# Patient Record
Sex: MALE | Race: WHITE | NOT HISPANIC OR LATINO | Employment: FULL TIME | URBAN - METROPOLITAN AREA
[De-identification: names, ages, dates, MRNs, and addresses within clinical notes are randomized per-mention and may not be internally consistent; named-entity substitution may affect disease eponyms.]

---

## 2017-01-05 ENCOUNTER — ANESTHESIA EVENT (OUTPATIENT)
Dept: GASTROENTEROLOGY | Facility: HOSPITAL | Age: 72
End: 2017-01-05
Payer: COMMERCIAL

## 2017-01-06 ENCOUNTER — HOSPITAL ENCOUNTER (OUTPATIENT)
Facility: HOSPITAL | Age: 72
Setting detail: OUTPATIENT SURGERY
Discharge: HOME | End: 2017-01-06
Attending: COLON & RECTAL SURGERY | Admitting: COLON & RECTAL SURGERY
Payer: COMMERCIAL

## 2017-01-06 ENCOUNTER — ANESTHESIA (OUTPATIENT)
Dept: GASTROENTEROLOGY | Facility: HOSPITAL | Age: 72
End: 2017-01-06
Payer: COMMERCIAL

## 2017-01-06 ENCOUNTER — GENERIC CONVERSION - ENCOUNTER (OUTPATIENT)
Dept: OTHER | Facility: OTHER | Age: 72
End: 2017-01-06

## 2017-01-06 VITALS
WEIGHT: 255 LBS | HEART RATE: 57 BPM | HEIGHT: 67 IN | TEMPERATURE: 98.1 F | DIASTOLIC BLOOD PRESSURE: 58 MMHG | OXYGEN SATURATION: 95 % | SYSTOLIC BLOOD PRESSURE: 127 MMHG | RESPIRATION RATE: 20 BRPM | BODY MASS INDEX: 40.02 KG/M2

## 2017-01-06 RX ORDER — SODIUM CHLORIDE 9 MG/ML
125 INJECTION, SOLUTION INTRAVENOUS CONTINUOUS
Status: DISCONTINUED | OUTPATIENT
Start: 2017-01-06 | End: 2017-01-06 | Stop reason: HOSPADM

## 2017-01-06 RX ORDER — NIFEDIPINE 60 MG/1
60 TABLET, FILM COATED, EXTENDED RELEASE ORAL DAILY
COMMUNITY
End: 2018-07-11 | Stop reason: SDUPTHER

## 2017-01-06 RX ORDER — PROPOFOL 10 MG/ML
INJECTION, EMULSION INTRAVENOUS AS NEEDED
Status: DISCONTINUED | OUTPATIENT
Start: 2017-01-06 | End: 2017-01-06 | Stop reason: SURG

## 2017-01-06 RX ORDER — ONDANSETRON 2 MG/ML
4 INJECTION INTRAMUSCULAR; INTRAVENOUS EVERY 6 HOURS PRN
Status: DISCONTINUED | OUTPATIENT
Start: 2017-01-06 | End: 2017-01-06 | Stop reason: HOSPADM

## 2017-01-06 RX ORDER — ALLOPURINOL 100 MG/1
300 TABLET ORAL DAILY
COMMUNITY
End: 2018-07-11 | Stop reason: SDUPTHER

## 2017-01-06 RX ORDER — LISINOPRIL AND HYDROCHLOROTHIAZIDE 12.5; 1 MG/1; MG/1
1 TABLET ORAL 2 TIMES DAILY
COMMUNITY
End: 2017-03-10

## 2017-01-06 RX ADMIN — PROPOFOL 50 MG: 10 INJECTION, EMULSION INTRAVENOUS at 08:15

## 2017-01-06 RX ADMIN — PROPOFOL 70 MG: 10 INJECTION, EMULSION INTRAVENOUS at 08:08

## 2017-01-06 RX ADMIN — PROPOFOL 30 MG: 10 INJECTION, EMULSION INTRAVENOUS at 08:09

## 2017-01-06 RX ADMIN — PROPOFOL 50 MG: 10 INJECTION, EMULSION INTRAVENOUS at 08:10

## 2017-01-06 RX ADMIN — SODIUM CHLORIDE 125 ML/HR: 0.9 INJECTION, SOLUTION INTRAVENOUS at 07:10

## 2017-01-06 RX ADMIN — PROPOFOL 50 MG: 10 INJECTION, EMULSION INTRAVENOUS at 08:27

## 2017-01-06 RX ADMIN — PROPOFOL 50 MG: 10 INJECTION, EMULSION INTRAVENOUS at 08:17

## 2017-01-06 RX ADMIN — PROPOFOL 50 MG: 10 INJECTION, EMULSION INTRAVENOUS at 08:23

## 2017-01-06 RX ADMIN — PROPOFOL 50 MG: 10 INJECTION, EMULSION INTRAVENOUS at 08:33

## 2017-01-27 ENCOUNTER — ALLSCRIPTS OFFICE VISIT (OUTPATIENT)
Dept: OTHER | Facility: OTHER | Age: 72
End: 2017-01-27

## 2017-02-21 ENCOUNTER — ALLSCRIPTS OFFICE VISIT (OUTPATIENT)
Dept: OTHER | Facility: OTHER | Age: 72
End: 2017-02-21

## 2017-02-21 ENCOUNTER — TRANSCRIBE ORDERS (OUTPATIENT)
Dept: ADMINISTRATIVE | Facility: HOSPITAL | Age: 72
End: 2017-02-21

## 2017-02-21 DIAGNOSIS — R10.31 PAIN IN THE GROIN, RIGHT: Primary | ICD-10-CM

## 2017-02-21 DIAGNOSIS — N28.9 UNSPECIFIED DISORDER OF KIDNEY AND URETER: ICD-10-CM

## 2017-02-21 DIAGNOSIS — R10.30 LOWER ABDOMINAL PAIN: ICD-10-CM

## 2017-02-24 ENCOUNTER — HOSPITAL ENCOUNTER (OUTPATIENT)
Dept: CT IMAGING | Facility: HOSPITAL | Age: 72
Discharge: HOME | End: 2017-02-24
Attending: SURGERY
Payer: COMMERCIAL

## 2017-02-24 DIAGNOSIS — R10.31 PAIN IN THE GROIN, RIGHT: ICD-10-CM

## 2017-02-24 PROCEDURE — 74176 CT ABD & PELVIS W/O CONTRAST: CPT

## 2017-03-07 ENCOUNTER — ALLSCRIPTS OFFICE VISIT (OUTPATIENT)
Dept: OTHER | Facility: OTHER | Age: 72
End: 2017-03-07

## 2017-03-10 ENCOUNTER — APPOINTMENT (OUTPATIENT)
Dept: PREADMISSION TESTING | Facility: HOSPITAL | Age: 72
End: 2017-03-10
Payer: COMMERCIAL

## 2017-03-10 ENCOUNTER — HOSPITAL ENCOUNTER (OUTPATIENT)
Dept: NON INVASIVE DIAGNOSTICS | Facility: HOSPITAL | Age: 72
Discharge: HOME | End: 2017-03-10
Attending: SURGERY
Payer: COMMERCIAL

## 2017-03-10 ENCOUNTER — ANESTHESIA EVENT (OUTPATIENT)
Dept: PERIOP | Facility: HOSPITAL | Age: 72
End: 2017-03-10
Payer: COMMERCIAL

## 2017-03-10 ENCOUNTER — APPOINTMENT (OUTPATIENT)
Dept: LAB | Facility: HOSPITAL | Age: 72
End: 2017-03-10
Attending: SURGERY
Payer: COMMERCIAL

## 2017-03-10 ENCOUNTER — TRANSCRIBE ORDERS (OUTPATIENT)
Dept: ADMINISTRATIVE | Facility: HOSPITAL | Age: 72
End: 2017-03-10

## 2017-03-10 VITALS
HEART RATE: 75 BPM | HEIGHT: 68 IN | WEIGHT: 266.4 LBS | BODY MASS INDEX: 40.38 KG/M2 | RESPIRATION RATE: 18 BRPM | DIASTOLIC BLOOD PRESSURE: 60 MMHG | TEMPERATURE: 97.1 F | SYSTOLIC BLOOD PRESSURE: 130 MMHG

## 2017-03-10 DIAGNOSIS — K40.90 INGUINAL HERNIA WITHOUT OBSTRUCTION OR GANGRENE, RECURRENCE NOT SPECIFIED, UNSPECIFIED LATERALITY: ICD-10-CM

## 2017-03-10 DIAGNOSIS — Z01.818 OTHER SPECIFIED PRE-OPERATIVE EXAMINATION: ICD-10-CM

## 2017-03-10 DIAGNOSIS — K42.9 UMBILICAL HERNIA WITHOUT OBSTRUCTION OR GANGRENE: ICD-10-CM

## 2017-03-10 DIAGNOSIS — Z01.818 OTHER SPECIFIED PRE-OPERATIVE EXAMINATION: Primary | ICD-10-CM

## 2017-03-10 LAB
ANION GAP SERPL CALCULATED.3IONS-SCNC: 8 MMOL/L (ref 4–13)
ATRIAL RATE: 66 BPM
ATRIAL RATE: 69 BPM
BASOPHILS # BLD AUTO: 0.03 THOUSANDS/ΜL (ref 0–0.1)
BASOPHILS NFR BLD AUTO: 0 % (ref 0–1)
BILIRUB UR QL STRIP: NEGATIVE
BUN SERPL-MCNC: 41 MG/DL (ref 5–25)
CALCIUM SERPL-MCNC: 9.4 MG/DL (ref 8.3–10.1)
CHLORIDE SERPL-SCNC: 103 MMOL/L (ref 100–108)
CLARITY UR: CLEAR
CO2 SERPL-SCNC: 25 MMOL/L (ref 21–32)
COLOR UR: YELLOW
CREAT SERPL-MCNC: 1.6 MG/DL (ref 0.6–1.3)
EOSINOPHIL # BLD AUTO: 0.31 THOUSAND/ΜL (ref 0–0.61)
EOSINOPHIL NFR BLD AUTO: 5 % (ref 0–6)
ERYTHROCYTE [DISTWIDTH] IN BLOOD BY AUTOMATED COUNT: 12.9 % (ref 11.6–15.1)
GFR SERPL CREATININE-BSD FRML MDRD: 42.7 ML/MIN/1.73SQ M
GLUCOSE SERPL-MCNC: 113 MG/DL (ref 65–140)
GLUCOSE UR STRIP-MCNC: NEGATIVE MG/DL
HCT VFR BLD AUTO: 40.2 % (ref 36.5–49.3)
HGB BLD-MCNC: 14.1 G/DL (ref 12–17)
HGB UR QL STRIP.AUTO: NEGATIVE
KETONES UR STRIP-MCNC: NEGATIVE MG/DL
LEUKOCYTE ESTERASE UR QL STRIP: NEGATIVE
LYMPHOCYTES # BLD AUTO: 2.56 THOUSANDS/ΜL (ref 0.6–4.47)
LYMPHOCYTES NFR BLD AUTO: 37 % (ref 14–44)
MCH RBC QN AUTO: 35 PG (ref 26.8–34.3)
MCHC RBC AUTO-ENTMCNC: 35.1 G/DL (ref 31.4–37.4)
MCV RBC AUTO: 100 FL (ref 82–98)
MONOCYTES # BLD AUTO: 0.97 THOUSAND/ΜL (ref 0.17–1.22)
MONOCYTES NFR BLD AUTO: 14 % (ref 4–12)
NEUTROPHILS # BLD AUTO: 3.04 THOUSANDS/ΜL (ref 1.85–7.62)
NEUTS SEG NFR BLD AUTO: 44 % (ref 43–75)
NITRITE UR QL STRIP: NEGATIVE
NRBC BLD AUTO-RTO: 0 /100 WBCS
P AXIS: 68 DEGREES
PH UR STRIP.AUTO: 5.5 [PH] (ref 4.5–8)
PLATELET # BLD AUTO: 145 THOUSANDS/UL (ref 149–390)
PMV BLD AUTO: 11.8 FL (ref 8.9–12.7)
POTASSIUM SERPL-SCNC: 4.6 MMOL/L (ref 3.5–5.3)
PROT UR STRIP-MCNC: NEGATIVE MG/DL
QRS AXIS: 4 DEGREES
QRS AXIS: 5 DEGREES
QRSD INTERVAL: 78 MS
QRSD INTERVAL: 86 MS
QT INTERVAL: 378 MS
QT INTERVAL: 388 MS
QTC INTERVAL: 396 MS
QTC INTERVAL: 415 MS
RBC # BLD AUTO: 4.03 MILLION/UL (ref 3.88–5.62)
SODIUM SERPL-SCNC: 136 MMOL/L (ref 136–145)
SP GR UR STRIP.AUTO: 1.01 (ref 1–1.03)
T WAVE AXIS: -6 DEGREES
T WAVE AXIS: 2 DEGREES
UROBILINOGEN UR QL STRIP.AUTO: 0.2 E.U./DL
VENTRICULAR RATE: 66 BPM
VENTRICULAR RATE: 69 BPM
WBC # BLD AUTO: 6.91 THOUSAND/UL (ref 4.31–10.16)

## 2017-03-10 PROCEDURE — 80048 BASIC METABOLIC PNL TOTAL CA: CPT

## 2017-03-10 PROCEDURE — 81003 URINALYSIS AUTO W/O SCOPE: CPT | Performed by: SURGERY

## 2017-03-10 PROCEDURE — 93005 ELECTROCARDIOGRAM TRACING: CPT

## 2017-03-10 PROCEDURE — 36415 COLL VENOUS BLD VENIPUNCTURE: CPT

## 2017-03-10 PROCEDURE — 85025 COMPLETE CBC W/AUTO DIFF WBC: CPT

## 2017-03-10 RX ORDER — LISINOPRIL AND HYDROCHLOROTHIAZIDE 20; 12.5 MG/1; MG/1
2 TABLET ORAL DAILY
COMMUNITY
End: 2018-07-11 | Stop reason: SDUPTHER

## 2017-03-10 RX ORDER — SODIUM CHLORIDE 9 MG/ML
125 INJECTION, SOLUTION INTRAVENOUS CONTINUOUS
Status: CANCELLED | OUTPATIENT
Start: 2017-03-10

## 2017-03-10 RX ORDER — AMOXICILLIN 500 MG
1 CAPSULE ORAL 3 TIMES DAILY
COMMUNITY

## 2017-03-16 ENCOUNTER — HOSPITAL ENCOUNTER (OUTPATIENT)
Facility: HOSPITAL | Age: 72
Setting detail: OUTPATIENT SURGERY
Discharge: HOME | End: 2017-03-16
Attending: SURGERY | Admitting: SURGERY
Payer: COMMERCIAL

## 2017-03-16 ENCOUNTER — ANESTHESIA (OUTPATIENT)
Dept: PERIOP | Facility: HOSPITAL | Age: 72
End: 2017-03-16
Payer: COMMERCIAL

## 2017-03-16 VITALS
DIASTOLIC BLOOD PRESSURE: 62 MMHG | OXYGEN SATURATION: 95 % | RESPIRATION RATE: 16 BRPM | TEMPERATURE: 98.7 F | SYSTOLIC BLOOD PRESSURE: 127 MMHG | HEART RATE: 85 BPM | WEIGHT: 271.6 LBS | HEIGHT: 68 IN | BODY MASS INDEX: 41.16 KG/M2

## 2017-03-16 PROCEDURE — C1713 ANCHOR/SCREW BN/BN,TIS/BN: HCPCS | Performed by: SURGERY

## 2017-03-16 PROCEDURE — C1781 MESH (IMPLANTABLE): HCPCS | Performed by: SURGERY

## 2017-03-16 DEVICE — BARD 3DMAX MESH RIGHT LARGE
Type: IMPLANTABLE DEVICE | Site: INGUINAL | Status: FUNCTIONAL
Brand: BARD 3DMAX MESH

## 2017-03-16 RX ORDER — FENTANYL CITRATE 50 UG/ML
INJECTION, SOLUTION INTRAMUSCULAR; INTRAVENOUS AS NEEDED
Status: DISCONTINUED | OUTPATIENT
Start: 2017-03-16 | End: 2017-03-16 | Stop reason: SURG

## 2017-03-16 RX ORDER — HYDROCODONE BITARTRATE AND ACETAMINOPHEN 5; 325 MG/1; MG/1
1 TABLET ORAL EVERY 4 HOURS PRN
Status: DISCONTINUED | OUTPATIENT
Start: 2017-03-16 | End: 2017-03-16 | Stop reason: HOSPADM

## 2017-03-16 RX ORDER — MORPHINE SULFATE 2 MG/ML
2 INJECTION, SOLUTION INTRAMUSCULAR; INTRAVENOUS
Status: DISCONTINUED | OUTPATIENT
Start: 2017-03-16 | End: 2017-03-16 | Stop reason: HOSPADM

## 2017-03-16 RX ORDER — BUPIVACAINE HYDROCHLORIDE AND EPINEPHRINE 2.5; 5 MG/ML; UG/ML
INJECTION, SOLUTION EPIDURAL; INFILTRATION; INTRACAUDAL; PERINEURAL AS NEEDED
Status: DISCONTINUED | OUTPATIENT
Start: 2017-03-16 | End: 2017-03-16 | Stop reason: HOSPADM

## 2017-03-16 RX ORDER — FENTANYL CITRATE/PF 50 MCG/ML
50 SYRINGE (ML) INJECTION
Status: DISCONTINUED | OUTPATIENT
Start: 2017-03-16 | End: 2017-03-16 | Stop reason: HOSPADM

## 2017-03-16 RX ORDER — ROCURONIUM BROMIDE 10 MG/ML
INJECTION, SOLUTION INTRAVENOUS AS NEEDED
Status: DISCONTINUED | OUTPATIENT
Start: 2017-03-16 | End: 2017-03-16 | Stop reason: SURG

## 2017-03-16 RX ORDER — SODIUM CHLORIDE 9 MG/ML
125 INJECTION, SOLUTION INTRAVENOUS CONTINUOUS
Status: DISCONTINUED | OUTPATIENT
Start: 2017-03-16 | End: 2017-03-16 | Stop reason: HOSPADM

## 2017-03-16 RX ORDER — HYDROCODONE BITARTRATE AND ACETAMINOPHEN 5; 325 MG/1; MG/1
2 TABLET ORAL EVERY 6 HOURS PRN
Status: DISCONTINUED | OUTPATIENT
Start: 2017-03-16 | End: 2017-03-16 | Stop reason: HOSPADM

## 2017-03-16 RX ORDER — ALBUTEROL SULFATE 2.5 MG/3ML
2.5 SOLUTION RESPIRATORY (INHALATION) ONCE AS NEEDED
Status: DISCONTINUED | OUTPATIENT
Start: 2017-03-16 | End: 2017-03-16 | Stop reason: HOSPADM

## 2017-03-16 RX ORDER — PROPOFOL 10 MG/ML
INJECTION, EMULSION INTRAVENOUS AS NEEDED
Status: DISCONTINUED | OUTPATIENT
Start: 2017-03-16 | End: 2017-03-16 | Stop reason: SURG

## 2017-03-16 RX ORDER — MAGNESIUM HYDROXIDE 1200 MG/15ML
LIQUID ORAL AS NEEDED
Status: DISCONTINUED | OUTPATIENT
Start: 2017-03-16 | End: 2017-03-16 | Stop reason: HOSPADM

## 2017-03-16 RX ORDER — EPHEDRINE SULFATE 50 MG/ML
INJECTION, SOLUTION INTRAVENOUS AS NEEDED
Status: DISCONTINUED | OUTPATIENT
Start: 2017-03-16 | End: 2017-03-16 | Stop reason: SURG

## 2017-03-16 RX ORDER — ONDANSETRON 2 MG/ML
INJECTION INTRAMUSCULAR; INTRAVENOUS AS NEEDED
Status: DISCONTINUED | OUTPATIENT
Start: 2017-03-16 | End: 2017-03-16 | Stop reason: SURG

## 2017-03-16 RX ORDER — ONDANSETRON 2 MG/ML
4 INJECTION INTRAMUSCULAR; INTRAVENOUS ONCE
Status: DISCONTINUED | OUTPATIENT
Start: 2017-03-16 | End: 2017-03-16 | Stop reason: HOSPADM

## 2017-03-16 RX ORDER — SODIUM CHLORIDE, SODIUM LACTATE, POTASSIUM CHLORIDE, CALCIUM CHLORIDE 600; 310; 30; 20 MG/100ML; MG/100ML; MG/100ML; MG/100ML
125 INJECTION, SOLUTION INTRAVENOUS CONTINUOUS
Status: DISCONTINUED | OUTPATIENT
Start: 2017-03-16 | End: 2017-03-16 | Stop reason: HOSPADM

## 2017-03-16 RX ORDER — HYDROCODONE BITARTRATE AND ACETAMINOPHEN 5; 325 MG/1; MG/1
1-2 TABLET ORAL EVERY 6 HOURS PRN
Qty: 40 TABLET | Refills: 0 | Status: SHIPPED | OUTPATIENT
Start: 2017-03-16 | End: 2017-03-26

## 2017-03-16 RX ADMIN — EPHEDRINE SULFATE 10 MG: 50 INJECTION, SOLUTION INTRAMUSCULAR; INTRAVENOUS; SUBCUTANEOUS at 14:21

## 2017-03-16 RX ADMIN — ONDANSETRON HYDROCHLORIDE 4 MG: 2 INJECTION, SOLUTION INTRAVENOUS at 13:21

## 2017-03-16 RX ADMIN — ROCURONIUM BROMIDE 10 MG: 10 INJECTION, SOLUTION INTRAVENOUS at 13:50

## 2017-03-16 RX ADMIN — FENTANYL CITRATE 50 MCG: 50 INJECTION, SOLUTION INTRAMUSCULAR; INTRAVENOUS at 13:56

## 2017-03-16 RX ADMIN — ROCURONIUM BROMIDE 30 MG: 10 INJECTION, SOLUTION INTRAVENOUS at 13:36

## 2017-03-16 RX ADMIN — DEXAMETHASONE SODIUM PHOSPHATE 4 MG: 10 INJECTION INTRAMUSCULAR; INTRAVENOUS at 13:22

## 2017-03-16 RX ADMIN — CEFAZOLIN SODIUM 3000 MG: 2 SOLUTION INTRAVENOUS at 13:43

## 2017-03-16 RX ADMIN — FENTANYL CITRATE 50 MCG: 50 INJECTION INTRAMUSCULAR; INTRAVENOUS at 16:00

## 2017-03-16 RX ADMIN — PROPOFOL 150 MG: 10 INJECTION, EMULSION INTRAVENOUS at 13:36

## 2017-03-16 RX ADMIN — SODIUM CHLORIDE: 0.9 INJECTION, SOLUTION INTRAVENOUS at 14:35

## 2017-03-16 RX ADMIN — SODIUM CHLORIDE 125 ML/HR: 0.9 INJECTION, SOLUTION INTRAVENOUS at 10:46

## 2017-03-16 RX ADMIN — FENTANYL CITRATE 50 MCG: 50 INJECTION INTRAMUSCULAR; INTRAVENOUS at 15:53

## 2017-03-16 RX ADMIN — FENTANYL CITRATE 50 MCG: 50 INJECTION, SOLUTION INTRAMUSCULAR; INTRAVENOUS at 13:21

## 2017-03-29 ENCOUNTER — ALLSCRIPTS OFFICE VISIT (OUTPATIENT)
Dept: OTHER | Facility: OTHER | Age: 72
End: 2017-03-29

## 2017-07-01 DIAGNOSIS — M10.9 GOUT: ICD-10-CM

## 2017-07-01 DIAGNOSIS — R73.09 OTHER ABNORMAL GLUCOSE: ICD-10-CM

## 2017-07-01 DIAGNOSIS — I10 ESSENTIAL (PRIMARY) HYPERTENSION: ICD-10-CM

## 2017-07-01 DIAGNOSIS — N18.30 CHRONIC KIDNEY DISEASE, STAGE III (MODERATE) (HCC): ICD-10-CM

## 2017-07-01 DIAGNOSIS — E78.1 PURE HYPERGLYCERIDEMIA: ICD-10-CM

## 2017-08-14 ENCOUNTER — ALLSCRIPTS OFFICE VISIT (OUTPATIENT)
Dept: OTHER | Facility: OTHER | Age: 72
End: 2017-08-14

## 2018-01-12 VITALS
BODY MASS INDEX: 39.4 KG/M2 | HEART RATE: 85 BPM | OXYGEN SATURATION: 96 % | DIASTOLIC BLOOD PRESSURE: 70 MMHG | TEMPERATURE: 98.5 F | HEIGHT: 68 IN | SYSTOLIC BLOOD PRESSURE: 140 MMHG | WEIGHT: 260 LBS

## 2018-01-12 VITALS
BODY MASS INDEX: 41.08 KG/M2 | DIASTOLIC BLOOD PRESSURE: 62 MMHG | TEMPERATURE: 98.3 F | HEART RATE: 76 BPM | RESPIRATION RATE: 16 BRPM | SYSTOLIC BLOOD PRESSURE: 130 MMHG | WEIGHT: 271.05 LBS | HEIGHT: 68 IN

## 2018-01-13 VITALS
HEIGHT: 69 IN | OXYGEN SATURATION: 97 % | HEART RATE: 82 BPM | SYSTOLIC BLOOD PRESSURE: 138 MMHG | TEMPERATURE: 96.8 F | BODY MASS INDEX: 38.83 KG/M2 | DIASTOLIC BLOOD PRESSURE: 62 MMHG | WEIGHT: 262.19 LBS | RESPIRATION RATE: 20 BRPM

## 2018-01-14 VITALS
WEIGHT: 264.02 LBS | DIASTOLIC BLOOD PRESSURE: 62 MMHG | BODY MASS INDEX: 40.01 KG/M2 | SYSTOLIC BLOOD PRESSURE: 124 MMHG | HEART RATE: 80 BPM | RESPIRATION RATE: 16 BRPM | HEIGHT: 68 IN | TEMPERATURE: 98.1 F

## 2018-01-14 VITALS
SYSTOLIC BLOOD PRESSURE: 120 MMHG | HEART RATE: 96 BPM | TEMPERATURE: 98.4 F | RESPIRATION RATE: 26 BRPM | WEIGHT: 259.01 LBS | DIASTOLIC BLOOD PRESSURE: 62 MMHG | HEIGHT: 68 IN | BODY MASS INDEX: 39.25 KG/M2

## 2018-02-01 DIAGNOSIS — I10 ESSENTIAL (PRIMARY) HYPERTENSION: ICD-10-CM

## 2018-02-01 DIAGNOSIS — E78.1 PURE HYPERGLYCERIDEMIA: ICD-10-CM

## 2018-02-01 DIAGNOSIS — N18.30 CHRONIC KIDNEY DISEASE, STAGE III (MODERATE) (HCC): ICD-10-CM

## 2018-02-01 DIAGNOSIS — Z12.5 ENCOUNTER FOR SCREENING FOR MALIGNANT NEOPLASM OF PROSTATE: ICD-10-CM

## 2018-02-01 DIAGNOSIS — M10.9 GOUT: ICD-10-CM

## 2018-02-01 DIAGNOSIS — R73.09 OTHER ABNORMAL GLUCOSE: ICD-10-CM

## 2018-02-19 ENCOUNTER — OFFICE VISIT (OUTPATIENT)
Dept: FAMILY MEDICINE CLINIC | Facility: CLINIC | Age: 73
End: 2018-02-19
Payer: COMMERCIAL

## 2018-02-19 VITALS
BODY MASS INDEX: 41.18 KG/M2 | SYSTOLIC BLOOD PRESSURE: 142 MMHG | WEIGHT: 262.4 LBS | HEART RATE: 78 BPM | RESPIRATION RATE: 16 BRPM | HEIGHT: 67 IN | OXYGEN SATURATION: 94 % | TEMPERATURE: 99.1 F | DIASTOLIC BLOOD PRESSURE: 80 MMHG

## 2018-02-19 DIAGNOSIS — M10.9 GOUT, UNSPECIFIED CAUSE, UNSPECIFIED CHRONICITY, UNSPECIFIED SITE: ICD-10-CM

## 2018-02-19 DIAGNOSIS — I10 ESSENTIAL HYPERTENSION: ICD-10-CM

## 2018-02-19 DIAGNOSIS — N18.30 CHRONIC KIDNEY DISEASE, STAGE III (MODERATE) (HCC): Primary | ICD-10-CM

## 2018-02-19 DIAGNOSIS — R73.09 ABNORMAL SERUM GLUCOSE LEVEL: ICD-10-CM

## 2018-02-19 DIAGNOSIS — E78.1 ESSENTIAL HYPERTRIGLYCERIDEMIA: ICD-10-CM

## 2018-02-19 PROCEDURE — 99214 OFFICE O/P EST MOD 30 MIN: CPT | Performed by: FAMILY MEDICINE

## 2018-02-19 NOTE — PROGRESS NOTES
Assessment/Plan:    Essential hypertension  Blood pressure well controlled on combination of lisinopril hydrochlorothiazide and nifedipine  Continue current dosage  Chronic kidney disease, stage III (moderate)  Chronic kidney disease is stable and in fact his creatinine is actually improved slightly to 1 52  Yudy Negro Continue to monitor    Essential hypertriglyceridemia  Lipids relatively stable  Total cholesterol and an LDL at goal with an LDL below 70 however triglycerides remain moderately elevated at greater than 300  Continue with dietary management exercise and Omega 3 fatty acid supplement  Gout  No recent gout flares  Continue with allopurinol  Uric acid level below 6  Diagnoses and all orders for this visit:    Chronic kidney disease, stage III (moderate)    Essential hypertriglyceridemia  -     Lipid Panel with Direct LDL reflex; Future  -     TSH, 3rd generation with T4 reflex; Future    Abnormal serum glucose level  -     Comprehensive metabolic panel; Future  -     Hemoglobin A1c; Future    Gout, unspecified cause, unspecified chronicity, unspecified site    Essential hypertension          Subjective:      Patient ID: Gabriella Mccoy is a 67 y o  male  Patient presents for routine follow-up chronic medical problems and review his recent blood work  He is being followed for hypertension, chronic kidney disease, hypertriglyceridemia, gout and elevated blood glucose  He also takes vitamin-D supplement for a vitamin-D deficiency  His regular medications include allopurinol for his gout, lisinopril hydrochlorothiazide and nifedipine for his blood pressure  He takes fish oil for his hypertriglyceridemia  He has no complaints at this time and feels generally well          The following portions of the patient's history were reviewed and updated as appropriate: allergies, current medications, past family history, past medical history, past social history, past surgical history and problem list     Review of Systems   Constitutional: Negative  Respiratory: Negative  Cardiovascular: Negative  Gastrointestinal: Negative  Genitourinary: Negative  Musculoskeletal: Negative  Psychiatric/Behavioral: Negative  Objective:      /80 (BP Location: Right arm, Patient Position: Sitting, Cuff Size: Large)   Pulse 78   Temp 99 1 °F (37 3 °C) (Tympanic)   Resp 16   Ht 5' 6 93" (1 7 m)   Wt 119 kg (262 lb 6 4 oz)   SpO2 94%   BMI 41 18 kg/m²          Physical Exam   Constitutional: He is oriented to person, place, and time  He appears well-developed and well-nourished  No distress  HENT:   Head: Normocephalic and atraumatic  Eyes: Conjunctivae are normal  Pupils are equal, round, and reactive to light  Right eye exhibits no discharge  Neck: Normal range of motion  No thyromegaly present  Cardiovascular: Normal rate and regular rhythm  Pulmonary/Chest: Effort normal and breath sounds normal  No respiratory distress  Lymphadenopathy:     He has no cervical adenopathy  Neurological: He is alert and oriented to person, place, and time  Skin: Skin is warm and dry  He is not diaphoretic  Psychiatric: He has a normal mood and affect  His behavior is normal  Judgment and thought content normal    Nursing note and vitals reviewed

## 2018-02-19 NOTE — ASSESSMENT & PLAN NOTE
Chronic kidney disease is stable and in fact his creatinine is actually improved slightly to 1 52  Monia Le   Continue to monitor

## 2018-02-19 NOTE — ASSESSMENT & PLAN NOTE
Blood pressure well controlled on combination of lisinopril hydrochlorothiazide and nifedipine  Continue current dosage

## 2018-02-19 NOTE — ASSESSMENT & PLAN NOTE
Lipids relatively stable  Total cholesterol and an LDL at goal with an LDL below 70 however triglycerides remain moderately elevated at greater than 300  Continue with dietary management exercise and Omega 3 fatty acid supplement

## 2018-04-02 LAB — HBA1C MFR BLD HPLC: 5.9 %

## 2018-07-06 LAB
CHOLEST SERPL-MCNC: 125 MG/DL (ref 50–200)
HBA1C MFR BLD HPLC: 5.8 %
HBA1C MFR BLD: 5.8 % (ref 4.2–6.3)
HDLC SERPL-MCNC: 28 MG/DL (ref 40–60)
LDLC SERPL DIRECT ASSAY-MCNC: 45 MG/DL
LDLC/HDLC SERPL: 4.46 {RATIO}
TRIGL SERPL-MCNC: 261 MG/DL (ref ?–150)
TSH SERPL DL<=0.05 MIU/L-ACNC: 2.39 UIU/ML (ref 0.34–4.82)

## 2018-07-09 LAB
EXT GLUCOSE BLD: 111
EXTERNAL ALBUMIN: 3.7
EXTERNAL ALK PHOS: 87
EXTERNAL ALT: 34
EXTERNAL ANION GAP: 6
EXTERNAL AST: 23
EXTERNAL BICARBONATE: 26
EXTERNAL BUN: 43
EXTERNAL CALCIUM: 9.5
EXTERNAL CHLORIDE: 108
EXTERNAL CREATININE: 1.58
EXTERNAL EGFR: 42
EXTERNAL POTASSIUM: 4.7
EXTERNAL SODIUM: 140
EXTERNAL T.BILIRUBIN: 0.4
EXTERNAL TOTAL PROTEIN: 7.3

## 2018-07-11 ENCOUNTER — OFFICE VISIT (OUTPATIENT)
Dept: FAMILY MEDICINE CLINIC | Facility: CLINIC | Age: 73
End: 2018-07-11
Payer: COMMERCIAL

## 2018-07-11 VITALS
SYSTOLIC BLOOD PRESSURE: 120 MMHG | TEMPERATURE: 98.5 F | DIASTOLIC BLOOD PRESSURE: 60 MMHG | BODY MASS INDEX: 41.36 KG/M2 | WEIGHT: 263.5 LBS | RESPIRATION RATE: 16 BRPM | OXYGEN SATURATION: 97 % | HEART RATE: 77 BPM | HEIGHT: 67 IN

## 2018-07-11 DIAGNOSIS — G89.29 CHRONIC PAIN OF RIGHT KNEE: ICD-10-CM

## 2018-07-11 DIAGNOSIS — E79.0 HYPERURICEMIA: ICD-10-CM

## 2018-07-11 DIAGNOSIS — Z01.818 PRE-OP EXAMINATION: Primary | ICD-10-CM

## 2018-07-11 DIAGNOSIS — M25.561 CHRONIC PAIN OF RIGHT KNEE: ICD-10-CM

## 2018-07-11 DIAGNOSIS — I10 ESSENTIAL HYPERTENSION: ICD-10-CM

## 2018-07-11 PROBLEM — M17.10 OSTEOARTHRITIS OF KNEE: Status: ACTIVE | Noted: 2018-07-11

## 2018-07-11 PROBLEM — M19.91 LOCALIZED, PRIMARY OSTEOARTHRITIS: Status: ACTIVE | Noted: 2018-07-11

## 2018-07-11 PROBLEM — M25.569 KNEE PAIN: Status: ACTIVE | Noted: 2018-07-11

## 2018-07-11 PROBLEM — M17.9 OSTEOARTHRITIS OF KNEE: Status: ACTIVE | Noted: 2018-07-11

## 2018-07-11 PROCEDURE — 4040F PNEUMOC VAC/ADMIN/RCVD: CPT | Performed by: FAMILY MEDICINE

## 2018-07-11 PROCEDURE — 3078F DIAST BP <80 MM HG: CPT | Performed by: FAMILY MEDICINE

## 2018-07-11 PROCEDURE — 3074F SYST BP LT 130 MM HG: CPT | Performed by: FAMILY MEDICINE

## 2018-07-11 PROCEDURE — 99214 OFFICE O/P EST MOD 30 MIN: CPT | Performed by: FAMILY MEDICINE

## 2018-07-11 RX ORDER — LISINOPRIL AND HYDROCHLOROTHIAZIDE 20; 12.5 MG/1; MG/1
2 TABLET ORAL DAILY
Qty: 60 TABLET | Refills: 0 | Status: SHIPPED | OUTPATIENT
Start: 2018-07-11 | End: 2018-07-11 | Stop reason: SDUPTHER

## 2018-07-11 RX ORDER — LISINOPRIL AND HYDROCHLOROTHIAZIDE 20; 12.5 MG/1; MG/1
2 TABLET ORAL DAILY
Qty: 180 TABLET | Refills: 3 | Status: SHIPPED | OUTPATIENT
Start: 2018-07-11 | End: 2019-01-11 | Stop reason: SDUPTHER

## 2018-07-11 RX ORDER — NIFEDIPINE 60 MG/1
60 TABLET, FILM COATED, EXTENDED RELEASE ORAL DAILY
Qty: 90 TABLET | Refills: 3 | Status: SHIPPED | OUTPATIENT
Start: 2018-07-11 | End: 2019-01-11 | Stop reason: SDUPTHER

## 2018-07-11 RX ORDER — ALLOPURINOL 100 MG/1
300 TABLET ORAL DAILY
Qty: 90 TABLET | Refills: 3 | Status: SHIPPED | OUTPATIENT
Start: 2018-07-11 | End: 2018-10-21 | Stop reason: SDUPTHER

## 2018-07-11 NOTE — PROGRESS NOTES
Assessment/Plan:    Preop clearance    Patient presented for preop clearance for right knee arthroscopy due to chronic pain and meniscus injury  I reviewed all of his preop testing including blood work urinalysis EKG and chest x-ray all of which are acceptable  The patient's chronic medical problems which include hypertension and hyperuricemia are stable and well controlled on his current medical regimen  He is low risk for the proposed surgery and he is medically cleared to proceed  Diagnoses and all orders for this visit:    Pre-op examination    Chronic pain of right knee    Essential hypertension  -     Discontinue: lisinopril-hydrochlorothiazide (PRINZIDE,ZESTORETIC) 20-12 5 MG per tablet; Take 2 tablets by mouth daily  -     lisinopril-hydrochlorothiazide (PRINZIDE,ZESTORETIC) 20-12 5 MG per tablet; Take 2 tablets by mouth daily  -     NIFEdipine ER (ADALAT CC) 60 MG 24 hr tablet; Take 1 tablet (60 mg total) by mouth daily    Hyperuricemia  -     allopurinol (ZYLOPRIM) 100 mg tablet; Take 3 tablets (300 mg total) by mouth daily          Subjective:      Patient ID: Joseph Damian is a 75 y o  male  Joseph Damian  75 y o   male    SURGEON: Praveena    SURGERY/PROCEDURE: Right knee arthroscopy    DATE OF SURGERY: 7/18    PRIOR ANESTHESIA:yes    COMPLICATION: no    BLEEDING PROBLEM: no    PERTINENT PMH:  Hypertension, hyperlipidemia, CKd st 3    EXERCISE CAPACITY:   CAN WALK 4 BLOCKS AND OR CLIMB 2 FLIGHTS: Yes    HOME LIVING SITUATION SAFE AND SECURE: Yes      TOBACCO: no     ETOH: no     ILLEGAL DRUGS: no        The following portions of the patient's history were reviewed and updated as appropriate: allergies, current medications, past family history, past medical history, past social history, past surgical history and problem list     Review of Systems   Constitutional: Negative  Respiratory: Negative  Cardiovascular: Negative  Gastrointestinal: Negative      Genitourinary: Negative  Musculoskeletal: Positive for arthralgias  Psychiatric/Behavioral: Negative  Objective:      /60 (BP Location: Left arm, Patient Position: Sitting, Cuff Size: Adult)   Pulse 77   Temp 98 5 °F (36 9 °C) (Tympanic)   Resp 16   Ht 5' 7" (1 702 m)   Wt 120 kg (263 lb 8 oz)   SpO2 97%   BMI 41 27 kg/m²          Physical Exam   Constitutional: He is oriented to person, place, and time  He appears well-developed and well-nourished  No distress  HENT:   Head: Normocephalic and atraumatic  Eyes: Conjunctivae are normal  Pupils are equal, round, and reactive to light  Right eye exhibits no discharge  Neck: Normal range of motion  No thyromegaly present  Cardiovascular: Normal rate and regular rhythm  Pulmonary/Chest: Effort normal and breath sounds normal  No respiratory distress  Lymphadenopathy:     He has no cervical adenopathy  Neurological: He is alert and oriented to person, place, and time  Skin: Skin is warm and dry  He is not diaphoretic  Psychiatric: He has a normal mood and affect  His behavior is normal  Judgment and thought content normal    Nursing note and vitals reviewed

## 2018-07-19 ENCOUNTER — TRANSCRIBE ORDERS (OUTPATIENT)
Dept: PHYSICAL THERAPY | Facility: CLINIC | Age: 73
End: 2018-07-19

## 2018-07-19 ENCOUNTER — EVALUATION (OUTPATIENT)
Dept: PHYSICAL THERAPY | Facility: CLINIC | Age: 73
End: 2018-07-19
Payer: COMMERCIAL

## 2018-07-19 DIAGNOSIS — M25.561 ACUTE PAIN OF RIGHT KNEE: ICD-10-CM

## 2018-07-19 DIAGNOSIS — S83.221D PERIPHERAL TEAR OF MEDIAL MENISCUS OF RIGHT KNEE AS CURRENT INJURY, SUBSEQUENT ENCOUNTER: Primary | ICD-10-CM

## 2018-07-19 DIAGNOSIS — S83.221A PERIPHERAL TEAR OF MEDIAL MENISCUS OF RIGHT KNEE AS CURRENT INJURY, INITIAL ENCOUNTER: Primary | ICD-10-CM

## 2018-07-19 PROCEDURE — 97161 PT EVAL LOW COMPLEX 20 MIN: CPT | Performed by: PHYSICAL THERAPIST

## 2018-07-19 PROCEDURE — 97110 THERAPEUTIC EXERCISES: CPT | Performed by: PHYSICAL THERAPIST

## 2018-07-19 PROCEDURE — G8980 MOBILITY D/C STATUS: HCPCS | Performed by: PHYSICAL THERAPIST

## 2018-07-19 PROCEDURE — G8979 MOBILITY GOAL STATUS: HCPCS | Performed by: PHYSICAL THERAPIST

## 2018-07-19 PROCEDURE — G8978 MOBILITY CURRENT STATUS: HCPCS | Performed by: PHYSICAL THERAPIST

## 2018-07-19 NOTE — LETTER
1356    Kalyi John MD  [facility] [address] [place] [address]    Patient: Jeffrey Palacios   YOB: 1945   Date of Visit: 2018     Encounter Diagnosis     ICD-10-CM    1  Peripheral tear of medial meniscus of right knee as current injury, initial encounter S83 221A    2  Acute pain of right knee M25 561        Dear Dr Joseph Oglesby:    Please review the attached Plan of Care from Kenney Rawls's recent visit  Please verify that you agree therapy should continue by signing the attached document and sending it back to our office  If you have any questions or concerns, please don't hesitate to call  Sincerely,    Bruna Díaz PT      Referring Provider:      I certify that I have read the below Plan of Care and certify the need for these services furnished under this plan of treatment while under my care  Kayli John MD  [facility]: [phone]          PT Evaluation     Today's date: 2018  Patient name: Jeffrey Palacios  : 1945  MRN: [id number]  Referring provider: Binu Tapia MD  Dx:   Encounter Diagnosis     ICD-10-CM    1  Peripheral tear of medial meniscus of right knee as current injury, initial encounter S83 221A    2  Acute pain of right knee M25 561                   Assessment  Impairments: abnormal gait, abnormal or restricted ROM, impaired physical strength, lacks appropriate home exercise program and pain with function    Assessment details: Presents s/p R knee arthroscopy 1 day w/ limited R knee ROM, mild edema presence, patellar hypomobility, and limited R knee strength w/ noted mild quad atrophy and reduced VMO timing  Gt is mildly antalgic but pt refuses use of AD stating he does not feel it necessary  Pt anticipates a RTW by this upcoming Monday and indicates a preference to maintain HEP vs consistent therapy sessions    Advised pt in post-operative affects, importance of restoring ROM and quad activation, and is provided HEP  Will f/u in 1 wk to assess progress and determine need for skilled Tx on a wkly basis as pt advances toward established goals  Understanding of Dx/Px/POC: good   Prognosis: good    Goals  ST  Decrease PN <2/10 w/ all activity within 2-3 wks  2  Increase LE strength and quad activiation + 1 grade within 2-3 wks  LT  I in HEP within 4-5 wks  2  PN <1/10 w/ all activity within 4-5 wks  3  Strength 5/5 t/o R LE within 4-5 wks  4  Restore WNL ROM R LE within 4-5 wks    Plan  Patient would benefit from: skilled physical therapy  Planned modality interventions: cryotherapy  Planned therapy interventions: manual therapy, joint mobilization, strengthening, stretching, therapeutic exercise, home exercise program and patient education  Frequency: 1x week  Duration in visits: 5  Duration in weeks: 5  Plan of Care beginning date: 2018  Plan of Care expiration date: 2018  Treatment plan discussed with: patient        Subjective Evaluation    History of Present Illness  Date of surgery: 2018  Mechanism of injury: Presents s/p 1 day after R knee arthroscopy to address medial meniscus tear  Pt admits PN, limited motion, and antalgic gt but denies use of AD and reports he's anxious to have the bandaging removed/dressing changed  Pt admits PMHx of R knee PN, also L knee TKR several yrs ago  Pt plans to RTW next week as  which requires mixed standing and sitting     Not a recurrent problem   Quality of life: good    Pain  Current pain ratin  At best pain ratin  At worst pain ratin  Quality: dull ache, discomfort and tight  Relieving factors: ice and medications  Aggravating factors: walking and stair climbing  Progression: improved    Treatments  No previous or current treatments  Patient Goals  Patient goals for therapy: decreased edema, decreased pain, increased motion, return to sport/leisure activities, return to work and increased strength          Objective     Tenderness     Right Knee   Tenderness in the medial joint line and popliteal fossa  Active Range of Motion   Left Knee   Flexion: 118 degrees     Right Knee   Flexion: 90 degrees   Extension: -8 degrees   Extensor la degrees     Passive Range of Motion     Right Knee   Flexion: 106 degrees with pain  Extension: -4 degrees with pain    Mobility   Patellar Mobility:     Right Knee   Hypomobile: medial and lateral     Strength/Myotome Testing     Left Knee   Flexion: 4+  Extension: 4+    Right Knee   Flexion: 3+  Extension: 3    Swelling     Left Knee Girth Measurement (cm)   Joint line: 44 cm    Right Knee Girth Measurement (cm)   Joint line: 43 25 cm    General Comments     Knee Comments  Gait: Antalgic, reduced heel to toe and slowed melissa w/ no AD             Precautions: HBP worse w/ exercise    Daily Treatment Diary     Manual                                                                                   Exercise Diary                                                                                                                                                                                                                                                                                      Modalities

## 2018-07-19 NOTE — PROGRESS NOTES
PT Evaluation  and PT Discharge    Today's date: 2018  Patient name: Jose Manuel Pugh  : 1945  MRN: [id number]  Referring provider: Zayra Vidales MD  Dx:   Encounter Diagnosis     ICD-10-CM    1  Peripheral tear of medial meniscus of right knee as current injury, initial encounter S83 221A    2  Acute pain of right knee M25 561                   Assessment  Impairments: abnormal gait, abnormal or restricted ROM, impaired physical strength, lacks appropriate home exercise program and pain with function    Assessment details: Presents s/p R knee arthroscopy 1 day w/ limited R knee ROM, mild edema presence, patellar hypomobility, and limited R knee strength w/ noted mild quad atrophy and reduced VMO timing  Gt is mildly antalgic but pt refuses use of AD stating he does not feel it necessary  Pt anticipates a RTW by this upcoming Monday and indicates a preference to maintain HEP vs consistent therapy sessions  Advised pt in post-operative affects, importance of restoring ROM and quad activation, and is provided HEP  Will f/u in 1 wk to assess progress and determine need for skilled Tx on a wkly basis as pt advances toward established goals  Understanding of Dx/Px/POC: good   Prognosis: good    Goals  ST  Decrease PN <2/10 w/ all activity within 2-3 wks  2  Increase LE strength and quad activiation + 1 grade within 2-3 wks  LT  I in HEP within 4-5 wks  2  PN <1/10 w/ all activity within 4-5 wks  3  Strength 5/5 t/o R LE within 4-5 wks  4   Restore WNL ROM R LE within 4-5 wks    Plan  Patient would benefit from: skilled physical therapy  Planned modality interventions: cryotherapy  Planned therapy interventions: manual therapy, joint mobilization, strengthening, stretching, therapeutic exercise, home exercise program and patient education  Frequency: 1x week  Duration in visits: 5  Duration in weeks: 5  Plan of Care beginning date: 2018  Plan of Care expiration date: 2018  Treatment plan discussed with: patient        Subjective Evaluation    History of Present Illness  Date of surgery: 2018  Mechanism of injury: Presents s/p 1 day after R knee arthroscopy to address medial meniscus tear  Pt admits PN, limited motion, and antalgic gt but denies use of AD and reports he's anxious to have the bandaging removed/dressing changed  Pt admits PMHx of R knee PN, also L knee TKR several yrs ago  Pt plans to RTW next week as  which requires mixed standing and sitting  Not a recurrent problem   Quality of life: good    Pain  Current pain ratin  At best pain ratin  At worst pain ratin  Quality: dull ache, discomfort and tight  Relieving factors: ice and medications  Aggravating factors: walking and stair climbing  Progression: improved    Treatments  No previous or current treatments  Patient Goals  Patient goals for therapy: decreased edema, decreased pain, increased motion, return to sport/leisure activities, return to work and increased strength          Objective     Tenderness     Right Knee   Tenderness in the medial joint line and popliteal fossa  Active Range of Motion   Left Knee   Flexion: 118 degrees     Right Knee   Flexion: 90 degrees   Extension: -8 degrees   Extensor la degrees     Passive Range of Motion     Right Knee   Flexion: 106 degrees with pain  Extension: -4 degrees with pain    Mobility   Patellar Mobility:     Right Knee   Hypomobile: medial and lateral     Strength/Myotome Testing     Left Knee   Flexion: 4+  Extension: 4+    Right Knee   Flexion: 3+  Extension: 3    Swelling     Left Knee Girth Measurement (cm)   Joint line: 44 cm    Right Knee Girth Measurement (cm)   Joint line: 43 25 cm    General Comments     Knee Comments  Gait: Antalgic, reduced heel to toe and slowed melissa w/ no AD             Precautions: HBP worse w/ exercise    Daily Treatment Diary     Manual Exercise Diary                                                                                                                                                                                                                                                                                      Modalities

## 2018-07-26 ENCOUNTER — APPOINTMENT (OUTPATIENT)
Dept: PHYSICAL THERAPY | Facility: CLINIC | Age: 73
End: 2018-07-26
Payer: COMMERCIAL

## 2018-10-21 DIAGNOSIS — E79.0 HYPERURICEMIA: ICD-10-CM

## 2018-10-21 RX ORDER — ALLOPURINOL 300 MG/1
300 TABLET ORAL DAILY
Qty: 90 TABLET | Refills: 1 | Status: SHIPPED | OUTPATIENT
Start: 2018-10-21 | End: 2019-01-11 | Stop reason: SDUPTHER

## 2018-11-26 ENCOUNTER — TELEPHONE (OUTPATIENT)
Dept: FAMILY MEDICINE CLINIC | Facility: CLINIC | Age: 73
End: 2018-11-26

## 2018-11-26 DIAGNOSIS — N18.30 CHRONIC KIDNEY DISEASE, STAGE III (MODERATE) (HCC): ICD-10-CM

## 2018-11-26 DIAGNOSIS — R73.09 ABNORMAL SERUM GLUCOSE LEVEL: ICD-10-CM

## 2018-11-26 DIAGNOSIS — I10 ESSENTIAL HYPERTENSION: Primary | ICD-10-CM

## 2018-11-26 DIAGNOSIS — M10.9 GOUT, UNSPECIFIED CAUSE, UNSPECIFIED CHRONICITY, UNSPECIFIED SITE: ICD-10-CM

## 2018-11-26 DIAGNOSIS — E78.1 ESSENTIAL HYPERTRIGLYCERIDEMIA: ICD-10-CM

## 2018-11-26 NOTE — TELEPHONE ENCOUNTER
I called and advised Margie Vargas his fasting blood work scripts were ready for   Had a bad connection pt understood they are up front at the desk

## 2019-01-05 LAB — HBA1C MFR BLD HPLC: 6.1 %

## 2019-01-11 ENCOUNTER — OFFICE VISIT (OUTPATIENT)
Dept: FAMILY MEDICINE CLINIC | Facility: CLINIC | Age: 74
End: 2019-01-11
Payer: COMMERCIAL

## 2019-01-11 VITALS
DIASTOLIC BLOOD PRESSURE: 68 MMHG | TEMPERATURE: 97.7 F | BODY MASS INDEX: 41.21 KG/M2 | WEIGHT: 262.6 LBS | OXYGEN SATURATION: 94 % | RESPIRATION RATE: 16 BRPM | HEIGHT: 67 IN | HEART RATE: 91 BPM | SYSTOLIC BLOOD PRESSURE: 140 MMHG

## 2019-01-11 DIAGNOSIS — I10 ESSENTIAL HYPERTENSION: ICD-10-CM

## 2019-01-11 DIAGNOSIS — R73.09 ABNORMAL SERUM GLUCOSE LEVEL: ICD-10-CM

## 2019-01-11 DIAGNOSIS — E79.0 HYPERURICEMIA: ICD-10-CM

## 2019-01-11 DIAGNOSIS — M10.9 GOUT, UNSPECIFIED CAUSE, UNSPECIFIED CHRONICITY, UNSPECIFIED SITE: ICD-10-CM

## 2019-01-11 DIAGNOSIS — Z00.00 MEDICARE ANNUAL WELLNESS VISIT, SUBSEQUENT: Primary | ICD-10-CM

## 2019-01-11 DIAGNOSIS — N18.30 CHRONIC KIDNEY DISEASE, STAGE III (MODERATE) (HCC): ICD-10-CM

## 2019-01-11 DIAGNOSIS — E78.1 ESSENTIAL HYPERTRIGLYCERIDEMIA: ICD-10-CM

## 2019-01-11 PROCEDURE — G0439 PPPS, SUBSEQ VISIT: HCPCS | Performed by: FAMILY MEDICINE

## 2019-01-11 PROCEDURE — 3725F SCREEN DEPRESSION PERFORMED: CPT | Performed by: FAMILY MEDICINE

## 2019-01-11 PROCEDURE — 1170F FXNL STATUS ASSESSED: CPT | Performed by: FAMILY MEDICINE

## 2019-01-11 PROCEDURE — 1160F RVW MEDS BY RX/DR IN RCRD: CPT | Performed by: FAMILY MEDICINE

## 2019-01-11 PROCEDURE — 1101F PT FALLS ASSESS-DOCD LE1/YR: CPT | Performed by: FAMILY MEDICINE

## 2019-01-11 PROCEDURE — 1125F AMNT PAIN NOTED PAIN PRSNT: CPT | Performed by: FAMILY MEDICINE

## 2019-01-11 PROCEDURE — 99214 OFFICE O/P EST MOD 30 MIN: CPT | Performed by: FAMILY MEDICINE

## 2019-01-11 RX ORDER — LISINOPRIL AND HYDROCHLOROTHIAZIDE 20; 12.5 MG/1; MG/1
2 TABLET ORAL DAILY
Qty: 180 TABLET | Refills: 3 | Status: SHIPPED | OUTPATIENT
Start: 2019-01-11 | End: 2020-03-15

## 2019-01-11 RX ORDER — NIFEDIPINE 60 MG/1
60 TABLET, FILM COATED, EXTENDED RELEASE ORAL DAILY
Qty: 90 TABLET | Refills: 3 | Status: SHIPPED | OUTPATIENT
Start: 2019-01-11 | End: 2020-07-31 | Stop reason: SDUPTHER

## 2019-01-11 RX ORDER — ALLOPURINOL 300 MG/1
300 TABLET ORAL DAILY
Qty: 90 TABLET | Refills: 3 | Status: SHIPPED | OUTPATIENT
Start: 2019-01-11 | End: 2020-01-24 | Stop reason: SDUPTHER

## 2019-01-11 NOTE — ASSESSMENT & PLAN NOTE
Triglycerides remain elevated at approximately 300 where they have been over the last 5-7 years  LDL well controlled, below 70  Continue with current management

## 2019-01-11 NOTE — ASSESSMENT & PLAN NOTE
Creatinine stable at approximately 1 5  Is been in this range more than 5 years without significant deviation    Continue to monitor

## 2019-01-11 NOTE — PROGRESS NOTES
[facility]      NAME: Gabriella Mccoy  AGE: 75 y o  SEX: male  : 1945   MRN: [id number]    DATE: 2019  TIME: 8:30 AM    Assessment and Plan     Problem List Items Addressed This Visit        Cardiovascular and Mediastinum    Essential hypertension     Well controlled on nifedipine and lisinopril hydrochlorothiazide  Continue with current dosage  Relevant Medications    NIFEdipine ER (ADALAT CC) 60 MG 24 hr tablet    lisinopril-hydrochlorothiazide (PRINZIDE,ZESTORETIC) 20-12 5 MG per tablet    Other Relevant Orders    Comprehensive metabolic panel    Lipid Panel with Direct LDL reflex    TSH, 3rd generation       Genitourinary    Chronic kidney disease, stage III (moderate) (HCC)     Creatinine stable at approximately 1 5  Is been in this range more than 5 years without significant deviation  Continue to monitor         Relevant Orders    Comprehensive metabolic panel    Lipid Panel with Direct LDL reflex    TSH, 3rd generation       Other    Gout     No recent flares of gout  Continue on allopurinol  Abnormal serum glucose level     Hemoglobin A1c 6 1  He has been between 5 8 and 6 2 consistently for the past 7 years         Relevant Orders    Hemoglobin A1c (w/out EAG)    Essential hypertriglyceridemia     Triglycerides remain elevated at approximately 300 where they have been over the last 5-7 years  LDL well controlled, below 70  Continue with current management  Relevant Orders    Comprehensive metabolic panel    Lipid Panel with Direct LDL reflex    TSH, 3rd generation      Other Visit Diagnoses     Medicare annual wellness visit, subsequent    -  Primary    Questionnaire reviewed  Immunizations and screenings up-to-date  Advance directive in place      Hyperuricemia        Relevant Medications    allopurinol (ZYLOPRIM) 300 mg tablet              Return to office in:  6 months    Chief Complaint     Chief Complaint   Patient presents with   Komal Parks Follow-up     bw review care every where       History of Present Illness     Patient was seen for routine follow-up chronic medical problems and review his recent blood work  His chronic problems include increased fasting glucose, hypertension and hyperuricemia  He takes allopurinol for gout prevention, lisinopril hydrochlorothiazide and nifedipine for high blood pressure  He manages blood sugar with diet and exercise  He has no new concerns  He is compliant with his medications and has no side effects related to them  The following portions of the patient's history were reviewed and updated as appropriate: allergies, current medications, past family history, past medical history, past social history, past surgical history and problem list     Review of Systems   Review of Systems   Constitutional: Negative  Respiratory: Negative  Cardiovascular: Negative  Gastrointestinal: Negative  Genitourinary: Negative  Musculoskeletal: Negative  Psychiatric/Behavioral: Negative  Active Problem List     Patient Active Problem List   Diagnosis    Abnormal serum glucose level    Chronic kidney disease, stage III (moderate) (HCC)    Essential hypertriglyceridemia    Gout    KALIA (obstructive sleep apnea)    Essential hypertension    Vitamin D deficiency    Knee pain    Localized, primary osteoarthritis    Osteoarthritis of knee       Objective   /68 (BP Location: Left arm, Patient Position: Sitting, Cuff Size: Adult)   Pulse 91   Temp 97 7 °F (36 5 °C) (Tympanic)   Resp 16   Ht 5' 7" (1 702 m)   Wt 119 kg (262 lb 9 6 oz)   SpO2 94%   BMI 41 13 kg/m²     Physical Exam   Constitutional: He is oriented to person, place, and time  He appears well-developed and well-nourished  No distress  HENT:   Head: Normocephalic and atraumatic  Eyes: Pupils are equal, round, and reactive to light  Conjunctivae are normal  Right eye exhibits no discharge     Neck: Normal range of motion  No thyromegaly present  Cardiovascular: Normal rate and regular rhythm  Pulmonary/Chest: Effort normal and breath sounds normal  No respiratory distress  Lymphadenopathy:     He has no cervical adenopathy  Neurological: He is alert and oriented to person, place, and time  Skin: Skin is warm and dry  He is not diaphoretic  Psychiatric: He has a normal mood and affect  His behavior is normal  Judgment and thought content normal    Nursing note and vitals reviewed          Current Medications     Current Outpatient Prescriptions:     allopurinol (ZYLOPRIM) 300 mg tablet, Take 1 tablet (300 mg total) by mouth daily, Disp: 90 tablet, Rfl: 3    aspirin 81 MG tablet, Take 81 mg by mouth daily, Disp: , Rfl:     Cholecalciferol (VITAMIN D3) 5000 units TABS, Take 1 tablet by mouth daily, Disp: , Rfl:     lisinopril-hydrochlorothiazide (PRINZIDE,ZESTORETIC) 20-12 5 MG per tablet, Take 2 tablets by mouth daily, Disp: 180 tablet, Rfl: 3    NIFEdipine ER (ADALAT CC) 60 MG 24 hr tablet, Take 1 tablet (60 mg total) by mouth daily, Disp: 90 tablet, Rfl: 3    Omega-3 Fatty Acids (FISH OIL) 1200 MG CAPS, Take 1 capsule by mouth 3 (three) times a day, Disp: , Rfl:     Health Maintenance     Health Maintenance   Topic Date Due    Medicare Annual Wellness Visit (AWV)  1945    DTaP,Tdap,and Td Vaccines (1 - Tdap) 01/02/1993    Fall Risk  07/19/2019    Depression Screening PHQ  07/19/2019    CRC Screening: Colonoscopy  01/06/2020    INFLUENZA VACCINE  Completed    Pneumococcal PPSV23/PCV13 65+ Years / Low and Medium Risk  Completed     Immunization History   Administered Date(s) Administered    Influenza 01/19/2005, 09/27/2007, 10/17/2008, 11/05/2011, 12/21/2012, 11/15/2013, 11/01/2015, 11/01/2016, 11/16/2018    Influenza Quadrivalent, 6-35 Months IM 11/01/2015    Influenza Split High Dose Preservative Free IM 08/14/2017    Influenza TIV (IM) 11/01/2016    Pneumococcal Conjugate 13-Valent 07/29/2016    Pneumococcal Polysaccharide PPV23 11/06/2009, 08/14/2017    Td (adult), Unspecified 01/01/1993    Zoster 02/08/2012, 07/29/2013       Mary Beth Whitmore DO  [facility]

## 2019-01-11 NOTE — PROGRESS NOTES
Assessment and Plan:  Problem List Items Addressed This Visit        Cardiovascular and Mediastinum    Essential hypertension    Relevant Medications    NIFEdipine ER (ADALAT CC) 60 MG 24 hr tablet    lisinopril-hydrochlorothiazide (PRINZIDE,ZESTORETIC) 20-12 5 MG per tablet    Other Relevant Orders    Comprehensive metabolic panel    Lipid Panel with Direct LDL reflex    TSH, 3rd generation       Genitourinary    Chronic kidney disease, stage III (moderate) (HCC)    Relevant Orders    Comprehensive metabolic panel    Lipid Panel with Direct LDL reflex    TSH, 3rd generation       Other    Abnormal serum glucose level    Relevant Orders    Hemoglobin A1c (w/out EAG)    Essential hypertriglyceridemia    Relevant Orders    Comprehensive metabolic panel    Lipid Panel with Direct LDL reflex    TSH, 3rd generation      Other Visit Diagnoses     Medicare annual wellness visit, subsequent    -  Primary    Questionnaire reviewed  Immunizations and screenings up-to-date  Advance directive in place      Hyperuricemia        Relevant Medications    allopurinol (ZYLOPRIM) 300 mg tablet        Health Maintenance Due   Topic Date Due    Medicare Annual Wellness Visit (AWV)  1945    DTaP,Tdap,and Td Vaccines (1 - Tdap) 01/02/1993         HPI:  Patient Active Problem List   Diagnosis    Abnormal serum glucose level    Chronic kidney disease, stage III (moderate) (HCC)    Essential hypertriglyceridemia    Gout    KALIA (obstructive sleep apnea)    Essential hypertension    Vitamin D deficiency    Knee pain    Localized, primary osteoarthritis    Osteoarthritis of knee     Past Medical History:   Diagnosis Date    CPAP (continuous positive airway pressure) dependence     Gout     Hearing aid worn     sabrina    Hernia, inguinal     RIH and umbilical hernia    Hypertension     Morbidly obese ([address] )     Sleep apnea     Wears glasses      Past Surgical History:   Procedure Laterality Date    CARPAL TUNNEL RELEASE Bilateral     COLONOSCOPY N/A 1/6/2017    Procedure: COLONOSCOPY;  Surgeon: Sarahi Garces MD;  [facility]; Service:     CYST REMOVAL      FOOT SURGERY      HERNIA REPAIR      JOINT REPLACEMENT      knee left     TN LAP,INGUINAL HERNIA REPR,INITIAL Right 3/16/2017    Procedure: REPAIR HERNIA INGUINAL, LAPAROSCOPIC WITH MESH;  Surgeon: Zackary Woodward MD;  [facility];  Service: General    TN REPAIR UMBILICAL AQAY,6+L/D,QOOIQ N/A 3/16/2017    Procedure: OPEN REPAIR HERNIA UMBILICAL;  Surgeon: Zackary Woodward MD;  [facility];  Service: General    TONSILLECTOMY       Family History   Problem Relation Age of Onset    No Known Problems Mother     No Known Problems Father      History   Smoking Status    Former Smoker    Quit date: 3/10/2003   Smokeless Tobacco    Never Used     History   Alcohol Use No      History   Drug Use No         Current Outpatient Prescriptions   Medication Sig Dispense Refill    allopurinol (ZYLOPRIM) 300 mg tablet Take 1 tablet (300 mg total) by mouth daily 90 tablet 3    aspirin 81 MG tablet Take 81 mg by mouth daily      Cholecalciferol (VITAMIN D3) 5000 units TABS Take 1 tablet by mouth daily      lisinopril-hydrochlorothiazide (PRINZIDE,ZESTORETIC) 20-12 5 MG per tablet Take 2 tablets by mouth daily 180 tablet 3    NIFEdipine ER (ADALAT CC) 60 MG 24 hr tablet Take 1 tablet (60 mg total) by mouth daily 90 tablet 3    Omega-3 Fatty Acids (FISH OIL) 1200 MG CAPS Take 1 capsule by mouth 3 (three) times a day       No current facility-administered medications for this visit        No Known Allergies  Immunization History   Administered Date(s) Administered    Influenza 01/19/2005, 09/27/2007, 10/17/2008, 11/05/2011, 12/21/2012, 11/15/2013, 11/01/2015, 11/01/2016, 11/16/2018    Influenza Quadrivalent, 6-35 Months IM 11/01/2015    Influenza Split High Dose Preservative Free IM 08/14/2017    Influenza TIV (IM) 11/01/2016    Pneumococcal Conjugate 13-Valent 07/29/2016    Pneumococcal Polysaccharide PPV23 11/06/2009, 08/14/2017    Td (adult), Unspecified 01/01/1993    Zoster 02/08/2012, 07/29/2013       Patient Care Team:  Gabbi Chatterjee DO as PCP - General    Medicare Screening Tests and Risk Assessments:  Walker Sewell is here for his Subsequent Wellness visit  Health Risk Assessment:  Patient rates overall health as good  Patient feels that their physical health rating is Same  Eyesight was rated as Same  Hearing was rated as Slightly worse  Patient feels that their emotional and mental health rating is Same  Pain experienced by patient in the last 7 days has been None  Patient states that he has experienced no weight loss or gain in last 6 months  Emotional/Mental Health:  Patient has been feeling nervous/anxious  PHQ-9 Depression Screening:    Frequency of the following problems over the past two weeks:      1  Little interest or pleasure in doing things: 0 - not at all      2  Feeling down, depressed, or hopeless: 0 - not at all  PHQ-2 Score: 0          Broken Bones/Falls: Fall Risk Assessment:    In the past year, patient has experienced: No history of falling in past year          Bladder/Bowel:  Patient has not leaked urine accidently in the last six months  Patient reports no loss of bowel control  Immunizations:  Patient has had a flu vaccination within the last year  Patient has received a pneumonia shot  Patient has received a shingles shot  Patient has not received tetanus/diphtheria shot  Home Safety:  Patient has trouble with stairs inside or outside of their home  Patient currently reports that there are no safety hazards present in home, working smoke alarms, working carbon monoxide detectors        Preventative Screenings:   prostate cancer screen performed, colon cancer screen completed, cholesterol screen completed, glaucoma eye exam completed,     Nutrition:  Current diet: Regular with servings of the following:    Medications:  Patient is currently taking over-the-counter supplements  Patient is able to manage medications  Lifestyle Choices:  Patient reports no tobacco use  Patient has not smoked or used tobacco in the past   Patient reports no alcohol use  Patient drives a vehicle  Patient wears seat belt  Current level of exercise of physical activity described by patient as: no         Activities of Daily Living:  Can get out of bed by his or her self, able to dress self, able to make own meals, able to do own shopping, able to bathe self, can do own laundry/housekeeping, can manage own money, pay bills and track expenses    Advanced Directives:  Patient has decided on a power of   Patient has spoken to designated power of   Patient has completed advanced directive  Preventative Screening/Counseling:      Cardiovascular:      General: Screening Current          Diabetes:      General: Screening Current          Colorectal Cancer:      General: Screening Current          Prostate Cancer:      General: Screening Current          Osteoporosis:      General: Screening Not Indicated          AAA:      General: Screening Not Indicated          Glaucoma:      General: Risks and Benefits Discussed          HIV:      General: Screening Not Indicated          Hepatitis C:      General: Screening Not Indicated        Advanced Directives:   has durable POA for healthcare, patient has an advanced directive  Information on ACP and/or AD not provided  No end of life assessment reviewed with patient       Immunizations:      Influenza: Influenza UTD This Year      Pneumococcal: Lifetime Vaccine Completed      Shingrix: Risks & Benefits Discussed      Hepatitis B (Low risk patients): Series Not Indicated      Zostavax: Vaccine Status Unknown      TD: Vaccine Status Unknown      TDAP: Vaccine Status Unknown            No exam data present    Physical Exam:  Review of Systems   Gastrointestinal: Negative for bowel incontinence  Psychiatric/Behavioral: The patient is not nervous/anxious  Vitals:    01/11/19 0757   BP: 140/68   BP Location: Left arm   Patient Position: Sitting   Cuff Size: Adult   Pulse: 91   Resp: 16   Temp: 97 7 °F (36 5 °C)   TempSrc: Tympanic   SpO2: 94%   Weight: 119 kg (262 lb 9 6 oz)   Height: 5' 7" (1 702 m)   Body mass index is 41 13 kg/m²      Physical Exam

## 2019-01-28 ENCOUNTER — TELEPHONE (OUTPATIENT)
Dept: FAMILY MEDICINE CLINIC | Facility: CLINIC | Age: 74
End: 2019-01-28

## 2019-01-28 NOTE — TELEPHONE ENCOUNTER
Patient came to the office to drop off bill  Will be in your mail box  Patient states if you give him a call, if you could speak with his wife

## 2019-05-23 ENCOUNTER — APPOINTMENT (EMERGENCY)
Dept: CT IMAGING | Facility: HOSPITAL | Age: 74
End: 2019-05-23
Payer: COMMERCIAL

## 2019-05-23 ENCOUNTER — TELEPHONE (OUTPATIENT)
Dept: OTHER | Facility: OTHER | Age: 74
End: 2019-05-23

## 2019-05-23 ENCOUNTER — HOSPITAL ENCOUNTER (EMERGENCY)
Facility: HOSPITAL | Age: 74
Discharge: HOME | End: 2019-05-23
Attending: EMERGENCY MEDICINE | Admitting: EMERGENCY MEDICINE
Payer: COMMERCIAL

## 2019-05-23 VITALS
TEMPERATURE: 97.9 F | RESPIRATION RATE: 20 BRPM | BODY MASS INDEX: 39.36 KG/M2 | DIASTOLIC BLOOD PRESSURE: 53 MMHG | SYSTOLIC BLOOD PRESSURE: 108 MMHG | HEART RATE: 64 BPM | OXYGEN SATURATION: 94 % | WEIGHT: 251.32 LBS

## 2019-05-23 DIAGNOSIS — R10.9 NONSPECIFIC ABDOMINAL PAIN: Primary | ICD-10-CM

## 2019-05-23 DIAGNOSIS — N26.1 RIGHT RENAL ATROPHY: ICD-10-CM

## 2019-05-23 DIAGNOSIS — N28.9 RENAL INSUFFICIENCY: ICD-10-CM

## 2019-05-23 LAB
ALBUMIN SERPL BCP-MCNC: 3.7 G/DL (ref 3.5–5)
ALP SERPL-CCNC: 84 U/L (ref 46–116)
ALT SERPL W P-5'-P-CCNC: 44 U/L (ref 12–78)
ANION GAP SERPL CALCULATED.3IONS-SCNC: 11 MMOL/L (ref 4–13)
AST SERPL W P-5'-P-CCNC: 28 U/L (ref 5–45)
BACTERIA UR QL AUTO: ABNORMAL /HPF
BASOPHILS # BLD AUTO: 0.07 THOUSANDS/ΜL (ref 0–0.1)
BASOPHILS NFR BLD AUTO: 1 % (ref 0–1)
BILIRUB SERPL-MCNC: 0.32 MG/DL (ref 0.2–1)
BILIRUB UR QL STRIP: NEGATIVE
BUN SERPL-MCNC: 50 MG/DL (ref 5–25)
CALCIUM SERPL-MCNC: 9.6 MG/DL (ref 8.3–10.1)
CHLORIDE SERPL-SCNC: 102 MMOL/L (ref 100–108)
CLARITY UR: CLEAR
CO2 SERPL-SCNC: 22 MMOL/L (ref 21–32)
COLOR UR: YELLOW
CREAT SERPL-MCNC: 2.08 MG/DL (ref 0.6–1.3)
EOSINOPHIL # BLD AUTO: 0.43 THOUSAND/ΜL (ref 0–0.61)
EOSINOPHIL NFR BLD AUTO: 4 % (ref 0–6)
ERYTHROCYTE [DISTWIDTH] IN BLOOD BY AUTOMATED COUNT: 12.7 % (ref 11.6–15.1)
GFR SERPL CREATININE-BSD FRML MDRD: 30 ML/MIN/1.73SQ M
GLUCOSE SERPL-MCNC: 110 MG/DL (ref 65–140)
GLUCOSE UR STRIP-MCNC: NEGATIVE MG/DL
HCT VFR BLD AUTO: 38.1 % (ref 36.5–49.3)
HGB BLD-MCNC: 12.8 G/DL (ref 12–17)
HGB UR QL STRIP.AUTO: NEGATIVE
IMM GRANULOCYTES # BLD AUTO: 0.1 THOUSAND/UL (ref 0–0.2)
IMM GRANULOCYTES NFR BLD AUTO: 1 % (ref 0–2)
KETONES UR STRIP-MCNC: NEGATIVE MG/DL
LEUKOCYTE ESTERASE UR QL STRIP: ABNORMAL
LIPASE SERPL-CCNC: 409 U/L (ref 73–393)
LYMPHOCYTES # BLD AUTO: 3.44 THOUSANDS/ΜL (ref 0.6–4.47)
LYMPHOCYTES NFR BLD AUTO: 35 % (ref 14–44)
MCH RBC QN AUTO: 34.9 PG (ref 26.8–34.3)
MCHC RBC AUTO-ENTMCNC: 33.6 G/DL (ref 31.4–37.4)
MCV RBC AUTO: 104 FL (ref 82–98)
MONOCYTES # BLD AUTO: 1.21 THOUSAND/ΜL (ref 0.17–1.22)
MONOCYTES NFR BLD AUTO: 12 % (ref 4–12)
NEUTROPHILS # BLD AUTO: 4.54 THOUSANDS/ΜL (ref 1.85–7.62)
NEUTS SEG NFR BLD AUTO: 47 % (ref 43–75)
NITRITE UR QL STRIP: NEGATIVE
NON-SQ EPI CELLS URNS QL MICRO: ABNORMAL /HPF
NRBC BLD AUTO-RTO: 0 /100 WBCS
PH UR STRIP.AUTO: 5.5 [PH] (ref 4.5–8)
PLATELET # BLD AUTO: 141 THOUSANDS/UL (ref 149–390)
PMV BLD AUTO: 11.4 FL (ref 8.9–12.7)
POTASSIUM SERPL-SCNC: 5.5 MMOL/L (ref 3.5–5.3)
PROT SERPL-MCNC: 8.1 G/DL (ref 6.4–8.2)
PROT UR STRIP-MCNC: NEGATIVE MG/DL
RBC # BLD AUTO: 3.67 MILLION/UL (ref 3.88–5.62)
RBC #/AREA URNS AUTO: ABNORMAL /HPF
SODIUM SERPL-SCNC: 135 MMOL/L (ref 136–145)
SP GR UR STRIP.AUTO: 1.01 (ref 1–1.03)
UROBILINOGEN UR QL STRIP.AUTO: 0.2 E.U./DL
WBC # BLD AUTO: 9.79 THOUSAND/UL (ref 4.31–10.16)
WBC #/AREA URNS AUTO: ABNORMAL /HPF

## 2019-05-23 PROCEDURE — 99285 EMERGENCY DEPT VISIT HI MDM: CPT | Performed by: EMERGENCY MEDICINE

## 2019-05-23 PROCEDURE — 99284 EMERGENCY DEPT VISIT MOD MDM: CPT

## 2019-05-23 PROCEDURE — 74176 CT ABD & PELVIS W/O CONTRAST: CPT

## 2019-05-23 PROCEDURE — 83690 ASSAY OF LIPASE: CPT | Performed by: EMERGENCY MEDICINE

## 2019-05-23 PROCEDURE — 96374 THER/PROPH/DIAG INJ IV PUSH: CPT

## 2019-05-23 PROCEDURE — 80053 COMPREHEN METABOLIC PANEL: CPT | Performed by: EMERGENCY MEDICINE

## 2019-05-23 PROCEDURE — 81001 URINALYSIS AUTO W/SCOPE: CPT

## 2019-05-23 PROCEDURE — 36415 COLL VENOUS BLD VENIPUNCTURE: CPT | Performed by: EMERGENCY MEDICINE

## 2019-05-23 PROCEDURE — 96361 HYDRATE IV INFUSION ADD-ON: CPT

## 2019-05-23 PROCEDURE — 85025 COMPLETE CBC W/AUTO DIFF WBC: CPT | Performed by: EMERGENCY MEDICINE

## 2019-05-23 RX ORDER — MORPHINE SULFATE 4 MG/ML
4 INJECTION, SOLUTION INTRAMUSCULAR; INTRAVENOUS ONCE
Status: COMPLETED | OUTPATIENT
Start: 2019-05-23 | End: 2019-05-23

## 2019-05-23 RX ORDER — TRAMADOL HYDROCHLORIDE 50 MG/1
50 TABLET ORAL EVERY 8 HOURS PRN
Qty: 15 TABLET | Refills: 0 | Status: SHIPPED | OUTPATIENT
Start: 2019-05-23 | End: 2019-06-02

## 2019-05-23 RX ADMIN — SODIUM CHLORIDE 1000 ML: 0.9 INJECTION, SOLUTION INTRAVENOUS at 17:11

## 2019-05-23 RX ADMIN — MORPHINE SULFATE 4 MG: 4 INJECTION INTRAVENOUS at 17:11

## 2019-05-24 ENCOUNTER — TELEPHONE (OUTPATIENT)
Dept: FAMILY MEDICINE CLINIC | Facility: CLINIC | Age: 74
End: 2019-05-24

## 2019-05-24 DIAGNOSIS — N18.30 CHRONIC KIDNEY DISEASE, STAGE III (MODERATE) (HCC): Primary | ICD-10-CM

## 2019-05-28 ENCOUNTER — APPOINTMENT (OUTPATIENT)
Dept: LAB | Facility: CLINIC | Age: 74
End: 2019-05-28
Payer: COMMERCIAL

## 2019-05-28 ENCOUNTER — OFFICE VISIT (OUTPATIENT)
Dept: FAMILY MEDICINE CLINIC | Facility: CLINIC | Age: 74
End: 2019-05-28
Payer: COMMERCIAL

## 2019-05-28 VITALS
WEIGHT: 255.4 LBS | DIASTOLIC BLOOD PRESSURE: 70 MMHG | HEIGHT: 68 IN | OXYGEN SATURATION: 96 % | HEART RATE: 83 BPM | BODY MASS INDEX: 38.71 KG/M2 | SYSTOLIC BLOOD PRESSURE: 130 MMHG | TEMPERATURE: 98 F

## 2019-05-28 DIAGNOSIS — N18.30 CHRONIC KIDNEY DISEASE, STAGE III (MODERATE) (HCC): ICD-10-CM

## 2019-05-28 DIAGNOSIS — N18.30 CHRONIC KIDNEY DISEASE, STAGE III (MODERATE) (HCC): Primary | ICD-10-CM

## 2019-05-28 LAB
ALBUMIN SERPL BCP-MCNC: 4 G/DL (ref 3.5–5)
ANION GAP SERPL CALCULATED.3IONS-SCNC: 7 MMOL/L (ref 4–13)
BUN SERPL-MCNC: 40 MG/DL (ref 5–25)
CALCIUM ALBUM COR SERPL-MCNC: 10.2 MG/DL (ref 8.3–10.1)
CALCIUM SERPL-MCNC: 10.2 MG/DL (ref 8.3–10.1)
CALCIUM SERPL-MCNC: 10.2 MG/DL (ref 8.3–10.1)
CHLORIDE SERPL-SCNC: 107 MMOL/L (ref 100–108)
CO2 SERPL-SCNC: 23 MMOL/L (ref 21–32)
CREAT SERPL-MCNC: 1.73 MG/DL (ref 0.6–1.3)
GFR SERPL CREATININE-BSD FRML MDRD: 38 ML/MIN/1.73SQ M
GLUCOSE P FAST SERPL-MCNC: 134 MG/DL (ref 65–99)
POTASSIUM SERPL-SCNC: 4.8 MMOL/L (ref 3.5–5.3)
SODIUM SERPL-SCNC: 137 MMOL/L (ref 136–145)

## 2019-05-28 PROCEDURE — 36415 COLL VENOUS BLD VENIPUNCTURE: CPT

## 2019-05-28 PROCEDURE — 1160F RVW MEDS BY RX/DR IN RCRD: CPT | Performed by: FAMILY MEDICINE

## 2019-05-28 PROCEDURE — 99213 OFFICE O/P EST LOW 20 MIN: CPT | Performed by: FAMILY MEDICINE

## 2019-05-28 PROCEDURE — 82040 ASSAY OF SERUM ALBUMIN: CPT

## 2019-05-28 PROCEDURE — 80048 BASIC METABOLIC PNL TOTAL CA: CPT

## 2019-05-28 PROCEDURE — 1036F TOBACCO NON-USER: CPT | Performed by: FAMILY MEDICINE

## 2019-05-28 RX ORDER — ALLOPURINOL 300 MG/1
300 TABLET ORAL DAILY
Qty: 90 TABLET | Refills: 3 | Status: CANCELLED | OUTPATIENT
Start: 2019-05-28

## 2019-07-05 ENCOUNTER — APPOINTMENT (OUTPATIENT)
Dept: LAB | Facility: CLINIC | Age: 74
End: 2019-07-05
Payer: COMMERCIAL

## 2019-07-05 DIAGNOSIS — I10 ESSENTIAL HYPERTENSION: ICD-10-CM

## 2019-07-05 DIAGNOSIS — N18.30 CHRONIC KIDNEY DISEASE, STAGE III (MODERATE) (HCC): ICD-10-CM

## 2019-07-05 DIAGNOSIS — M10.9 GOUT, UNSPECIFIED CAUSE, UNSPECIFIED CHRONICITY, UNSPECIFIED SITE: ICD-10-CM

## 2019-07-05 DIAGNOSIS — R73.09 ABNORMAL SERUM GLUCOSE LEVEL: ICD-10-CM

## 2019-07-05 DIAGNOSIS — E78.1 ESSENTIAL HYPERTRIGLYCERIDEMIA: ICD-10-CM

## 2019-07-05 LAB
ALBUMIN SERPL BCP-MCNC: 3.4 G/DL (ref 3.5–5)
ALP SERPL-CCNC: 104 U/L (ref 46–116)
ALT SERPL W P-5'-P-CCNC: 32 U/L (ref 12–78)
ANION GAP SERPL CALCULATED.3IONS-SCNC: 5 MMOL/L (ref 4–13)
AST SERPL W P-5'-P-CCNC: 19 U/L (ref 5–45)
BILIRUB SERPL-MCNC: 0.31 MG/DL (ref 0.2–1)
BUN SERPL-MCNC: 36 MG/DL (ref 5–25)
CALCIUM SERPL-MCNC: 9.2 MG/DL (ref 8.3–10.1)
CHLORIDE SERPL-SCNC: 106 MMOL/L (ref 100–108)
CHOLEST SERPL-MCNC: 130 MG/DL (ref 50–200)
CO2 SERPL-SCNC: 23 MMOL/L (ref 21–32)
CREAT SERPL-MCNC: 1.6 MG/DL (ref 0.6–1.3)
EST. AVERAGE GLUCOSE BLD GHB EST-MCNC: 123 MG/DL
GFR SERPL CREATININE-BSD FRML MDRD: 42 ML/MIN/1.73SQ M
GLUCOSE P FAST SERPL-MCNC: 117 MG/DL (ref 65–99)
HBA1C MFR BLD: 5.9 % (ref 4.2–6.3)
HDLC SERPL-MCNC: 24 MG/DL (ref 40–60)
LDLC SERPL CALC-MCNC: 43 MG/DL (ref 0–100)
POTASSIUM SERPL-SCNC: 4.9 MMOL/L (ref 3.5–5.3)
PROT SERPL-MCNC: 7.6 G/DL (ref 6.4–8.2)
SODIUM SERPL-SCNC: 134 MMOL/L (ref 136–145)
TRIGL SERPL-MCNC: 313 MG/DL
TSH SERPL DL<=0.05 MIU/L-ACNC: 2 UIU/ML (ref 0.36–3.74)
URATE SERPL-MCNC: 5.1 MG/DL (ref 4.2–8)

## 2019-07-05 PROCEDURE — 83036 HEMOGLOBIN GLYCOSYLATED A1C: CPT

## 2019-07-05 PROCEDURE — 80061 LIPID PANEL: CPT

## 2019-07-05 PROCEDURE — 84550 ASSAY OF BLOOD/URIC ACID: CPT

## 2019-07-05 PROCEDURE — 84443 ASSAY THYROID STIM HORMONE: CPT

## 2019-07-05 PROCEDURE — 80053 COMPREHEN METABOLIC PANEL: CPT

## 2019-07-05 PROCEDURE — 36415 COLL VENOUS BLD VENIPUNCTURE: CPT

## 2019-07-15 ENCOUNTER — OFFICE VISIT (OUTPATIENT)
Dept: FAMILY MEDICINE CLINIC | Facility: CLINIC | Age: 74
End: 2019-07-15
Payer: COMMERCIAL

## 2019-07-15 VITALS
HEIGHT: 68 IN | BODY MASS INDEX: 39.47 KG/M2 | OXYGEN SATURATION: 94 % | HEART RATE: 81 BPM | TEMPERATURE: 97.8 F | SYSTOLIC BLOOD PRESSURE: 138 MMHG | WEIGHT: 260.4 LBS | DIASTOLIC BLOOD PRESSURE: 76 MMHG

## 2019-07-15 DIAGNOSIS — R73.09 ABNORMAL SERUM GLUCOSE LEVEL: ICD-10-CM

## 2019-07-15 DIAGNOSIS — I10 ESSENTIAL HYPERTENSION: ICD-10-CM

## 2019-07-15 DIAGNOSIS — E79.0 HYPERURICEMIA: ICD-10-CM

## 2019-07-15 DIAGNOSIS — N18.30 CHRONIC KIDNEY DISEASE, STAGE III (MODERATE) (HCC): ICD-10-CM

## 2019-07-15 DIAGNOSIS — Z12.11 COLON CANCER SCREENING: Primary | ICD-10-CM

## 2019-07-15 PROCEDURE — 3075F SYST BP GE 130 - 139MM HG: CPT | Performed by: FAMILY MEDICINE

## 2019-07-15 PROCEDURE — 1036F TOBACCO NON-USER: CPT | Performed by: FAMILY MEDICINE

## 2019-07-15 PROCEDURE — 99214 OFFICE O/P EST MOD 30 MIN: CPT | Performed by: FAMILY MEDICINE

## 2019-07-15 PROCEDURE — 1160F RVW MEDS BY RX/DR IN RCRD: CPT | Performed by: FAMILY MEDICINE

## 2019-07-15 NOTE — PATIENT INSTRUCTIONS

## 2019-07-15 NOTE — PROGRESS NOTES
[facility]      NAME: Fawn Gillespie  AGE: 75 y o  SEX: male  : 1945   MRN: [id number]    DATE: 7/15/2019  TIME: 11:26 AM    Assessment and Plan     Problem List Items Addressed This Visit     None      Visit Diagnoses     Colon cancer screening    -  Primary              Return to office in:  6 months, annual wellness visit    Chief Complaint     Chief Complaint   Patient presents with    Follow-up     review labs       History of Present Illness     Patient presents to follow-up on chronic medical problems  He has no new concerns at this time  He takes allopurinol for hyperuricemia but has not had a gout issue recently  He takes lisinopril hydrochlorothiazide and nifedipine for high blood pressure  He was in the emergency room several months ago at that time had acute kidney injury with creatinine greater than 2  His levels have improved since then and he has repeat blood work to review today  The following portions of the patient's history were reviewed and updated as appropriate: allergies, current medications, past family history, past medical history, past social history, past surgical history and problem list     Review of Systems   Review of Systems   Constitutional: Negative  Respiratory: Negative  Cardiovascular: Negative  Gastrointestinal: Negative  Genitourinary: Negative  Musculoskeletal: Negative  Psychiatric/Behavioral: Negative          Active Problem List     Patient Active Problem List   Diagnosis    Abnormal serum glucose level    Chronic kidney disease, stage III (moderate) (HCC)    Essential hypertriglyceridemia    Gout    KALIA (obstructive sleep apnea)    Essential hypertension    Vitamin D deficiency    Knee pain    Localized, primary osteoarthritis    Osteoarthritis of knee       Objective   /74 (BP Location: Right arm, Patient Position: Sitting, Cuff Size: Adult)   Pulse 81   Temp 97 8 °F (36 6 °C)   Ht 5' 8" (1 727 m)   Wt 118 kg (260 lb 6 4 oz)   SpO2 94%   BMI 39 59 kg/m²     Physical Exam   Constitutional: He is oriented to person, place, and time  He appears well-developed and well-nourished  No distress  HENT:   Head: Normocephalic and atraumatic  Eyes: Pupils are equal, round, and reactive to light  Conjunctivae are normal  Right eye exhibits no discharge  Neck: Normal range of motion  No thyromegaly present  Cardiovascular: Normal rate and regular rhythm  Pulmonary/Chest: Effort normal and breath sounds normal  No respiratory distress  Lymphadenopathy:     He has no cervical adenopathy  Neurological: He is alert and oriented to person, place, and time  Skin: Skin is warm and dry  He is not diaphoretic  Psychiatric: He has a normal mood and affect  His behavior is normal  Judgment and thought content normal    Nursing note and vitals reviewed          Current Medications     Current Outpatient Medications:     allopurinol (ZYLOPRIM) 300 mg tablet, Take 1 tablet (300 mg total) by mouth daily, Disp: 90 tablet, Rfl: 3    aspirin 81 MG tablet, Take 81 mg by mouth daily, Disp: , Rfl:     Cholecalciferol (VITAMIN D3) 5000 units TABS, Take 1 tablet by mouth daily, Disp: , Rfl:     lisinopril-hydrochlorothiazide (PRINZIDE,ZESTORETIC) 20-12 5 MG per tablet, Take 2 tablets by mouth daily, Disp: 180 tablet, Rfl: 3    NIFEdipine ER (ADALAT CC) 60 MG 24 hr tablet, Take 1 tablet (60 mg total) by mouth daily, Disp: 90 tablet, Rfl: 3    Omega-3 Fatty Acids (FISH OIL) 1200 MG CAPS, Take 1 capsule by mouth 3 (three) times a day, Disp: , Rfl:     Health Maintenance     Health Maintenance   Topic Date Due    BMI: Followup Plan  07/05/1962    HEPATITIS B VACCINES (1 of 3 - Risk 3-dose series) 07/05/1963    INFLUENZA VACCINE  09/12/2019 (Originally 7/1/2019)    DTaP,Tdap,and Td Vaccines (1 - Tdap) 05/28/2020 (Originally 1/2/1993)    CRC Screening: Colonoscopy  01/06/2020    Fall Risk  01/11/2020    Depression Screening PHQ  01/11/2020    Medicare Annual Wellness Visit (AWV)  01/11/2020    BMI: Adult  05/28/2020    Pneumococcal Vaccine: 65+ Years  Completed    Pneumococcal Vaccine: Pediatrics (0 to 5 Years) and At-Risk Patients (6 to 59 Years)  Aged [employer] History   Administered Date(s) Administered    INFLUENZA 01/19/2005, 09/27/2007, 10/17/2008, 11/05/2011, 12/21/2012, 11/15/2013, 11/01/2015, 11/01/2016, 11/16/2018    Influenza Quadrivalent, 6-35 Months IM 11/01/2015    Influenza Split High Dose Preservative Free IM 08/14/2017    Influenza TIV (IM) 11/01/2016    Pneumococcal Conjugate 13-Valent 07/29/2016    Pneumococcal Polysaccharide PPV23 11/06/2009, 08/14/2017    Td (adult), Unspecified 01/01/1993    Zoster 02/08/2012, 07/29/2013       Ronny Hernandez DO  [facility]: Body mass index is 39 59 kg/m²  Discussed the patient's BMI with him  The BMI is above average  BMI counseling and education was provided to the patient  Nutrition recommendations include reducing portion sizes, decreasing overall calorie intake, consuming healthier snacks, moderation in carbohydrate intake and increasing intake of lean protein  Exercise recommendations include moderate aerobic physical activity for 150 minutes/week

## 2019-07-15 NOTE — ASSESSMENT & PLAN NOTE
Creatinine has improved somewhat to the 1 6 range which is closer to his baseline however recommend that he see Nephrology for 2nd opinion regarding future management

## 2019-10-22 ENCOUNTER — CONSULT (OUTPATIENT)
Dept: NEPHROLOGY | Facility: CLINIC | Age: 74
End: 2019-10-22
Payer: COMMERCIAL

## 2019-10-22 VITALS
HEIGHT: 68 IN | SYSTOLIC BLOOD PRESSURE: 144 MMHG | HEART RATE: 74 BPM | WEIGHT: 259 LBS | DIASTOLIC BLOOD PRESSURE: 78 MMHG | BODY MASS INDEX: 39.25 KG/M2

## 2019-10-22 DIAGNOSIS — I10 ESSENTIAL HYPERTENSION: ICD-10-CM

## 2019-10-22 DIAGNOSIS — E78.1 ESSENTIAL HYPERTRIGLYCERIDEMIA: ICD-10-CM

## 2019-10-22 DIAGNOSIS — M10.9 GOUT, UNSPECIFIED CAUSE, UNSPECIFIED CHRONICITY, UNSPECIFIED SITE: ICD-10-CM

## 2019-10-22 DIAGNOSIS — N18.30 CHRONIC KIDNEY DISEASE, STAGE III (MODERATE) (HCC): Primary | ICD-10-CM

## 2019-10-22 DIAGNOSIS — G47.33 OSA (OBSTRUCTIVE SLEEP APNEA): ICD-10-CM

## 2019-10-22 PROCEDURE — 99204 OFFICE O/P NEW MOD 45 MIN: CPT | Performed by: INTERNAL MEDICINE

## 2019-10-22 NOTE — PROGRESS NOTES
OFFICE CONSULT - Nephrology   Filiberto Nam 75 y o  male MRN: [id number]        ASSESSMENT and PLAN:  Amarilis Juarez was seen today for consult and chronic kidney disease  Diagnoses and all orders for this visit:    Chronic kidney disease, stage III (moderate) ([facility] [address] )  -     Ambulatory referral to Nephrology  -     Renal function panel; Future  -     PTH, intact; Future  -     Protein / creatinine ratio, urine; Future    Essential hypertension    Essential hypertriglyceridemia    Gout, unspecified cause, unspecified chronicity, unspecified site    KALIA (obstructive sleep apnea)        This is a 76-year-old gentleman with known history of chronic kidney disease stage III with previous creatinine around 1 4-1 8 back since 2012 as per Care everywhere, atrophic right kidney, hypertension for over 20 years, hyperlipidemia, obesity, who was referred to our office for evaluation chronic kidney disease  1  Chronic kidney disease stage 3, most recent creatinine 1 6 on July 2019  Long discussion with patient about his chronic kidney disease, suspected is combination of hypertensive nephrosclerosis, solitary functional kidney, obesity as well as age-related nephron loss  I would like to repeat a blood and urine test with CKD workup when he had the next blood test by his primary care doctor, will contact him with the results, if renal function remains stable should come back to the office in 6 months if needed  Discussed about avoiding NSAIDs  Follow low-salt diet  Advised to lose weight  CKD information booklet was given to patient  2  Hypertension, today blood pressure is slightly over the upper limit  Advised to follow low-salt diet and to lose weight  No changes on his medication at this moment okay to continue with lisinopril  3  Atrophic right kidney, chronic   4  History of gout, continue with allopurinol, most recent uric acid 5 1 on July 2019     5  Obesity, advised to lose weight      6  Mineral bone disease, will check phosphorus and PTH with the upcoming labs  7  Hemoglobin 12 8 in May 2019        Patient Instructions   I want you to have repeat blood and urine test next time you are having your blood work for your primary care doctor  I will contact you with the results  Her kidney function remains stable I would like to see you back in the office in 6 months  Avoid NSAIDs (no ibuprofen, Motrin, Advil, Aleve, naproxen)  Okay to take Tylenol or paracetamol or acetaminophen as needed for pain  Encouraged to lose weight  Follow low-salt diet less than 2 g of salt in a day  No changes in your medications at this moment  HPI:  Joanna Kearns is a 75 y  o male who was referred by Jose Elias Rankin DO for evaluation of Consult and Chronic Kidney Disease    Patient with known history of chronic kidney disease stage III with previous creatinine around 1 4-1 8 back since 2012 as per review of previous labs results in Care everywhere, atrophic right kidney, hypertension for over 20 years, obesity, hyperlipidemia, history of gout, who was referred to our office for evaluation of chronic kidney disease  Patient states that his have high blood pressure for over 20 years, he is currently taking lisinopril with hydrochlorothiazide as well as nifedipine  Patient states that he saw recently on the Internet that "Lisinopril is not a good blood pressure medication because affect his kidneys"so has home concerns about it  In general he is feeling okay, denies any complaints, denies any chest pain or shortness of breath, no lower extremity edema  Denies any abdominal pain, no nausea, no vomiting, no diarrhea constipation  Denies any urinary problems, no burning sensation, no gross hematuria, he have nocturia once at night  Appetite and energy level is stable  Denies NSAID use  Denies tobacco abuse, he quit smoking more than a decade ago    Denies family history of kidney disease, mother and sister with history of diabetes  I personally spent over half of a total 43 minutes face to face with the patient in counseling and discussion and/or coordination of care as described above  ROS: All the systems were reviewed and were negative except as documented on the HPI  Allergies: Patient has no known allergies  Medications:   Current Outpatient Medications:     allopurinol (ZYLOPRIM) 300 mg tablet, Take 1 tablet (300 mg total) by mouth daily, Disp: 90 tablet, Rfl: 3    aspirin 81 MG tablet, Take 81 mg by mouth daily, Disp: , Rfl:     Cholecalciferol (VITAMIN D3) 5000 units TABS, Take 1 tablet by mouth daily, Disp: , Rfl:     lisinopril-hydrochlorothiazide (PRINZIDE,ZESTORETIC) 20-12 5 MG per tablet, Take 2 tablets by mouth daily, Disp: 180 tablet, Rfl: 3    NIFEdipine ER (ADALAT CC) 60 MG 24 hr tablet, Take 1 tablet (60 mg total) by mouth daily, Disp: 90 tablet, Rfl: 3    Omega-3 Fatty Acids (FISH OIL) 1200 MG CAPS, Take 1 capsule by mouth 3 (three) times a day, Disp: , Rfl:     Past Medical History:   Diagnosis Date    Chronic kidney disease     CPAP (continuous positive airway pressure) dependence     Gout     Hearing aid worn     sabrina    Hernia, inguinal     RIH and umbilical hernia    Hypertension     Morbidly obese (HCC)     Sleep apnea     Wears glasses      Past Surgical History:   Procedure Laterality Date    CARPAL TUNNEL RELEASE Bilateral     COLONOSCOPY N/A 1/6/2017    Procedure: COLONOSCOPY;  Surgeon: Marcelino Resendiz MD;  Location[facility];   Service:     CYST REMOVAL      FOOT SURGERY      HERNIA REPAIR      JOINT REPLACEMENT      knee left     ME LAP,INGUINAL HERNIA REPR,INITIAL Right 3/16/2017    Procedure: REPAIR HERNIA INGUINAL, LAPAROSCOPIC WITH MESH;  Surgeon: Keira Lanza MD;  [facility];  Service: General    ME REPAIR UMBILICAL HOYG,7+G/C,APFGC N/A 3/16/2017    Procedure: OPEN REPAIR HERNIA UMBILICAL;  Surgeon: Keira Lanza MD;  [facility];  Service: General    TONSILLECTOMY       Family History   Problem Relation Age of Onset    No Known Problems Mother     Diabetes Father     Diabetes Sister       reports that he quit smoking about 16 years ago  He has never used smokeless tobacco  He reports that he does not drink alcohol or use drugs  Physical Exam:   Vitals:    10/22/19 1326   BP: 144/78   BP Location: Left arm   Patient Position: Sitting   Pulse: 74   Weight: 117 kg (259 lb)   Height: 5' 8" (1 727 m)     Body mass index is 39 38 kg/m²      General: conscious, cooperative, in not acute distress  Eyes: conjunctivae pink, anicteric sclerae  ENT: lips and mucous membranes moist  Neck: supple, no JVD  Chest: clear breath sounds bilateral, no crackles, ronchus or wheezings  CVS: distinct S1 & S2, normal rate, regular rhythm  Abdomen: obese, soft, non-tender, non-distended, normoactive bowel sounds  Back: no CVA tenderness  Extremities: no significant edema of both legs  Skin: no rash  Neuro: awake, alert, oriented      Lab Results:   Results for orders placed or performed in visit on 07/05/19   Comprehensive metabolic panel   Result Value Ref Range    Sodium 134 (L) 136 - 145 mmol/L    Potassium 4 9 3 5 - 5 3 mmol/L    Chloride 106 100 - 108 mmol/L    CO2 23 21 - 32 mmol/L    ANION GAP 5 4 - 13 mmol/L    BUN 36 (H) 5 - 25 mg/dL    Creatinine 1 60 (H) 0 60 - 1 30 mg/dL    Glucose, Fasting 117 (H) 65 - 99 mg/dL    Calcium 9 2 8 3 - 10 1 mg/dL    AST 19 5 - 45 U/L    ALT 32 12 - 78 U/L    Alkaline Phosphatase 104 46 - 116 U/L    Total Protein 7 6 6 4 - 8 2 g/dL    Albumin 3 4 (L) 3 5 - 5 0 g/dL    Total Bilirubin 0 31 0 20 - 1 00 mg/dL    eGFR 42 ml/min/1 73sq m   Lipid Panel with Direct LDL reflex   Result Value Ref Range    Cholesterol 130 50 - 200 mg/dL    Triglycerides 313 (H) <=150 mg/dL    HDL, Direct 24 (L) 40 - 60 mg/dL    LDL Calculated 43 0 - 100 mg/dL   TSH, 3rd generation   Result Value Ref Range    TSH 3RD GENERATON 2 000 0 358 - 3 740 uIU/mL   Uric acid   Result Value Ref Range    Uric Acid 5 1 4 2 - 8 0 mg/dL   Hemoglobin A1C   Result Value Ref Range    Hemoglobin A1C 5 9 4 2 - 6 3 %     mg/dl             Portions of the record may have been created with voice recognition software  Occasional wrong word or "sound a like" substitutions may have occurred due to the inherent limitations of voice recognition software  Read the chart carefully and recognize, using context, where substitutions have occurred  If you have any questions, please contact the dictating provider

## 2019-10-22 NOTE — PATIENT INSTRUCTIONS
I want you to have repeat blood and urine test next time you are having your blood work for your primary care doctor  I will contact you with the results  Her kidney function remains stable I would like to see you back in the office in 6 months  Avoid NSAIDs (no ibuprofen, Motrin, Advil, Aleve, naproxen)  Okay to take Tylenol or paracetamol or acetaminophen as needed for pain  Encouraged to lose weight  Follow low-salt diet less than 2 g of salt in a day  No changes in your medications at this moment  Chronic Kidney Disease   WHAT YOU NEED TO KNOW:   What is chronic kidney disease (CKD)? CKD is the gradual and permanent loss of kidney function  It is also called chronic kidney failure, or chronic renal insufficiency  Normally, the kidneys remove fluid, chemicals, and waste from your blood  These wastes are turned into urine by your kidneys  CKD may worsen over time and lead to kidney failure  What increases my risk for CKD? · Diabetes or obesity    · High blood pressure or heart disease    · Kidney infections or kidney stones    · Autoimmune diseases, such as lupus    · An enlarged prostate    · NSAIDs, illegal drugs, or smoking    · Family history of kidney disease  What are the signs and symptoms of CKD? Your signs and symptoms will depend on how well your kidneys work  You may not have symptoms, or you may have any of the following:  · Changes in how often you need to urinate    · Swelling in your arms, legs, or feet    · Shortness of breath    · Fatigue or weakness    · Bad or bitter taste in your mouth    · Nausea, vomiting, or loss of appetite  How is CKD diagnosed? · Blood and urine tests  show how well your kidneys are working  They may also help manage or show the cause of CKD  · An ultrasound, CT scan, or MRI  may show the cause of CKD  You may be given contrast liquid to help your kidneys show up better in the pictures   Tell the healthcare provider if you have ever had an allergic reaction to contrast liquid  Do not enter the MRI room with anything metal  Metal can cause serious injury  Tell the healthcare provider if you have any metal in or on your body  · A biopsy  is a procedure to remove a small piece of tissue from your kidney  It is done to find the cause of your CKD  How is CKD treated? The goals of treatment are to control your symptoms and prevent your CKD from getting worse  You may need the following:  · Medicines  may be given to decrease blood pressure and get rid of extra fluid  You may also receive medicine to manage health conditions that may occur with CKD, such as anemia, diabetes, and heart disease  · Dialysis  is a treatment to remove chemicals and waste from your blood when your kidneys can no longer do this  · Surgery  may be needed to create an arteriovenous fistula (AVF) in your arm or insert a catheter into your abdomen  This is done so you can receive dialysis  · A kidney transplant  may be done if your CKD becomes severe  How can I manage CKD? · Maintain a healthy weight  Ask your healthcare provider how much you should weigh  Ask him to help you create a weight loss plan if you are overweight  · Exercise 30 to 60 minutes a day, 4 to 7 times a week, or as directed  Ask about the best exercise plan for you  Regular exercise can help you manage CKD, high blood pressure, and diabetes  · Follow your healthcare provider's advice about what to eat and drink  He may tell you to eat food low in sodium (salt), potassium, phosphorus, or protein  You may need to see a dietitian if you need help planning meals  Ask how much liquid to drink each day and which liquids are best for you  · Limit alcohol  Ask how much alcohol is safe for you to drink  A drink of alcohol is 12 ounces of beer, 5 ounces of wine, or 1½ ounces of liquor  · Do not smoke  Nicotine and other chemicals in cigarettes and cigars can cause lung and kidney damage  Ask your healthcare provider for information if you currently smoke and need help to quit  E-cigarettes or smokeless tobacco still contain nicotine  Talk to your healthcare provider before you use these products  · Ask your healthcare provider if you need vaccines  Infections such as pneumonia, influenza, and hepatitis can be more harmful or more likely to occur in a person who has CKD  Vaccines reduce your risk of infection with these viruses  When should I seek immediate care? · You are confused and very drowsy  · You have a seizure  · You have shortness of breath  When should I contact my healthcare provider? · You suddenly gain or lose more weight than your healthcare provider has told you is okay  · You have itchy skin or a rash  · You urinate more or less than you normally do  · You have blood in your urine  · You have nausea and repeated vomiting  · You have fatigue or muscle weakness  · You have hiccups that will not stop  · You have questions or concerns about your condition or care  CARE AGREEMENT:   You have the right to help plan your care  Learn about your health condition and how it may be treated  Discuss treatment options with your caregivers to decide what care you want to receive  You always have the right to refuse treatment  The above information is an  only  It is not intended as medical advice for individual conditions or treatments  Talk to your doctor, nurse or pharmacist before following any medical regimen to see if it is safe and effective for you  © 2017 [employer] Information is for End User's use only and may not be sold, redistributed or otherwise used for commercial purposes  All illustrations and images included in CareNotes® are the copyrighted property of A [employer] M [employer]  or Anatoly Dunn

## 2020-01-11 ENCOUNTER — APPOINTMENT (OUTPATIENT)
Dept: LAB | Facility: CLINIC | Age: 75
End: 2020-01-11
Payer: COMMERCIAL

## 2020-01-11 DIAGNOSIS — N18.30 CHRONIC KIDNEY DISEASE, STAGE III (MODERATE) (HCC): ICD-10-CM

## 2020-01-11 DIAGNOSIS — R73.09 ABNORMAL SERUM GLUCOSE LEVEL: ICD-10-CM

## 2020-01-11 DIAGNOSIS — I10 ESSENTIAL HYPERTENSION: ICD-10-CM

## 2020-01-11 DIAGNOSIS — E79.0 HYPERURICEMIA: ICD-10-CM

## 2020-01-11 LAB
ALBUMIN SERPL BCP-MCNC: 3.5 G/DL (ref 3.5–5)
ALP SERPL-CCNC: 74 U/L (ref 46–116)
ALT SERPL W P-5'-P-CCNC: 45 U/L (ref 12–78)
ANION GAP SERPL CALCULATED.3IONS-SCNC: 5 MMOL/L (ref 4–13)
AST SERPL W P-5'-P-CCNC: 27 U/L (ref 5–45)
BILIRUB SERPL-MCNC: 0.45 MG/DL (ref 0.2–1)
BUN SERPL-MCNC: 41 MG/DL (ref 5–25)
CALCIUM SERPL-MCNC: 10 MG/DL (ref 8.3–10.1)
CHLORIDE SERPL-SCNC: 108 MMOL/L (ref 100–108)
CO2 SERPL-SCNC: 24 MMOL/L (ref 21–32)
CREAT SERPL-MCNC: 1.6 MG/DL (ref 0.6–1.3)
CREAT UR-MCNC: 60.2 MG/DL
EST. AVERAGE GLUCOSE BLD GHB EST-MCNC: 140 MG/DL
GFR SERPL CREATININE-BSD FRML MDRD: 42 ML/MIN/1.73SQ M
GLUCOSE P FAST SERPL-MCNC: 109 MG/DL (ref 65–99)
HBA1C MFR BLD: 6.5 % (ref 4.2–6.3)
PHOSPHATE SERPL-MCNC: 3.6 MG/DL (ref 2.3–4.1)
POTASSIUM SERPL-SCNC: 4.5 MMOL/L (ref 3.5–5.3)
PROT SERPL-MCNC: 7.7 G/DL (ref 6.4–8.2)
PROT UR-MCNC: <6 MG/DL
PROT/CREAT UR: <0.1 MG/G{CREAT} (ref 0–0.1)
PTH-INTACT SERPL-MCNC: 61.6 PG/ML (ref 18.4–80.1)
SODIUM SERPL-SCNC: 137 MMOL/L (ref 136–145)
TSH SERPL DL<=0.05 MIU/L-ACNC: 2.11 UIU/ML (ref 0.36–3.74)
URATE SERPL-MCNC: 4.7 MG/DL (ref 4.2–8)

## 2020-01-11 PROCEDURE — 84156 ASSAY OF PROTEIN URINE: CPT

## 2020-01-11 PROCEDURE — 84443 ASSAY THYROID STIM HORMONE: CPT

## 2020-01-11 PROCEDURE — 80053 COMPREHEN METABOLIC PANEL: CPT

## 2020-01-11 PROCEDURE — 84100 ASSAY OF PHOSPHORUS: CPT

## 2020-01-11 PROCEDURE — 82570 ASSAY OF URINE CREATININE: CPT

## 2020-01-11 PROCEDURE — 36415 COLL VENOUS BLD VENIPUNCTURE: CPT

## 2020-01-11 PROCEDURE — 83970 ASSAY OF PARATHORMONE: CPT

## 2020-01-11 PROCEDURE — 84550 ASSAY OF BLOOD/URIC ACID: CPT

## 2020-01-11 PROCEDURE — 83036 HEMOGLOBIN GLYCOSYLATED A1C: CPT

## 2020-01-13 ENCOUNTER — TELEPHONE (OUTPATIENT)
Dept: NEPHROLOGY | Facility: CLINIC | Age: 75
End: 2020-01-13

## 2020-01-13 DIAGNOSIS — N18.30 CHRONIC KIDNEY DISEASE, STAGE III (MODERATE) (HCC): Primary | ICD-10-CM

## 2020-01-13 NOTE — TELEPHONE ENCOUNTER
Recent blood test reviewed, renal function is stable with a creatinine of 1 6  Minimal proteinuria, rest of the other test between normal limits  I have called patient and spoke with his wife regarding recent blood test   Recommend follow-up in 6 months with repeat labs (orders placed)  Please keep patient on recall list for six-month follow-up with me and mail orders    Thanks,        I cc note to patient's PCP to keep him updated

## 2020-01-24 ENCOUNTER — OFFICE VISIT (OUTPATIENT)
Dept: FAMILY MEDICINE CLINIC | Facility: CLINIC | Age: 75
End: 2020-01-24
Payer: COMMERCIAL

## 2020-01-24 VITALS
HEART RATE: 70 BPM | OXYGEN SATURATION: 97 % | DIASTOLIC BLOOD PRESSURE: 80 MMHG | TEMPERATURE: 97.6 F | BODY MASS INDEX: 40.44 KG/M2 | WEIGHT: 266.8 LBS | HEIGHT: 68 IN | SYSTOLIC BLOOD PRESSURE: 138 MMHG

## 2020-01-24 DIAGNOSIS — I10 ESSENTIAL HYPERTENSION: ICD-10-CM

## 2020-01-24 DIAGNOSIS — R73.09 ABNORMAL SERUM GLUCOSE LEVEL: ICD-10-CM

## 2020-01-24 DIAGNOSIS — Z12.5 SCREENING FOR PROSTATE CANCER: ICD-10-CM

## 2020-01-24 DIAGNOSIS — Z00.00 MEDICARE ANNUAL WELLNESS VISIT, SUBSEQUENT: Primary | ICD-10-CM

## 2020-01-24 DIAGNOSIS — E79.0 HYPERURICEMIA: ICD-10-CM

## 2020-01-24 DIAGNOSIS — Z12.11 ENCOUNTER FOR SCREENING COLONOSCOPY: ICD-10-CM

## 2020-01-24 DIAGNOSIS — E78.1 ESSENTIAL HYPERTRIGLYCERIDEMIA: ICD-10-CM

## 2020-01-24 DIAGNOSIS — N18.30 CHRONIC KIDNEY DISEASE, STAGE III (MODERATE) (HCC): ICD-10-CM

## 2020-01-24 PROCEDURE — 1125F AMNT PAIN NOTED PAIN PRSNT: CPT | Performed by: FAMILY MEDICINE

## 2020-01-24 PROCEDURE — 99214 OFFICE O/P EST MOD 30 MIN: CPT | Performed by: FAMILY MEDICINE

## 2020-01-24 PROCEDURE — 1160F RVW MEDS BY RX/DR IN RCRD: CPT | Performed by: FAMILY MEDICINE

## 2020-01-24 PROCEDURE — 1036F TOBACCO NON-USER: CPT | Performed by: FAMILY MEDICINE

## 2020-01-24 PROCEDURE — 3725F SCREEN DEPRESSION PERFORMED: CPT | Performed by: FAMILY MEDICINE

## 2020-01-24 PROCEDURE — G0439 PPPS, SUBSEQ VISIT: HCPCS | Performed by: FAMILY MEDICINE

## 2020-01-24 PROCEDURE — 3075F SYST BP GE 130 - 139MM HG: CPT | Performed by: FAMILY MEDICINE

## 2020-01-24 PROCEDURE — 1170F FXNL STATUS ASSESSED: CPT | Performed by: FAMILY MEDICINE

## 2020-01-24 PROCEDURE — 3079F DIAST BP 80-89 MM HG: CPT | Performed by: FAMILY MEDICINE

## 2020-01-24 RX ORDER — ALLOPURINOL 300 MG/1
300 TABLET ORAL DAILY
Qty: 90 TABLET | Refills: 0 | Status: SHIPPED | OUTPATIENT
Start: 2020-01-24 | End: 2020-03-15

## 2020-01-24 NOTE — ASSESSMENT & PLAN NOTE
Hemoglobin A1c 6 5  Discussed the need for improved diet exercise and weight loss with patient  If hemoglobin A1c continues to rise he will require medication for control of his blood sugar

## 2020-01-24 NOTE — ASSESSMENT & PLAN NOTE
Creatinine at 1 6 which is down from 9 months ago when it was greater than 2  Continue regular follow-up with Nephrology

## 2020-01-24 NOTE — ASSESSMENT & PLAN NOTE
Blood pressure stable on lisinopril hydrochlorothiazide and nifedipine  Continue at current dosages

## 2020-01-24 NOTE — PROGRESS NOTES
Assessment and Plan:     Problem List Items Addressed This Visit     Hyperuricemia    Relevant Medications    allopurinol (ZYLOPRIM) 300 mg tablet    Essential hypertriglyceridemia    Relevant Orders    Lipid Panel with Direct LDL reflex    TSH, 3rd generation    Chronic kidney disease, stage III (moderate) (HCC)    Relevant Orders    Comprehensive metabolic panel    Abnormal serum glucose level    Relevant Orders    Hemoglobin A1C      Other Visit Diagnoses     Medicare annual wellness visit, subsequent    -  Primary    Questionnaire reviewed  Immunizations and screening procedures reviewed  Due for colonoscopy  Advance directive in place  Encounter for screening colonoscopy        Relevant Orders    Ambulatory referral for colonoscopy    Screening for prostate cancer        Relevant Orders    PSA, Total Screen        BMI Counseling: Body mass index is 40 57 kg/m²  The BMI is above normal  Nutrition recommendations include decreasing portion sizes, encouraging healthy choices of fruits and vegetables, consuming healthier snacks, limiting drinks that contain sugar, moderation in carbohydrate intake and increasing intake of lean protein  Exercise recommendations include exercising 3-5 times per week  Preventive health issues were discussed with patient, and age appropriate screening tests were ordered as noted in patient's After Visit Summary  Personalized health advice and appropriate referrals for health education or preventive services given if needed, as noted in patient's After Visit Summary  History of Present Illness:     Patient presents for Welcome to Medicare visit       Patient Care Team:  Vania Fraga DO as PCP - General  Vania Fraga DO as PCP - [address] [place] [address] (RTE)  Martín Lewis MD as Endoscopist     Review of Systems:     Review of Systems   Problem List:     Patient Active Problem List   Diagnosis    Abnormal serum glucose level    Chronic kidney disease, stage III (moderate) (HCC)    Essential hypertriglyceridemia    Gout    KALIA (obstructive sleep apnea)    Essential hypertension    Vitamin D deficiency    Knee pain    Localized, primary osteoarthritis    Osteoarthritis of knee    Hyperuricemia      Past Medical and Surgical History:     Past Medical History:   Diagnosis Date    Chronic kidney disease     CPAP (continuous positive airway pressure) dependence     Gout     Hearing aid worn     sabrina    Hernia, inguinal     RIH and umbilical hernia    Hypertension     Morbidly obese ([address] )     Sleep apnea     Wears glasses      Past Surgical History:   Procedure Laterality Date    CARPAL TUNNEL RELEASE Bilateral     COLONOSCOPY N/A 1/6/2017    Procedure: COLONOSCOPY;  Surgeon: Haydee Chin MD;  Location: [facility];   Service:     CYST REMOVAL      FOOT SURGERY      HERNIA REPAIR      JOINT REPLACEMENT      knee left     [place] LAP,INGUINAL HERNIA REPR,INITIAL Right 3/16/2017    Procedure: REPAIR HERNIA INGUINAL, LAPAROSCOPIC WITH MESH;  Surgeon: Mirlande Morris MD;  Location: [facility];  Service: General    [place] REPAIR UMBILICAL UCOZ,7+J/K,JESNN N/A 3/16/2017    Procedure: OPEN REPAIR HERNIA UMBILICAL;  Surgeon: Mirlande Morris MD;  Location[facility];  Service: General    TONSILLECTOMY        Family History:     Family History   Problem Relation Age of Onset    No Known Problems Mother     Diabetes Father     Diabetes Sister       Social History:     Social History     Socioeconomic History    Marital status: /Civil Union     Spouse name: None    Number of children: None    Years of education: None    Highest education level: None   Occupational History    None   Social Needs    Financial resource strain: None    Food insecurity:     Worry: None     Inability: None    Transportation needs:     Medical: None     Non-medical: None   Tobacco Use    Smoking status: Former Smoker     Last attempt to quit: 3/10/2003     Years since quitting: 16 8    Smokeless tobacco: Never Used   Substance and Sexual Activity    Alcohol use: No    Drug use: No    Sexual activity: None   Lifestyle    Physical activity:     Days per week: None     Minutes per session: None    Stress: None   Relationships    Social connections:     Talks on phone: None     Gets together: None     Attends Faith service: None     Active member of club or organization: None     Attends meetings of clubs or organizations: None     Relationship status: None    Intimate partner violence:     Fear of current or ex partner: None     Emotionally abused: None     Physically abused: None     Forced sexual activity: None   Other Topics Concern    None   Social History Narrative    Always uses seat belt    Feels safe at home      Medications and Allergies:     Current Outpatient Medications   Medication Sig Dispense Refill    allopurinol (ZYLOPRIM) 300 mg tablet Take 1 tablet (300 mg total) by mouth daily 90 tablet 0    aspirin 81 MG tablet Take 81 mg by mouth daily      Cholecalciferol (VITAMIN D3) 5000 units TABS Take 1 tablet by mouth daily      lisinopril-hydrochlorothiazide (PRINZIDE,ZESTORETIC) 20-12 5 MG per tablet Take 2 tablets by mouth daily 180 tablet 3    NIFEdipine ER (ADALAT CC) 60 MG 24 hr tablet Take 1 tablet (60 mg total) by mouth daily 90 tablet 3    Omega-3 Fatty Acids (FISH OIL) 1200 MG CAPS Take 1 capsule by mouth 3 (three) times a day       No current facility-administered medications for this visit        No Known Allergies   Immunizations:     Immunization History   Administered Date(s) Administered    INFLUENZA 01/19/2005, 09/27/2007, 10/17/2008, 11/05/2011, 12/21/2012, 11/15/2013, 11/01/2015, 11/01/2016, 11/16/2018    Influenza Quadrivalent, 6-35 Months IM 11/01/2015    Influenza Split High Dose Preservative Free IM 08/14/2017, 11/02/2019    Influenza TIV (IM) 11/01/2016    Pneumococcal Conjugate 13-Valent 07/29/2016    Pneumococcal Polysaccharide PPV23 11/06/2009, 08/14/2017    Td (adult), Unspecified 01/01/1993    Zoster 02/08/2012, 07/29/2013      Health Maintenance:         Topic Date Due    CRC Screening: Colonoscopy  01/06/2020     There are no preventive care reminders to display for this patient  Medicare Screening Tests and Risk Assessments:     Last Medicare Wellness visit information reviewed, patient interviewed and updates made to the record today  Health Risk Assessment:   Patient rates overall health as very good  Patient feels that their physical health rating is same  Eyesight was rated as same  Hearing was rated as same  Patient feels that their emotional and mental health rating is same  Pain experienced in the last 7 days has been none  Patient states that he has experienced no weight loss or gain in last 6 months  Depression Screening:   PHQ-2 Score: 0      Fall Risk Screening: In the past year, patient has experienced: no history of falling in past year      Home Safety:  Patient does not have trouble with stairs inside or outside of their home  Patient has working smoke alarms and has working carbon monoxide detector  Home safety hazards include: none  Nutrition:   Current diet is Regular  Medications:   Patient is currently taking over-the-counter supplements  OTC medications include: see medication list  Patient is able to manage medications  Activities of Daily Living (ADLs)/Instrumental Activities of Daily Living (IADLs):   Walk and transfer into and out of bed and chair?: Yes  Dress and groom yourself?: Yes    Bathe or shower yourself?: Yes    Feed yourself? Yes  Do your laundry/housekeeping?: Yes  Manage your money, pay your bills and track your expenses?: Yes  Make your own meals?: Yes    Do your own shopping?: Yes    Previous Hospitalizations:   Any hospitalizations or ED visits within the last 12 months?: No      Advance Care Planning:   Living will: Yes    Durable POA for healthcare:  Yes    Advanced directive: Yes    Advanced directive counseling given: No      Cognitive Screening:   Provider or family/friend/caregiver concerned regarding cognition?: No    PREVENTIVE SCREENINGS      Cardiovascular Screening:    General: Screening Not Indicated and History Lipid Disorder      Diabetes Screening:     General: Screening Current      Colorectal Cancer Screening:     General: Screening Current      Prostate Cancer Screening:    General: Screening Not Indicated      Osteoporosis Screening:    General: Screening Not Indicated      Abdominal Aortic Aneurysm (AAA) Screening:    Risk factors include: age between 73-69 yo and tobacco use        Lung Cancer Screening:     General: Screening Not Indicated      Hepatitis C Screening:    General: Screening Not Indicated    No exam data present     Physical Exam:     /80   Pulse 70   Temp 97 6 °F (36 4 °C) (Tympanic)   Ht 5' 8" (1 727 m)   Wt 121 kg (266 lb 12 8 oz)   SpO2 97%   BMI 40 57 kg/m²     Physical Exam    Mitchel Crigler, DO

## 2020-03-15 DIAGNOSIS — E79.0 HYPERURICEMIA: ICD-10-CM

## 2020-03-15 DIAGNOSIS — I10 ESSENTIAL HYPERTENSION: ICD-10-CM

## 2020-03-15 RX ORDER — LISINOPRIL AND HYDROCHLOROTHIAZIDE 20; 12.5 MG/1; MG/1
TABLET ORAL
Qty: 180 TABLET | Refills: 2 | Status: SHIPPED | OUTPATIENT
Start: 2020-03-15 | End: 2020-07-31 | Stop reason: SDUPTHER

## 2020-03-15 RX ORDER — ALLOPURINOL 300 MG/1
TABLET ORAL
Qty: 90 TABLET | Refills: 1 | Status: SHIPPED | OUTPATIENT
Start: 2020-03-15 | End: 2020-07-31 | Stop reason: SDUPTHER

## 2020-03-15 RX ORDER — NIFEDIPINE 60 MG/1
TABLET, EXTENDED RELEASE ORAL
Qty: 90 TABLET | Refills: 2 | Status: SHIPPED | OUTPATIENT
Start: 2020-03-15 | End: 2020-07-31 | Stop reason: SDUPTHER

## 2020-05-27 ENCOUNTER — TELEPHONE (OUTPATIENT)
Dept: NEPHROLOGY | Facility: CLINIC | Age: 75
End: 2020-05-27

## 2020-07-06 ENCOUNTER — APPOINTMENT (OUTPATIENT)
Dept: LAB | Facility: CLINIC | Age: 75
End: 2020-07-06
Payer: COMMERCIAL

## 2020-07-06 DIAGNOSIS — N18.30 CHRONIC KIDNEY DISEASE, STAGE III (MODERATE) (HCC): ICD-10-CM

## 2020-07-06 DIAGNOSIS — Z12.5 SCREENING FOR PROSTATE CANCER: ICD-10-CM

## 2020-07-06 DIAGNOSIS — E78.1 ESSENTIAL HYPERTRIGLYCERIDEMIA: ICD-10-CM

## 2020-07-06 DIAGNOSIS — R73.09 ABNORMAL SERUM GLUCOSE LEVEL: ICD-10-CM

## 2020-07-06 LAB
ALBUMIN SERPL BCP-MCNC: 3.8 G/DL (ref 3.5–5)
ALP SERPL-CCNC: 92 U/L (ref 46–116)
ALT SERPL W P-5'-P-CCNC: 33 U/L (ref 12–78)
ANION GAP SERPL CALCULATED.3IONS-SCNC: 6 MMOL/L (ref 4–13)
AST SERPL W P-5'-P-CCNC: 20 U/L (ref 5–45)
BILIRUB SERPL-MCNC: 0.48 MG/DL (ref 0.2–1)
BUN SERPL-MCNC: 50 MG/DL (ref 5–25)
CALCIUM SERPL-MCNC: 9.6 MG/DL (ref 8.3–10.1)
CHLORIDE SERPL-SCNC: 110 MMOL/L (ref 100–108)
CHOLEST SERPL-MCNC: 144 MG/DL (ref 50–200)
CO2 SERPL-SCNC: 23 MMOL/L (ref 21–32)
CREAT SERPL-MCNC: 1.76 MG/DL (ref 0.6–1.3)
EST. AVERAGE GLUCOSE BLD GHB EST-MCNC: 117 MG/DL
GFR SERPL CREATININE-BSD FRML MDRD: 37 ML/MIN/1.73SQ M
GLUCOSE P FAST SERPL-MCNC: 99 MG/DL (ref 65–99)
HBA1C MFR BLD: 5.7 %
HDLC SERPL-MCNC: 29 MG/DL
LDLC SERPL CALC-MCNC: 74 MG/DL (ref 0–100)
POTASSIUM SERPL-SCNC: 5.3 MMOL/L (ref 3.5–5.3)
PROT SERPL-MCNC: 8 G/DL (ref 6.4–8.2)
PSA SERPL-MCNC: 2.5 NG/ML (ref 0–4)
SODIUM SERPL-SCNC: 139 MMOL/L (ref 136–145)
TRIGL SERPL-MCNC: 204 MG/DL
TSH SERPL DL<=0.05 MIU/L-ACNC: 2.07 UIU/ML (ref 0.36–3.74)

## 2020-07-06 PROCEDURE — G0103 PSA SCREENING: HCPCS

## 2020-07-06 PROCEDURE — 83036 HEMOGLOBIN GLYCOSYLATED A1C: CPT

## 2020-07-06 PROCEDURE — 80053 COMPREHEN METABOLIC PANEL: CPT

## 2020-07-06 PROCEDURE — 80061 LIPID PANEL: CPT

## 2020-07-06 PROCEDURE — 84443 ASSAY THYROID STIM HORMONE: CPT

## 2020-07-06 PROCEDURE — 36415 COLL VENOUS BLD VENIPUNCTURE: CPT

## 2020-07-31 ENCOUNTER — OFFICE VISIT (OUTPATIENT)
Dept: FAMILY MEDICINE CLINIC | Facility: CLINIC | Age: 75
End: 2020-07-31
Payer: COMMERCIAL

## 2020-07-31 VITALS
WEIGHT: 256.2 LBS | BODY MASS INDEX: 38.83 KG/M2 | DIASTOLIC BLOOD PRESSURE: 68 MMHG | TEMPERATURE: 97.6 F | HEIGHT: 68 IN | HEART RATE: 86 BPM | OXYGEN SATURATION: 95 % | SYSTOLIC BLOOD PRESSURE: 142 MMHG

## 2020-07-31 DIAGNOSIS — R73.09 ABNORMAL SERUM GLUCOSE LEVEL: Primary | ICD-10-CM

## 2020-07-31 DIAGNOSIS — E79.0 HYPERURICEMIA: ICD-10-CM

## 2020-07-31 DIAGNOSIS — I10 ESSENTIAL HYPERTENSION: ICD-10-CM

## 2020-07-31 DIAGNOSIS — N18.30 CHRONIC KIDNEY DISEASE, STAGE III (MODERATE) (HCC): ICD-10-CM

## 2020-07-31 DIAGNOSIS — E78.1 ESSENTIAL HYPERTRIGLYCERIDEMIA: ICD-10-CM

## 2020-07-31 PROCEDURE — 3078F DIAST BP <80 MM HG: CPT | Performed by: FAMILY MEDICINE

## 2020-07-31 PROCEDURE — 3008F BODY MASS INDEX DOCD: CPT | Performed by: FAMILY MEDICINE

## 2020-07-31 PROCEDURE — 1036F TOBACCO NON-USER: CPT | Performed by: FAMILY MEDICINE

## 2020-07-31 PROCEDURE — 99214 OFFICE O/P EST MOD 30 MIN: CPT | Performed by: FAMILY MEDICINE

## 2020-07-31 PROCEDURE — 1160F RVW MEDS BY RX/DR IN RCRD: CPT | Performed by: FAMILY MEDICINE

## 2020-07-31 PROCEDURE — 4040F PNEUMOC VAC/ADMIN/RCVD: CPT | Performed by: FAMILY MEDICINE

## 2020-07-31 PROCEDURE — 3077F SYST BP >= 140 MM HG: CPT | Performed by: FAMILY MEDICINE

## 2020-07-31 RX ORDER — LISINOPRIL AND HYDROCHLOROTHIAZIDE 20; 12.5 MG/1; MG/1
2 TABLET ORAL DAILY
Qty: 180 TABLET | Refills: 2 | Status: SHIPPED | OUTPATIENT
Start: 2020-07-31 | End: 2021-02-05 | Stop reason: SDUPTHER

## 2020-07-31 RX ORDER — NIFEDIPINE 60 MG/1
60 TABLET, EXTENDED RELEASE ORAL DAILY
Qty: 90 TABLET | Refills: 2 | Status: SHIPPED | OUTPATIENT
Start: 2020-07-31 | End: 2021-02-05 | Stop reason: SDUPTHER

## 2020-07-31 RX ORDER — ALLOPURINOL 300 MG/1
300 TABLET ORAL DAILY
Qty: 90 TABLET | Refills: 1 | Status: SHIPPED | OUTPATIENT
Start: 2020-07-31 | End: 2021-02-05 | Stop reason: SDUPTHER

## 2020-07-31 NOTE — ASSESSMENT & PLAN NOTE
Creatinine stable at 1 7  Patient was referred to Nephrology but this point does not wish to return  Would like to continue monitoring through this practice  We will continue to check his labs  Will endeavor for good glucose and blood pressure control  Stay off of NSAIDs  If creatinine begins to rise further I advised patient that nephrology consultation would be important

## 2020-07-31 NOTE — PROGRESS NOTES
[facility]      NAME: Lianne Gilbert  AGE: 67 y o  SEX: male  : 1945   MRN: [id number]    DATE: 2020  TIME: 8:40 AM    Assessment and Plan     Problem List Items Addressed This Visit     Hyperuricemia    Relevant Medications    allopurinol (ZYLOPRIM) 300 mg tablet    Other Relevant Orders    Uric acid    Essential hypertriglyceridemia     Lipids well controlled with LDL below 100  Continue current diet  Essential hypertension     Well controlled on lisinopril hydrochlorothiazide and nifedipine  Relevant Medications    lisinopril-hydrochlorothiazide (PRINZIDE,ZESTORETIC) 20-12 5 MG per tablet    NIFEdipine (PROCARDIA XL) 60 mg 24 hr tablet    Other Relevant Orders    Comprehensive metabolic panel    TSH, 3rd generation with Free T4 reflex    Chronic kidney disease, stage III (moderate) (HCC)     Creatinine stable at 1 7  Patient was referred to Nephrology but this point does not wish to return  Would like to continue monitoring through this practice  We will continue to check his labs  Will endeavor for good glucose and blood pressure control  Stay off of NSAIDs  If creatinine begins to rise further I advised patient that nephrology consultation would be important  Abnormal serum glucose level - Primary     Hemoglobin A1c has improved to 5 7  Continue with diet and exercise  Relevant Orders    Hemoglobin A1C              Return to office in:  6 months, annual wellness visit    Chief Complaint     Chief Complaint   Patient presents with    Follow-up     6 month check, labs done   History of Present Illness     Patient was seen for routine follow-up of chronic medical problems  He is being treated for hyperlipidemia, hypertension, chronic kidney disease and elevated fasting glucose  Medications include allopurinol for hyperuricemia, lisinopril hydrochlorothiazide and nifedipine for high blood pressure    He is on no medication for his blood sugar  He has no complaints today  He has lost a little bit of weight and has been working on his diet  The following portions of the patient's history were reviewed and updated as appropriate: allergies, current medications, past family history, past medical history, past social history, past surgical history and problem list     Review of Systems   Review of Systems   Constitutional: Negative  Respiratory: Negative  Cardiovascular: Negative  Gastrointestinal: Negative  Genitourinary: Negative  Musculoskeletal: Negative  Psychiatric/Behavioral: Negative  Active Problem List     Patient Active Problem List   Diagnosis    Abnormal serum glucose level    Chronic kidney disease, stage III (moderate) (HCC)    Essential hypertriglyceridemia    Gout    KALIA (obstructive sleep apnea)    Essential hypertension    Vitamin D deficiency    Knee pain    Localized, primary osteoarthritis    Osteoarthritis of knee    Hyperuricemia       Objective   /68 (BP Location: Left arm, Patient Position: Sitting, Cuff Size: Large)   Pulse 86   Temp 97 6 °F (36 4 °C) (Tympanic)   Ht 5' 8" (1 727 m)   Wt 116 kg (256 lb 3 2 oz)   SpO2 95%   BMI 38 96 kg/m²     Physical Exam   Constitutional: He is oriented to person, place, and time  He appears well-developed and well-nourished  No distress  HENT:   Head: Normocephalic and atraumatic  Eyes: Pupils are equal, round, and reactive to light  Conjunctivae are normal  Right eye exhibits no discharge  Neck: Normal range of motion  No thyromegaly present  Cardiovascular: Normal rate and regular rhythm  Pulmonary/Chest: Effort normal and breath sounds normal  No respiratory distress  Lymphadenopathy:     He has no cervical adenopathy  Neurological: He is alert and oriented to person, place, and time  Skin: Skin is warm and dry  He is not diaphoretic  Psychiatric: He has a normal mood and affect   His behavior is normal  Judgment and thought content normal    Nursing note and vitals reviewed          Current Medications     Current Outpatient Medications:     allopurinol (ZYLOPRIM) 300 mg tablet, Take 1 tablet (300 mg total) by mouth daily, Disp: 90 tablet, Rfl: 1    aspirin 81 MG tablet, Take 81 mg by mouth daily, Disp: , Rfl:     Cholecalciferol (VITAMIN D3) 5000 units TABS, Take 1 tablet by mouth daily, Disp: , Rfl:     lisinopril-hydrochlorothiazide (PRINZIDE,ZESTORETIC) 20-12 5 MG per tablet, Take 2 tablets by mouth daily, Disp: 180 tablet, Rfl: 2    NIFEdipine (PROCARDIA XL) 60 mg 24 hr tablet, Take 1 tablet (60 mg total) by mouth daily, Disp: 90 tablet, Rfl: 2    Omega-3 Fatty Acids (FISH OIL) 1200 MG CAPS, Take 1 capsule by mouth 3 (three) times a day, Disp: , Rfl:     Health Maintenance     Health Maintenance   Topic Date Due    DTaP,Tdap,and Td Vaccines (1 - Tdap) 07/05/1956    Depression Screening PHQ  01/24/2021    BMI: Followup Plan  01/24/2021    Influenza Vaccine  09/19/2020 (Originally 7/1/2020)    Fall Risk  01/24/2021    Medicare Annual Wellness Visit (AWV)  01/24/2021    BMI: Adult  07/31/2021    Pneumococcal Vaccine: 65+ Years  Completed    Pneumococcal Vaccine: Pediatrics (0 to 5 Years) and At-Risk Patients (6 to 59 Years)  Aged Out    HIB Vaccine  Aged Out    Hepatitis B Vaccine  Aged Out    IPV Vaccine  Aged Out    Hepatitis A Vaccine  Aged Out    Meningococcal ACWY Vaccine  Aged Out    HPV Vaccine  Aged [employer] History   Administered Date(s) Administered    INFLUENZA 01/19/2005, 09/27/2007, 10/17/2008, 11/05/2011, 12/21/2012, 11/15/2013, 11/01/2015, 11/01/2016, 11/16/2018    Influenza Quadrivalent, 6-35 Months IM 11/01/2015    Influenza Split High Dose Preservative Free IM 08/14/2017, 11/02/2019    Influenza TIV (IM) 11/01/2016    Pneumococcal Conjugate 13-Valent 07/29/2016    Pneumococcal Polysaccharide PPV23 11/06/2009, 08/14/2017    Td (adult), Unspecified 01/01/1993  Zoster 02/08/2012, 07/29/2013       Gabbi Chatterjee DO  [facility]

## 2020-10-07 ENCOUNTER — OFFICE VISIT (OUTPATIENT)
Dept: FAMILY MEDICINE CLINIC | Facility: CLINIC | Age: 75
End: 2020-10-07
Payer: COMMERCIAL

## 2020-10-07 VITALS
HEART RATE: 99 BPM | HEIGHT: 68 IN | BODY MASS INDEX: 40.32 KG/M2 | RESPIRATION RATE: 16 BRPM | SYSTOLIC BLOOD PRESSURE: 130 MMHG | OXYGEN SATURATION: 94 % | TEMPERATURE: 98 F | WEIGHT: 266 LBS | DIASTOLIC BLOOD PRESSURE: 80 MMHG

## 2020-10-07 DIAGNOSIS — M62.08 RECTUS DIASTASIS: Primary | ICD-10-CM

## 2020-10-07 DIAGNOSIS — Z23 NEED FOR VACCINATION: ICD-10-CM

## 2020-10-07 PROCEDURE — 3079F DIAST BP 80-89 MM HG: CPT | Performed by: FAMILY MEDICINE

## 2020-10-07 PROCEDURE — 3075F SYST BP GE 130 - 139MM HG: CPT | Performed by: FAMILY MEDICINE

## 2020-10-07 PROCEDURE — 1036F TOBACCO NON-USER: CPT | Performed by: FAMILY MEDICINE

## 2020-10-07 PROCEDURE — G0008 ADMIN INFLUENZA VIRUS VAC: HCPCS | Performed by: FAMILY MEDICINE

## 2020-10-07 PROCEDURE — 1160F RVW MEDS BY RX/DR IN RCRD: CPT | Performed by: FAMILY MEDICINE

## 2020-10-07 PROCEDURE — 99213 OFFICE O/P EST LOW 20 MIN: CPT | Performed by: FAMILY MEDICINE

## 2020-10-07 PROCEDURE — 90662 IIV NO PRSV INCREASED AG IM: CPT | Performed by: FAMILY MEDICINE

## 2021-01-30 LAB — HBA1C MFR BLD HPLC: 5.8 %

## 2021-02-05 ENCOUNTER — OFFICE VISIT (OUTPATIENT)
Dept: FAMILY MEDICINE CLINIC | Facility: CLINIC | Age: 76
End: 2021-02-05
Payer: COMMERCIAL

## 2021-02-05 VITALS
HEART RATE: 95 BPM | RESPIRATION RATE: 18 BRPM | OXYGEN SATURATION: 94 % | SYSTOLIC BLOOD PRESSURE: 124 MMHG | HEIGHT: 68 IN | WEIGHT: 265 LBS | TEMPERATURE: 97.4 F | DIASTOLIC BLOOD PRESSURE: 80 MMHG | BODY MASS INDEX: 40.16 KG/M2

## 2021-02-05 DIAGNOSIS — Z00.00 MEDICARE ANNUAL WELLNESS VISIT, SUBSEQUENT: Primary | ICD-10-CM

## 2021-02-05 DIAGNOSIS — R73.09 ABNORMAL SERUM GLUCOSE LEVEL: ICD-10-CM

## 2021-02-05 DIAGNOSIS — E55.9 VITAMIN D DEFICIENCY: ICD-10-CM

## 2021-02-05 DIAGNOSIS — E66.01 OBESITY, MORBID (HCC): ICD-10-CM

## 2021-02-05 DIAGNOSIS — I10 ESSENTIAL HYPERTENSION: ICD-10-CM

## 2021-02-05 DIAGNOSIS — N18.30 STAGE 3 CHRONIC KIDNEY DISEASE, UNSPECIFIED WHETHER STAGE 3A OR 3B CKD (HCC): ICD-10-CM

## 2021-02-05 DIAGNOSIS — E79.0 HYPERURICEMIA: ICD-10-CM

## 2021-02-05 DIAGNOSIS — E78.1 ESSENTIAL HYPERTRIGLYCERIDEMIA: ICD-10-CM

## 2021-02-05 PROCEDURE — 1101F PT FALLS ASSESS-DOCD LE1/YR: CPT | Performed by: FAMILY MEDICINE

## 2021-02-05 PROCEDURE — 3725F SCREEN DEPRESSION PERFORMED: CPT | Performed by: FAMILY MEDICINE

## 2021-02-05 PROCEDURE — 1160F RVW MEDS BY RX/DR IN RCRD: CPT | Performed by: FAMILY MEDICINE

## 2021-02-05 PROCEDURE — G0439 PPPS, SUBSEQ VISIT: HCPCS | Performed by: FAMILY MEDICINE

## 2021-02-05 PROCEDURE — 1170F FXNL STATUS ASSESSED: CPT | Performed by: FAMILY MEDICINE

## 2021-02-05 PROCEDURE — 3079F DIAST BP 80-89 MM HG: CPT | Performed by: FAMILY MEDICINE

## 2021-02-05 PROCEDURE — 99214 OFFICE O/P EST MOD 30 MIN: CPT | Performed by: FAMILY MEDICINE

## 2021-02-05 PROCEDURE — 1125F AMNT PAIN NOTED PAIN PRSNT: CPT | Performed by: FAMILY MEDICINE

## 2021-02-05 PROCEDURE — 1036F TOBACCO NON-USER: CPT | Performed by: FAMILY MEDICINE

## 2021-02-05 PROCEDURE — 3288F FALL RISK ASSESSMENT DOCD: CPT | Performed by: FAMILY MEDICINE

## 2021-02-05 PROCEDURE — 3074F SYST BP LT 130 MM HG: CPT | Performed by: FAMILY MEDICINE

## 2021-02-05 RX ORDER — NIFEDIPINE 60 MG/1
60 TABLET, EXTENDED RELEASE ORAL DAILY
Qty: 90 TABLET | Refills: 2 | Status: SHIPPED | OUTPATIENT
Start: 2021-02-05

## 2021-02-05 RX ORDER — ALLOPURINOL 300 MG/1
300 TABLET ORAL DAILY
Qty: 90 TABLET | Refills: 2 | Status: SHIPPED | OUTPATIENT
Start: 2021-02-05 | End: 2021-10-18

## 2021-02-05 RX ORDER — LISINOPRIL AND HYDROCHLOROTHIAZIDE 20; 12.5 MG/1; MG/1
2 TABLET ORAL DAILY
Qty: 180 TABLET | Refills: 2 | Status: SHIPPED | OUTPATIENT
Start: 2021-02-05 | End: 2021-10-18

## 2021-02-05 NOTE — ASSESSMENT & PLAN NOTE
Lab Results   Component Value Date    EGFR 37 07/06/2020    EGFR 42 01/11/2020    EGFR 42 07/05/2019    CREATININE 1 76 (H) 07/06/2020    CREATININE 1 60 (H) 01/11/2020    CREATININE 1 60 (H) 07/05/2019   Creatinine has increased to 1 75  Encourage patient to be seen at Nephrology but he refuses at this time

## 2021-02-05 NOTE — PROGRESS NOTES
Assessment and Plan:     Problem List Items Addressed This Visit     Hyperuricemia    Essential hypertension      Other Visit Diagnoses     Medicare annual wellness visit, subsequent    -  Primary      Questionnaire reviewed  Immunization and screening history updated  Advance directive in place  BMI Counseling: Body mass index is 40 63 kg/m²  The BMI is above normal  Nutrition recommendations include decreasing portion sizes, encouraging healthy choices of fruits and vegetables, decreasing fast food intake, moderation in carbohydrate intake and increasing intake of lean protein  Exercise recommendations include moderate physical activity 150 minutes/week and exercising 3-5 times per week  No pharmacotherapy was ordered  Preventive health issues were discussed with patient, and age appropriate screening tests were ordered as noted in patient's After Visit Summary  Personalized health advice and appropriate referrals for health education or preventive services given if needed, as noted in patient's After Visit Summary  History of Present Illness:     Patient presents for Welcome to Medicare visit       Patient Care Team:  Saskia Francis DO as PCP - General  Saskia Francis DO as PCP - PCP-[facility] ([facility])  Julien Arana MD as Endoscopist     Review of Systems:     Review of Systems   Problem List:     Patient Active Problem List   Diagnosis    Abnormal serum glucose level    Chronic kidney disease, stage III (moderate)    Essential hypertriglyceridemia    Gout    KALIA (obstructive sleep apnea)    Essential hypertension    Vitamin D deficiency    Knee pain    Localized, primary osteoarthritis    Osteoarthritis of knee    Hyperuricemia    Rectus diastasis      Past Medical and Surgical History:     Past Medical History:   Diagnosis Date    Chronic kidney disease     CPAP (continuous positive airway pressure) dependence     Gout     Hearing aid worn     sabrina    Hernia, inguinal     RIH and umbilical hernia    Hypertension     Morbidly obese ([address] )     Sleep apnea     Wears glasses      Past Surgical History:   Procedure Laterality Date    CARPAL TUNNEL RELEASE Bilateral     COLONOSCOPY N/A 2017    Procedure: COLONOSCOPY;  Surgeon: Rudy Hines MD;  Location: [facility];   Service:     CYST REMOVAL      FOOT SURGERY      HERNIA REPAIR      JOINT REPLACEMENT      knee left     DE LAP,INGUINAL HERNIA REPR,INITIAL Right 3/16/2017    Procedure: REPAIR HERNIA INGUINAL, LAPAROSCOPIC WITH MESH;  Surgeon: Maria Guadalupe Mancilla MD;  Location: [facility];  Service: General    DE REPAIR UMBILICAL CQZI,6+S/J,CYQUK N/A 3/16/2017    Procedure: OPEN REPAIR HERNIA UMBILICAL;  Surgeon: Maria Guadalupe Mancilla MD;  Location[facility];  Service: General    TONSILLECTOMY        Family History:     Family History   Problem Relation Age of Onset    No Known Problems Mother     Diabetes Father     Diabetes Sister       Social History:     E-Cigarette/Vaping    E-Cigarette Use Never User      E-Cigarette/Vaping Substances    Nicotine No     THC No     CBD No     Flavoring No     Other No     Unknown No      Social History     Socioeconomic History    Marital status: /Civil Union     Spouse name: None    Number of children: None    Years of education: None    Highest education level: None   Occupational History    None   Social Needs    Financial resource strain: None    Food insecurity     Worry: None     Inability: None    Transportation needs     Medical: None     Non-medical: None   Tobacco Use    Smoking status: Former Smoker     Quit date: 3/10/2003     Years since quittin 9    Smokeless tobacco: Never Used   Substance and Sexual Activity    Alcohol use: No    Drug use: No    Sexual activity: None   Lifestyle    Physical activity     Days per week: None     Minutes per session: None    Stress: None   Relationships    Social connections     Talks on phone: None Gets together: None     Attends Caodaism service: None     Active member of club or organization: None     Attends meetings of clubs or organizations: None     Relationship status: None    Intimate partner violence     Fear of current or ex partner: None     Emotionally abused: None     Physically abused: None     Forced sexual activity: None   Other Topics Concern    None   Social History Narrative    Always uses seat belt    Feels safe at home      Medications and Allergies:     Current Outpatient Medications   Medication Sig Dispense Refill    allopurinol (ZYLOPRIM) 300 mg tablet Take 1 tablet (300 mg total) by mouth daily 90 tablet 1    aspirin 81 MG tablet Take 81 mg by mouth daily      Cholecalciferol (VITAMIN D3) 5000 units TABS Take 1 tablet by mouth daily      lisinopril-hydrochlorothiazide (PRINZIDE,ZESTORETIC) 20-12 5 MG per tablet Take 2 tablets by mouth daily 180 tablet 2    NIFEdipine (PROCARDIA XL) 60 mg 24 hr tablet Take 1 tablet (60 mg total) by mouth daily 90 tablet 2    Omega-3 Fatty Acids (FISH OIL) 1200 MG CAPS Take 1 capsule by mouth 3 (three) times a day       No current facility-administered medications for this visit  No Known Allergies   Immunizations:     Immunization History   Administered Date(s) Administered    INFLUENZA 01/19/2005, 09/27/2007, 10/17/2008, 11/05/2011, 12/21/2012, 11/15/2013, 11/01/2015, 11/01/2016, 11/16/2018    Influenza Quadrivalent, 6-35 Months IM 11/01/2015    Influenza Split High Dose Preservative Free IM 08/14/2017, 11/02/2019    Influenza, high dose seasonal 0 7 mL 10/07/2020    Influenza, seasonal, injectable 11/01/2016    Pneumococcal Conjugate 13-Valent 07/29/2016    Pneumococcal Polysaccharide PPV23 11/06/2009, 08/14/2017    SARS-CoV-2 / COVID-19 mRNA IM (Moderna) 01/19/2021    Td (adult), Unspecified 01/01/1993    Zoster 02/08/2012, 07/29/2013      Health Maintenance:      There are no preventive care reminders to display for this patient  Topic Date Due    DTaP,Tdap,and Td Vaccines (1 - Tdap) 07[birth year]05/1965      Medicare Screening Tests and Risk Assessments:     Song Lockhart is here for his Subsequent Wellness visit  Last Medicare Wellness visit information reviewed, patient interviewed and updates made to the record today  Health Risk Assessment:   Patient rates overall health as fair  Patient feels that their physical health rating is same  Eyesight was rated as same  Hearing was rated as same  Patient feels that their emotional and mental health rating is same  Pain experienced in the last 7 days has been none  Patient states that he has experienced no weight loss or gain in last 6 months  Depression Screening:   PHQ-2 Score: 0      Fall Risk Screening: In the past year, patient has experienced: no history of falling in past year      Home Safety:  Patient does not have trouble with stairs inside or outside of their home  Patient has working smoke alarms and has working carbon monoxide detector  Home safety hazards include: none  Nutrition:   Current diet is Regular  Medications:   Patient is currently taking over-the-counter supplements  OTC medications include: see medication list  Patient is able to manage medications  Activities of Daily Living (ADLs)/Instrumental Activities of Daily Living (IADLs):   Walk and transfer into and out of bed and chair?: Yes  Dress and groom yourself?: Yes    Bathe or shower yourself?: Yes    Feed yourself? Yes  Do your laundry/housekeeping?: Yes  Manage your money, pay your bills and track your expenses?: Yes  Make your own meals?: Yes    Do your own shopping?: Yes    Previous Hospitalizations:   Any hospitalizations or ED visits within the last 12 months?: No      Advance Care Planning:   Living will: Yes    Durable POA for healthcare:  Yes    Advanced directive: Yes    Advanced directive counseling given: Yes    Five wishes given: Yes    End of Life Decisions reviewed with patient: Yes    Provider agrees with end of life decisions: Yes      Cognitive Screening:   Provider or family/friend/caregiver concerned regarding cognition?: No    PREVENTIVE SCREENINGS      Cardiovascular Screening:    General: Screening Not Indicated and History Lipid Disorder      Diabetes Screening:     General: Screening Current      Prostate Cancer Screening:    General: Screening Not Indicated      Osteoporosis Screening:    General: Screening Not Indicated      Abdominal Aortic Aneurysm (AAA) Screening:    Risk factors include: tobacco use        Lung Cancer Screening:     General: Screening Not Indicated    No exam data present     Physical Exam:     /80 (BP Location: Left arm, Patient Position: Sitting, Cuff Size: Large)   Pulse 95   Temp (!) 97 4 °F (36 3 °C) (Tympanic)   Resp 18   Ht 5' 7 72" (1 72 m)   Wt 120 kg (265 lb)   SpO2 94%   BMI 40 63 kg/m²     Physical Exam     Jose Elias Rankin DO

## 2021-02-05 NOTE — ASSESSMENT & PLAN NOTE
Well controlled presently with triglycerides of 285 however total cholesterol 149 and LDL and HDL are at goal

## 2021-02-05 NOTE — PATIENT INSTRUCTIONS

## 2021-03-21 NOTE — TELEPHONE ENCOUNTER
Goal Outcome Evaluation:  Plan of Care Reviewed With: patient     Outcome Summary: VSS, prevena to all incisions, wires T&I, 10cc out of MURALI, 50cc out of CT, pt has moaned most of the night prn pain meds given q 3 hours, order for robaxin obtained and has helped with muscle spasms per pt, sinus 74-86.   Patient came in and needs an order for blood work he is going to [facility]  Please call when the orders are in so he can come

## 2021-04-14 ENCOUNTER — CONSULT (OUTPATIENT)
Dept: SURGERY | Facility: CLINIC | Age: 76
End: 2021-04-14
Payer: COMMERCIAL

## 2021-04-14 VITALS
RESPIRATION RATE: 20 BRPM | DIASTOLIC BLOOD PRESSURE: 67 MMHG | BODY MASS INDEX: 40.81 KG/M2 | WEIGHT: 260 LBS | HEIGHT: 67 IN | SYSTOLIC BLOOD PRESSURE: 168 MMHG | HEART RATE: 96 BPM | TEMPERATURE: 98 F

## 2021-04-14 DIAGNOSIS — M62.08 RECTUS DIASTASIS: Primary | ICD-10-CM

## 2021-04-14 DIAGNOSIS — E66.01 MORBID OBESITY WITH BMI OF 40.0-44.9, ADULT (HCC): ICD-10-CM

## 2021-04-14 PROBLEM — K76.89 CALCIFICATION OF LIVER: Status: ACTIVE | Noted: 2021-04-14

## 2021-04-14 PROCEDURE — 3077F SYST BP >= 140 MM HG: CPT | Performed by: SURGERY

## 2021-04-14 PROCEDURE — 1036F TOBACCO NON-USER: CPT | Performed by: SURGERY

## 2021-04-14 PROCEDURE — 3078F DIAST BP <80 MM HG: CPT | Performed by: SURGERY

## 2021-04-14 PROCEDURE — 99203 OFFICE O/P NEW LOW 30 MIN: CPT | Performed by: SURGERY

## 2021-04-14 NOTE — ASSESSMENT & PLAN NOTE
Recommend weight loss and healthy diet  The asymmetry he is concerned about I believe is just an asymmetrical distribution of subcutaneous fatty tissue  There is no evidence of and a mass or hernia on the right flank  Again recommend weight loss for help with this asymmetry

## 2021-04-14 NOTE — ASSESSMENT & PLAN NOTE
I agree with his primary care physician's diagnosis of rectus diastasis  I Re explained to him what this was and that this is not a hernia  I did show him images on the computer of the anatomy of the abdominal wall  Explained to him that losing weight is the best treatment for this and will help decrease the progression of the separation of the rectus muscles

## 2021-04-14 NOTE — PROGRESS NOTES
Assessment/Plan:    Calcification of liver   His CT scan 2017 questions some calcifications of the liver and recommend six-month follow-up  He did not have specific follow-up however he did have a repeat CT scan in 2019 and the calcifications appear to be stable and benign  The CT scan read Stable cluster of dystrophic calcifications in the right lobe of the liver, superior to the gallbladder fossa  No evidence of solid mass  Possible sequela of prior inflammatory or traumatic insult  No further follow-up needed  I reassured him that his findings were benign    Rectus diastasis   I agree with his primary care physician's diagnosis of rectus diastasis  I Re explained to him what this was and that this is not a hernia  I did show him images on the computer of the anatomy of the abdominal wall  Explained to him that losing weight is the best treatment for this and will help decrease the progression of the separation of the rectus muscles  Morbid obesity with BMI of 40 0-44 9, adult ([address] )   Recommend weight loss and healthy diet  The asymmetry he is concerned about I believe is just an asymmetrical distribution of subcutaneous fatty tissue  There is no evidence of and a mass or hernia on the right flank  Again recommend weight loss for help with this asymmetry  Diagnoses and all orders for this visit:    Rectus diastasis    Morbid obesity with BMI of 40 0-44 9, adult ([address] )          Subjective:      Patient ID: Marta Silva is a 67 y o  male  Mr Iqra Orlando  Is a 18-year-old gentleman that is here for concern over bulging on his right side  He has a history of a laparoscopic right inguinal hernia repair and umbilical hernia repair in 2017  At that time he had a CT scan that did show some calcifications in his liver and recommended a six-month follow-up  He does not believe he ever had follow-up for his CT scan and is concerned about this    In addition he noticed significant bulging in his upper abdomen under xiphoid for which he saw his family physician but he is still concerned about this  And he also feels there is some asymmetry on the right side any feels was bulging on his right flank  He denies any pain  Denies any nausea vomiting  Denies any changes bowel habits  The following portions of the patient's history were reviewed and updated as appropriate: allergies, current medications, past family history, past medical history, past social history, past surgical history and problem list     Review of Systems   Constitutional: Negative for chills and fever  HENT: Negative for ear pain and sore throat  Eyes: Negative for pain and visual disturbance  Respiratory: Negative for cough and shortness of breath  Cardiovascular: Negative for chest pain and palpitations  Gastrointestinal: Negative for abdominal pain and vomiting  Genitourinary: Negative for dysuria and hematuria  Musculoskeletal: Negative for arthralgias and back pain  Skin: Negative for color change and rash  Neurological: Negative for seizures and syncope  Psychiatric/Behavioral: Negative for agitation and behavioral problems  All other systems reviewed and are negative  Objective:      /67   Pulse 96   Temp 98 °F (36 7 °C)   Resp 20   Ht 5' 7" (1 702 m)   Wt 118 kg (260 lb)   BMI 40 72 kg/m²          Physical Exam  Vitals signs and nursing note reviewed  Constitutional:       General: He is not in acute distress  Appearance: He is well-developed  He is obese  He is not diaphoretic  HENT:      Head: Normocephalic and atraumatic  Eyes:      Pupils: Pupils are equal, round, and reactive to light  Neck:      Musculoskeletal: Normal range of motion and neck supple  Cardiovascular:      Rate and Rhythm: Normal rate and regular rhythm  Heart sounds: Normal heart sounds  No murmur  Pulmonary:      Effort: Pulmonary effort is normal  No respiratory distress        Breath sounds: Normal breath sounds  No wheezing  Abdominal:      General: Bowel sounds are normal       Palpations: Abdomen is soft  Hernia: No hernia is present  Comments:   Rectus diastasis  No evidence of recurrent umbilical hernia  Asymmetrical distribution of the subcutaneous fat with bulge on the right side but no underlying mass or hernia   Musculoskeletal: Normal range of motion  Skin:     General: Skin is warm and dry  Neurological:      Mental Status: He is alert and oriented to person, place, and time     Psychiatric:         Behavior: Behavior normal

## 2021-04-14 NOTE — ASSESSMENT & PLAN NOTE
His CT scan 2017 questions some calcifications of the liver and recommend six-month follow-up  He did not have specific follow-up however he did have a repeat CT scan in 2019 and the calcifications appear to be stable and benign  The CT scan read Stable cluster of dystrophic calcifications in the right lobe of the liver, superior to the gallbladder fossa  No evidence of solid mass  Possible sequela of prior inflammatory or traumatic insult  No further follow-up needed    I reassured him that his findings were benign

## 2021-08-12 ENCOUNTER — RA CDI HCC (OUTPATIENT)
Dept: OTHER | Facility: HOSPITAL | Age: 76
End: 2021-08-12

## 2021-08-12 NOTE — PROGRESS NOTES
[facility]  coding opportunities       Chart reviewed, no opportunity found: CHART REVIEWED, NO OPPORTUNITY FOUND                        Patients insurance company: [facility]

## 2021-08-20 ENCOUNTER — OFFICE VISIT (OUTPATIENT)
Dept: FAMILY MEDICINE CLINIC | Facility: CLINIC | Age: 76
End: 2021-08-20
Payer: COMMERCIAL

## 2021-08-20 VITALS
DIASTOLIC BLOOD PRESSURE: 56 MMHG | BODY MASS INDEX: 41.07 KG/M2 | SYSTOLIC BLOOD PRESSURE: 130 MMHG | HEIGHT: 68 IN | OXYGEN SATURATION: 93 % | HEART RATE: 93 BPM | WEIGHT: 271 LBS | TEMPERATURE: 97.8 F

## 2021-08-20 DIAGNOSIS — E78.1 ESSENTIAL HYPERTRIGLYCERIDEMIA: ICD-10-CM

## 2021-08-20 DIAGNOSIS — R73.09 ABNORMAL SERUM GLUCOSE LEVEL: Primary | ICD-10-CM

## 2021-08-20 DIAGNOSIS — N18.30 STAGE 3 CHRONIC KIDNEY DISEASE, UNSPECIFIED WHETHER STAGE 3A OR 3B CKD (HCC): ICD-10-CM

## 2021-08-20 DIAGNOSIS — G47.33 OSA (OBSTRUCTIVE SLEEP APNEA): ICD-10-CM

## 2021-08-20 DIAGNOSIS — E79.0 HYPERURICEMIA: ICD-10-CM

## 2021-08-20 PROCEDURE — 1036F TOBACCO NON-USER: CPT | Performed by: FAMILY MEDICINE

## 2021-08-20 PROCEDURE — 99214 OFFICE O/P EST MOD 30 MIN: CPT | Performed by: FAMILY MEDICINE

## 2021-08-20 PROCEDURE — 1160F RVW MEDS BY RX/DR IN RCRD: CPT | Performed by: FAMILY MEDICINE

## 2021-08-20 NOTE — ASSESSMENT & PLAN NOTE
Currently on no medication for his lipids  Due for repeat lipid profile which was ordered with his next blood work

## 2021-08-20 NOTE — PROGRESS NOTES
[facility]      NAME: Rafa Hayden  AGE: 67 y o  SEX: male  : 1945   MRN: [id number]    DATE: 2021  TIME: 12:33 PM    Assessment and Plan     Problem List Items Addressed This Visit     KALIA (obstructive sleep apnea)    Hyperuricemia       Continue allopurinol  Recheck uric acid with next labs  Relevant Orders    Uric acid    Essential hypertriglyceridemia       Currently on no medication for his lipids  Due for repeat lipid profile which was ordered with his next blood work  Relevant Orders    Comprehensive metabolic panel    Lipid Panel with Direct LDL reflex    TSH, 3rd generation    Chronic kidney disease, stage III (moderate) ([facility])     Lab Results   Component Value Date    EGFR 37 2020    EGFR 42 2020    EGFR 42 2019    CREATININE 1 76 (H) 2020    CREATININE 1 60 (H) 2020    CREATININE 1 60 (H) 2019     Creatinine 1 76  Stable over greater than 18 months  Will continue to monitor  Follow-up with Nephrology as needed  Abnormal serum glucose level - Primary       Blood glucose remained stable  Hemoglobin A1c 5 9 essentially unchanged over the last 2 years  Relevant Orders    Hemoglobin A1C              Return to office in:   Six months, annual wellness visit    Chief Complaint     Chief Complaint   Patient presents with    Follow-up     6 month follow up       History of Present Illness     Patient presents for routine follow-up of chronic medical problems  He is being treated for hyperlipidemia, hypertension hyperuricemia  He also has chronic kidney disease stage 3  He takes allopurinol for his uric acid level  He takes lisinopril hydrochlorothiazide and nifedipine for high blood pressure  He did initially follow-up with Nephrology for his chronic kidney disease but his numbers have remained stable and he has not been to see his nephrologist in over 1 year    He has no new complaints today       The following portions of the patient's history were reviewed and updated as appropriate: allergies, current medications, past family history, past medical history, past social history, past surgical history and problem list     Review of Systems   Review of Systems   Constitutional: Negative  Respiratory: Negative  Cardiovascular: Negative  Gastrointestinal: Negative  Genitourinary: Negative  Musculoskeletal: Negative  Psychiatric/Behavioral: Negative  Active Problem List     Patient Active Problem List   Diagnosis    Abnormal serum glucose level    Chronic kidney disease, stage III (moderate) (HCC)    Essential hypertriglyceridemia    Gout    KALIA (obstructive sleep apnea)    Essential hypertension    Vitamin D deficiency    Knee pain    Localized, primary osteoarthritis    Osteoarthritis of knee    Hyperuricemia    Rectus diastasis    Obesity, morbid ([facility] )    Calcification of liver    Morbid obesity with BMI of 40 0-44 9, adult ([facility])       Objective   /56 (BP Location: Left arm, Patient Position: Sitting, Cuff Size: Large)   Pulse 93   Temp 97 8 °F (36 6 °C)   Ht 5' 7 5" (1 715 m)   Wt 123 kg (271 lb)   SpO2 93%   BMI 41 82 kg/m²     Physical Exam  Vitals and nursing note reviewed  Constitutional:       General: He is not in acute distress  Appearance: He is well-developed  He is not diaphoretic  HENT:      Head: Normocephalic and atraumatic  Eyes:      General:         Right eye: No discharge  Conjunctiva/sclera: Conjunctivae normal       Pupils: Pupils are equal, round, and reactive to light  Neck:      Thyroid: No thyromegaly  Cardiovascular:      Rate and Rhythm: Normal rate and regular rhythm  Pulmonary:      Effort: Pulmonary effort is normal  No respiratory distress  Breath sounds: Normal breath sounds  Musculoskeletal:      Cervical back: Normal range of motion     Lymphadenopathy:      Cervical: No cervical adenopathy  Skin:     General: Skin is warm and dry  Neurological:      Mental Status: He is alert and oriented to person, place, and time  Psychiatric:         Behavior: Behavior normal          Thought Content:  Thought content normal          Judgment: Judgment normal            Current Medications     Current Outpatient Medications:     allopurinol (ZYLOPRIM) 300 mg tablet, Take 1 tablet (300 mg total) by mouth daily, Disp: 90 tablet, Rfl: 2    aspirin 81 MG tablet, Take 81 mg by mouth daily, Disp: , Rfl:     Cholecalciferol (VITAMIN D3) 5000 units TABS, Take 1 tablet by mouth daily, Disp: , Rfl:     lisinopril-hydrochlorothiazide (PRINZIDE,ZESTORETIC) 20-12 5 MG per tablet, Take 2 tablets by mouth daily, Disp: 180 tablet, Rfl: 2    NIFEdipine (PROCARDIA XL) 60 mg 24 hr tablet, Take 1 tablet (60 mg total) by mouth daily, Disp: 90 tablet, Rfl: 2    Omega-3 Fatty Acids (FISH OIL) 1200 MG CAPS, Take 1 capsule by mouth 3 (three) times a day, Disp: , Rfl:     Health Maintenance     Health Maintenance   Topic Date Due    Hepatitis C Screening  Never done    DTaP,Tdap,and Td Vaccines (1 - Tdap) 07/05/1965    Influenza Vaccine (1) 09/01/2021    Depression Screening PHQ  02/05/2022    BMI: Followup Plan  02/05/2022    Fall Risk  02/05/2022    Medicare Annual Wellness Visit (AWV)  02/05/2022    BMI: Adult  04/14/2022    Pneumococcal Vaccine: 65+ Years  Completed    COVID-19 Vaccine  Completed    HIB Vaccine  Aged Out    Hepatitis B Vaccine  Aged Out    IPV Vaccine  Aged Out    Hepatitis A Vaccine  Aged Out    Meningococcal ACWY Vaccine  Aged Out    HPV Vaccine  Aged Out     Immunization History   Administered Date(s) Administered    INFLUENZA 01/19/2005, 09/27/2007, 10/17/2008, 11/05/2011, 12/21/2012, 11/15/2013, 11/01/2015, 11/01/2016, 11/16/2018    Influenza Quadrivalent, 6-35 Months IM 11/01/2015    Influenza Split High Dose Preservative Free IM 08/14/2017, 11/02/2019    Influenza, high dose seasonal 0 7 mL 10/07/2020    Influenza, seasonal, injectable 11/01/2016    Pneumococcal Conjugate 13-Valent 07/29/2016    Pneumococcal Polysaccharide PPV23 11/06/2009, 08/14/2017    SARS-CoV-2 / COVID-19 mRNA IM (Moderna) 01/19/2021, 02/16/2021    Td (adult), Unspecified 01/01/1993    Zoster 02/08/2012, 07/29/2013       Antonio Gates DO  [facility]

## 2021-08-20 NOTE — ASSESSMENT & PLAN NOTE
Lab Results   Component Value Date    EGFR 37 07/06/2020    EGFR 42 01/11/2020    EGFR 42 07/05/2019    CREATININE 1 76 (H) 07/06/2020    CREATININE 1 60 (H) 01/11/2020    CREATININE 1 60 (H) 07/05/2019     Creatinine 1 76  Stable over greater than 18 months  Will continue to monitor  Follow-up with Nephrology as needed

## 2021-10-17 DIAGNOSIS — I10 ESSENTIAL HYPERTENSION: ICD-10-CM

## 2021-10-17 DIAGNOSIS — E79.0 HYPERURICEMIA: ICD-10-CM

## 2021-10-18 RX ORDER — LISINOPRIL AND HYDROCHLOROTHIAZIDE 20; 12.5 MG/1; MG/1
TABLET ORAL
Qty: 180 TABLET | Refills: 2 | Status: SHIPPED | OUTPATIENT
Start: 2021-10-18 | End: 2022-06-26

## 2021-10-18 RX ORDER — ALLOPURINOL 300 MG/1
TABLET ORAL
Qty: 90 TABLET | Refills: 2 | Status: SHIPPED | OUTPATIENT
Start: 2021-10-18 | End: 2022-06-26

## 2021-10-18 RX ORDER — NIFEDIPINE 60 MG/1
TABLET, FILM COATED, EXTENDED RELEASE ORAL
Qty: 90 TABLET | Refills: 2 | Status: SHIPPED | OUTPATIENT
Start: 2021-10-18 | End: 2022-06-26

## 2021-10-25 ENCOUNTER — TELEPHONE (OUTPATIENT)
Dept: FAMILY MEDICINE CLINIC | Facility: CLINIC | Age: 76
End: 2021-10-25

## 2022-02-12 ENCOUNTER — APPOINTMENT (OUTPATIENT)
Dept: LAB | Facility: CLINIC | Age: 77
End: 2022-02-12
Payer: COMMERCIAL

## 2022-02-12 DIAGNOSIS — R73.09 ABNORMAL SERUM GLUCOSE LEVEL: ICD-10-CM

## 2022-02-12 DIAGNOSIS — E79.0 HYPERURICEMIA: ICD-10-CM

## 2022-02-12 DIAGNOSIS — E78.1 ESSENTIAL HYPERTRIGLYCERIDEMIA: ICD-10-CM

## 2022-02-12 LAB
ALBUMIN SERPL BCP-MCNC: 3.5 G/DL (ref 3.5–5)
ALP SERPL-CCNC: 80 U/L (ref 46–116)
ALT SERPL W P-5'-P-CCNC: 59 U/L (ref 12–78)
ANION GAP SERPL CALCULATED.3IONS-SCNC: 7 MMOL/L (ref 4–13)
AST SERPL W P-5'-P-CCNC: 32 U/L (ref 5–45)
BILIRUB SERPL-MCNC: 0.42 MG/DL (ref 0.2–1)
BUN SERPL-MCNC: 35 MG/DL (ref 5–25)
CALCIUM SERPL-MCNC: 10 MG/DL (ref 8.3–10.1)
CHLORIDE SERPL-SCNC: 108 MMOL/L (ref 100–108)
CHOLEST SERPL-MCNC: 143 MG/DL
CO2 SERPL-SCNC: 22 MMOL/L (ref 21–32)
CREAT SERPL-MCNC: 1.5 MG/DL (ref 0.6–1.3)
EST. AVERAGE GLUCOSE BLD GHB EST-MCNC: 134 MG/DL
GFR SERPL CREATININE-BSD FRML MDRD: 44 ML/MIN/1.73SQ M
GLUCOSE P FAST SERPL-MCNC: 118 MG/DL (ref 65–99)
HBA1C MFR BLD: 6.3 %
HDLC SERPL-MCNC: 30 MG/DL
LDLC SERPL CALC-MCNC: 63 MG/DL (ref 0–100)
POTASSIUM SERPL-SCNC: 4.7 MMOL/L (ref 3.5–5.3)
PROT SERPL-MCNC: 7.7 G/DL (ref 6.4–8.2)
SODIUM SERPL-SCNC: 137 MMOL/L (ref 136–145)
TRIGL SERPL-MCNC: 248 MG/DL
TSH SERPL DL<=0.05 MIU/L-ACNC: 2.61 UIU/ML (ref 0.36–3.74)
URATE SERPL-MCNC: 4.8 MG/DL (ref 4.2–8)

## 2022-02-12 PROCEDURE — 84550 ASSAY OF BLOOD/URIC ACID: CPT

## 2022-02-12 PROCEDURE — 36415 COLL VENOUS BLD VENIPUNCTURE: CPT

## 2022-02-12 PROCEDURE — 80053 COMPREHEN METABOLIC PANEL: CPT

## 2022-02-12 PROCEDURE — 80061 LIPID PANEL: CPT

## 2022-02-12 PROCEDURE — 83036 HEMOGLOBIN GLYCOSYLATED A1C: CPT

## 2022-02-12 PROCEDURE — 84443 ASSAY THYROID STIM HORMONE: CPT

## 2022-02-15 ENCOUNTER — RA CDI HCC (OUTPATIENT)
Dept: OTHER | Facility: HOSPITAL | Age: 77
End: 2022-02-15

## 2022-02-15 NOTE — PROGRESS NOTES
[facility]  coding opportunities       Chart reviewed, no opportunity found: CHART REVIEWED, NO OPPORTUNITY FOUND                        Patients insurance company: [employer]

## 2022-02-18 ENCOUNTER — OFFICE VISIT (OUTPATIENT)
Dept: FAMILY MEDICINE CLINIC | Facility: CLINIC | Age: 77
End: 2022-02-18
Payer: COMMERCIAL

## 2022-02-18 VITALS
HEART RATE: 97 BPM | WEIGHT: 277 LBS | OXYGEN SATURATION: 95 % | DIASTOLIC BLOOD PRESSURE: 70 MMHG | BODY MASS INDEX: 43.47 KG/M2 | RESPIRATION RATE: 18 BRPM | TEMPERATURE: 97.9 F | HEIGHT: 67 IN | SYSTOLIC BLOOD PRESSURE: 130 MMHG

## 2022-02-18 DIAGNOSIS — N18.30 STAGE 3 CHRONIC KIDNEY DISEASE, UNSPECIFIED WHETHER STAGE 3A OR 3B CKD (HCC): ICD-10-CM

## 2022-02-18 DIAGNOSIS — E78.1 ESSENTIAL HYPERTRIGLYCERIDEMIA: ICD-10-CM

## 2022-02-18 DIAGNOSIS — R73.09 ABNORMAL SERUM GLUCOSE LEVEL: ICD-10-CM

## 2022-02-18 DIAGNOSIS — Z00.00 MEDICARE ANNUAL WELLNESS VISIT, SUBSEQUENT: Primary | ICD-10-CM

## 2022-02-18 DIAGNOSIS — E66.01 MORBID OBESITY WITH BMI OF 40.0-44.9, ADULT (HCC): ICD-10-CM

## 2022-02-18 DIAGNOSIS — E79.0 HYPERURICEMIA: ICD-10-CM

## 2022-02-18 DIAGNOSIS — I10 ESSENTIAL HYPERTENSION: ICD-10-CM

## 2022-02-18 PROCEDURE — 1036F TOBACCO NON-USER: CPT | Performed by: FAMILY MEDICINE

## 2022-02-18 PROCEDURE — 1170F FXNL STATUS ASSESSED: CPT | Performed by: FAMILY MEDICINE

## 2022-02-18 PROCEDURE — 3288F FALL RISK ASSESSMENT DOCD: CPT | Performed by: FAMILY MEDICINE

## 2022-02-18 PROCEDURE — 1101F PT FALLS ASSESS-DOCD LE1/YR: CPT | Performed by: FAMILY MEDICINE

## 2022-02-18 PROCEDURE — 99214 OFFICE O/P EST MOD 30 MIN: CPT | Performed by: FAMILY MEDICINE

## 2022-02-18 PROCEDURE — 1160F RVW MEDS BY RX/DR IN RCRD: CPT | Performed by: FAMILY MEDICINE

## 2022-02-18 PROCEDURE — G0439 PPPS, SUBSEQ VISIT: HCPCS | Performed by: FAMILY MEDICINE

## 2022-02-18 PROCEDURE — 1125F AMNT PAIN NOTED PAIN PRSNT: CPT | Performed by: FAMILY MEDICINE

## 2022-02-18 PROCEDURE — 3725F SCREEN DEPRESSION PERFORMED: CPT | Performed by: FAMILY MEDICINE

## 2022-02-18 NOTE — PATIENT INSTRUCTIONS

## 2022-02-18 NOTE — PROGRESS NOTES
Assessment and Plan:     Problem List Items Addressed This Visit     None      Visit Diagnoses     Medicare annual wellness visit, subsequent    -  Primary    Questionnaire reviewed  Immunization and health screening history updated  Advance directive in place  BMI Counseling: Body mass index is 42 97 kg/m²  The BMI is above normal  Nutrition recommendations include decreasing portion sizes, encouraging healthy choices of fruits and vegetables, limiting drinks that contain sugar, moderation in carbohydrate intake and increasing intake of lean protein  Exercise recommendations include exercising 3-5 times per week  No pharmacotherapy was ordered  Rationale for BMI follow-up plan is due to patient being overweight or obese  Depression Screening and Follow-up Plan: Patient was screened for depression during today's encounter  They screened negative with a PHQ-2 score of 0  Preventive health issues were discussed with patient, and age appropriate screening tests were ordered as noted in patient's After Visit Summary  Personalized health advice and appropriate referrals for health education or preventive services given if needed, as noted in patient's After Visit Summary  History of Present Illness:     Patient presents for Welcome to Medicare visit       Patient Care Team:  Joe Marcos DO as PCP - General  Joe Marcos DO as PCP - [address] [place] [address] ([place])  [facility]  Philipp Lopez MD as Endoscopist     Review of Systems:     Review of Systems   Problem List:     Patient Active Problem List   Diagnosis    Abnormal serum glucose level    Chronic kidney disease, stage III (moderate) (HCC)    Essential hypertriglyceridemia    Gout    KALIA (obstructive sleep apnea)    Essential hypertension    Vitamin D deficiency    Knee pain    Localized, primary osteoarthritis    Osteoarthritis of knee    Hyperuricemia    Rectus diastasis    Obesity, morbid ([facility] )    Calcification of liver    Morbid obesity with BMI of 40 0-44 9, adult [facility])      Past Medical and Surgical History:     Past Medical History:   Diagnosis Date    Chronic kidney disease     CPAP (continuous positive airway pressure) dependence     Gout     Hearing aid worn     sabrina    Hernia, inguinal     RIH and umbilical hernia    Hypertension     Morbidly obese ([facility] )     Sleep apnea     Wears glasses      Past Surgical History:   Procedure Laterality Date    CARPAL TUNNEL RELEASE Bilateral     COLONOSCOPY N/A 2017    Procedure: COLONOSCOPY;  Surgeon: Alvaro Ji MD;  Location: [facility];   Service:     CYST REMOVAL      FOOT SURGERY      HERNIA REPAIR      JOINT REPLACEMENT      knee left     AR LAP,INGUINAL HERNIA REPR,INITIAL Right 3/16/2017    Procedure: REPAIR HERNIA INGUINAL, LAPAROSCOPIC WITH MESH;  Surgeon: Tomi Melendez MD;  [facility]: [facility];  Service: General    AR REPAIR UMBILICAL OABG,2+U/Z,KDBLA N/A 3/16/2017    Procedure: OPEN REPAIR HERNIA UMBILICAL;  Surgeon: Tomi Melendez MD;  Location[facility];  Service: General    TONSILLECTOMY        Family History:     Family History   Problem Relation Age of Onset    No Known Problems Mother     Diabetes Father     Diabetes Sister       Social History:     Social History     Socioeconomic History    Marital status: /Civil Union     Spouse name: None    Number of children: None    Years of education: None    Highest education level: None   Occupational History    None   Tobacco Use    Smoking status: Former Smoker     Quit date: 3/10/2003     Years since quittin 9    Smokeless tobacco: Never Used   Vaping Use    Vaping Use: Never used   Substance and Sexual Activity    Alcohol use: No    Drug use: No    Sexual activity: None   Other Topics Concern    None   Social History Narrative    Always uses seat belt    Feels safe at home     Social Determinants of Health     Financial Resource Strain: Not on file   Food Insecurity: Not on file   Transportation Needs: Not on file   Physical Activity: Not on file   Stress: Not on file   Social Connections: Not on file   Intimate Partner Violence: Not on file   Housing Stability: Not on file      Medications and Allergies:     Current Outpatient Medications   Medication Sig Dispense Refill    allopurinol (ZYLOPRIM) 300 mg tablet TAKE ONE TABLET BY MOUTH EVERY DAY 90 tablet 2    aspirin 81 MG tablet Take 81 mg by mouth daily      Cholecalciferol (VITAMIN D3) 5000 units TABS Take 1 tablet by mouth daily      lisinopril-hydrochlorothiazide (PRINZIDE,ZESTORETIC) 20-12 5 MG per tablet TAKE TWO TABLETS BY MOUTH EVERY  tablet 2    NIFEdipine (PROCARDIA XL) 60 mg 24 hr tablet Take 1 tablet (60 mg total) by mouth daily 90 tablet 2    Omega-3 Fatty Acids (FISH OIL) 1200 MG CAPS Take 1 capsule by mouth 3 (three) times a day      NIFEdipine ER (ADALAT CC) 60 MG 24 hr tablet TAKE ONE TABLET BY MOUTH EVERY DAY (Patient not taking: Reported on 2/18/2022) 90 tablet 2     No current facility-administered medications for this visit       No Known Allergies   Immunizations:     Immunization History   Administered Date(s) Administered    COVID-19 MODERNA VACC 0 5 ML IM 01/19/2021, 02/16/2021    INFLUENZA 01/19/2005, 09/27/2007, 10/17/2008, 11/05/2011, 12/21/2012, 11/15/2013, 11/01/2015, 11/01/2016, 11/16/2018    Influenza Quadrivalent, 6-35 Months IM 11/01/2015    Influenza Split High Dose Preservative Free IM 08/14/2017, 11/02/2019    Influenza, high dose seasonal 0 7 mL 10/07/2020    Influenza, seasonal, injectable 11/01/2016    Pneumococcal Conjugate 13-Valent 07/29/2016    Pneumococcal Polysaccharide PPV23 11/06/2009, 08/14/2017    Td (adult), Unspecified 01/01/1993    Zoster 02/08/2012, 07/29/2013      Health Maintenance:         Topic Date Due    Hepatitis C Screening  Never done         Topic Date Due    DTaP,Tdap,and Td Vaccines (1 - Tdap) 01/02/1993    COVID-19 Vaccine (3 - Booster for Moderna series) 07/16/2021    Influenza Vaccine (1) 09/01/2021      Medicare Screening Tests and Risk Assessments:     Aleena Irvin is here for his Subsequent Wellness visit  Last Medicare Wellness visit information reviewed, patient interviewed and updates made to the record today  Health Risk Assessment:   Patient rates overall health as fair  Patient feels that their physical health rating is same  Patient is satisfied with their life  Eyesight was rated as same  Hearing was rated as same  Patient feels that their emotional and mental health rating is same  Patients states they are never, rarely angry  Patient states they are never, rarely unusually tired/fatigued  Pain experienced in the last 7 days has been none  Patient states that he has experienced no weight loss or gain in last 6 months  Depression Screening:   PHQ-2 Score: 0      Fall Risk Screening: In the past year, patient has experienced: no history of falling in past year      Home Safety:  Patient does not have trouble with stairs inside or outside of their home  Patient has working smoke alarms and has working carbon monoxide detector  Home safety hazards include: none  Nutrition:   Current diet is Regular  Medications:   Patient is currently taking over-the-counter supplements  OTC medications include: see medication list  Patient is able to manage medications  Activities of Daily Living (ADLs)/Instrumental Activities of Daily Living (IADLs):   Walk and transfer into and out of bed and chair?: Yes  Dress and groom yourself?: Yes    Bathe or shower yourself?: Yes    Feed yourself? Yes  Do your laundry/housekeeping?: Yes  Manage your money, pay your bills and track your expenses?: Yes  Make your own meals?: Yes    Do your own shopping?: Yes    Previous Hospitalizations:   Any hospitalizations or ED visits within the last 12 months?: No      Advance Care Planning:   Living will:  Yes Durable POA for healthcare: Yes    Advanced directive: Yes    Advanced directive counseling given: Yes    Five wishes given: Yes    End of Life Decisions reviewed with patient: Yes    Provider agrees with end of life decisions: Yes      Cognitive Screening:   Provider or family/friend/caregiver concerned regarding cognition?: No    PREVENTIVE SCREENINGS      Cardiovascular Screening:    General: Screening Not Indicated and History Lipid Disorder      Diabetes Screening:     General: Screening Current      Colorectal Cancer Screening:     General: Screening Current      Prostate Cancer Screening:    General: Screening Not Indicated      Osteoporosis Screening:    General: Screening Not Indicated      Abdominal Aortic Aneurysm (AAA) Screening:    Risk factors include: tobacco use        Lung Cancer Screening:     General: Screening Not Indicated    Screening, Brief Intervention, and Referral to Treatment (SBIRT)    Screening    Typical number of drinks in a week: 0    Single Item Drug Screening:  How often have you used an illegal drug (including marijuana) or a prescription medication for non-medical reasons in the past year? never    Single Item Drug Screen Score: 0  Interpretation: Negative screen for possible drug use disorder    Brief Intervention  Alcohol & drug use screenings were reviewed  No concerns regarding substance use disorder identified       No exam data present     Physical Exam:     /70 (BP Location: Right arm, Patient Position: Sitting, Cuff Size: Large)   Pulse 97   Temp 97 9 °F (36 6 °C) (Temporal)   Resp 18   Ht 5' 7 32" (1 71 m)   Wt 126 kg (277 lb)   SpO2 95%   BMI 42 97 kg/m²     Physical Exam     Malachy Anger, DO

## 2022-02-18 NOTE — PROGRESS NOTES
[facility]      NAME: Ashley Osborne  AGE: 67 y o  SEX: male  : 1945   MRN: [id number]    DATE: 2022  TIME: 8:37 AM    Assessment and Plan     Problem List Items Addressed This Visit     Morbid obesity with BMI of 40 0-44 9, adult ([address] )    Hyperuricemia     Uric acid level well controlled  Continue allopurinol 300 mg daily  Essential hypertriglyceridemia     Lipids well controlled  Continue fish oil  Watch diet         Essential hypertension     Well controlled on lisinopril hydrochlorothiazide and nifedipine  Relevant Orders    Comprehensive metabolic panel    TSH, 3rd generation with Free T4 reflex    Chronic kidney disease, stage III (moderate) (HCC)     Lab Results   Component Value Date    EGFR 44 2022    EGFR 37 2020    EGFR 42 2020    CREATININE 1 50 (H) 2022    CREATININE 1 76 (H) 2020    CREATININE 1 60 (H) 2020   Creatinine is improved to 1 5  Will continue to monitor and avoid nephrotoxic drugs         Abnormal serum glucose level     Hemoglobin A1c has gone up to 6 3  Watch diet and work on exercise  Relevant Orders    Hemoglobin A1C      Other Visit Diagnoses     Medicare annual wellness visit, subsequent    -  Primary    Questionnaire reviewed  Immunization and health screening history updated  Advance directive in place  Return to office in:  6 months    Chief Complaint     Chief Complaint   Patient presents with    Medicare Wellness Visit       History of Present Illness     Patient was seen for routine follow-up of chronic medical problems  He is being treated for hyperuricemia, elevated triglycerides, hypertension and chronic kidney disease  No complaints other than some generalized aches and pains        The following portions of the patient's history were reviewed and updated as appropriate: allergies, current medications, past family history, past medical history, past social history, past surgical history and problem list     Review of Systems   Review of Systems   Constitutional: Negative  Respiratory: Negative  Cardiovascular: Negative  Gastrointestinal: Negative  Genitourinary: Negative  Musculoskeletal: Positive for arthralgias  Psychiatric/Behavioral: Negative  Active Problem List     Patient Active Problem List   Diagnosis    Abnormal serum glucose level    [facility] disease, stage III (moderate) ([facility]    Essential hypertriglyceridemia    Gout    KALIA (obstructive sleep apnea)    Essential hypertension    Vitamin D deficiency    Knee pain    Localized, primary osteoarthritis    Osteoarthritis of knee    Hyperuricemia    Rectus diastasis    Obesity, morbid ([address] )    Calcification of liver    Morbid obesity with BMI of 40 0-44 9, adult ([facility])       Objective   /70 (BP Location: Right arm, Patient Position: Sitting, Cuff Size: Large)   Pulse 97   Temp 97 9 °F (36 6 °C) (Temporal)   Resp 18   Ht 5' 7 32" (1 71 m)   Wt 126 kg (277 lb)   SpO2 95%   BMI 42 97 kg/m²     Physical Exam  Vitals and nursing note reviewed  Constitutional:       General: He is not in acute distress  Appearance: He is well-developed  He is not diaphoretic  HENT:      Head: Normocephalic and atraumatic  Eyes:      General:         Right eye: No discharge  Conjunctiva/sclera: Conjunctivae normal       Pupils: Pupils are equal, round, and reactive to light  Neck:      Thyroid: No thyromegaly  Cardiovascular:      Rate and Rhythm: Normal rate and regular rhythm  Pulmonary:      Effort: Pulmonary effort is normal  No respiratory distress  Breath sounds: Normal breath sounds  Musculoskeletal:      Cervical back: Normal range of motion  Lymphadenopathy:      Cervical: No cervical adenopathy  Skin:     General: Skin is warm and dry  Neurological:      Mental Status: He is alert and oriented to person, place, and time     Psychiatric: Behavior: Behavior normal          Thought Content:  Thought content normal          Judgment: Judgment normal            Current Medications     Current Outpatient Medications:     allopurinol (ZYLOPRIM) 300 mg tablet, TAKE ONE TABLET BY MOUTH EVERY DAY, Disp: 90 tablet, Rfl: 2    aspirin 81 MG tablet, Take 81 mg by mouth daily, Disp: , Rfl:     Cholecalciferol (VITAMIN D3) 5000 units TABS, Take 1 tablet by mouth daily, Disp: , Rfl:     lisinopril-hydrochlorothiazide (PRINZIDE,ZESTORETIC) 20-12 5 MG per tablet, TAKE TWO TABLETS BY MOUTH EVERY DAY, Disp: 180 tablet, Rfl: 2    NIFEdipine (PROCARDIA XL) 60 mg 24 hr tablet, Take 1 tablet (60 mg total) by mouth daily, Disp: 90 tablet, Rfl: 2    Omega-3 Fatty Acids (FISH OIL) 1200 MG CAPS, Take 1 capsule by mouth 3 (three) times a day, Disp: , Rfl:     NIFEdipine ER (ADALAT CC) 60 MG 24 hr tablet, TAKE ONE TABLET BY MOUTH EVERY DAY (Patient not taking: Reported on 2/18/2022), Disp: 90 tablet, Rfl: 2    Health Maintenance     Health Maintenance   Topic Date Due    Hepatitis C Screening  Never done    DTaP,Tdap,and Td Vaccines (1 - Tdap) 01/02/1993    COVID-19 Vaccine (3 - Booster for Moderna series) 07/16/2021    Influenza Vaccine (1) 09/01/2021    Medicare Annual Wellness Visit (AWV)  02/05/2022    Fall Risk  02/18/2023    Depression Screening  02/18/2023    BMI: Followup Plan  02/18/2023    BMI: Adult  02/18/2023    Pneumococcal Vaccine: 65+ Years  Completed    HIB Vaccine  Aged Out    Hepatitis B Vaccine  Aged Out    IPV Vaccine  Aged Out    Hepatitis A Vaccine  Aged Out    Meningococcal ACWY Vaccine  Aged Out    HPV Vaccine  Aged [employer] History   Administered Date(s) Administered    COVID-19 MODERNA VACC 0 5 ML IM 01/19/2021, 02/16/2021    INFLUENZA 01/19/2005, 09/27/2007, 10/17/2008, 11/05/2011, 12/21/2012, 11/15/2013, 11/01/2015, 11/01/2016, 11/16/2018    Influenza Quadrivalent, 6-35 Months IM 11/01/2015    Influenza Split High Dose Preservative Free IM 08/14/2017, 11/02/2019    Influenza, high dose seasonal 0 7 mL 10/07/2020    Influenza, seasonal, injectable 11/01/2016    Pneumococcal Conjugate 13-Valent 07/29/2016    Pneumococcal Polysaccharide PPV23 11/06/2009, 08/14/2017    Td (adult), Unspecified 01/01/1993    Zoster 02/08/2012, 07/29/2013       Jacky Ozuna DO  [facility]

## 2022-02-18 NOTE — ASSESSMENT & PLAN NOTE
Lab Results   Component Value Date    EGFR 44 02/12/2022    EGFR 37 07/06/2020    EGFR 42 01/11/2020    CREATININE 1 50 (H) 02/12/2022    CREATININE 1 76 (H) 07/06/2020    CREATININE 1 60 (H) 01/11/2020   Creatinine is improved to 1 5    Will continue to monitor and avoid nephrotoxic drugs

## 2022-06-25 DIAGNOSIS — E79.0 HYPERURICEMIA: ICD-10-CM

## 2022-06-25 DIAGNOSIS — I10 ESSENTIAL HYPERTENSION: ICD-10-CM

## 2022-06-26 RX ORDER — LISINOPRIL AND HYDROCHLOROTHIAZIDE 20; 12.5 MG/1; MG/1
TABLET ORAL
Qty: 180 TABLET | Refills: 2 | Status: SHIPPED | OUTPATIENT
Start: 2022-06-26

## 2022-06-26 RX ORDER — NIFEDIPINE 60 MG/1
TABLET, FILM COATED, EXTENDED RELEASE ORAL
Qty: 90 TABLET | Refills: 2 | Status: SHIPPED | OUTPATIENT
Start: 2022-06-26

## 2022-06-26 RX ORDER — ALLOPURINOL 300 MG/1
TABLET ORAL
Qty: 90 TABLET | Refills: 2 | Status: SHIPPED | OUTPATIENT
Start: 2022-06-26

## 2022-08-19 ENCOUNTER — APPOINTMENT (OUTPATIENT)
Dept: LAB | Facility: CLINIC | Age: 77
End: 2022-08-19
Payer: COMMERCIAL

## 2022-08-19 DIAGNOSIS — R73.09 ABNORMAL SERUM GLUCOSE LEVEL: ICD-10-CM

## 2022-08-19 DIAGNOSIS — I10 ESSENTIAL HYPERTENSION: ICD-10-CM

## 2022-08-19 LAB
ALBUMIN SERPL BCP-MCNC: 3.5 G/DL (ref 3.5–5)
ALP SERPL-CCNC: 86 U/L (ref 46–116)
ALT SERPL W P-5'-P-CCNC: 66 U/L (ref 12–78)
ANION GAP SERPL CALCULATED.3IONS-SCNC: 8 MMOL/L (ref 4–13)
AST SERPL W P-5'-P-CCNC: 48 U/L (ref 5–45)
BILIRUB SERPL-MCNC: 0.48 MG/DL (ref 0.2–1)
BUN SERPL-MCNC: 44 MG/DL (ref 5–25)
CALCIUM SERPL-MCNC: 10.1 MG/DL (ref 8.3–10.1)
CHLORIDE SERPL-SCNC: 107 MMOL/L (ref 96–108)
CO2 SERPL-SCNC: 22 MMOL/L (ref 21–32)
CREAT SERPL-MCNC: 1.88 MG/DL (ref 0.6–1.3)
EST. AVERAGE GLUCOSE BLD GHB EST-MCNC: 148 MG/DL
GFR SERPL CREATININE-BSD FRML MDRD: 33 ML/MIN/1.73SQ M
GLUCOSE P FAST SERPL-MCNC: 149 MG/DL (ref 65–99)
HBA1C MFR BLD: 6.8 %
POTASSIUM SERPL-SCNC: 4.8 MMOL/L (ref 3.5–5.3)
PROT SERPL-MCNC: 7.9 G/DL (ref 6.4–8.4)
SODIUM SERPL-SCNC: 137 MMOL/L (ref 135–147)
TSH SERPL DL<=0.05 MIU/L-ACNC: 2.11 UIU/ML (ref 0.45–4.5)

## 2022-08-19 PROCEDURE — 83036 HEMOGLOBIN GLYCOSYLATED A1C: CPT

## 2022-08-19 PROCEDURE — 36415 COLL VENOUS BLD VENIPUNCTURE: CPT

## 2022-08-19 PROCEDURE — 80053 COMPREHEN METABOLIC PANEL: CPT

## 2022-08-19 PROCEDURE — 84443 ASSAY THYROID STIM HORMONE: CPT

## 2022-08-22 ENCOUNTER — RA CDI HCC (OUTPATIENT)
Dept: OTHER | Facility: HOSPITAL | Age: 77
End: 2022-08-22

## 2022-08-22 NOTE — PROGRESS NOTES
[facility]  coding opportunities       Chart reviewed, no opportunity found:   [address]        Patients Insurance     Medicare Insurance: [employer]

## 2022-08-26 ENCOUNTER — TELEPHONE (OUTPATIENT)
Dept: NEPHROLOGY | Facility: CLINIC | Age: 77
End: 2022-08-26

## 2022-08-26 ENCOUNTER — OFFICE VISIT (OUTPATIENT)
Dept: FAMILY MEDICINE CLINIC | Facility: CLINIC | Age: 77
End: 2022-08-26
Payer: COMMERCIAL

## 2022-08-26 VITALS
DIASTOLIC BLOOD PRESSURE: 70 MMHG | BODY MASS INDEX: 43.16 KG/M2 | HEART RATE: 99 BPM | RESPIRATION RATE: 16 BRPM | HEIGHT: 67 IN | WEIGHT: 275 LBS | TEMPERATURE: 97.7 F | OXYGEN SATURATION: 96 % | SYSTOLIC BLOOD PRESSURE: 140 MMHG

## 2022-08-26 DIAGNOSIS — E78.1 ESSENTIAL HYPERTRIGLYCERIDEMIA: ICD-10-CM

## 2022-08-26 DIAGNOSIS — I10 ESSENTIAL HYPERTENSION: ICD-10-CM

## 2022-08-26 DIAGNOSIS — G47.33 OSA (OBSTRUCTIVE SLEEP APNEA): ICD-10-CM

## 2022-08-26 DIAGNOSIS — R73.09 ABNORMAL SERUM GLUCOSE LEVEL: Primary | ICD-10-CM

## 2022-08-26 DIAGNOSIS — E79.0 HYPERURICEMIA: ICD-10-CM

## 2022-08-26 DIAGNOSIS — N18.30 STAGE 3 CHRONIC KIDNEY DISEASE, UNSPECIFIED WHETHER STAGE 3A OR 3B CKD (HCC): ICD-10-CM

## 2022-08-26 DIAGNOSIS — E66.01 MORBID OBESITY WITH BMI OF 40.0-44.9, ADULT (HCC): ICD-10-CM

## 2022-08-26 PROCEDURE — 99214 OFFICE O/P EST MOD 30 MIN: CPT | Performed by: FAMILY MEDICINE

## 2022-08-26 PROCEDURE — 3077F SYST BP >= 140 MM HG: CPT | Performed by: FAMILY MEDICINE

## 2022-08-26 PROCEDURE — 1160F RVW MEDS BY RX/DR IN RCRD: CPT | Performed by: FAMILY MEDICINE

## 2022-08-26 PROCEDURE — 3078F DIAST BP <80 MM HG: CPT | Performed by: FAMILY MEDICINE

## 2022-08-26 NOTE — TELEPHONE ENCOUNTER
Referral from PCP but has seen Dr Tamika Staples in the past  Left voice mail to schedule appointment  This is the 1st attempt

## 2022-08-26 NOTE — PATIENT INSTRUCTIONS
Diabetes and Nutrition   AMBULATORY CARE:   Nutrition plans  help with healthy eating patterns that improve health  Nutrition plans and regular exercise help keep your blood sugar levels steady  They also help delay or prevent complications of diabetes, such as diabetic kidney disease  Call your local emergency number (911 in the [address]) if:   You have any of the following signs of a heart attack:      Squeezing, pressure, or pain in your chest    You may  also have any of the following:     Discomfort or pain in your back, neck, jaw, stomach, or arm    Shortness of breath    Nausea or vomiting    Lightheadedness or a sudden cold sweat      Seek care immediately if:   You have a low blood sugar level and it does not improve with treatment  Symptoms are trouble thinking, a pounding heartbeat, and sweating  Your blood sugar level is above 240 mg/dL and does not come down within 15 minutes of treatment  You have ketones in your blood or urine  You have nausea or are vomiting and cannot keep any food or liquid down  You have blurred or double vision  Your breath has a fruity, sweet smell, or your breathing is shallow  Call your doctor or diabetes care team if:   Your blood sugar levels are higher than your target goals  You often have low blood sugar levels  You have trouble coping with diabetes, or you feel anxious or depressed  You have questions or concerns about your condition or care  A dietitian will help you create a nutrition plan  to meet your needs and your family's needs  The goal is for you to reach or maintain healthy weight, blood sugar, blood pressure, and lipid levels  You should meet with the dietitian at least 1 time each year  You will learn the following:   How food affects your blood sugar levels    How to create healthy eating habits    How to make food choices based on your activity level, weight, and glucose levels    How your favorite foods may fit into your plan    How to keep track of carbohydrates    Correct portion sizes for each food    Changes you can make to your plan if you get pregnant or are breastfeeding    What you can do before you meet with the dietitian:   Do not skip meals  The goal is to keep your blood sugar level steady  Blood sugar levels may drop too low if you have received insulin and do not eat  Eat more high-fiber foods, such as fresh or frozen fruits and vegetables, whole-grain breads, and beans  Fiber helps control or lower blood sugar and cholesterol levels  Choose whole fruits instead of fruit juice as much as possible  Sugar may be added to juice, and fiber may be removed  Choose heart-healthy fats  Foods high in heart-healthy fats include olive oil, nuts, avocados, and fatty fish, such as salmon and tuna  Foods high in unhealthy fats include red meat, full-fat dairy products, and soft margarine  Unhealthy fats can increase your risk for heart disease, increase bad cholesterol, and lower good cholesterol  Choose complex carbohydrates  Foods with complex carbohydrates include brown rice, whole-grain breads and cereals, and cooked beans  Foods with simple carbohydrates include white bread, white rice, most cold cereals, and snack foods  Your plan will include the amount of carbohydrate to have at one time or in a day  Your blood sugar level can get too high if you eat too much carbohydrate at one time  Blood sugar levels do not spike as high or drop as quickly with complex carbohydrates as with simple carbohydrates  Choose complex carbohydrates whenever possible  Have less sodium (salt)  The risk for high blood pressure (BP) increases with high-sodium foods  Limit high-sodium foods, such as soy sauce, potato chips, and canned soup  Do not add salt to food you cook  Limit your use of table salt  Read labels to have no more than 2,300 milligrams of sodium in one day  Limit artificial sweeteners    These may be found in food or drinks, such as diet soft drinks or other low-calorie beverages  Artificial sweeteners are low in calories  They may help you lower your overall calories and carbohydrates  It is important not to have more calories from other foods to make up for the calories saved  Artificial sweeteners do not have any nutrition  Eat whole foods and drink water as much as possible  Your plan may include beverages with artificial sweeteners for a short time  These can help you transition from high-sugar beverages to water  Use the plate method for each meal   This method can help you eat the right amount of carbohydrates and keep your blood sugar levels under control  Draw an imaginary line down the middle of a 9-inch dinner plate  On one side, draw another line to divide that section in half  Your plate will have one large section and 2 small sections  Fill the largest section with non-starchy vegetables  These include broccoli, spinach, cucumbers, peppers, cauliflower, and tomatoes  Add a starch to one of the small sections  Starches include pasta, rice, whole-grain bread, tortillas, corn, potatoes, and beans  Add meat or another source of protein to the other small section  Examples include chicken or turkey without skin, fish, lean beef or pork, low-fat cheese, tofu, and eggs  Add dairy products or fruit next to your plate if your meal plan allows  Examples of dairy include skim or 1% milk and low-fat yogurt  If you do not drink milk or eat dairy products, you may be able to add another serving of starchy food instead  Have a low-calorie or calorie-free drink with your meal  Examples include water or unsweetened tea or coffee  Know the risks if you choose to drink alcohol:  Alcohol can cause hypoglycemia (low blood sugar level), especially if you use insulin  Alcohol can cause high blood sugar and BP levels, and weight gain if you drink too much   Women 21 years or older and men 72 years or older should limit alcohol to 1 drink a day  Men aged 24 to 59 years should limit alcohol to 2 drinks a day  A drink of alcohol is 12 ounces of beer, 5 ounces of wine, or 1½ ounces of liquor  Hypoglycemia can happen hours after you drink alcohol  Check your blood sugar level for several hours after you drink alcohol  Have a source of fast-acting carbohydrates with you in case your level goes too low  You need immediate care if you have signs or symptoms of hypoglycemia, such as sweating, confusion, or fainting  Maintain a healthy weight:  A healthy weight can help you control your diabetes  You can maintain a healthy weight with a nutrition plan and exercise  Ask your healthcare provider how much you should weigh  Ask him or her to help you create a weight loss plan if you are overweight  Together you can set weight loss and maintenance goals  Follow up with your diabetes team as directed:  Write down your questions so you remember to ask them during your visits  © [employer] 2022 Information is for End User's use only and may not be sold, redistributed or otherwise used for commercial purposes  All illustrations and images included in CareNotes® are the copyrighted property of [employer] A M [employer] Inc  or [facility]  The above information is an  only  It is not intended as medical advice for individual conditions or treatments  Talk to your doctor, nurse or pharmacist before following any medical regimen to see if it is safe and effective for you  Diabetes and Exercise   WHAT YOU NEED TO KNOW:   Physical activity, such as exercise, can help keep your blood sugar level steady or improve insulin resistance  Activity can help decrease your risk for heart disease, and help you lose weight  Exercise can also help lower your A1c  Your diabetes care team will help you create an exercise plan  The plan will be based on the type of diabetes you have and your starting fitness level    DISCHARGE INSTRUCTIONS: Call your local emergency number (911 in the [address]) if:   You have chest pain or shortness of breath  Return to the emergency department if:   You have a low blood sugar level and it does not improve with treatment  Symptoms are trouble thinking, a pounding heartbeat, and sweating  Your blood sugar level is above 240 mg/dL and does not come down within 15 minutes of treatment  You have blurred or double vision  Your breath has a fruity, sweet smell, or your breathing is shallow  Call your doctor or diabetes care team if:   You have ketones in your blood or urine  You have a fever  Your blood sugar levels are higher than your target goals  You often have low blood sugar levels  Your skin is red, dry, warm, or swollen  You have a wound that does not heal     You have trouble coping with diabetes, or you feel anxious or depressed  You have questions or concerns about your condition or care  Tips to help you create and meet your exercise goals:   Set a goal for 150 minutes (2 5 hours) of moderate to vigorous aerobic activity each week  Aerobic activity helps your heart stay strong  Aerobic activity includes walking, bicycling, dancing, swimming, and raking leaves  Spread aerobic activity over 3 to 5 days  Do not take more than 2 days off in a row  It is best to do at least 10 minutes at a time and 30 minutes each day  You can work up to these goals  Remember that any activity is better than no activity  Over time, you can make exercise more intense or last longer  You can also add more days of exercise as your fitness level improves  Your diabetes care team can help you make a step-by-step plan to achieve your goals  Set a strength training goal of 2 to 3 times a week  Take at least 1 day off in between strength training sessions  Strength training helps you keep the muscles you have and build new muscles   Strength training includes lifting weights, climbing stairs, yoga, and dex chi          Older adults should include balance training 2 to 3 times each week  These include walking backwards, standing on one foot, and walking heel to toe in a straight line  Other healthy exercise tips:   Stretch before and after you exercise to prevent injury  Drink water or liquids that do not contain sugar before, during, and after exercise  Ask your dietitian or healthcare provider which liquids you should drink when you exercise  Do not sit for longer than 30 minutes at a time during your day  If you cannot walk around, at least stand up  This will help you stay active and keep your blood circulating  Exercise and blood sugar levels:  Check your blood sugar level before and after exercise, if you use insulin  Healthcare providers may tell you to change the amount of insulin you take or food you eat  If your blood sugar level is high, check your blood or urine for ketones before you exercise  Do not exercise if your blood sugar level is high and you have ketones  If your blood sugar level is less than 100 mg/dL, have a carbohydrate snack before you exercise  Examples are 4 to 6 crackers, ½ banana, 8 ounces (1 cup) of milk, or 4 ounces (½ cup) of juice  Follow up with your doctor or diabetes care team as directed:  Write down your questions so you remember to ask them during your visits  © Copyright [employer] 2022 Information is for End User's use only and may not be sold, redistributed or otherwise used for commercial purposes  All illustrations and images included in CareNotes® are the copyrighted property of [employer] Inc  or [facility] Cindy Lofton   The above information is an  only  It is not intended as medical advice for individual conditions or treatments  Talk to your doctor, nurse or pharmacist before following any medical regimen to see if it is safe and effective for you

## 2022-08-26 NOTE — PROGRESS NOTES
[facility]      NAME: Kavita Green  AGE: 65 y o  SEX: male  : 1945   MRN: [id number]    DATE: 2022  TIME: 9:59 AM    Assessment and Plan     Problem List Items Addressed This Visit     KALIA (obstructive sleep apnea)    Morbid obesity with BMI of 40 0-44 9, adult ([address] )     Reviewed diet and exercise recommendations  Hyperuricemia    Relevant Orders    Uric acid    Essential hypertriglyceridemia     Currently on no anti lipidemic medications  Recheck lipid panel with next labs  Relevant Orders    Lipid Panel with Direct LDL reflex    TSH, 3rd generation    Essential hypertension     Blood pressure stable on lisinopril hydrochlorothiazide and nifedipine 60 mg daily  Chronic kidney disease, stage III (moderate) ([facility])     Lab Results   Component Value Date    EGFR 33 2022    EGFR 44 2022    EGFR 37 2020    CREATININE 1 88 (H) 2022    CREATININE 1 50 (H) 2022    CREATININE 1 76 (H) 2020   Creatinine has risen slightly above patient's normal baseline to 1 88  Has not seen Nephrology in close to 2 years  Have recommended he return for follow-up  Relevant Orders    Ambulatory Referral to Nephrology    Comprehensive metabolic panel    Abnormal serum glucose level - Primary     Hemoglobin A1c has risen to 6 8  Patient declines medication and feels that he can address it with diet and exercise  Educational materials provided today  Relevant Orders    Hemoglobin A1C              Return to office in:  6 months    Chief Complaint     Chief Complaint   Patient presents with    Follow-up     6 months       History of Present Illness     Patient was seen for routine follow-up chronic medical problems  He has no complaints  He is being treated for elevated glucose, hypertension, hyperuricemia and chronic kidney disease  He has no complaints today    He notes that he has been relatively inactive and his diet has not been good though he recently completely retired from his job and feels that he will be making some improvements in that area  The following portions of the patient's history were reviewed and updated as appropriate: allergies, current medications, past family history, past medical history, past social history, past surgical history and problem list     Review of Systems   Review of Systems   Constitutional: Negative  Respiratory: Negative  Cardiovascular: Negative  Gastrointestinal: Negative  Genitourinary: Negative  Musculoskeletal: Negative  Psychiatric/Behavioral: Negative  Active Problem List     Patient Active Problem List   Diagnosis    Abnormal serum glucose level    Chronic kidney disease, stage III (moderate) ([facility])    Essential hypertriglyceridemia    Gout    KALIA (obstructive sleep apnea)    Essential hypertension    Vitamin D deficiency    Knee pain    Localized, primary osteoarthritis    Osteoarthritis of knee    Hyperuricemia    Rectus diastasis    Obesity, morbid ([facility] [address] [address] )    Calcification of liver    Morbid obesity with BMI of 40 0-44 9, adult ([facility])       Objective   /70 (BP Location: Right arm, Patient Position: Sitting, Cuff Size: Large)   Pulse 99   Temp 97 7 °F (36 5 °C) (Temporal)   Resp 16   Ht 5' 7 32" (1 71 m)   Wt 125 kg (275 lb)   SpO2 96%   BMI 42 66 kg/m²     Physical Exam  Vitals and nursing note reviewed  Constitutional:       General: He is not in acute distress  Appearance: He is well-developed  He is not diaphoretic  HENT:      Head: Normocephalic and atraumatic  Eyes:      General:         Right eye: No discharge  Conjunctiva/sclera: Conjunctivae normal       Pupils: Pupils are equal, round, and reactive to light  Neck:      Thyroid: No thyromegaly  Cardiovascular:      Rate and Rhythm: Normal rate and regular rhythm  Pulmonary:      Effort: Pulmonary effort is normal  No respiratory distress        Breath sounds: Normal breath sounds  Musculoskeletal:      Cervical back: Normal range of motion  Lymphadenopathy:      Cervical: No cervical adenopathy  Skin:     General: Skin is warm and dry  Neurological:      Mental Status: He is alert and oriented to person, place, and time  Psychiatric:         Behavior: Behavior normal          Thought Content:  Thought content normal          Judgment: Judgment normal            Current Medications     Current Outpatient Medications:     allopurinol (ZYLOPRIM) 300 mg tablet, TAKE ONE TABLET BY MOUTH EVERY DAY, Disp: 90 tablet, Rfl: 2    aspirin 81 MG tablet, Take 81 mg by mouth daily, Disp: , Rfl:     Cholecalciferol (VITAMIN D3) 5000 units TABS, Take 1 tablet by mouth daily, Disp: , Rfl:     lisinopril-hydrochlorothiazide (PRINZIDE,ZESTORETIC) 20-12 5 MG per tablet, TAKE TWO TABLETS BY MOUTH EVERY DAY, Disp: 180 tablet, Rfl: 2    NIFEdipine (PROCARDIA XL) 60 mg 24 hr tablet, Take 1 tablet (60 mg total) by mouth daily, Disp: 90 tablet, Rfl: 2    NIFEdipine ER (ADALAT CC) 60 MG 24 hr tablet, TAKE ONE TABLET BY MOUTH EVERY DAY, Disp: 90 tablet, Rfl: 2    Omega-3 Fatty Acids (FISH OIL) 1200 MG CAPS, Take 1 capsule by mouth 3 (three) times a day, Disp: , Rfl:     Health Maintenance     Health Maintenance   Topic Date Due    Hepatitis C Screening  Never done    COVID-19 Vaccine (3 - Booster for Moderna series) 07/16/2021    Influenza Vaccine (1) 09/01/2022    Depression Screening  02/18/2023    BMI: Followup Plan  02/18/2023    Fall Risk  02/18/2023    Medicare Annual Wellness Visit (AWV)  02/18/2023    BMI: Adult  08/26/2023    Pneumococcal Vaccine: 65+ Years  Completed    HIB Vaccine  Aged Out    Hepatitis B Vaccine  Aged Out    IPV Vaccine  Aged Out    Hepatitis A Vaccine  Aged Out    Meningococcal ACWY Vaccine  Aged Out    HPV Vaccine  Aged [employer] History   Administered Date(s) Administered    COVID-19 MODERNA VACC 0 5 ML IM 01/19/2021, 02/16/2021    INFLUENZA 01/19/2005, 09/27/2007, 10/17/2008, 11/05/2011, 12/21/2012, 11/15/2013, 11/01/2015, 11/01/2016, 11/16/2018    Influenza Quadrivalent, 6-35 Months IM 11/01/2015    Influenza Split High Dose Preservative Free IM 08/14/2017, 11/02/2019    Influenza, high dose seasonal 0 7 mL 10/07/2020    Influenza, seasonal, injectable 11/01/2016    Pneumococcal Conjugate 13-Valent 07/29/2016    Pneumococcal Polysaccharide PPV23 11/06/2009, 08/14/2017    Td (adult), Unspecified 01/01/1993    Zoster 02/08/2012, 07/29/2013       Matt Bone DO  [facility]

## 2022-08-26 NOTE — ASSESSMENT & PLAN NOTE
Hemoglobin A1c has risen to 6 8  Patient declines medication and feels that he can address it with diet and exercise  Educational materials provided today

## 2022-08-26 NOTE — ASSESSMENT & PLAN NOTE
Lab Results   Component Value Date    EGFR 33 08/19/2022    EGFR 44 02/12/2022    EGFR 37 07/06/2020    CREATININE 1 88 (H) 08/19/2022    CREATININE 1 50 (H) 02/12/2022    CREATININE 1 76 (H) 07/06/2020   Creatinine has risen slightly above patient's normal baseline to 1 88  Has not seen Nephrology in close to 2 years  Have recommended he return for follow-up

## 2022-09-01 NOTE — TELEPHONE ENCOUNTER
Mailed recall card to patient to please call and schedule a follow up with Dr Kenneth Patel  This is the third attempt  Referral will be closed

## 2022-09-06 ENCOUNTER — TELEMEDICINE (OUTPATIENT)
Dept: FAMILY MEDICINE CLINIC | Facility: CLINIC | Age: 77
End: 2022-09-06
Payer: COMMERCIAL

## 2022-09-06 DIAGNOSIS — U07.1 COVID-19: Primary | ICD-10-CM

## 2022-09-06 PROCEDURE — 1160F RVW MEDS BY RX/DR IN RCRD: CPT | Performed by: FAMILY MEDICINE

## 2022-09-06 PROCEDURE — 99214 OFFICE O/P EST MOD 30 MIN: CPT | Performed by: FAMILY MEDICINE

## 2022-09-06 RX ORDER — NIRMATRELVIR AND RITONAVIR 150-100 MG
2 KIT ORAL 2 TIMES DAILY
Qty: 20 TABLET | Refills: 0 | Status: SHIPPED | OUTPATIENT
Start: 2022-09-06 | End: 2022-09-11

## 2022-09-06 NOTE — PROGRESS NOTES
COVID-19 Outpatient Progress Note    Assessment/Plan:    Problem List Items Addressed This Visit    None     Visit Diagnoses     COVID-19    -  Primary    Relevant Medications    nirmatrelvir & ritonavir (Paxlovid, 150/100,) tablet therapy pack         Disposition:     Patient has asymptomatic or mild COVID-19 infection  Based off CDC guidelines, they were recommended to isolate for 5 days  If they are asymptomatic or symptoms are improving with no fevers in the past 24 hours, isolation may be ended followed by 5 days of wearing a mask when around othes to minimize risk of infecting others  If still have a fever or other symptoms have not improved, continue to isolate until they improve  Regardless of when they end isolation, avoid being around people who are more likely to get very sick from COVID-19 until at least day 11  Discussed symptom directed medication options with patient  Discussed vitamin D, vitamin C, and/or zinc supplementation with patient  Patient meets criteria for PAXLOVID and they have been counseled appropriately according to EUA documentation released by the FDA  After discussion, patient agrees to treatment  Navjot  is an investigational medicine used to treat mild-to-moderate COVID-19 in adults and children (15years of age and older weighing at least 80 pounds (40 kg)) with positive results of direct SARS-CoV-2 viral testing, and who are at high risk for progression to severe COVID-19, including hospitalization or death  PAXLOVID is investigational because it is still being studied  There is limited information about the safety and effectiveness of using PAXLOVID to treat people with mild-to-moderate COVID-19      The FDA has authorized the emergency use of PAXLOVID for the treatment of mild-tomoderate COVID-19 in adults and children (15years of age and older weighing at least 80 pounds (40 kg)) with a positive test for the virus that causes COVID-19, and who are at high risk for progression to severe COVID-19, including hospitalization or death, under an EUA  What should I tell my healthcare provider before I take PAXLOVID? Tell your healthcare provider if you:  - Have any allergies  - Have liver or kidney disease  - Are pregnant or plan to become pregnant  - Are breastfeeding a child  - Have any serious illnesses    Tell your healthcare provider about all the medicines you take, including prescription and over-the-counter medicines, vitamins, and herbal supplements  Some medicines may interact with PAXLOVID and may cause serious side effects  Keep a list of your medicines to show your healthcare provider and pharmacist when you get a new medicine  You can ask your healthcare provider or pharmacist for a list of medicines that interact with PAXLOVID  Do not start taking a new medicine without telling your healthcare provider  Your healthcare provider can tell you if it is safe to take PAXLOVID with other medicines  Tell your healthcare provider if you are taking combined hormonal contraceptive  PAXLOVID may affect how your birth control pills work  Females who are able to become pregnant should use another effective alternative form of contraception or an additional barrier method of contraception  Talk to your healthcare provider if you have any questions about contraceptive methods that might be right for you  How do I take PAXLOVID? PAXLOVID consists of 2 medicines: nirmatrelvir and ritonavir  - Take 2 pink tablets of nirmatrelvir with 1 white tablet of ritonavir by mouth 2 times each day (in the morning and in the evening) for 5 days  For each dose, take all 3 tablets at the same time  - If you have kidney disease, talk to your healthcare provider  You may need a different dose  - Swallow the tablets whole  Do not chew, break, or crush the tablets  - Take PAXLOVID with or without food    - Do not stop taking PAXLOVID without talking to your healthcare provider, even if you feel better  - If you miss a dose of PAXLOVID within 8 hours of the time it is usually taken, take it as soon as you remember  If you miss a dose by more than 8 hours, skip the missed dose and take the next dose at your regular time  Do not take 2 doses of PAXLOVID at the same time  - If you take too much PAXLOVID, call your healthcare provider or go to the nearest hospital emergency room right away  - If you are taking a ritonavir- or cobicistat-containing medicine to treat hepatitis C or Human Immunodeficiency Virus (HIV), you should continue to take your medicine as prescribed by your healthcare provider   - Talk to your healthcare provider if you do not feel better or if you feel worse after 5 days  Who should generally not take PAXLOVID? Do not take PAXLOVID if:  You are allergic to nirmatrelvir, ritonavir, or any of the ingredients in PAXLOVID  You are taking any of the following medicines:  - Alfuzosin  - Pethidine, piroxicam, propoxyphene  - Ranolazine  - Amiodarone, dronedarone, flecainide, propafenone, quinidine  - Colchicine  - Lurasidone, pimozide, clozapine  - Dihydroergotamine, ergotamine, methylergonovine  - Lovastatin, simvastatin  - Sildenafil (Revatio®) for pulmonary arterial hypertension (PAH)  - Triazolam, oral midazolam  - Apalutamide  - Carbamazepine, phenobarbital, phenytoin  - Rifampin  - St  Joãos Wort (hypericum perforatum)    What are the important possible side effects of PAXLOVID? Possible side effects of PAXLOVID are:  - Liver Problems  Tell your healthcare provider right away if you have any of these signs and symptoms of liver problems: loss of appetite, yellowing of your skin and the whites of eyes (jaundice), dark-colored urine, pale colored stools and itchy skin, stomach area (abdominal) pain  - Resistance to HIV Medicines  If you have untreated HIV infection, PAXLOVID may lead to some HIV medicines not working as well in the future    - Other possible side effects include: altered sense of taste, diarrhea, high blood pressure, or muscle aches    These are not all the possible side effects of PAXLOVID  Not many people have taken PAXLOVID  Serious and unexpected side effects may happen  Patsy Hudson is still being studied, so it is possible that all of the risks are not known at this time  What other treatment choices are there? Like Genevieve Menjivar may allow for the emergency use of other medicines to treat people with COVID-19  Go to https://[employer]/ for information on the emergency use of other medicines that are authorized by FDA to treat people with COVID-19  Your healthcare provider may talk with you about clinical trials for which you may be eligible  It is your choice to be treated or not to be treated with PAXLOVID  Should you decide not to receive it or for your child not to receive it, it will not change your standard medical care  What if I am pregnant or breastfeeding? There is no experience treating pregnant women or breastfeeding mothers with PAXLOVID  For a mother and unborn baby, the benefit of taking PAXLOVID may be greater than the risk from the treatment  If you are pregnant, discuss your options and specific situation with your healthcare provider  It is recommended that you use effective barrier contraception or do not have sexual activity while taking PAXLOVID  If you are breastfeeding, discuss your options and specific situation with your healthcare provider  How do I report side effects with PAXLOVID? Contact your healthcare provider if you have any side effects that bother you or do not go away  Report side effects to FDA MedWatch at www fda gov/medwatch or call 5-354-IGI9139 or you can report side effects to [facility]  at the contact information provided below      Website Fax number Telephone number www [employer] [phone]     How should I store [address]? Store PAXLOVID tablets at room temperature between 68°F to 77°F (20°C to 25°C)  Full fact sheet for patients, parents, and caregivers can be found at: [employer] alexa gerber    I have spent 11 minutes directly with the patient  Greater than 50% of this time was spent in counseling/coordination of care regarding: diagnostic results, prognosis, risks and benefits of treatment options, instructions for management, patient and family education, importance of treatment compliance, risk factor reductions and impressions  Reviewed risks associated with antiviral treatment  He was advised that there can be increased effectiveness of his nifedipine while taking his COVID antiviral treatment  He was advised that he will be checking his blood pressure every day  If he notes any lightheadedness dizziness, he must call immediately and stop his antiviral medication  Encounter provider: Donavon Henry DO     Provider located at: [address] [place] [address] [address]  [address] [facility] [address]  [address] [facility] [address]  [phone]     Recent Visits  No visits were found meeting these conditions  Showing recent visits within past 7 days and meeting all other requirements  Today's Visits  Date Type Provider Dept   09/06/22 Telemedicine Zoey Salmeron DO [facility]   Showing today's visits and meeting all other requirements  Future Appointments  No visits were found meeting these conditions  Showing future appointments within next 150 days and meeting all other requirements     This virtual check-in was done via telephone and he agrees to proceed  Patient agrees to participate in a virtual check in via telephone or video visit instead of presenting to the office to address urgent/immediate medical needs  Patient is aware this is a billable service   He acknowledged consent and understanding of privacy and security of the video platform  The patient has agreed to participate and understands they can discontinue the visit at any time  After connecting through Telephone, the patient was identified by name and date of birth  Charity White was informed that this was a telemedicine visit and that the exam was being conducted confidentially over secure lines  My office door was closed  No one else was in the room  Charity White acknowledged consent and understanding of privacy and security of the telemedicine visit  I informed the patient that I have reviewed his record in Epic and presented the opportunity for him to ask any questions regarding the visit today  The patient agreed to participate  It was my intent to perform this visit via video technology but the patient was not able to do a video connection so the visit was completed via audio telephone only  Verification of patient location:  Patient is located in the following state in which I hold an active license: [place]    Subjective: Charity White is a 65 y o  male who has been screened for COVID-19  Symptom change since last report: unchanged  Patient's symptoms include chills, fatigue, malaise, sore throat and cough  Patient denies fever, congestion, rhinorrhea, shortness of breath, chest tightness, abdominal pain, nausea, diarrhea, myalgias and headaches  - Date of symptom onset: 9/5/2022  - Date of positive COVID-19 test: 9/5/2022  Type of test: Home antigen  COVID-19 vaccination status: Fully vaccinated (primary series)    Charlotte Sewell has been staying home and has isolated themselves in his home  He is taking care to not share personal items and is cleaning all surfaces that are touched often, like counters, tabletops, and doorknobs using household cleaning sprays or wipes  He is wearing a mask when he leaves his room       No results found for: Jose Arguello, [address] [facility], [address] [facility], [place] [address], [facility], [address] [facility]  Past Medical History:   Diagnosis Date    Chronic kidney disease     CPAP (continuous positive airway pressure) dependence     Gout     Hearing aid worn     sabrina    Hernia, inguinal     RIH and umbilical hernia    Hypertension     Morbidly obese ([address] )     Sleep apnea     Wears glasses      Past Surgical History:   Procedure Laterality Date    CARPAL TUNNEL RELEASE Bilateral     COLONOSCOPY N/A 1/6/2017    Procedure: COLONOSCOPY;  Surgeon: Mamadou Zambrano MD;  [facility];   Service:     CYST REMOVAL      FOOT SURGERY      HERNIA REPAIR      JOINT REPLACEMENT      knee left     [place] LAP,INGUINAL HERNIA REPR,INITIAL Right 3/16/2017    Procedure: REPAIR HERNIA INGUINAL, LAPAROSCOPIC WITH MESH;  Surgeon: Janes Lora MD;  [facility];  Service: General    [place] REPAIR UMBILICAL UCXI,4+L/G,ETIXR N/A 3/16/2017    Procedure: OPEN REPAIR HERNIA UMBILICAL;  Surgeon: Janes Lora MD;  [facility];  Service: General    TONSILLECTOMY       Current Outpatient Medications   Medication Sig Dispense Refill    nirmatrelvir & ritonavir (Paxlovid, 150/100,) tablet therapy pack Take 2 tablets by mouth 2 (two) times a day for 5 days Take 1 nirmatrelvir tablet + 1 ritonavir tablet together per dose 20 tablet 0    allopurinol (ZYLOPRIM) 300 mg tablet TAKE ONE TABLET BY MOUTH EVERY DAY 90 tablet 2    aspirin 81 MG tablet Take 81 mg by mouth daily      Cholecalciferol (VITAMIN D3) 5000 units TABS Take 1 tablet by mouth daily      lisinopril-hydrochlorothiazide (PRINZIDE,ZESTORETIC) 20-12 5 MG per tablet TAKE TWO TABLETS BY MOUTH EVERY  tablet 2    NIFEdipine (PROCARDIA XL) 60 mg 24 hr tablet Take 1 tablet (60 mg total) by mouth daily 90 tablet 2    NIFEdipine ER (ADALAT CC) 60 MG 24 hr tablet TAKE ONE TABLET BY MOUTH EVERY DAY 90 tablet 2    Omega-3 Fatty Acids (FISH OIL) 1200 MG CAPS Take 1 capsule by mouth 3 (three) times a day       No current facility-administered medications for this visit  No Known Allergies    Review of Systems   Constitutional: Positive for chills and fatigue  Negative for activity change and fever  HENT: Positive for sore throat  Negative for congestion, ear pain, rhinorrhea and sinus pressure  Eyes: Negative for redness, itching and visual disturbance  Respiratory: Positive for cough  Negative for chest tightness and shortness of breath  Cardiovascular: Negative for chest pain and palpitations  Gastrointestinal: Negative for abdominal pain, diarrhea and nausea  Endocrine: Negative for cold intolerance and heat intolerance  Genitourinary: Negative for dysuria, flank pain and frequency  Musculoskeletal: Negative for arthralgias, back pain, gait problem and myalgias  Skin: Negative for color change  Allergic/Immunologic: Negative for environmental allergies  Neurological: Negative for dizziness, numbness and headaches  Psychiatric/Behavioral: Negative for behavioral problems and sleep disturbance  Objective: There were no vitals filed for this visit      =

## 2022-09-29 ENCOUNTER — TELEPHONE (OUTPATIENT)
Dept: NEPHROLOGY | Facility: CLINIC | Age: 77
End: 2022-09-29

## 2022-09-29 NOTE — TELEPHONE ENCOUNTER
Pt called to schedule appt  Saw Dr Carl Arnold 4 yrs ago and schedule appointment for 1/26/23 with Dr Rafael Dale in the [facility] location

## 2023-01-24 ENCOUNTER — TELEPHONE (OUTPATIENT)
Dept: NEPHROLOGY | Facility: CLINIC | Age: 78
End: 2023-01-24

## 2023-01-24 NOTE — TELEPHONE ENCOUNTER
Appointment Confirmation   Person confirmed appointment with  If not patient, name of the person Patient    Date and time of appointment 10 1/25   Patient acknowledged and will be at appointment? yes    Did you advise the patient that they will need a urine sample if they are a new patient?  Yes    Did you advise the patient to bring their current medications for verification? (including any OTC) Yes    Additional Information

## 2023-01-25 ENCOUNTER — CONSULT (OUTPATIENT)
Dept: NEPHROLOGY | Facility: CLINIC | Age: 78
End: 2023-01-25

## 2023-01-25 VITALS
DIASTOLIC BLOOD PRESSURE: 70 MMHG | HEIGHT: 67 IN | SYSTOLIC BLOOD PRESSURE: 128 MMHG | BODY MASS INDEX: 41.91 KG/M2 | WEIGHT: 267 LBS | HEART RATE: 80 BPM

## 2023-01-25 DIAGNOSIS — N18.30 STAGE 3 CHRONIC KIDNEY DISEASE, UNSPECIFIED WHETHER STAGE 3A OR 3B CKD (HCC): ICD-10-CM

## 2023-01-25 DIAGNOSIS — N18.9 CHRONIC KIDNEY DISEASE, UNSPECIFIED CKD STAGE: Primary | ICD-10-CM

## 2023-01-25 DIAGNOSIS — E66.01 MORBID OBESITY WITH BMI OF 40.0-44.9, ADULT (HCC): ICD-10-CM

## 2023-01-25 NOTE — LETTER
January 25, 2023     Fabiola Sharif DO  [address]  [address]    Patient: Betina Patiño   YOB: 1945   Date of Visit: 1/25/2023       Dear Dr Hall Oven: Thank you for referring Michelle Ventura to me for evaluation  Below are my notes for this consultation  If you have questions, please do not hesitate to call me  I look forward to following your patient along with you  Sincerely,        John Miranda MD        CC: No Recipients  John Miranda MD  1/25/2023 12:59 PM  Sign when Signing Visit  Consultation - Nephrology   Betina Patiño 65 y o  male MRN: [id number]  Unit/Bed#:  Encounter: [id number]      Assessment/Plan     Assessment / Plan:  1  Renal    Patient's chronic kidney disease looking back over the last few years creatinine range 1 5-1 7 he actually had been seen by nephrologist in the past   He then had creatinine in the summer that was 1 8 and it looks like he was referred back  Looking at that over the last 3-4 years creatinine really has not significantly progressed and the 1 8 very well could have been up during the heat wave due to subtle volume depletion and dehydration  Urinalysis in the past had no significant proteinuria so that tells me there is not a significant intrinsic disease  Imaging revealed that he has an atrophic right kidney  From the appearance which we personally visualized together I suspect that it may be congenitally abnormal and saying that he may have had some hyper filtration nephrosclerosis of his normal kidney  With lack of proteinuria it goes against having significant intrinsic disease so at this point there is been no significant progression from what I see  His blood pressure is under good control      I am just going to repeat a BMP because his last lab was in August and urine protein to creatinine ratio to make sure there is no significant proteinuria then I will contact him with those results and determine follow-up  If things above are stable and there is no significant proteinuria can monitor with blood pressure control and routine health maintenance  History of Present Illness   Physician Requesting Consult: No att  providers found  Reason for Consult / Principal Problem: Chronic kidney disease  Hx and PE limited by:   HPI: Sierra Bagley is a 65y o  year old male who presents for his first visit with me  The patient states that he feels well he was going to his doctor and they noticed a change in his kidney function so he is being referred for evaluation and recommendations  History obtained from chart review and the patient  Consults    Review of Systems   Constitutional: Negative for appetite change, chills, diaphoresis and fatigue  HENT: Negative  Eyes: Negative  Respiratory: Positive for shortness of breath  Negative for cough, chest tightness and wheezing  At time a little short of breath and he feels that since he had COVID last August    Cardiovascular: Negative for chest pain, palpitations and leg swelling  Gastrointestinal: Negative  Negative for abdominal pain, diarrhea, nausea and vomiting  Endocrine: Negative  Genitourinary: Negative for difficulty urinating, dysuria, flank pain and hematuria  Musculoskeletal: Positive for arthralgias  Skin: Negative  Negative for rash  Neurological: Negative for dizziness, syncope, light-headedness and headaches  Psychiatric/Behavioral: Negative for agitation, behavioral problems, confusion and decreased concentration         Historical Information   Patient Active Problem List   Diagnosis   • Abnormal serum glucose level   • Essential hypertriglyceridemia   • Gout   • KALIA (obstructive sleep apnea)   • Essential hypertension   • Vitamin D deficiency   • Knee pain   • Localized, primary osteoarthritis   • Osteoarthritis of knee   • Hyperuricemia   • Rectus diastasis   • Obesity, morbid (HCC)   • Calcification of liver   • CKD (chronic kidney disease)     Past Medical History:   Diagnosis Date   • Chronic kidney disease    • CPAP (continuous positive airway pressure) dependence    • Gout    • Hearing aid worn     sabrina   • Hernia, inguinal     RIH and umbilical hernia   • Hypertension    • Morbidly obese (HCC)    • Sleep apnea    • Wears glasses    No history of diabetes, cancer, stroke, kidney stones, liver disease, lung disease  Past Surgical History:   Procedure Laterality Date   • CARPAL TUNNEL RELEASE Bilateral    • COLONOSCOPY N/A 2017    Procedure: COLONOSCOPY;  Surgeon: Becca Harris MD;  [facility]; Service:    • CYST REMOVAL     • FOOT SURGERY     • HERNIA REPAIR     • JOINT REPLACEMENT      knee left    • NH LAPAROSCOPY SURG RPR INITIAL INGUINAL HERNIA Right 3/16/2017    Procedure: REPAIR HERNIA INGUINAL, LAPAROSCOPIC WITH MESH;  Surgeon: Tawana Kyle MD;  [facility];  Service: General   • NH RPR UMBILICAL HRNA 5 YRS/> REDUCIBLE N/A 3/16/2017    Procedure: OPEN REPAIR HERNIA UMBILICAL;  Surgeon: Tawana Kyle MD;  [facility];  Service: General   • TONSILLECTOMY       Social History   Social History     Substance and Sexual Activity   Alcohol Use No   No tobacco ethanol or drug abuse  Social History     Substance and Sexual Activity   Drug Use No     Social History     Tobacco Use   Smoking Status Former   • Types: Cigarettes   • Quit date: 3/10/2003   • Years since quittin 8   Smokeless Tobacco Never     Family History   Problem Relation Age of Onset   • No Known Problems Mother    • Diabetes Father    • Diabetes Sister      No family history of renal disease  Meds/Allergies    current meds:   No current facility-administered medications for this visit  No Known Allergies    Objective    [unfilled]  Body mass index is 41 42 kg/m²      Invasive Devices:        PHYSICAL EXAM:  /70 (BP Location: Right arm, Patient Position: Sitting, Cuff Size: Standard)   Pulse 80   Ht 5' 7 32" (1 71 m)   Wt 121 kg (267 lb)   BMI 41 42 kg/m²     Physical Exam  Constitutional:       General: He is not in acute distress  Appearance: He is not ill-appearing or toxic-appearing  HENT:      Head: Normocephalic and atraumatic  Nose: Nose normal       Mouth/Throat:      Mouth: Mucous membranes are moist    Eyes:      General: No scleral icterus  Extraocular Movements: Extraocular movements intact  Cardiovascular:      Rate and Rhythm: Normal rate and regular rhythm  Heart sounds: No friction rub  No gallop  Comments: Mild edema  Pulmonary:      Effort: Pulmonary effort is normal  No respiratory distress  Breath sounds: No wheezing, rhonchi or rales  Abdominal:      General: Bowel sounds are normal  There is no distension  Palpations: Abdomen is soft  Tenderness: There is no abdominal tenderness  There is no rebound  Musculoskeletal:      Cervical back: Normal range of motion and neck supple  Neurological:      General: No focal deficit present  Mental Status: He is alert and oriented to person, place, and time  Mental status is at baseline  Psychiatric:         Mood and Affect: Mood normal          Behavior: Behavior normal          Thought Content:  Thought content normal          Judgment: Judgment normal            Current Weight: Weight - Scale: 121 kg (267 lb)  First Weight: Weight - Scale: 121 kg (267 lb)    Lab Results:              Invalid input(s): LABGLOM        Invalid input(s): LABALBU

## 2023-01-25 NOTE — PATIENT INSTRUCTIONS
You are here for your initial visit with me  It looks like you did see someone in our office a few years ago  It was a pleasure meeting you and thank you for your service  The creatinine to the blood test that measures the kidney function normal is 1  Your level most recently in August was 1 8 but looking back a few years ago it ranges from 1 5-1 7  I think it is possibly a little bit higher in August because it was very hot you likely were dehydrated  Regardless there really has not been significant worsening of this level which is good  Looking back you did not have protein in your urine which is good because more aggressive kidney diseases cause protein in the urine  So it is good that it was not there  Also I think the most revealing thing is you had a CAT scan and it shows you have a very small right kidney  The left kidney looked normal   If the right kidney was smaller since you are a young man or even birth you have likely been doing most of the kidney function from your left kidney and that led to what we called nephrosclerosis or a little aging of the kidney  For now the treatment just involves routine health management cholesterol treatment blood pressure control  Understood to repeat labs as we discussed I will remeasure protein in urine to make sure none is there and I will call you with your kidney function results and then will determine follow-up

## 2023-01-25 NOTE — PROGRESS NOTES
Consultation - Nephrology   Molly Martinez 65 y o  male MRN: [id number]  Unit/Bed#:  Encounter: [id number]      Assessment/Plan     Assessment / Plan:  1  Renal    Patient's chronic kidney disease looking back over the last few years creatinine range 1 5-1 7 he actually had been seen by nephrologist in the past   He then had creatinine in the summer that was 1 8 and it looks like he was referred back  Looking at that over the last 3-4 years creatinine really has not significantly progressed and the 1 8 very well could have been up during the heat wave due to subtle volume depletion and dehydration  Urinalysis in the past had no significant proteinuria so that tells me there is not a significant intrinsic disease  Imaging revealed that he has an atrophic right kidney  From the appearance which we personally visualized together I suspect that it may be congenitally abnormal and saying that he may have had some hyper filtration nephrosclerosis of his normal kidney  With lack of proteinuria it goes against having significant intrinsic disease so at this point there is been no significant progression from what I see  His blood pressure is under good control  I am just going to repeat a BMP because his last lab was in August and urine protein to creatinine ratio to make sure there is no significant proteinuria then I will contact him with those results and determine follow-up  If things above are stable and there is no significant proteinuria can monitor with blood pressure control and routine health maintenance  History of Present Illness   Physician Requesting Consult: No att  providers found  Reason for Consult / Principal Problem: Chronic kidney disease  Hx and PE limited by:   HPI: Molly Martinez is a 65y o  year old male who presents for his first visit with me    The patient states that he feels well he was going to his doctor and they noticed a change in his kidney function so he is being referred for evaluation and recommendations  History obtained from chart review and the patient  Consults    Review of Systems   Constitutional: Negative for appetite change, chills, diaphoresis and fatigue  HENT: Negative  Eyes: Negative  Respiratory: Positive for shortness of breath  Negative for cough, chest tightness and wheezing  At time a little short of breath and he feels that since he had COVID last August    Cardiovascular: Negative for chest pain, palpitations and leg swelling  Gastrointestinal: Negative  Negative for abdominal pain, diarrhea, nausea and vomiting  Endocrine: Negative  Genitourinary: Negative for difficulty urinating, dysuria, flank pain and hematuria  Musculoskeletal: Positive for arthralgias  Skin: Negative  Negative for rash  Neurological: Negative for dizziness, syncope, light-headedness and headaches  Psychiatric/Behavioral: Negative for agitation, behavioral problems, confusion and decreased concentration  Historical Information   Patient Active Problem List   Diagnosis   • Abnormal serum glucose level   • Essential hypertriglyceridemia   • Gout   • KALIA (obstructive sleep apnea)   • Essential hypertension   • Vitamin D deficiency   • Knee pain   • Localized, primary osteoarthritis   • Osteoarthritis of knee   • Hyperuricemia   • Rectus diastasis   • Obesity, morbid (HCC)   • Calcification of liver   • CKD (chronic kidney disease)     Past Medical History:   Diagnosis Date   • Chronic kidney disease    • CPAP (continuous positive airway pressure) dependence    • Gout    • Hearing aid worn     sabrina   • Hernia, inguinal     RIH and umbilical hernia   • Hypertension    • Morbidly obese ([address] )    • Sleep apnea    • Wears glasses    No history of diabetes, cancer, stroke, kidney stones, liver disease, lung disease    Past Surgical History:   Procedure Laterality Date   • CARPAL TUNNEL RELEASE Bilateral    • COLONOSCOPY N/A 1/6/2017 Procedure: COLONOSCOPY;  Surgeon: Emily Najera MD;  Location: [facility]; Service:    • CYST REMOVAL     • FOOT SURGERY     • HERNIA REPAIR     • JOINT REPLACEMENT      knee left    • DE LAPAROSCOPY SURG RPR INITIAL INGUINAL HERNIA Right 3/16/2017    Procedure: REPAIR HERNIA INGUINAL, LAPAROSCOPIC WITH MESH;  Surgeon: Henok Arango MD;  Location: [facility];  Service: General   • DE RPR UMBILICAL HRNA 5 YRS/> REDUCIBLE N/A 3/16/2017    Procedure: OPEN REPAIR HERNIA UMBILICAL;  Surgeon: Henok Arango MD;  Location: [facility];  Service: General   • TONSILLECTOMY       Social History   Social History     Substance and Sexual Activity   Alcohol Use No   No tobacco ethanol or drug abuse  Social History     Substance and Sexual Activity   Drug Use No     Social History     Tobacco Use   Smoking Status Former   • Types: Cigarettes   • Quit date: 3/10/2003   • Years since quittin 8   Smokeless Tobacco Never     Family History   Problem Relation Age of Onset   • No Known Problems Mother    • Diabetes Father    • Diabetes Sister      No family history of renal disease  Meds/Allergies   current meds:   No current facility-administered medications for this visit  No Known Allergies    Objective   [unfilled]  Body mass index is 41 42 kg/m²  Invasive Devices:        PHYSICAL EXAM:  /70 (BP Location: Right arm, Patient Position: Sitting, Cuff Size: Standard)   Pulse 80   Ht 5' 7 32" (1 71 m)   Wt 121 kg (267 lb)   BMI 41 42 kg/m²     Physical Exam  Constitutional:       General: He is not in acute distress  Appearance: He is not ill-appearing or toxic-appearing  HENT:      Head: Normocephalic and atraumatic  Nose: Nose normal       Mouth/Throat:      Mouth: Mucous membranes are moist    Eyes:      General: No scleral icterus  Extraocular Movements: Extraocular movements intact  Cardiovascular:      Rate and Rhythm: Normal rate and regular rhythm  Heart sounds:      No friction rub  No gallop  Comments: Mild edema  Pulmonary:      Effort: Pulmonary effort is normal  No respiratory distress  Breath sounds: No wheezing, rhonchi or rales  Abdominal:      General: Bowel sounds are normal  There is no distension  Palpations: Abdomen is soft  Tenderness: There is no abdominal tenderness  There is no rebound  Musculoskeletal:      Cervical back: Normal range of motion and neck supple  Neurological:      General: No focal deficit present  Mental Status: He is alert and oriented to person, place, and time  Mental status is at baseline  Psychiatric:         Mood and Affect: Mood normal          Behavior: Behavior normal          Thought Content:  Thought content normal          Judgment: Judgment normal            Current Weight: Weight - Scale: 121 kg (267 lb)  First Weight: Weight - Scale: 121 kg (267 lb)    Lab Results:              Invalid input(s): LABGLOM        Invalid input(s): LABALBU

## 2023-02-24 ENCOUNTER — HOSPITAL ENCOUNTER (INPATIENT)
Facility: HOSPITAL | Age: 78
LOS: 1 days | Discharge: HOME | End: 2023-02-26
Attending: EMERGENCY MEDICINE

## 2023-02-24 ENCOUNTER — TELEPHONE (OUTPATIENT)
Dept: FAMILY MEDICINE CLINIC | Facility: CLINIC | Age: 78
End: 2023-02-24

## 2023-02-24 ENCOUNTER — NURSE TRIAGE (OUTPATIENT)
Dept: OTHER | Facility: OTHER | Age: 78
End: 2023-02-24

## 2023-02-24 ENCOUNTER — APPOINTMENT (OUTPATIENT)
Dept: LAB | Facility: CLINIC | Age: 78
End: 2023-02-24

## 2023-02-24 DIAGNOSIS — E78.1 ESSENTIAL HYPERTRIGLYCERIDEMIA: ICD-10-CM

## 2023-02-24 DIAGNOSIS — E79.0 HYPERURICEMIA: ICD-10-CM

## 2023-02-24 DIAGNOSIS — R73.09 ABNORMAL SERUM GLUCOSE LEVEL: ICD-10-CM

## 2023-02-24 DIAGNOSIS — N18.30 ACUTE RENAL FAILURE SUPERIMPOSED ON STAGE 3 CHRONIC KIDNEY DISEASE (HCC): ICD-10-CM

## 2023-02-24 DIAGNOSIS — N17.9 ACUTE RENAL FAILURE SUPERIMPOSED ON STAGE 3 CHRONIC KIDNEY DISEASE (HCC): ICD-10-CM

## 2023-02-24 DIAGNOSIS — E87.5 HYPERKALEMIA: Primary | ICD-10-CM

## 2023-02-24 DIAGNOSIS — N18.9 CHRONIC KIDNEY DISEASE, UNSPECIFIED CKD STAGE: ICD-10-CM

## 2023-02-24 DIAGNOSIS — N18.30 STAGE 3 CHRONIC KIDNEY DISEASE, UNSPECIFIED WHETHER STAGE 3A OR 3B CKD (HCC): ICD-10-CM

## 2023-02-24 LAB
ALBUMIN SERPL BCP-MCNC: 3.4 G/DL (ref 3.5–5)
ALP SERPL-CCNC: 88 U/L (ref 46–116)
ALT SERPL W P-5'-P-CCNC: 37 U/L (ref 12–78)
ANION GAP SERPL CALCULATED.3IONS-SCNC: 4 MMOL/L (ref 4–13)
ANION GAP SERPL CALCULATED.3IONS-SCNC: 8 MMOL/L (ref 4–13)
AST SERPL W P-5'-P-CCNC: 23 U/L (ref 5–45)
BASOPHILS # BLD AUTO: 0.08 THOUSANDS/ÂΜL (ref 0–0.1)
BASOPHILS NFR BLD AUTO: 1 % (ref 0–1)
BILIRUB SERPL-MCNC: 0.24 MG/DL (ref 0.2–1)
BUN SERPL-MCNC: 64 MG/DL (ref 5–25)
BUN SERPL-MCNC: 70 MG/DL (ref 5–25)
CALCIUM ALBUM COR SERPL-MCNC: 10.5 MG/DL (ref 8.3–10.1)
CALCIUM SERPL-MCNC: 10 MG/DL (ref 8.3–10.1)
CALCIUM SERPL-MCNC: 9.9 MG/DL (ref 8.4–10.2)
CHLORIDE SERPL-SCNC: 111 MMOL/L (ref 96–108)
CHLORIDE SERPL-SCNC: 116 MMOL/L (ref 96–108)
CHOLEST SERPL-MCNC: 126 MG/DL
CO2 SERPL-SCNC: 14 MMOL/L (ref 21–32)
CO2 SERPL-SCNC: 17 MMOL/L (ref 21–32)
CREAT SERPL-MCNC: 2.18 MG/DL (ref 0.6–1.3)
CREAT SERPL-MCNC: 2.21 MG/DL (ref 0.6–1.3)
CREAT UR-MCNC: 93.9 MG/DL
EOSINOPHIL # BLD AUTO: 0.19 THOUSAND/ÂΜL (ref 0–0.61)
EOSINOPHIL NFR BLD AUTO: 2 % (ref 0–6)
ERYTHROCYTE [DISTWIDTH] IN BLOOD BY AUTOMATED COUNT: 13.2 % (ref 11.6–15.1)
EST. AVERAGE GLUCOSE BLD GHB EST-MCNC: 126 MG/DL
GFR SERPL CREATININE-BSD FRML MDRD: 27 ML/MIN/1.73SQ M
GFR SERPL CREATININE-BSD FRML MDRD: 27 ML/MIN/1.73SQ M
GLUCOSE P FAST SERPL-MCNC: 120 MG/DL (ref 65–99)
GLUCOSE SERPL-MCNC: 120 MG/DL (ref 65–140)
HBA1C MFR BLD: 6 %
HCT VFR BLD AUTO: 35.4 % (ref 36.5–49.3)
HDLC SERPL-MCNC: 27 MG/DL
HGB BLD-MCNC: 11 G/DL (ref 12–17)
IMM GRANULOCYTES # BLD AUTO: 0.14 THOUSAND/UL (ref 0–0.2)
IMM GRANULOCYTES NFR BLD AUTO: 2 % (ref 0–2)
LDLC SERPL CALC-MCNC: 42 MG/DL (ref 0–100)
LYMPHOCYTES # BLD AUTO: 2.25 THOUSANDS/ÂΜL (ref 0.6–4.47)
LYMPHOCYTES NFR BLD AUTO: 23 % (ref 14–44)
MCH RBC QN AUTO: 35 PG (ref 26.8–34.3)
MCHC RBC AUTO-ENTMCNC: 31.1 G/DL (ref 31.4–37.4)
MCV RBC AUTO: 113 FL (ref 82–98)
MONOCYTES # BLD AUTO: 1.01 THOUSAND/ÂΜL (ref 0.17–1.22)
MONOCYTES NFR BLD AUTO: 11 % (ref 4–12)
NEUTROPHILS # BLD AUTO: 5.96 THOUSANDS/ÂΜL (ref 1.85–7.62)
NEUTS SEG NFR BLD AUTO: 61 % (ref 43–75)
NRBC BLD AUTO-RTO: 0 /100 WBCS
PLATELET # BLD AUTO: 124 THOUSANDS/UL (ref 149–390)
PMV BLD AUTO: 11.7 FL (ref 8.9–12.7)
POTASSIUM SERPL-SCNC: 6.3 MMOL/L (ref 3.5–5.3)
POTASSIUM SERPL-SCNC: 6.8 MMOL/L (ref 3.5–5.3)
PROT SERPL-MCNC: 7.3 G/DL (ref 6.4–8.4)
PROT UR-MCNC: 7 MG/DL
PROT/CREAT UR: 0.07 MG/G{CREAT} (ref 0–0.1)
RBC # BLD AUTO: 3.14 MILLION/UL (ref 3.88–5.62)
SODIUM SERPL-SCNC: 133 MMOL/L (ref 135–147)
SODIUM SERPL-SCNC: 137 MMOL/L (ref 135–147)
TRIGL SERPL-MCNC: 283 MG/DL
TSH SERPL DL<=0.05 MIU/L-ACNC: 2.39 UIU/ML (ref 0.45–4.5)
URATE SERPL-MCNC: 3.8 MG/DL (ref 3.5–8.5)
WBC # BLD AUTO: 9.63 THOUSAND/UL (ref 4.31–10.16)

## 2023-02-24 RX ORDER — DEXTROSE MONOHYDRATE 25 G/50ML
50 INJECTION, SOLUTION INTRAVENOUS ONCE
Status: COMPLETED | OUTPATIENT
Start: 2023-02-24 | End: 2023-02-24

## 2023-02-24 RX ORDER — SODIUM POLYSTYRENE SULFONATE 4.1 MEQ/G
30 POWDER, FOR SUSPENSION ORAL; RECTAL ONCE
Qty: 30 G | Refills: 0 | Status: SHIPPED | OUTPATIENT
Start: 2023-02-24 | End: 2023-02-26

## 2023-02-24 RX ORDER — SODIUM POLYSTYRENE SULFONATE 4.1 MEQ/G
15 POWDER, FOR SUSPENSION ORAL; RECTAL ONCE
Status: COMPLETED | OUTPATIENT
Start: 2023-02-24 | End: 2023-02-24

## 2023-02-24 RX ORDER — DEXTROSE MONOHYDRATE 25 G/50ML
25 INJECTION, SOLUTION INTRAVENOUS ONCE
Status: DISCONTINUED | OUTPATIENT
Start: 2023-02-24 | End: 2023-02-24

## 2023-02-24 RX ORDER — FUROSEMIDE 10 MG/ML
20 INJECTION INTRAMUSCULAR; INTRAVENOUS ONCE
Status: COMPLETED | OUTPATIENT
Start: 2023-02-24 | End: 2023-02-24

## 2023-02-24 RX ADMIN — INSULIN HUMAN 10 UNITS: 100 INJECTION, SOLUTION PARENTERAL at 23:46

## 2023-02-24 RX ADMIN — FUROSEMIDE 20 MG: 10 INJECTION, SOLUTION INTRAVENOUS at 23:46

## 2023-02-24 RX ADMIN — ALBUTEROL SULFATE 10 MG: 2.5 SOLUTION RESPIRATORY (INHALATION) at 23:46

## 2023-02-24 RX ADMIN — SODIUM BICARBONATE 50 MEQ: 84 INJECTION INTRAVENOUS at 23:46

## 2023-02-24 RX ADMIN — SODIUM POLYSTYRENE SULFONATE 15 G: 1 POWDER ORAL; RECTAL at 23:50

## 2023-02-24 RX ADMIN — DEXTROSE MONOHYDRATE 50 ML: 25 INJECTION, SOLUTION INTRAVENOUS at 23:46

## 2023-02-24 NOTE — TELEPHONE ENCOUNTER
Call from lab with critical lab value  Potassium 6 3  Creatinine also has risen to 2 2  I spoke to patient  We will call in 30 g of Kayexalate which he will take tonight or tomorrow morning  Hold lisinopril hydrochlorothiazide  Continue nifedipine for blood pressure  Recheck BMP 3 days

## 2023-02-25 ENCOUNTER — APPOINTMENT (INPATIENT)
Dept: ULTRASOUND IMAGING | Facility: HOSPITAL | Age: 78
End: 2023-02-25

## 2023-02-25 PROBLEM — N18.30 ACUTE RENAL FAILURE SUPERIMPOSED ON STAGE 3 CHRONIC KIDNEY DISEASE (HCC): Status: ACTIVE | Noted: 2023-02-25

## 2023-02-25 PROBLEM — N17.9 ACUTE RENAL FAILURE SUPERIMPOSED ON STAGE 3 CHRONIC KIDNEY DISEASE (HCC): Status: ACTIVE | Noted: 2023-02-25

## 2023-02-25 PROBLEM — E87.5 ACUTE HYPERKALEMIA: Status: ACTIVE | Noted: 2023-02-25

## 2023-02-25 PROBLEM — D50.9 MICROCYTIC ANEMIA: Status: ACTIVE | Noted: 2023-02-25

## 2023-02-25 LAB
ANION GAP SERPL CALCULATED.3IONS-SCNC: 8 MMOL/L (ref 4–13)
ANION GAP SERPL CALCULATED.3IONS-SCNC: 8 MMOL/L (ref 4–13)
ATRIAL RATE: 91 BPM
BASOPHILS # BLD AUTO: 0.05 THOUSANDS/ÂΜL (ref 0–0.1)
BASOPHILS NFR BLD AUTO: 1 % (ref 0–1)
BUN SERPL-MCNC: 64 MG/DL (ref 5–25)
BUN SERPL-MCNC: 68 MG/DL (ref 5–25)
CALCIUM SERPL-MCNC: 9.4 MG/DL (ref 8.4–10.2)
CALCIUM SERPL-MCNC: 9.5 MG/DL (ref 8.4–10.2)
CHLORIDE SERPL-SCNC: 109 MMOL/L (ref 96–108)
CHLORIDE SERPL-SCNC: 113 MMOL/L (ref 96–108)
CO2 SERPL-SCNC: 14 MMOL/L (ref 21–32)
CO2 SERPL-SCNC: 17 MMOL/L (ref 21–32)
CREAT SERPL-MCNC: 1.93 MG/DL (ref 0.6–1.3)
CREAT SERPL-MCNC: 2.12 MG/DL (ref 0.6–1.3)
EOSINOPHIL # BLD AUTO: 0.2 THOUSAND/ÂΜL (ref 0–0.61)
EOSINOPHIL NFR BLD AUTO: 2 % (ref 0–6)
ERYTHROCYTE [DISTWIDTH] IN BLOOD BY AUTOMATED COUNT: 13.3 % (ref 11.6–15.1)
FERRITIN SERPL-MCNC: 609 NG/ML (ref 8–388)
GFR SERPL CREATININE-BSD FRML MDRD: 28 ML/MIN/1.73SQ M
GFR SERPL CREATININE-BSD FRML MDRD: 32 ML/MIN/1.73SQ M
GLUCOSE SERPL-MCNC: 102 MG/DL (ref 65–140)
GLUCOSE SERPL-MCNC: 119 MG/DL (ref 65–140)
HCT VFR BLD AUTO: 32 % (ref 36.5–49.3)
HEMOCCULT STL QL: NEGATIVE
HGB BLD-MCNC: 10.3 G/DL (ref 12–17)
IMM GRANULOCYTES # BLD AUTO: 0.12 THOUSAND/UL (ref 0–0.2)
IMM GRANULOCYTES NFR BLD AUTO: 1 % (ref 0–2)
IRON SATN MFR SERPL: 33 % (ref 20–50)
IRON SERPL-MCNC: 90 UG/DL (ref 65–175)
LYMPHOCYTES # BLD AUTO: 2.63 THOUSANDS/ÂΜL (ref 0.6–4.47)
LYMPHOCYTES NFR BLD AUTO: 29 % (ref 14–44)
MCH RBC QN AUTO: 34.9 PG (ref 26.8–34.3)
MCHC RBC AUTO-ENTMCNC: 32.2 G/DL (ref 31.4–37.4)
MCV RBC AUTO: 109 FL (ref 82–98)
MONOCYTES # BLD AUTO: 1.06 THOUSAND/ÂΜL (ref 0.17–1.22)
MONOCYTES NFR BLD AUTO: 12 % (ref 4–12)
NEUTROPHILS # BLD AUTO: 5.09 THOUSANDS/ÂΜL (ref 1.85–7.62)
NEUTS SEG NFR BLD AUTO: 55 % (ref 43–75)
NRBC BLD AUTO-RTO: 0 /100 WBCS
P AXIS: 70 DEGREES
PLATELET # BLD AUTO: 116 THOUSANDS/UL (ref 149–390)
PMV BLD AUTO: 11.8 FL (ref 8.9–12.7)
POTASSIUM SERPL-SCNC: 5.6 MMOL/L (ref 3.5–5.3)
POTASSIUM SERPL-SCNC: 6 MMOL/L (ref 3.5–5.3)
PR INTERVAL: 194 MS
QRS AXIS: 31 DEGREES
QRSD INTERVAL: 78 MS
QT INTERVAL: 312 MS
QTC INTERVAL: 383 MS
RBC # BLD AUTO: 2.95 MILLION/UL (ref 3.88–5.62)
SODIUM SERPL-SCNC: 134 MMOL/L (ref 135–147)
SODIUM SERPL-SCNC: 135 MMOL/L (ref 135–147)
T WAVE AXIS: 36 DEGREES
TIBC SERPL-MCNC: 276 UG/DL (ref 250–450)
VENTRICULAR RATE: 91 BPM
WBC # BLD AUTO: 9.15 THOUSAND/UL (ref 4.31–10.16)

## 2023-02-25 RX ORDER — SODIUM CHLORIDE, SODIUM GLUCONATE, SODIUM ACETATE, POTASSIUM CHLORIDE, MAGNESIUM CHLORIDE, SODIUM PHOSPHATE, DIBASIC, AND POTASSIUM PHOSPHATE .53; .5; .37; .037; .03; .012; .00082 G/100ML; G/100ML; G/100ML; G/100ML; G/100ML; G/100ML; G/100ML
75 INJECTION, SOLUTION INTRAVENOUS CONTINUOUS
Status: DISCONTINUED | OUTPATIENT
Start: 2023-02-25 | End: 2023-02-25

## 2023-02-25 RX ORDER — ONDANSETRON 2 MG/ML
4 INJECTION INTRAMUSCULAR; INTRAVENOUS EVERY 6 HOURS PRN
Status: DISCONTINUED | OUTPATIENT
Start: 2023-02-25 | End: 2023-02-26 | Stop reason: HOSPADM

## 2023-02-25 RX ORDER — NIFEDIPINE 60 MG/1
60 TABLET, EXTENDED RELEASE ORAL DAILY
Status: DISCONTINUED | OUTPATIENT
Start: 2023-02-25 | End: 2023-02-25

## 2023-02-25 RX ORDER — ASPIRIN 81 MG/1
81 TABLET, CHEWABLE ORAL DAILY
Status: DISCONTINUED | OUTPATIENT
Start: 2023-02-25 | End: 2023-02-26 | Stop reason: HOSPADM

## 2023-02-25 RX ORDER — HEPARIN SODIUM 5000 [USP'U]/ML
5000 INJECTION, SOLUTION INTRAVENOUS; SUBCUTANEOUS EVERY 8 HOURS SCHEDULED
Status: DISCONTINUED | OUTPATIENT
Start: 2023-02-25 | End: 2023-02-26 | Stop reason: HOSPADM

## 2023-02-25 RX ORDER — SODIUM POLYSTYRENE SULFONATE 4.1 MEQ/G
15 POWDER, FOR SUSPENSION ORAL; RECTAL ONCE
Status: COMPLETED | OUTPATIENT
Start: 2023-02-25 | End: 2023-02-25

## 2023-02-25 RX ORDER — TAMSULOSIN HYDROCHLORIDE 0.4 MG/1
0.4 CAPSULE ORAL
Status: DISCONTINUED | OUTPATIENT
Start: 2023-02-25 | End: 2023-02-26 | Stop reason: HOSPADM

## 2023-02-25 RX ORDER — NIFEDIPINE 30 MG/1
60 TABLET, EXTENDED RELEASE ORAL DAILY
Status: DISCONTINUED | OUTPATIENT
Start: 2023-02-25 | End: 2023-02-26 | Stop reason: HOSPADM

## 2023-02-25 RX ADMIN — ASPIRIN 81 MG: 81 TABLET, CHEWABLE ORAL at 09:54

## 2023-02-25 RX ADMIN — SODIUM POLYSTYRENE SULFONATE 15 G: 1 POWDER ORAL; RECTAL at 12:53

## 2023-02-25 RX ADMIN — NIFEDIPINE 60 MG: 30 TABLET, FILM COATED, EXTENDED RELEASE ORAL at 09:54

## 2023-02-25 RX ADMIN — SODIUM BICARBONATE 100 ML/HR: 84 INJECTION, SOLUTION INTRAVENOUS at 23:33

## 2023-02-25 RX ADMIN — HEPARIN SODIUM 5000 UNITS: 5000 INJECTION INTRAVENOUS; SUBCUTANEOUS at 09:54

## 2023-02-25 RX ADMIN — TAMSULOSIN HYDROCHLORIDE 0.4 MG: 0.4 CAPSULE ORAL at 19:36

## 2023-02-25 RX ADMIN — SODIUM BICARBONATE 100 ML/HR: 84 INJECTION, SOLUTION INTRAVENOUS at 12:28

## 2023-02-25 RX ADMIN — SODIUM CHLORIDE, SODIUM GLUCONATE, SODIUM ACETATE, POTASSIUM CHLORIDE, MAGNESIUM CHLORIDE, SODIUM PHOSPHATE, DIBASIC, AND POTASSIUM PHOSPHATE 75 ML/HR: .53; .5; .37; .037; .03; .012; .00082 INJECTION, SOLUTION INTRAVENOUS at 10:00

## 2023-02-25 NOTE — PROGRESS NOTES
Potassium at 6 3  I was contacted by his primary doctor and they informed me of lab  They sent in kayexalate 30 grams and held ACEI  For repeat BMP

## 2023-02-25 NOTE — ASSESSMENT & PLAN NOTE
Lab Results   Component Value Date    EGFR 27 02/24/2023    EGFR 27 02/24/2023    EGFR 33 08/19/2022    CREATININE 2 18 (H) 02/24/2023    CREATININE 2 21 (H) 02/24/2023    CREATININE 1 88 (H) 08/19/2022   baseline remotely closer to 1 8 now 2 2 in setting of lisinopril/hctz use  ivf repeat bmp in am

## 2023-02-25 NOTE — UTILIZATION REVIEW
NOTIFICATION OF INPATIENT ADMISSION   AUTHORIZATION REQUEST   SERVICING FACILITY:   [address] [place] [address] [facility]  Tax ID: [id number]  NPI: [id number] ATTENDING PROVIDER:  Attending Name and NPI#: Alejandrorocio Lavell [[id number]]  Address: [address] [place] [address]  Phone: [phone]     ADMISSION INFORMATION:  Place of Service: [place] [address]  Place of Service Code: 21  Inpatient Admission Date/Time: 2/25/23 12:45 AM  Discharge Date/Time: No discharge date for patient encounter  Admitting Diagnosis Code/Description:  Hyperkalemia [E87 5]  Abnormal laboratory test result [R89 9]     UTILIZATION REVIEW CONTACT:  Lia Jordan Utilization   Network Utilization Review Department  Phone: [phone]  Fax: [phone]  Email: [email or web address]  Contact for approvals/pending authorizations, clinical reviews, and discharge  PHYSICIAN ADVISORY SERVICES:  Medical Necessity Denial & [facility] Review  Phone: [phone]  Fax: [phone]  Email: [email or web address]

## 2023-02-25 NOTE — ASSESSMENT & PLAN NOTE
Mild but mcv in 70s w/significant azotemia  Check hemoccult, iron panel  Monitor stools while on kayexalate

## 2023-02-25 NOTE — PLAN OF CARE
Problem: SAFETY ADULT  Goal: Patient will remain free of falls  Description: INTERVENTIONS:  - Educate patient/family on patient safety including physical limitations  - Instruct patient to call for assistance with activity   - Consult OT/PT to assist with strengthening/mobility   - Keep Call bell within reach  - Keep bed low and locked with side rails adjusted as appropriate  - Keep care items and personal belongings within reach  - Initiate and maintain comfort rounds  - Make Fall Risk Sign visible to staff  - Apply yellow socks and bracelet for high fall risk patients  - Consider moving patient to room near nurses station  Outcome: Progressing  Goal: Maintain or return to baseline ADL function  Description: INTERVENTIONS:  -  Assess patient's ability to carry out ADLs; assess patient's baseline for ADL function and identify physical deficits which impact ability to perform ADLs (bathing, care of mouth/teeth, toileting, grooming, dressing, etc )  - Assess/evaluate cause of self-care deficits   - Assess range of motion  - Assess patient's mobility; develop plan if impaired  - Assess patient's need for assistive devices and provide as appropriate  - Encourage maximum independence but intervene and supervise when necessary  - Involve family in performance of ADLs  - Assess for home care needs following discharge   - Consider OT consult to assist with ADL evaluation and planning for discharge  - Provide patient education as appropriate  Outcome: Progressing  Goal: Maintains/Returns to pre admission functional level  Description: INTERVENTIONS:  - Perform BMAT or MOVE assessment daily    - Set and communicate daily mobility goal to care team and patient/family/caregiver     - Collaborate with rehabilitation services on mobility goals if consulted  - Out of bed for toileting  - Record patient progress and toleration of activity level   Outcome: Progressing     Problem: DISCHARGE PLANNING  Goal: Discharge to home or other facility with appropriate resources  Description: INTERVENTIONS:  - Identify barriers to discharge w/patient and caregiver  - Arrange for needed discharge resources and transportation as appropriate  - Identify discharge learning needs (meds, wound care, etc )  - Arrange for interpretive services to assist at discharge as needed  - Refer to Case Management Department for coordinating discharge planning if the patient needs post-hospital services based on physician/advanced practitioner order or complex needs related to functional status, cognitive ability, or social support system  Outcome: Progressing     Problem: CARDIOVASCULAR - ADULT  Goal: Maintains optimal cardiac output and hemodynamic stability  Description: INTERVENTIONS:  - Monitor I/O, vital signs and rhythm  - Monitor for S/S and trends of decreased cardiac output  - Administer and titrate ordered vasoactive medications to optimize hemodynamic stability  - Assess quality of pulses, skin color and temperature  - Assess for signs of decreased coronary artery perfusion  - Instruct patient to report change in severity of symptoms  Outcome: Progressing  Goal: Absence of cardiac dysrhythmias or at baseline rhythm  Description: INTERVENTIONS:  - Continuous cardiac monitoring, vital signs, obtain 12 lead EKG if ordered  - Administer antiarrhythmic and heart rate control medications as ordered  - Monitor electrolytes and administer replacement therapy as ordered  Outcome: Progressing     Problem: METABOLIC, FLUID AND ELECTROLYTES - ADULT  Goal: Electrolytes maintained within normal limits  Description: INTERVENTIONS:  - Monitor labs and assess patient for signs and symptoms of electrolyte imbalances  - Administer electrolyte replacement as ordered  - Monitor response to electrolyte replacements, including repeat lab results as appropriate  - Instruct patient on fluid and nutrition as appropriate  Outcome: Progressing  Goal: Fluid balance maintained  Description: INTERVENTIONS:  - Monitor labs   - Monitor I/O and WT  - Instruct patient on fluid and nutrition as appropriate  - Assess for signs & symptoms of volume excess or deficit  Outcome: Progressing  Goal: Glucose maintained within target range  Description: INTERVENTIONS:  - Monitor Blood Glucose as ordered  - Assess for signs and symptoms of hyperglycemia and hypoglycemia  - Administer ordered medications to maintain glucose within target range  - Assess nutritional intake and initiate nutrition service referral as needed  Outcome: Progressing

## 2023-02-25 NOTE — ED PROVIDER NOTES
History  Chief Complaint   Patient presents with   • Abnormal Lab     Pt sent to ED by PCP for elevated potassium 6 3     The patient is a 69-year-old male with history of CKD, DM, hypertension, morbid obesity who presents to the ED sent from his PCP after he was noted to have a potassium of 6 3 outpatient  Patient's nephrologist was also notified  PCP had prescribed patient kayexalate 30 grams and held his ACEI  The patient states that he went to 2 separate pharmacies but was unable to obtain any Kayexalate  He then contacted his PCP, who instructed him to go to the ED  He denies having any complaints, or symptoms including chest pain, dyspnea, palpitations, nausea, vomiting, diarrhea, abdominal pain  Prior to Admission Medications   Prescriptions Last Dose Informant Patient Reported? Taking?    Cholecalciferol (VITAMIN D3) 5000 units TABS 2/24/2023  Yes Yes   Sig: Take 1 tablet by mouth daily   NIFEdipine (PROCARDIA XL) 60 mg 24 hr tablet 2/24/2023  No Yes   Sig: Take 1 tablet (60 mg total) by mouth daily   NIFEdipine ER (ADALAT CC) 60 MG 24 hr tablet   No No   Sig: TAKE ONE TABLET BY MOUTH EVERY DAY   Patient not taking: Reported on 1/25/2023   Omega-3 Fatty Acids (FISH OIL) 1200 MG CAPS 2/24/2023  Yes Yes   Sig: Take 1 capsule by mouth 3 (three) times a day   allopurinol (ZYLOPRIM) 300 mg tablet 2/24/2023  No Yes   Sig: TAKE ONE TABLET BY MOUTH EVERY DAY   aspirin 81 MG tablet 2/24/2023  Yes Yes   Sig: Take 81 mg by mouth daily   lisinopril-hydrochlorothiazide (PRINZIDE,ZESTORETIC) 20-12 5 MG per tablet 2/24/2023  No Yes   Sig: TAKE TWO TABLETS BY MOUTH EVERY DAY   sodium polystyrene (KAYEXALATE) powder   No No   Sig: Take 30 g by mouth once for 1 dose      Facility-Administered Medications: None       Past Medical History:   Diagnosis Date   • Chronic kidney disease    • CPAP (continuous positive airway pressure) dependence    • Gout    • Hearing aid worn     sabrina   • Hernia, inguinal     RIH and umbilical hernia   • Hypertension    • Morbidly obese (HCC)    • Sleep apnea    • Wears glasses        Past Surgical History:   Procedure Laterality Date   • CARPAL TUNNEL RELEASE Bilateral    • COLONOSCOPY N/A 2017    Procedure: COLONOSCOPY;  Surgeon: Becca Harris MD;  Location: [facility]; Service:    • CYST REMOVAL     • FOOT SURGERY     • HERNIA REPAIR     • JOINT REPLACEMENT      knee left    • IN LAPAROSCOPY SURG RPR INITIAL INGUINAL HERNIA Right 3/16/2017    Procedure: REPAIR HERNIA INGUINAL, LAPAROSCOPIC WITH MESH;  Surgeon: Tawana Kyle MD;  Location: [facility];  Service: General   • IN RPR UMBILICAL HRNA 5 YRS/> REDUCIBLE N/A 3/16/2017    Procedure: OPEN REPAIR HERNIA UMBILICAL;  Surgeon: Tawana Kyle MD;  Location: [facility];  Service: General   • TONSILLECTOMY         Family History   Problem Relation Age of Onset   • No Known Problems Mother    • Diabetes Father    • Diabetes Sister      I have reviewed and agree with the history as documented  E-Cigarette/Vaping   • E-Cigarette Use Never User      E-Cigarette/Vaping Substances   • Nicotine No    • THC No    • CBD No    • Flavoring No    • Other No    • Unknown No      Social History     Tobacco Use   • Smoking status: Former     Types: Cigarettes     Quit date: 3/10/2003     Years since quittin 9   • Smokeless tobacco: Never   Vaping Use   • Vaping Use: Never used   Substance Use Topics   • Alcohol use: No   • Drug use: No       Review of Systems   Constitutional: Negative for chills and fever  HENT: Negative for congestion and rhinorrhea  Respiratory: Negative for cough and shortness of breath  Cardiovascular: Negative for chest pain and leg swelling  Gastrointestinal: Negative for abdominal pain, constipation, diarrhea, nausea and vomiting  Genitourinary: Negative for dysuria and flank pain  Musculoskeletal: Negative for arthralgias and myalgias  Skin: Negative for rash and wound     Neurological: Negative for dizziness, weakness, numbness and headaches  Psychiatric/Behavioral: Negative for behavioral problems  Physical Exam  Physical Exam  Vitals and nursing note reviewed  Constitutional:       General: He is not in acute distress  Appearance: He is well-developed  He is not ill-appearing or toxic-appearing  HENT:      Head: Normocephalic and atraumatic  Mouth/Throat:      Mouth: Mucous membranes are moist    Eyes:      Conjunctiva/sclera: Conjunctivae normal    Cardiovascular:      Rate and Rhythm: Normal rate and regular rhythm  Pulses: Normal pulses  Heart sounds: Normal heart sounds  No murmur heard  Pulmonary:      Effort: Pulmonary effort is normal  No respiratory distress  Breath sounds: Normal breath sounds  No wheezing, rhonchi or rales  Abdominal:      Palpations: Abdomen is soft  Tenderness: There is no abdominal tenderness  Musculoskeletal:         General: No swelling  Cervical back: Neck supple  Skin:     General: Skin is warm and dry  Capillary Refill: Capillary refill takes less than 2 seconds  Neurological:      Mental Status: He is alert     Psychiatric:         Mood and Affect: Mood normal          Vital Signs  ED Triage Vitals   Temperature Pulse Respirations Blood Pressure SpO2   02/24/23 2100 02/24/23 2100 02/24/23 2100 02/24/23 2100 02/24/23 2100   98 4 °F (36 9 °C) 96 18 (!) 193/75 99 %      Temp Source Heart Rate Source Patient Position - Orthostatic VS BP Location FiO2 (%)   02/24/23 2100 02/24/23 2300 02/24/23 2300 02/24/23 2300 --   Oral Monitor Lying Right arm       Pain Score       02/24/23 2100       No Pain           Vitals:    02/24/23 2100 02/24/23 2300 02/25/23 0000 02/25/23 0158   BP: (!) 193/75 143/63 141/65 154/73   Pulse: 96 86 97 99   Patient Position - Orthostatic VS:  Lying Lying          Visual Acuity      ED Medications  Medications   sodium polystyrene (KAYEXALATE) powder 15 g (15 g Oral Given 2/24/23 2350)   albuterol inhalation solution 10 mg (10 mg Nebulization Given 2/24/23 2346)   sodium bicarbonate 8 4 % injection 50 mEq (50 mEq Intravenous Given 2/24/23 2346)   insulin regular (HumuLIN R,NovoLIN R) injection 10 Units (10 Units Intravenous Given 2/24/23 2346)   dextrose 50 % IV solution 50 mL (50 mL Intravenous Given 2/24/23 2346)   furosemide (LASIX) injection 20 mg (20 mg Intravenous Given 2/24/23 2346)       Diagnostic Studies  Results Reviewed     Procedure Component Value Units Date/Time    Basic metabolic panel [[id number]]  (Abnormal) Collected: 02/24/23 2257    Lab Status: Final result Specimen: Blood from Arm, Right Updated: 02/24/23 2323     Sodium 133 mmol/L      Potassium 6 8 mmol/L      Chloride 111 mmol/L      CO2 14 mmol/L      ANION GAP 8 mmol/L      BUN 70 mg/dL      Creatinine 2 18 mg/dL      Glucose 120 mg/dL      Calcium 9 9 mg/dL      eGFR 27 ml/min/1 73sq m     Narrative:      [place] guidelines for Chronic Kidney Disease (CKD):   •  Stage 1 with normal or high GFR (GFR > 90 mL/min/1 73 square meters)  •  Stage 2 Mild CKD (GFR = 60-89 mL/min/1 73 square meters)  •  Stage 3A Moderate CKD (GFR = 45-59 mL/min/1 73 square meters)  •  Stage 3B Moderate CKD (GFR = 30-44 mL/min/1 73 square meters)  •  Stage 4 Severe CKD (GFR = 15-29 mL/min/1 73 square meters)  •  Stage 5 End Stage CKD (GFR <15 mL/min/1 73 square meters)  Note: GFR calculation is accurate only with a steady state creatinine    CBC and differential [[id number]]  (Abnormal) Collected: 02/24/23 2257    Lab Status: Final result Specimen: Blood from Arm, Right Updated: 02/24/23 2311     WBC 9 63 Thousand/uL      RBC 3 14 Million/uL      Hemoglobin 11 0 g/dL      Hematocrit 35 4 %       fL      MCH 35 0 pg      MCHC 31 1 g/dL      RDW 13 2 %      MPV 11 7 fL      Platelets 362 Thousands/uL      nRBC 0 /100 WBCs      Neutrophils Relative 61 %      Immat GRANS % 2 %      Lymphocytes Relative 23 %      Monocytes Relative 11 %      Eosinophils Relative 2 %      Basophils Relative 1 %      Neutrophils Absolute 5 96 Thousands/µL      Immature Grans Absolute 0 14 Thousand/uL      Lymphocytes Absolute 2 25 Thousands/µL      Monocytes Absolute 1 01 Thousand/µL      Eosinophils Absolute 0 19 Thousand/µL      Basophils Absolute 0 08 Thousands/µL                  No orders to display              Procedures  Procedures         ED Course  ED Course as of 02/25/23 0332   Sat Feb 25, 2023   0000 EKG: NSR at 91 BPM, , Qtc 383, no peaked T waves, normal axis, no ST elevation or depression, as interpreted by me    0012 Potassium(!!): 6 8   0012 Creatinine(!): 2 18       SBIRT 22yo+    Flowsheet Row Most Recent Value   SBIRT (25 yo +)    In order to provide better care to our patients, we are screening all of our patients for alcohol and drug use  Would it be okay to ask you these screening questions? Yes Filed at: 02/24/2023 2316   Initial Alcohol Screen: US AUDIT-C     1  How often do you have a drink containing alcohol? 0 Filed at: 02/24/2023 2316   2  How many drinks containing alcohol do you have on a typical day you are drinking? 0 Filed at: 02/24/2023 2316   3b  FEMALE Any Age, or MALE 65+: How often do you have 4 or more drinks on one occassion? 0 Filed at: 02/24/2023 2316   Audit-C Score 0 Filed at: 02/24/2023 2316   [place]: How many times in the past year have you    Used an illegal drug or used a prescription medication for non-medical reasons? Never Filed at: 02/24/2023 2316          Medical Decision Making  The patient is a 76-year-old male who presents to the ED for evaluation of hyperkalemia noted outpatient  On exam, patient is in no acute distress  Patient mildly hypertensive, vitals otherwise on remarkable  Heart rhythm and rate regular  Lungs clear to auscultation bilaterally  Will obtain EKG to evaluate for peaked T waves  Will obtain CBC, BMP     K 6 8  EKG with no evidence of peaked T waves   Cr unchanged from previous, however increased compared to 6 months ago  Will give Albuterol, Insulin, dextrose, bicarb, kayexalate, lasix  At the time of admission, the patient is in no acute distress  I discussed with the patient and family the diagnosis, treatment plan, and plan for admission; they were given the opportunity to ask questions and verbalized understanding  They agree with plan  Hyperkalemia: complicated acute illness or injury  Amount and/or Complexity of Data Reviewed  External Data Reviewed: labs, ECG and notes  Labs: ordered  Decision-making details documented in ED Course  ECG/medicine tests: ordered and independent interpretation performed  Decision-making details documented in ED Course  Risk  OTC drugs  Prescription drug management  Decision regarding hospitalization            Disposition  Final diagnoses:   Hyperkalemia     Time reflects when diagnosis was documented in both MDM as applicable and the Disposition within this note     Time User Action Codes Description Comment    2/25/2023 12:44 AM Jorge Niño Add [E87 5] Hyperkalemia       ED Disposition     ED Disposition   Admit    Condition   Stable    Date/Time   Sat Feb 25, 2023 0044    Comment   Case was discussed with BASILIA and the patient's admission status was agreed to be Admission Status: inpatient status to the service of Dr Augusto Nunez             Follow-up Information    None         Current Discharge Medication List      CONTINUE these medications which have NOT CHANGED    Details   allopurinol (ZYLOPRIM) 300 mg tablet TAKE ONE TABLET BY MOUTH EVERY DAY  Qty: 90 tablet, Refills: 2    Associated Diagnoses: Hyperuricemia      aspirin 81 MG tablet Take 81 mg by mouth daily      Cholecalciferol (VITAMIN D3) 5000 units TABS Take 1 tablet by mouth daily      lisinopril-hydrochlorothiazide (PRINZIDE,ZESTORETIC) 20-12 5 MG per tablet TAKE TWO TABLETS BY MOUTH EVERY DAY  Qty: 180 tablet, Refills: 2    Associated Diagnoses: Essential hypertension      NIFEdipine (PROCARDIA XL) 60 mg 24 hr tablet Take 1 tablet (60 mg total) by mouth daily  Qty: 90 tablet, Refills: 2    Associated Diagnoses: Essential hypertension      Omega-3 Fatty Acids (FISH OIL) 1200 MG CAPS Take 1 capsule by mouth 3 (three) times a day      NIFEdipine ER (ADALAT CC) 60 MG 24 hr tablet TAKE ONE TABLET BY MOUTH EVERY DAY  Qty: 90 tablet, Refills: 2    Associated Diagnoses: Essential hypertension         STOP taking these medications       sodium polystyrene (KAYEXALATE) powder Comments:   Reason for Stopping:               No discharge procedures on file      PDMP Review     None          ED Provider  Electronically Signed by           Guillermina Duffy PA-C  02/25/23 5380

## 2023-02-25 NOTE — CONSULTS
Consultation - Nephrology   Jaxon Newell 65 y o  male MRN: [id number]  Unit/Bed#: E5 -01 Encounter: [id number]      Assessment/Plan     Assessment / Plan:  1  Renal    The patient has chronic renal disease likely due to nephrosclerosis  Looking back in the record his baseline creatinine tended to run 1 5-1 8 and he has a history of an atrophic right kidney  As an outpatient he actually had urine protein estimation was was virtually negative done yesterday so again I am not suspicious of significant intrinsic process  He very recently was referred back to me for evaluation and labs I had to go on during my visit 1/25/2023 were from August of last year and creatinine was stable towards the higher end of his baseline range at 1 8  I would check with a repeat labs and when he had them done yesterday creatinine was 2 2 and he had significant hyperkalemia potassium was 6 3  He reports no issue with bladder emptying  He does report that he was eating a lot of pickles as he changed his diet recently  If he had issues with bladder emptying it could explain his increased creatinine and hyperkalemia as well  Monitor on isotonic fluids  Renal ultrasound  Checked bladder scan for PVR  He reports no issues with bladder emptying but if things do not improve we may need to consider another cause of the increased creatinine  2   Hyperkalemia    Patient's potassium as an outpatient was 6 3 this was attempted to be treated as an outpatient but the pharmacy did not have Kayexalate  He was also on an ACE inhibitor which is now held  When he came to the emergency room potassium was 6 8 and he was admitted  This morning it is 6 after some acute treatment  He is aware that pickles can be high in potassium  Continue to hold ACE inhibitor even on discharge  Kayexalate 15 g x 2 given and follow  Repeat potassium later today    3  Metabolic acidosis    Patient has nongap metabolic acidosis    Could be due to his acute renal insufficiency he also received sodium chloride containing fluids which diluted it further from 17 to 14 this morning  Also hyperkalemia suppresses ammonia production this can reduce excretion of acid by the kidney as the main way is through ammonium chloride  So hopefully lowering potassium will help with acid excretion as well  Treat hyperkalemia  Change IV fluids to half-normal saline +75 mill equivalents of sodium bicarb per liter at 100 cc/h  Monitor labs later today and tomorrow morning  History of Present Illness   Physician Requesting Consult: Kelly Infante DO  Reason for Consult / Principal Problem: Acute kidney injury on chronic kidney disease and hyperkalemia with metabolic acidosis  Hx and PE limited by:   HPI: Batool Glass is a 65y o  year old male who has a history of chronic kidney disease baseline creatinines been 1 5-1 8  He also has history of severely atrophic right kidney  He was referred back to me in late January for evaluation and his creatinine in August of last year was 1 8  He then had labs repeated yesterday after our visit in January and it revealed a creatinine increased to 2 2 with hyperkalemia potassium was 6 3  I was in contact with his family physician and the attempt was made to give him Kayexalate as an outpatient hold his ACE inhibitor but he said when he went to the pharmacy they did not have Kayexalate and he was advised to go to the emergency room  Were asked to see him regarding these issues  History obtained from chart review and the patient  Inpatient consult to Nephrology  Consult performed by: Kannan Mills MD  Consult ordered by: Humberto Evans PA-C          Review of Systems   Constitutional: Negative for appetite change, chills, diaphoresis, fatigue and fever  HENT: Negative  Eyes: Negative  Respiratory: Negative  Negative for cough, chest tightness, shortness of breath and wheezing  Cardiovascular: Negative  Negative for chest pain, palpitations and leg swelling  Gastrointestinal: Negative  Negative for abdominal pain, diarrhea, nausea and vomiting  Endocrine: Negative  Genitourinary: Negative for difficulty urinating, dysuria and flank pain  Feels he empties his bladder completely  Musculoskeletal: Negative  Skin: Negative  Negative for rash  Neurological: Negative for dizziness, syncope, light-headedness and headaches  Psychiatric/Behavioral: Negative  Negative for agitation, behavioral problems, confusion and decreased concentration  Historical Information   Patient Active Problem List   Diagnosis   • Abnormal serum glucose level   • Essential hypertriglyceridemia   • Gout   • KALIA (obstructive sleep apnea)   • Essential hypertension   • Vitamin D deficiency   • Knee pain   • Localized, primary osteoarthritis   • Osteoarthritis of knee   • Hyperuricemia   • Rectus diastasis   • Obesity, morbid (HCC)   • Calcification of liver   • CKD (chronic kidney disease)   • Acute hyperkalemia   • Acute renal failure superimposed on stage 3 chronic kidney disease (HCC)   • Microcytic anemia     Past Medical History:   Diagnosis Date   • Chronic kidney disease    • CPAP (continuous positive airway pressure) dependence    • Gout    • Hearing aid worn     sabrina   • Hernia, inguinal     RIH and umbilical hernia   • Hypertension    • Morbidly obese ([address] )    • Sleep apnea    • Wears glasses      Past Surgical History:   Procedure Laterality Date   • CARPAL TUNNEL RELEASE Bilateral    • COLONOSCOPY N/A 1/6/2017    Procedure: COLONOSCOPY;  Surgeon: Darene Schaumann, MD;  Location: [facility];   Service:    • CYST REMOVAL     • FOOT SURGERY     • HERNIA REPAIR     • JOINT REPLACEMENT      knee left    • [place] LAPAROSCOPY SURG RPR INITIAL INGUINAL HERNIA Right 3/16/2017    Procedure: REPAIR HERNIA INGUINAL, LAPAROSCOPIC WITH MESH;  Surgeon: Vlad Zacarias MD;  Location[facility];  Service: General   • [place] RPR UMBILICAL HRNA 5 YRS/> REDUCIBLE N/A 3/16/2017    Procedure: OPEN REPAIR HERNIA UMBILICAL;  Surgeon: Yoan Lynch MD;  Location[facility];  Service: General   • TONSILLECTOMY       Social History   Social History     Substance and Sexual Activity   Alcohol Use No     Social History     Substance and Sexual Activity   Drug Use No     Social History     Tobacco Use   Smoking Status Former   • Types: Cigarettes   • Quit date: 3/10/2003   • Years since quittin 9   Smokeless Tobacco Never     Family History   Problem Relation Age of Onset   • No Known Problems Mother    • Diabetes Father    • Diabetes Sister        Meds/Allergies   current meds:   Current Facility-Administered Medications   Medication Dose Route Frequency   • aspirin chewable tablet 81 mg  81 mg Oral Daily   • heparin (porcine) subcutaneous injection 5,000 Units  5,000 Units Subcutaneous Q8H [address]   • multi-electrolyte (PLASMALYTE-A/ISOLYTE-S PH 7 4) IV solution  75 mL/hr Intravenous Continuous   • NIFEdipine (PROCARDIA XL) 24 hr tablet 60 mg  60 mg Oral Daily   • ondansetron (ZOFRAN) injection 4 mg  4 mg Intravenous Q6H PRN     No Known Allergies    Objective     Intake/Output Summary (Last 24 hours) at 2023 1036  Last data filed at 2023 0907  Gross per 24 hour   Intake --   Output 475 ml   Net -475 ml     Body mass index is 42 07 kg/m²  Invasive Devices:        PHYSICAL EXAM:  /56 (BP Location: Left arm)   Pulse 85   Temp (!) 97 4 °F (36 3 °C) (Oral)   Resp 18   Wt 123 kg (271 lb 2 7 oz)   SpO2 96%   BMI 42 07 kg/m²     Physical Exam  Constitutional:       General: He is not in acute distress  Appearance: He is not ill-appearing or toxic-appearing  HENT:      Head: Normocephalic and atraumatic  Nose: Nose normal       Mouth/Throat:      Mouth: Mucous membranes are moist    Eyes:      General: No scleral icterus  Extraocular Movements: Extraocular movements intact     Cardiovascular:      Rate and Rhythm: Normal rate and regular rhythm  Heart sounds: No friction rub  No gallop  Comments: No significant edema  Pulmonary:      Effort: Pulmonary effort is normal  No respiratory distress  Breath sounds: No wheezing, rhonchi or rales  Abdominal:      General: Bowel sounds are normal  There is no distension  Palpations: Abdomen is soft  Tenderness: There is no abdominal tenderness  There is no rebound  Musculoskeletal:      Cervical back: Normal range of motion and neck supple  Skin:     General: Skin is warm and dry  Neurological:      General: No focal deficit present  Mental Status: He is alert and oriented to person, place, and time  Mental status is at baseline  Psychiatric:         Mood and Affect: Mood normal          Behavior: Behavior normal          Thought Content: Thought content normal          Judgment: Judgment normal            Current Weight: Weight - Scale: 123 kg (271 lb 2 7 oz)  First Weight: Weight - Scale: 123 kg (271 lb 2 7 oz)    Lab Results:    Results from last 7 days   Lab Units 02/25/23  0526   WBC Thousand/uL 9 15   HEMOGLOBIN g/dL 10 3*   HEMATOCRIT % 32 0*   PLATELETS Thousands/uL 116*     Results from last 7 days   Lab Units 02/25/23  0526   POTASSIUM mmol/L 6 0*   CHLORIDE mmol/L 113*   CO2 mmol/L 14*   BUN mg/dL 68*   CREATININE mg/dL 2 12*   CALCIUM mg/dL 9 4     Results from last 7 days   Lab Units 02/25/23  0526 02/24/23  2257 02/24/23  0948   POTASSIUM mmol/L 6 0*   < > 6 3*   CHLORIDE mmol/L 113*   < > 116*   CO2 mmol/L 14*   < > 17*   BUN mg/dL 68*   < > 64*   CREATININE mg/dL 2 12*   < > 2 21*   CALCIUM mg/dL 9 4   < > 10 0   ALK PHOS U/L  --   --  88   ALT U/L  --   --  37   AST U/L  --   --  23    < > = values in this interval not displayed

## 2023-02-25 NOTE — TELEPHONE ENCOUNTER
Regarding: medication issue at pharmacy  ----- Message from Hailey Reyna sent at 2/24/2023  6:49 PM EST -----  " My pharmacy does not have my new sodium polystyrene (KAYEXALATE) powder, medication in stock   Can an alternative be sent in?"

## 2023-02-25 NOTE — TELEPHONE ENCOUNTER
Answer Assessment - Initial Assessment Questions  1  NAME of MEDICATION: "What medicine are you calling about?"      kayexalate  2  QUESTION: "What is your question?" (e g , medication refill, side effect)      It is not at pharmacy  3  PRESCRIBING HCP: "Who prescribed it?" Reason: if prescribed by specialist, call should be referred to that group  PCP    Patient states the [facility] and was told they will not have the medication till Monday      Protocols used: MEDICATION QUESTION CALL-ADULT-

## 2023-02-25 NOTE — H&P
[facility]  H&P- Guerda Ford 1945, 65 y o  male MRN: [id number]  Unit/Bed#: E5 -01 Encounter: [id number]  Primary Care Provider: Regina Thompson DO   Date and time admitted to hospital: 2/24/2023 10:21 PM    Microcytic anemia  Assessment & Plan  Mild but mcv in 70s w/significant azotemia  Check hemoccult, iron panel  Monitor stools while on kayexalate    Acute renal failure superimposed on stage 3 chronic kidney disease [facility])  Assessment & Plan  Lab Results   Component Value Date    EGFR 27 02/24/2023    EGFR 27 02/24/2023    EGFR 33 08/19/2022    CREATININE 2 18 (H) 02/24/2023    CREATININE 2 21 (H) 02/24/2023    CREATININE 1 88 (H) 08/19/2022   baseline remotely closer to 1 8 now 2 2 in setting of lisinopril/hctz use  ivf repeat bmp in am    Essential hypertension  Assessment & Plan  Hold lisinopril/hctz    * Acute hyperkalemia  Assessment & Plan  Asymptomatic likely in the setting of lisinopril use progressive renal disease and Pickle intake  -Repeat potassium demonstrates potassium of 6 8 in the setting of progressive renal disease with low bicarb  -EKG without any T wave abnormality or QRS widening  -Status post albuterol bicarbonate insulin glucose with IV Lasix 20 mg and Kayexalate 15 g in ED  -We will repeat BMP consult nephrology, start isolyte, however if repeat bicarbonate still low likely will need bicarb gtt      VTE Prophylaxis: Heparin  / sequential compression device   Code Status: fc  POLST: There is no POLST form on file for this patient (pre-hospital)  Discussion with family:     Anticipated Length of Stay:  Patient will be admitted on an Inpatient basis with an anticipated length of stay of  Greater than 2 midnights  Justification for Hospital Stay: hyperkalemia    Total Time for Visit, including Counseling / Coordination of Care: 45 minutes  Greater than 50% of this total time spent on direct patient counseling and coordination of care      Chief Complaint:   Abn lab value    History of Present Illness: Kathryn Gomez is a 65 y o  male who presents with PMH of CKD stage III, hypertension, obesity with BMI greater than 40 coming to the hospital for abnormal lab value  Patient is hard of hearing  He does have hearing aids  HPI constructed by discussion with patient and review of EMR  Patient was sent in for outpatient lab work where he was found to be hyperkalemic  He had seen nephrology in January 2023 and recommended for outpatient BMP for follow-up given his creatinine of 1 8 in the fall was felt that he likely had progressive disease but wanted to evaluate his creatinine given his diuretic use and ACE inhibitor use  He had an upcoming visit with his family doctor the week or so after day of admission and patient was called due to abnormal lab value by his PCP  He was found to have hyperkalemia with a creatinine of 2 2 and a potassium of 6 3  Patient was asymptomatic without any chest pain shortness of breath palpitations lightheadedness or dizziness  He notes that he has been taking his lisinopril HCTZ daily as well as eating a pickle every single day and attempt to decrease his caloric intake and help maintain his sugar levels to impress his PCP  He was given a prescription for Kayexalate but not the pharmacy that he went to had a and he called his PCPs office who after trying to coordinate his care recommended going to the ER for evaluation where his creatinine creatinine was stable but he was found to have a potassium of 6 8 and admission was requested       Review of Systems:    Review of Systems   Constitutional: Negative for chills and fever  Respiratory: Negative for shortness of breath  Cardiovascular: Negative for chest pain  Gastrointestinal: Negative for abdominal pain, nausea and vomiting  Neurological: Negative for light-headedness  All other systems reviewed and are negative        Past Medical and Surgical History: Past Medical History:   Diagnosis Date   • Chronic kidney disease    • CPAP (continuous positive airway pressure) dependence    • Gout    • Hearing aid worn     sabrina   • Hernia, inguinal     RIH and umbilical hernia   • Hypertension    • Morbidly obese (HCC)    • Sleep apnea    • Wears glasses        Past Surgical History:   Procedure Laterality Date   • CARPAL TUNNEL RELEASE Bilateral    • COLONOSCOPY N/A 1/6/2017    Procedure: COLONOSCOPY;  Surgeon: Sharyle Passy, MD;  Location: [facility]; Service:    • CYST REMOVAL     • FOOT SURGERY     • HERNIA REPAIR     • JOINT REPLACEMENT      knee left    • OH LAPAROSCOPY SURG RPR INITIAL INGUINAL HERNIA Right 3/16/2017    Procedure: REPAIR HERNIA INGUINAL, LAPAROSCOPIC WITH MESH;  Surgeon: Sean Brasher MD;  Location: [facility];  Service: General   • OH RPR UMBILICAL HRNA 5 YRS/> REDUCIBLE N/A 3/16/2017    Procedure: OPEN REPAIR HERNIA UMBILICAL;  Surgeon: Sean Brasher MD;  Location: [facility];  Service: General   • TONSILLECTOMY         Meds/Allergies:    Prior to Admission medications    Medication Sig Start Date End Date Taking?  Authorizing Provider   allopurinol (ZYLOPRIM) 300 mg tablet TAKE ONE TABLET BY MOUTH EVERY DAY 6/26/22  Yes Vane Patel DO   aspirin 81 MG tablet Take 81 mg by mouth daily   Yes Historical Provider, MD   Cholecalciferol (VITAMIN D3) 5000 units TABS Take 1 tablet by mouth daily   Yes Historical Provider, MD   lisinopril-hydrochlorothiazide (PRINZIDE,ZESTORETIC) 20-12 5 MG per tablet TAKE TWO TABLETS BY MOUTH EVERY DAY 6/26/22  Yes Vane Patel DO   NIFEdipine (PROCARDIA XL) 60 mg 24 hr tablet Take 1 tablet (60 mg total) by mouth daily 2/5/21  Yes Vane Patel DO   Omega-3 Fatty Acids (FISH OIL) 1200 MG CAPS Take 1 capsule by mouth 3 (three) times a day   Yes Historical Provider, MD   NIFEdipine ER (ADALAT CC) 60 MG 24 hr tablet TAKE ONE TABLET BY MOUTH EVERY DAY  Patient not taking: Reported on 1/25/2023 6/26/22   Salena Pacheco Walter Evans DO   sodium polystyrene (KAYEXALATE) powder Take 30 g by mouth once for 1 dose 23  Yudith Rendon DO     I have reviewed home medications with patient personally  Allergies: No Known Allergies    Social History:     Marital Status: /Civil Union   Occupation:   Patient Pre-hospital Living Situation:   Patient Pre-hospital Level of Mobility:   Patient Pre-hospital Diet Restrictions:   Substance Use History:   Social History     Substance and Sexual Activity   Alcohol Use No     Social History     Tobacco Use   Smoking Status Former   • Types: Cigarettes   • Quit date: 3/10/2003   • Years since quittin 9   Smokeless Tobacco Never     Social History     Substance and Sexual Activity   Drug Use No       Family History:     Family History   Problem Relation Age of Onset   • No Known Problems Mother    • Diabetes Father    • Diabetes Sister        Physical Exam:     Vitals:   Blood Pressure: 154/73 (23)  Pulse: 99 (23)  Temperature: 97 5 °F (36 4 °C) (23)  Temp Source: Oral (23)  Respirations: 18 (23)  Weight - Scale: 123 kg (271 lb 2 7 oz) (23)  SpO2: 93 % (23)    Physical Exam  Vitals reviewed  Constitutional:       General: He is not in acute distress  Appearance: He is obese  He is not ill-appearing, toxic-appearing or diaphoretic  HENT:      Head: Normocephalic and atraumatic  Right Ear: External ear normal       Left Ear: External ear normal       Nose: Nose normal    Eyes:      Extraocular Movements: Extraocular movements intact  Cardiovascular:      Rate and Rhythm: Normal rate and regular rhythm  Heart sounds: No murmur heard  No friction rub  No gallop  Pulmonary:      Effort: No respiratory distress  Breath sounds: No wheezing, rhonchi or rales  Abdominal:      General: There is no distension  Palpations: Abdomen is soft  There is no mass        Tenderness: There is no abdominal tenderness  There is no guarding or rebound  Hernia: No hernia is present  Musculoskeletal:      Right lower leg: No edema  Left lower leg: No edema  Skin:     General: Skin is warm and dry  Neurological:      Mental Status: He is alert  Mental status is at baseline  Psychiatric:         Mood and Affect: Mood normal          Additional Data:     Lab Results: I have personally reviewed pertinent reports  Results from last 7 days   Lab Units 02/25/23  0526   WBC Thousand/uL 9 15   HEMOGLOBIN g/dL 10 3*   HEMATOCRIT % 32 0*   PLATELETS Thousands/uL 116*   NEUTROS PCT % 55   LYMPHS PCT % 29   MONOS PCT % 12   EOS PCT % 2     Results from last 7 days   Lab Units 02/24/23  2257 02/24/23  0948   SODIUM mmol/L 133* 137   POTASSIUM mmol/L 6 8* 6 3*   CHLORIDE mmol/L 111* 116*   CO2 mmol/L 14* 17*   BUN mg/dL 70* 64*   CREATININE mg/dL 2 18* 2 21*   ANION GAP mmol/L 8 4   CALCIUM mg/dL 9 9 10 0   ALBUMIN g/dL  --  3 4*   TOTAL BILIRUBIN mg/dL  --  0 24   ALK PHOS U/L  --  88   ALT U/L  --  37   AST U/L  --  23   GLUCOSE RANDOM mg/dL 120  --              Results from last 7 days   Lab Units 02/24/23  0948   HEMOGLOBIN A1C % 6 0*           Imaging:     No orders to display       EKG, Pathology, and Other Studies Reviewed on Admission:   · EKG:     Allscripts / Epic Records Reviewed: Yes     ** Please Note: This note has been constructed using a voice recognition system   **

## 2023-02-25 NOTE — PLAN OF CARE
Problem: SAFETY ADULT  Goal: Patient will remain free of falls  Description: INTERVENTIONS:  - Educate patient/family on patient safety including physical limitations  - Instruct patient to call for assistance with activity   - Consult OT/PT to assist with strengthening/mobility   - Keep Call bell within reach  - Keep bed low and locked with side rails adjusted as appropriate  - Keep care items and personal belongings within reach  - Initiate and maintain comfort rounds  - Make Fall Risk Sign visible to staff  - Offer Toileting every 2 Hours, in advance of need  - Initiate/Maintain bed alarm (refused)  - Obtain necessary fall risk management equipment: yellow socks/yellow bracelet  - Apply yellow socks and bracelet for high fall risk patients  - Consider moving patient to room near nurses station  Outcome: Progressing  Goal: Maintain or return to baseline ADL function  Description: INTERVENTIONS:  -  Assess patient's ability to carry out ADLs; assess patient's baseline for ADL function and identify physical deficits which impact ability to perform ADLs (bathing, care of mouth/teeth, toileting, grooming, dressing, etc )  - Assess/evaluate cause of self-care deficits   - Assess range of motion  - Assess patient's mobility; develop plan if impaired  - Assess patient's need for assistive devices and provide as appropriate  - Encourage maximum independence but intervene and supervise when necessary  - Involve family in performance of ADLs  - Assess for home care needs following discharge   - Consider OT consult to assist with ADL evaluation and planning for discharge  - Provide patient education as appropriate  Outcome: Progressing  Goal: Maintains/Returns to pre admission functional level  Description: INTERVENTIONS:  - Perform BMAT or MOVE assessment daily    - Set and communicate daily mobility goal to care team and patient/family/caregiver     - Collaborate with rehabilitation services on mobility goals if consulted  - Perform Range of Motion 3 times a day  - Reposition patient every 2 hours    - Dangle patient 3 times a day  - Stand patient 3 times a day  - Ambulate patient 3 times a day  - Out of bed to chair 3 times a day   - Out of bed for meals 3 times a day  - Out of bed for toileting  - Record patient progress and toleration of activity level   Outcome: Progressing     Problem: DISCHARGE PLANNING  Goal: Discharge to home or other facility with appropriate resources  Description: INTERVENTIONS:  - Identify barriers to discharge w/patient and caregiver  - Arrange for needed discharge resources and transportation as appropriate  - Identify discharge learning needs (meds, wound care, etc )  - Arrange for interpretive services to assist at discharge as needed  - Refer to Case Management Department for coordinating discharge planning if the patient needs post-hospital services based on physician/advanced practitioner order or complex needs related to functional status, cognitive ability, or social support system  Outcome: Progressing     Problem: CARDIOVASCULAR - ADULT  Goal: Maintains optimal cardiac output and hemodynamic stability  Description: INTERVENTIONS:  - Monitor I/O, vital signs and rhythm  - Monitor for S/S and trends of decreased cardiac output  - Administer and titrate ordered vasoactive medications to optimize hemodynamic stability  - Assess quality of pulses, skin color and temperature  - Assess for signs of decreased coronary artery perfusion  - Instruct patient to report change in severity of symptoms  Outcome: Progressing  Goal: Absence of cardiac dysrhythmias or at baseline rhythm  Description: INTERVENTIONS:  - Continuous cardiac monitoring, vital signs, obtain 12 lead EKG if ordered  - Administer antiarrhythmic and heart rate control medications as ordered  - Monitor electrolytes and administer replacement therapy as ordered  Outcome: Progressing     Problem: METABOLIC, FLUID AND ELECTROLYTES - ADULT  Goal: Electrolytes maintained within normal limits  Description: INTERVENTIONS:  - Monitor labs and assess patient for signs and symptoms of electrolyte imbalances  - Administer electrolyte replacement as ordered  - Monitor response to electrolyte replacements, including repeat lab results as appropriate  - Instruct patient on fluid and nutrition as appropriate  Outcome: Progressing  Goal: Fluid balance maintained  Description: INTERVENTIONS:  - Monitor labs   - Monitor I/O and WT  - Instruct patient on fluid and nutrition as appropriate  - Assess for signs & symptoms of volume excess or deficit  Outcome: Progressing  Goal: Glucose maintained within target range  Description: INTERVENTIONS:  - Monitor Blood Glucose as ordered  - Assess for signs and symptoms of hyperglycemia and hypoglycemia  - Administer ordered medications to maintain glucose within target range  - Assess nutritional intake and initiate nutrition service referral as needed  Outcome: Progressing

## 2023-02-25 NOTE — ASSESSMENT & PLAN NOTE
Asymptomatic likely in the setting of lisinopril use progressive renal disease and Pickle intake  -Repeat potassium demonstrates potassium of 6 8 in the setting of progressive renal disease with low bicarb  -EKG without any T wave abnormality or QRS widening  -Status post albuterol bicarbonate insulin glucose with IV Lasix 20 mg and Kayexalate 15 g in ED  -We will repeat BMP consult nephrology, start isolyte, however if repeat bicarbonate still low likely will need bicarb gtt

## 2023-02-26 VITALS
WEIGHT: 271.17 LBS | TEMPERATURE: 97.7 F | DIASTOLIC BLOOD PRESSURE: 81 MMHG | BODY MASS INDEX: 42.07 KG/M2 | SYSTOLIC BLOOD PRESSURE: 95 MMHG | HEART RATE: 81 BPM | OXYGEN SATURATION: 99 % | RESPIRATION RATE: 16 BRPM

## 2023-02-26 LAB
ANION GAP SERPL CALCULATED.3IONS-SCNC: 5 MMOL/L (ref 4–13)
BUN SERPL-MCNC: 57 MG/DL (ref 5–25)
CALCIUM SERPL-MCNC: 9.2 MG/DL (ref 8.4–10.2)
CHLORIDE SERPL-SCNC: 111 MMOL/L (ref 96–108)
CO2 SERPL-SCNC: 20 MMOL/L (ref 21–32)
CREAT SERPL-MCNC: 1.73 MG/DL (ref 0.6–1.3)
GFR SERPL CREATININE-BSD FRML MDRD: 36 ML/MIN/1.73SQ M
GLUCOSE SERPL-MCNC: 89 MG/DL (ref 65–140)
POTASSIUM SERPL-SCNC: 5.6 MMOL/L (ref 3.5–5.3)
SODIUM SERPL-SCNC: 136 MMOL/L (ref 135–147)

## 2023-02-26 RX ORDER — FLUDROCORTISONE ACETATE 0.1 MG/1
0.1 TABLET ORAL ONCE
Status: COMPLETED | OUTPATIENT
Start: 2023-02-26 | End: 2023-02-26

## 2023-02-26 RX ADMIN — FLUDROCORTISONE ACETATE 0.1 MG: 0.1 TABLET ORAL at 11:14

## 2023-02-26 RX ADMIN — ASPIRIN 81 MG: 81 TABLET, CHEWABLE ORAL at 08:49

## 2023-02-26 NOTE — PLAN OF CARE
Problem: SAFETY ADULT  Goal: Patient will remain free of falls  Description: INTERVENTIONS:  - Educate patient/family on patient safety including physical limitations  - Instruct patient to call for assistance with activity   - Consult OT/PT to assist with strengthening/mobility   - Keep Call bell within reach  - Keep bed low and locked with side rails adjusted as appropriate  - Keep care items and personal belongings within reach  - Initiate and maintain comfort rounds  - Make Fall Risk Sign visible to staff  - Offer Toileting every 2 Hours, in advance of need  - Apply yellow socks and bracelet for high fall risk patients  - Consider moving patient to room near nurses station  Outcome: Progressing  Goal: Maintain or return to baseline ADL function  Description: INTERVENTIONS:  -  Assess patient's ability to carry out ADLs; assess patient's baseline for ADL function and identify physical deficits which impact ability to perform ADLs (bathing, care of mouth/teeth, toileting, grooming, dressing, etc )  - Assess/evaluate cause of self-care deficits   - Assess range of motion  - Assess patient's mobility; develop plan if impaired  - Assess patient's need for assistive devices and provide as appropriate  - Encourage maximum independence but intervene and supervise when necessary  - Involve family in performance of ADLs  - Assess for home care needs following discharge   - Consider OT consult to assist with ADL evaluation and planning for discharge  - Provide patient education as appropriate  Outcome: Progressing  Goal: Maintains/Returns to pre admission functional level  Description: INTERVENTIONS:  - Perform BMAT or MOVE assessment daily    - Set and communicate daily mobility goal to care team and patient/family/caregiver  - Collaborate with rehabilitation services on mobility goals if consulted  - Perform Range of Motion 3 times a day  - Reposition patient every 2 hours    - Dangle patient 3 times a day  - Stand patient 3 times a day  - Ambulate patient 3 times a day  - Out of bed to chair 3 times a day   - Out of bed for meals 3 times a day  - Out of bed for toileting  - Record patient progress and toleration of activity level   Outcome: Progressing     Problem: DISCHARGE PLANNING  Goal: Discharge to home or other facility with appropriate resources  Description: INTERVENTIONS:  - Identify barriers to discharge w/patient and caregiver  - Arrange for needed discharge resources and transportation as appropriate  - Identify discharge learning needs (meds, wound care, etc )  - Arrange for interpretive services to assist at discharge as needed  - Refer to Case Management Department for coordinating discharge planning if the patient needs post-hospital services based on physician/advanced practitioner order or complex needs related to functional status, cognitive ability, or social support system  Outcome: Progressing     Problem: CARDIOVASCULAR - ADULT  Goal: Maintains optimal cardiac output and hemodynamic stability  Description: INTERVENTIONS:  - Monitor I/O, vital signs and rhythm  - Monitor for S/S and trends of decreased cardiac output  - Administer and titrate ordered vasoactive medications to optimize hemodynamic stability  - Assess quality of pulses, skin color and temperature  - Assess for signs of decreased coronary artery perfusion  - Instruct patient to report change in severity of symptoms  Outcome: Progressing  Goal: Absence of cardiac dysrhythmias or at baseline rhythm  Description: INTERVENTIONS:  - Continuous cardiac monitoring, vital signs, obtain 12 lead EKG if ordered  - Administer antiarrhythmic and heart rate control medications as ordered  - Monitor electrolytes and administer replacement therapy as ordered  Outcome: Progressing     Problem: METABOLIC, FLUID AND ELECTROLYTES - ADULT  Goal: Electrolytes maintained within normal limits  Description: INTERVENTIONS:  - Monitor labs and assess patient for signs and symptoms of electrolyte imbalances  - Administer electrolyte replacement as ordered  - Monitor response to electrolyte replacements, including repeat lab results as appropriate  - Instruct patient on fluid and nutrition as appropriate  Outcome: Progressing  Goal: Fluid balance maintained  Description: INTERVENTIONS:  - Monitor labs   - Monitor I/O and WT  - Instruct patient on fluid and nutrition as appropriate  - Assess for signs & symptoms of volume excess or deficit  Outcome: Progressing  Goal: Glucose maintained within target range  Description: INTERVENTIONS:  - Monitor Blood Glucose as ordered  - Assess for signs and symptoms of hyperglycemia and hypoglycemia  - Administer ordered medications to maintain glucose within target range  - Assess nutritional intake and initiate nutrition service referral as needed  Outcome: Progressing

## 2023-02-26 NOTE — DISCHARGE SUMMARY
[address] [facility]  [facility] 1945[facility] 65 [facility]  [facility] MRN: [id number]  Unit/Bed#: E5 -01 Encounter: [id number]  Primary Care Provider: Toni Mcmahon DO   Date and time admitted to hospital: 2/24/2023 10:21 PM    Admitting Provider:  No admitting provider for patient encounter  Discharge Provider:  Oseas Ruff DO  Admission Date: 2/24/2023       Discharge Date: 02/26/23   LOS: 1  Primary Care Physician at Discharge: Toni Mcmahon [phone]    DISCHARGE DIAGNOSES  * Acute hyperkalemia  Assessment & Plan  · Presentation for abnormal labs found to have hyperkalemia and kidney injury  · Was seen by nephrology  Patient had increased pickle intake and attempt to better control his hyperglycemia  He was also on lisinopril  · Potassium improved with hyperkalemia cocktail and kayexalate  · Cleared by nephrology to be discharged to stay off lisinopril/HCT and avoid potassium rich foods    Results from last 7 days   Lab Units 02/26/23  0442 02/25/23 1707 02/25/23  0526 02/24/23 2257 02/24/23  0948   POTASSIUM mmol/L 5 6* 5 6* 6 0* 6 8* 6 3*       Metabolic acidosis  Assessment & Plan  · Secondary to kidney injury  Has been on bicarbonate infusion since hospitalization  Microcytic anemia  Assessment & Plan  · Microcytic anemia without any evidence of GI bleeding/blood loss    Results from last 7 days   Lab Units 02/25/23  0526 02/24/23  2257   HEMOGLOBIN g/dL 10 3* 11 0*       Acute renal failure superimposed on stage 3 chronic kidney disease (HCC)  Assessment & Plan  · MATILDE secondary to lisinopril/HCTZ use  · Improving  Nephrology consulted  · Discharge recommendations: Nephrology requesting holding lisinopril/HCTZ  Patient being started on tamsulosin      Results from last 7 days   Lab Units 02/26/23  0442 02/25/23  1707 02/25/23  0526 02/24/23 2257 02/24/23  0948   BUN mg/dL 57* 64* 68* 70* 64*   CREATININE mg/dL 1 73* 1 93* 2 12* 2 18* 2 21*   EGFR ml/min/1 73sq m 43 34 28 27 27       Hyperuricemia  Assessment & Plan  · Continue allopurinol    Essential hypertension  Assessment & Plan  · Holding lisinopril HCT secondary to kidney injury  Continue nifedipine  REASON FOR ADMISSION  Abnormal Lab (Pt sent to ED by PCP for elevated potassium 6 3)    HOSPITAL COURSE:  Alistair Peterson is a 65 y o  male with a history of hypertension CKD and hyperlipidemia who presented to the hospital with abnormal labs  As an outpatient was found to have hyperkalemia potassium 6 3  He was referred to the emergency department after he could not get his prescription for kayexalate filled  During hospitalization he did receive kayexalate and hyperkalemia cocktail  He was seen by nephrology  His potassium improved  Hyperkalemia secondary to kidney injury and excessive pickle intake  He is cleared by nephrology for discharge today  He will have repeat BMP mid next week  The case was discussed with nephrology on day of discharge  Please see problem list listed above  CONSULTING PROVIDERS   IP CONSULT TO NEPHROLOGY    PROCEDURES PERFORMED  * No surgery found *    RADIOLOGY RESULTS  No results found      LABS  Results from last 7 days   Lab Units 02/25/23  0526 02/24/23  2257   WBC Thousand/uL 9 15 9 63   HEMOGLOBIN g/dL 10 3* 11 0*   HEMATOCRIT % 32 0* 35 4*   MCV fL 109* 113*   PLATELETS Thousands/uL 116* 124*     Results from last 7 days   Lab Units 02/26/23  0442 02/25/23  1707 02/25/23  0526 02/24/23  2257 02/24/23  0948   SODIUM mmol/L 136 134* 135 133* 137   POTASSIUM mmol/L 5 6* 5 6* 6 0* 6 8* 6 3*   CHLORIDE mmol/L 111* 109* 113* 111* 116*   CO2 mmol/L 20* 17* 14* 14* 17*   BUN mg/dL 57* 64* 68* 70* 64*   CREATININE mg/dL 1 73* 1 93* 2 12* 2 18* 2 21*   CALCIUM mg/dL 9 2 9 5 9 4 9 9 10 0   ALBUMIN g/dL  --   --   --   --  3 4*   TOTAL BILIRUBIN mg/dL  --   --   --   --  0 24   ALK PHOS U/L  --   --   --   --  88   ALT U/L  --   --   --   --  37   AST U/L --   --   --   --  23   EGFR ml/min/1 73sq m 36 32 28 27 27   GLUCOSE RANDOM mg/dL 89 119 102 120  --                           Results from last 7 days   Lab Units 02/24/23  0948   HEMOGLOBIN A1C % 6 0*     Results from last 7 days   Lab Units 02/24/23  0948   TSH 3RD GENERATON uIU/mL 2 390         Results from last 7 days   Lab Units 02/24/23  0948   TRIGLYCERIDES mg/dL 283*   CHOLESTEROL mg/dL 126   LDL CALC mg/dL 42   HDL mg/dL 27*             DISCHARGE DAY VISIT AND PHYSICAL EXAM:  Subjective: Patient seen and examined  Cleared by nephrology for discharge  Wants to go home  Vitals:   Blood Pressure: 95/81 (02/26/23 0723)  Pulse: 81 (02/26/23 0723)  Temperature: 97 7 °F (36 5 °C) (02/26/23 0723)  Temp Source: Oral (02/26/23 0723)  Respirations: 16 (02/26/23 0723)  Weight - Scale: 123 kg (271 lb 2 7 oz) (02/24/23 2100)  SpO2: 99 % (02/26/23 0723)    Physical Exam  Vitals reviewed  Constitutional:       General: He is not in acute distress  Appearance: Normal appearance  He is obese  HENT:      Head: Atraumatic  Cardiovascular:      Rate and Rhythm: Regular rhythm  Heart sounds: Normal heart sounds  Pulmonary:      Breath sounds: No wheezing  Abdominal:      General: Bowel sounds are normal       Palpations: Abdomen is soft  Tenderness: There is no abdominal tenderness  There is no rebound  Musculoskeletal:         General: No swelling or tenderness  Cervical back: Normal range of motion  Skin:     General: Skin is warm  Neurological:      Mental Status: He is alert and oriented to person, place, and time  Cranial Nerves: No cranial nerve deficit  Psychiatric:         Mood and Affect: Mood normal        Planned Re-admission: No  Discharge Disposition: Home/Self Care    Test Results Pending at Discharge:   Incidental findings:     Medications   · Discharge Medication List: See after visit summary for reconciled discharge medications       Diet restrictions: Low potassium diet  Activity restrictions: No strenuous activity  Discharge Condition: stable    Outpatient Follow-Up and Discharge Instructions  See after visit summary section titled Discharge Instructions for information provided to patient and family  Code Status: Level 1 - Full Code  Discharge Statement   I spent 35 minutes discharging the patient  This time was spent on the day of discharge  Greater than 50% of total time was spent with the patient and / or family counseling and / or coordination of care  ** Please Note: This note has been constructed using a voice recognition system   **

## 2023-02-26 NOTE — ASSESSMENT & PLAN NOTE
· Microcytic anemia without any evidence of GI bleeding/blood loss    Results from last 7 days   Lab Units 02/25/23  0526 02/24/23  2257   HEMOGLOBIN g/dL 10 3* 11 0*

## 2023-02-26 NOTE — ASSESSMENT & PLAN NOTE
· MATILDE secondary to lisinopril/HCTZ use  · Improving  Nephrology consulted  · Discharge recommendations: Nephrology requesting holding lisinopril/HCTZ  Patient being started on tamsulosin      Results from last 7 days   Lab Units 02/26/23  0442 02/25/23  1707 02/25/23  0526 02/24/23  2257 02/24/23  0948   BUN mg/dL 57* 64* 68* 70* 64*   CREATININE mg/dL 1 73* 1 93* 2 12* 2 18* 2 21*   EGFR ml/min/1 73sq m 36 32 28 27 27

## 2023-02-26 NOTE — UTILIZATION REVIEW
Initial Clinical Review    Admission: Date/Time/Statement:   Admission Orders (From admission, onward)     Ordered        02/25/23 0045  INPATIENT ADMISSION  Once                      Orders Placed This Encounter   Procedures   • INPATIENT ADMISSION     Standing Status:   Standing     Number of Occurrences:   1     Order Specific Question:   Level of Care     Answer:   Med Surg [16]     Order Specific Question:   Estimated length of stay     Answer:   More than 2 Midnights     Order Specific Question:   Certification     Answer:   I certify that inpatient services are medically necessary for this patient for a duration of greater than two midnights  See H&P and MD Progress Notes for additional information about the patient's course of treatment  ED Arrival Information     Expected   -    Arrival   2/24/2023 20:46    Acuity   Urgent            Means of arrival   Walk-In    Escorted by   Family Member    Service   Hospitalist    Admission type   Emergency            Arrival complaint   Potassium 6  3-patient was prescribed kayexalate by PCP but no pharmacy has it, so was adviced by PCP to go to ED           Chief Complaint   Patient presents with   • Abnormal Lab     Pt sent to ED by PCP for elevated potassium 6 3       Initial Presentation: 65 y o  male  Presents to ED 2/24 due to hyperkalemia on outpatient blood work  K as outpatient was 6 3  In ED K  Is 6 8  Creatine is 2 2 from baseline 1 8  PMH Stage 3 CKD, HTN on lisinopril HCTZ, microcytic anemia  Pt reports eating pickle every day to lower caloric intake, takes  lisinopril HCTZ daily    EKG no T wave abnormality or QRS widening, received albuterol bicarbonate insulin glucose with IV Lasix 20 mg and Kayexalate 15 g in ED Admitted as Inpatient to med surg for acute hyperkalemia, Acute renal failure on Stage 3 CKD, microcytic anemia, HTn Plan repeat BMP, start isolyte, start bicarbonate gtt if repeat bicarb remains low, consult nephrology, monitor telemetry , monitor stools whi;le on Kayexelate, check hemoccult, iron panel  Hold lisinopril/HCTZ     2/25 Nephrology  patient has chronic renal disease likely due to nephrosclerosis  Looking back in the record his baseline creatinine tended to run 1 5-1 8 and he has a history of an atrophic right kidney  Repeat labs doen yesterday as OP showed creatinine 2 2, K 6 3  In the ED K 6 8 , received treatment and repeat K 2/25 am is 6  He reports no issue with bladder emptying  He does report that he was eating a lot of pickles as he changed his diet recently  If he had issues with bladder emptying it could explain his increased creatinine and hyperkalemia as well  Check renal US, maintain on isotonic fluids, check bladder scan for PVR  Pt aware pickles are high in potassium, continue to hold ACE, Recheck K later today 2/25     Date: 2/26  Day 2:   Awake, alert mucous membranes moist Normal cardiac rate, rhythm,    Intake/Output Summary (Last 24 hours) at 2/26/2023 1006  Last data filed at 2/26/2023 0757  Gross per 24 hour   Intake 370 ml   Output 2200 ml   Net -1830 ml         Nephrology   Renal and bladder ultrasound done yesterday official report not back but I was able to visualize the reading and the patient did have an increased urine residual and did not empty his bladder  This potentially could be due to prostatism and could be responsible for the increased creatinine and hyperkalemia above baseline  With IV fluids creatinine is improved to 1 7 and bicarbonate is improved with fluids containing bicarbonate as well   1 dose fludrocortisone 0 1 mg prior to discharge  Start Tamsulosin 0 4 mg in the evening Repeat BMP this Wednesday, Stop lisinopril   Follow up in office as OP         ED Triage Vitals   Temperature Pulse Respirations Blood Pressure SpO2   02/24/23 2100 02/24/23 2100 02/24/23 2100 02/24/23 2100 02/24/23 2100   98 4 °F (36 9 °C) 96 18 (!) 193/75 99 %      Temp Source Heart Rate Source Patient Position - Orthostatic VS BP Location FiO2 (%)   02/24/23 2100 02/24/23 2300 02/24/23 2300 02/24/23 2300 --   Oral Monitor Lying Right arm       Pain Score       02/24/23 2100       No Pain          Wt Readings from Last 1 Encounters:   02/24/23 123 kg (271 lb 2 7 oz)     Additional Vital Signs:      Date/Time Temp Pulse Resp BP MAP (mmHg) SpO2 O2 Device Patient Position - Orthostatic VS   02/26/23 07:23:36 97 7 °F (36 5 °C) 81 16 95/81 86 99 % None (Room air) Lying   02/25/23 23:12:06 98 2 °F (36 8 °C) 81 18 150/66 94 94 % None (Room air) Lying   02/25/23 15:58:50 97 8 °F (36 6 °C) 92 -- 139/53 82 95 % -- --   02/25/23 07:48:32 97 4 °F (36 3 °C) Abnormal  85 18 138/56 83 96 % None (Room air) Lying   02/25/23 01:58:28 97 5 °F (36 4 °C) 99 18 154/73 100 93 % -- --   02/25/23 0000 -- 97 20 141/65 91 97 % None (Room air) Lying   02/24/23 2300 -- 86 20 143/63 90 97 % None (Room air) Lying       Pertinent Labs/Diagnostic Test Results:      EKG 2/24 @ 2246    Normal sinus rhythm  Normal ECG    US kidney and bladder with pvr    (Results Pending)         Results from last 7 days   Lab Units 02/25/23  0526 02/24/23 2257   WBC Thousand/uL 9 15 9 63   HEMOGLOBIN g/dL 10 3* 11 0*   HEMATOCRIT % 32 0* 35 4*   PLATELETS Thousands/uL 116* 124*   NEUTROS ABS Thousands/µL 5 09 5 96         Results from last 7 days   Lab Units 02/26/23  0442 02/25/23  1707 02/25/23  0526 02/24/23  2257 02/24/23  0948   SODIUM mmol/L 136 134* 135 133* 137   POTASSIUM mmol/L 5 6* 5 6* 6 0* 6 8* 6 3*   CHLORIDE mmol/L 111* 109* 113* 111* 116*   CO2 mmol/L 20* 17* 14* 14* 17*   ANION GAP mmol/L 5 8 8 8 4   BUN mg/dL 57* 64* 68* 70* 64*   CREATININE mg/dL 1 73* 1 93* 2 12* 2 18* 2 21*   EGFR ml/min/1 73sq m 36 32 28 27 27   CALCIUM mg/dL 9 2 9 5 9 4 9 9 10 0     Results from last 7 days   Lab Units 02/24/23  0948   AST U/L 23   ALT U/L 37   ALK PHOS U/L 88   TOTAL PROTEIN g/dL 7 3   ALBUMIN g/dL 3 4*   TOTAL BILIRUBIN mg/dL 0 24         Results from last 7 days Lab Units 02/26/23  0442 02/25/23  1707 02/25/23  0526 02/24/23  2257   GLUCOSE RANDOM mg/dL 89 119 102 120         Results from last 7 days   Lab Units 02/24/23  0948   HEMOGLOBIN A1C % 6 0*   EAG mg/dl 126     No results found for: BETA-HYDROXYBUTYRATE                               Results from last 7 days   Lab Units 02/24/23  0948   TSH 3RD GENERATON uIU/mL 2 390                         Results from last 7 days   Lab Units 02/25/23  0526   FERRITIN ng/mL 609*                         Results from last 7 days   Lab Units 02/24/23  0948   CREATININE UR mg/dL 93 9   PROTEIN UR mg/dL 7   PROT/CREAT RATIO UR  0 07                                               ED Treatment:   Medication Administration from 02/24/2023 2022 to 02/25/2023 0144       Date/Time Order Dose Route Action     02/24/2023 2350 EST sodium polystyrene (KAYEXALATE) powder 15 g 15 g Oral Given     02/24/2023 2346 EST albuterol inhalation solution 10 mg 10 mg Nebulization Given     02/24/2023 2346 EST sodium bicarbonate 8 4 % injection 50 mEq 50 mEq Intravenous Given     02/24/2023 2346 EST insulin regular (HumuLIN R,NovoLIN R) injection 10 Units 10 Units Intravenous Given     02/24/2023 2346 EST dextrose 50 % IV solution 50 mL 50 mL Intravenous Given     02/24/2023 2346 EST furosemide (LASIX) injection 20 mg 20 mg Intravenous Given        Past Medical History:   Diagnosis Date   • Chronic kidney disease    • CPAP (continuous positive airway pressure) dependence    • Gout    • Hearing aid worn     sabrina   • Hernia, inguinal     RIH and umbilical hernia   • Hypertension    • Morbidly obese (HCC)    • Sleep apnea    • Wears glasses      Present on Admission:  • Essential hypertension      Admitting Diagnosis: Hyperkalemia [E87 5]  Abnormal laboratory test result [R89 9]  Age/Sex: 65 y o  male  Admission Orders:  Scheduled Medications:  aspirin, 81 mg, Oral, Daily  heparin (porcine), 5,000 Units, Subcutaneous, Q8H RENNY  NIFEdipine, 60 mg, Oral, Daily  tamsulosin, 0 4 mg, Oral, Daily With Dinner      Continuous IV Infusions: Sodium bicarbonate 75 mEq in NS 0 45 100 ml/h start 2/25 1228, DC 2/26 1008      PRN Meds:  ondansetron, 4 mg, Intravenous, Q6H PRN    Renal diet Potassium 2 gram    Up with assist    Telemetry   SCD's    Measure PVR     IP CONSULT TO NEPHROLOGY    Network Utilization Review Department  ATTENTION: Please call with any questions or concerns to [phone] and carefully listen to the prompts so that you are directed to the right person  All voicemails are confidential   Kaiden Hamilton all requests for admission clinical reviews, approved or denied determinations and any other requests to dedicated fax number below belonging to the campus where the patient is receiving treatment   List of dedicated fax numbers for the Facilities:  [address] DENIALS (Administrative/Medical Necessity) [phone]   [address] (Maternity/NICU/Pediatrics) [phone]   [address] [phone]   [facility] [place]  [phone]   [address] [address] [facility] [place] [address] [facility] [phone]   [address] Dottie Lam [facility] [place] [address] [facility] [phone]

## 2023-02-26 NOTE — ASSESSMENT & PLAN NOTE
· Presentation for abnormal labs found to have hyperkalemia and kidney injury  · Was seen by nephrology  Patient had increased pickle intake and attempt to better control his hyperglycemia  He was also on lisinopril  · Potassium improved with hyperkalemia cocktail and kayexalate    · Cleared by nephrology to be discharged to stay off lisinopril/HCT and avoid potassium rich foods    Results from last 7 days   Lab Units 02/26/23  0442 02/25/23  1707 02/25/23  0526 02/24/23  2257 02/24/23  0948   POTASSIUM mmol/L 5 6* 5 6* 6 0* 6 8* 6 3*

## 2023-02-26 NOTE — NURSING NOTE
ESPINOZA and solomon removed  Discharge instructions reviewed  Questions answered  Discharged to home

## 2023-02-26 NOTE — PROGRESS NOTES
NEPHROLOGY PROGRESS NOTE    Cielo Cummings 65 y o  male MRN: [id number]  Unit/Bed#: E5 -01 Encounter: [id number]  Reason for Consult: Acute on chronic kidney disease with hyperkalemia    Patient is awake and alert says he is feeling well he does not notice any problems with urination  Other than that he is hoping to be able to go home today and has no complaints  ASSESSMENT/PLAN:  1  Renal    Patient's chronic kidney disease baseline creatinine running 1 5-1 8  He had labs done routinely creatinine had increased to 2 2  He does have history of atrophic right kidney  Renal and bladder ultrasound done yesterday official report not back but I was able to visualize the reading and the patient did have an increased urine residual and did not empty his bladder  This potentially could be due to prostatism and could be responsible for the increased creatinine and hyperkalemia above baseline  With IV fluids creatinine is improved to 1 7 and bicarbonate is improved with fluids containing bicarbonate as well   1 dose fludrocortisone 0 1 mg prior to discharge  I will start tamsulosin 0 4 mg in the evening  He was instructed to not take his lisinopril which was stopped prior to admission  For [facility] Wednesday and will follow-up as I see him in the office  Follow-up on official ultrasound report  If this results in significant residual no help from tamsulosin may need to see urology  I relayed this information to the primary attending with the results that he can be discharged  I did send in the prescription to tamsulosin for his pharmacy and reviewed stopping his lisinopril and repeat labs next week and I will call him with results  SUBJECTIVE:  Review of Systems   Constitutional: Negative for chills, diaphoresis, fever and malaise/fatigue  HENT: Negative  Eyes: Negative  Cardiovascular: Negative for chest pain, dyspnea on exertion, leg swelling and orthopnea  Respiratory: Negative  Negative for cough, shortness of breath, sputum production and wheezing  Gastrointestinal: Negative for abdominal pain, diarrhea, nausea and vomiting  Genitourinary: Negative for dysuria, flank pain and hematuria  He feels he has no problem urinating subjectively  Neurological: Negative for dizziness, focal weakness, headaches and weakness  Psychiatric/Behavioral: Negative for altered mental status, depression, hallucinations and hypervigilance  OBJECTIVE:  Current Weight: Weight - Scale: 123 kg (271 lb 2 7 oz)  Vitals:Temp (24hrs), Av 9 °F (36 6 °C), Min:97 7 °F (36 5 °C), Max:98 2 °F (36 8 °C)  Current: Temperature: 97 7 °F (36 5 °C)   Blood pressure 95/81, pulse 81, temperature 97 7 °F (36 5 °C), temperature source Oral, resp  rate 16, weight 123 kg (271 lb 2 7 oz), SpO2 99 %  , Body mass index is 42 07 kg/m²  Intake/Output Summary (Last 24 hours) at 2023 1006  Last data filed at 2023 0757  Gross per 24 hour   Intake 370 ml   Output 2200 ml   Net -1830 ml       Physical Exam: BP 95/81 (BP Location: Right arm)   Pulse 81   Temp 97 7 °F (36 5 °C) (Oral)   Resp 16   Wt 123 kg (271 lb 2 7 oz)   SpO2 99%   BMI 42 07 kg/m²   Physical Exam  Constitutional:       General: He is not in acute distress  Appearance: He is not toxic-appearing or diaphoretic  HENT:      Head: Normocephalic and atraumatic  Nose: Nose normal       Mouth/Throat:      Mouth: Mucous membranes are moist    Eyes:      General: No scleral icterus  Extraocular Movements: Extraocular movements intact  Cardiovascular:      Rate and Rhythm: Normal rate and regular rhythm  Heart sounds: No friction rub  No gallop  Pulmonary:      Effort: Pulmonary effort is normal  No respiratory distress  Breath sounds: No wheezing, rhonchi or rales  Abdominal:      General: Bowel sounds are normal  There is no distension  Palpations: Abdomen is soft  Tenderness:  There is no abdominal tenderness  There is no rebound  Musculoskeletal:      Cervical back: Normal range of motion and neck supple  Neurological:      General: No focal deficit present  Mental Status: He is alert and oriented to person, place, and time  Mental status is at baseline  Psychiatric:         Mood and Affect: Mood normal          Behavior: Behavior normal          Thought Content:  Thought content normal          Judgment: Judgment normal          Medications:    Current Facility-Administered Medications:   •  aspirin chewable tablet 81 mg, 81 mg, Oral, Daily, Tiffanie Lester PA-C, 81 mg at 02/26/23 0849  •  heparin (porcine) subcutaneous injection 5,000 Units, 5,000 Units, Subcutaneous, Q8H [facility], 5,000 Units at 02/25/23 0954 **AND** [CANCELED] Platelet count, , , Once, Tiffanie Lester PA-C  •  NIFEdipine (PROCARDIA XL) 24 hr tablet 60 mg, 60 mg, Oral, Daily, Tiffanie Lester PA-C, 60 mg at 02/25/23 0954  •  ondansetron (ZOFRAN) injection 4 mg, 4 mg, Intravenous, Q6H PRN, Tiffanie Lester PA-C  •  sodium bicarbonate 75 mEq in sodium chloride 0 45 % 1,000 mL infusion, 100 mL/hr, Intravenous, Continuous, Ronald Donis MD, Last Rate: 100 mL/hr at 02/25/23 2333, 100 mL/hr at 02/25/23 2333  •  tamsulosin (FLOMAX) capsule 0 4 mg, 0 4 mg, Oral, Daily With Rashid Ayala MD, 0 4 mg at 02/25/23 1936    Laboratory Results:  Lab Results   Component Value Date    WBC 9 15 02/25/2023    HGB 10 3 (L) 02/25/2023    HCT 32 0 (L) 02/25/2023     (H) 02/25/2023     (L) 02/25/2023     Lab Results   Component Value Date    SODIUM 136 02/26/2023    K 5 6 (H) 02/26/2023     (H) 02/26/2023    CO2 20 (L) 02/26/2023    BUN 57 (H) 02/26/2023    CREATININE 1 73 (H) 02/26/2023    GLUC 89 02/26/2023    CALCIUM 9 2 02/26/2023     Lab Results   Component Value Date    CALCIUM 9 2 02/26/2023    PHOS 3 6 01/11/2020     No results found for: LABPROT

## 2023-02-26 NOTE — PLAN OF CARE
Problem: SAFETY ADULT  Goal: Patient will remain free of falls  Description: INTERVENTIONS:  - Educate patient/family on patient safety including physical limitations  - Instruct patient to call for assistance with activity   - Consult OT/PT to assist with strengthening/mobility   - Keep Call bell within reach  - Keep bed low and locked with side rails adjusted as appropriate  - Keep care items and personal belongings within reach  - Initiate and maintain comfort rounds  - Make Fall Risk Sign visible to staff  - Offer Toileting every 2 Hours, in advance of need  - Initiate/Maintain bed alarm  - Obtain necessary fall risk management equipment: bed alarm   - Apply yellow socks and bracelet for high fall risk patients  - Consider moving patient to room near nurses station  Outcome: Progressing  Goal: Maintain or return to baseline ADL function  Description: INTERVENTIONS:  -  Assess patient's ability to carry out ADLs; assess patient's baseline for ADL function and identify physical deficits which impact ability to perform ADLs (bathing, care of mouth/teeth, toileting, grooming, dressing, etc )  - Assess/evaluate cause of self-care deficits   - Assess range of motion  - Assess patient's mobility; develop plan if impaired  - Assess patient's need for assistive devices and provide as appropriate  - Encourage maximum independence but intervene and supervise when necessary  - Involve family in performance of ADLs  - Assess for home care needs following discharge   - Consider OT consult to assist with ADL evaluation and planning for discharge  - Provide patient education as appropriate  Outcome: Progressing  Goal: Maintains/Returns to pre admission functional level  Description: INTERVENTIONS:  - Perform BMAT or MOVE assessment daily    - Set and communicate daily mobility goal to care team and patient/family/caregiver     - Collaborate with rehabilitation services on mobility goals if consulted  - Perform Range of Motion 3 times a day  - Reposition patient every 2 hours    - Dangle patient 3 times a day  - Stand patient 3 times a day  - Ambulate patient 3 times a day  - Out of bed to chair 3 times a day   - Out of bed for meals 3 times a day  - Out of bed for toileting  - Record patient progress and toleration of activity level   Outcome: Progressing     Problem: DISCHARGE PLANNING  Goal: Discharge to home or other facility with appropriate resources  Description: INTERVENTIONS:  - Identify barriers to discharge w/patient and caregiver  - Arrange for needed discharge resources and transportation as appropriate  - Identify discharge learning needs (meds, wound care, etc )  - Arrange for interpretive services to assist at discharge as needed  - Refer to Case Management Department for coordinating discharge planning if the patient needs post-hospital services based on physician/advanced practitioner order or complex needs related to functional status, cognitive ability, or social support system  Outcome: Progressing     Problem: CARDIOVASCULAR - ADULT  Goal: Maintains optimal cardiac output and hemodynamic stability  Description: INTERVENTIONS:  - Monitor I/O, vital signs and rhythm  - Monitor for S/S and trends of decreased cardiac output  - Administer and titrate ordered vasoactive medications to optimize hemodynamic stability  - Assess quality of pulses, skin color and temperature  - Assess for signs of decreased coronary artery perfusion  - Instruct patient to report change in severity of symptoms  Outcome: Progressing  Goal: Absence of cardiac dysrhythmias or at baseline rhythm  Description: INTERVENTIONS:  - Continuous cardiac monitoring, vital signs, obtain 12 lead EKG if ordered  - Administer antiarrhythmic and heart rate control medications as ordered  - Monitor electrolytes and administer replacement therapy as ordered  Outcome: Progressing     Problem: METABOLIC, FLUID AND ELECTROLYTES - ADULT  Goal: Electrolytes maintained within normal limits  Description: INTERVENTIONS:  - Monitor labs and assess patient for signs and symptoms of electrolyte imbalances  - Administer electrolyte replacement as ordered  - Monitor response to electrolyte replacements, including repeat lab results as appropriate  - Instruct patient on fluid and nutrition as appropriate  Outcome: Progressing  Goal: Fluid balance maintained  Description: INTERVENTIONS:  - Monitor labs   - Monitor I/O and WT  - Instruct patient on fluid and nutrition as appropriate  - Assess for signs & symptoms of volume excess or deficit  Outcome: Progressing  Goal: Glucose maintained within target range  Description: INTERVENTIONS:  - Monitor Blood Glucose as ordered  - Assess for signs and symptoms of hyperglycemia and hypoglycemia  - Administer ordered medications to maintain glucose within target range  - Assess nutritional intake and initiate nutrition service referral as needed  Outcome: Progressing

## 2023-02-28 ENCOUNTER — TRANSITIONAL CARE MANAGEMENT (OUTPATIENT)
Dept: FAMILY MEDICINE CLINIC | Facility: CLINIC | Age: 78
End: 2023-02-28

## 2023-03-01 ENCOUNTER — APPOINTMENT (OUTPATIENT)
Dept: LAB | Facility: CLINIC | Age: 78
End: 2023-03-01

## 2023-03-01 DIAGNOSIS — E87.5 HYPERKALEMIA: ICD-10-CM

## 2023-03-01 LAB
ANION GAP SERPL CALCULATED.3IONS-SCNC: 6 MMOL/L (ref 4–13)
BUN SERPL-MCNC: 57 MG/DL (ref 5–25)
CALCIUM SERPL-MCNC: 10 MG/DL (ref 8.3–10.1)
CHLORIDE SERPL-SCNC: 109 MMOL/L (ref 96–108)
CO2 SERPL-SCNC: 18 MMOL/L (ref 21–32)
CREAT SERPL-MCNC: 1.95 MG/DL (ref 0.6–1.3)
GFR SERPL CREATININE-BSD FRML MDRD: 32 ML/MIN/1.73SQ M
GLUCOSE P FAST SERPL-MCNC: 114 MG/DL (ref 65–99)
POTASSIUM SERPL-SCNC: 4.8 MMOL/L (ref 3.5–5.3)
SODIUM SERPL-SCNC: 133 MMOL/L (ref 135–147)

## 2023-03-01 NOTE — UTILIZATION REVIEW
NOTIFICATION OF ADMISSION DISCHARGE   This is a Notification of Discharge from [facility]  Please be advised that this patient has been discharge from our facility  Below you will find the admission and discharge date and time including the patient’s disposition  UTILIZATION REVIEW CONTACT:  Tanya Kenyon MA  Utilization   Network Utilization Review Department  Phone: [phone] x carefully listen to the prompts  All voicemails are confidential   Email: [email or web address]     ADMISSION INFORMATION  PRESENTATION DATE: 2/24/2023 10:21 PM  OBERVATION ADMISSION DATE:   INPATIENT ADMISSION DATE: 2/25/23 12:45 AM   DISCHARGE DATE: 2/26/2023 12:53 PM   DISPOSITION:Home/Self Care    IMPORTANT INFORMATION:  Send all requests for admission clinical reviews, approved or denied determinations and any other requests to dedicated fax number below belonging to the campus where the patient is receiving treatment   List of dedicated fax numbers:  [address] DENIALS (Administrative/Medical Necessity) [phone]   [address] (Maternity/NICU/Pediatrics) [phone]   [facility] [phone]   [facility] [phone]   [address] [phone]   [address] [facility] [phone] 90 [facility] [phone]   [facility] 119 [phone]   [facility]  [phone]   [address] [phone]   [address] [facility] [address] [facility] [phone]

## 2023-03-05 DIAGNOSIS — E79.0 HYPERURICEMIA: ICD-10-CM

## 2023-03-06 RX ORDER — ALLOPURINOL 300 MG/1
TABLET ORAL
Qty: 90 TABLET | Refills: 2 | Status: SHIPPED | OUTPATIENT
Start: 2023-03-06

## 2023-03-07 ENCOUNTER — OFFICE VISIT (OUTPATIENT)
Dept: FAMILY MEDICINE CLINIC | Facility: CLINIC | Age: 78
End: 2023-03-07

## 2023-03-07 VITALS
SYSTOLIC BLOOD PRESSURE: 136 MMHG | HEART RATE: 104 BPM | BODY MASS INDEX: 44.46 KG/M2 | OXYGEN SATURATION: 96 % | TEMPERATURE: 98.5 F | WEIGHT: 286.6 LBS | DIASTOLIC BLOOD PRESSURE: 50 MMHG

## 2023-03-07 DIAGNOSIS — R73.09 ABNORMAL SERUM GLUCOSE LEVEL: ICD-10-CM

## 2023-03-07 DIAGNOSIS — N17.9 ACUTE RENAL FAILURE SUPERIMPOSED ON STAGE 3B CHRONIC KIDNEY DISEASE, UNSPECIFIED ACUTE RENAL FAILURE TYPE (HCC): ICD-10-CM

## 2023-03-07 DIAGNOSIS — G47.33 OSA (OBSTRUCTIVE SLEEP APNEA): ICD-10-CM

## 2023-03-07 DIAGNOSIS — E87.5 ACUTE HYPERKALEMIA: Primary | ICD-10-CM

## 2023-03-07 DIAGNOSIS — N17.9 AKI (ACUTE KIDNEY INJURY) (HCC): ICD-10-CM

## 2023-03-07 DIAGNOSIS — I10 ESSENTIAL HYPERTENSION: ICD-10-CM

## 2023-03-07 DIAGNOSIS — N18.30 STAGE 3 CHRONIC KIDNEY DISEASE, UNSPECIFIED WHETHER STAGE 3A OR 3B CKD (HCC): ICD-10-CM

## 2023-03-07 DIAGNOSIS — N18.32 ACUTE RENAL FAILURE SUPERIMPOSED ON STAGE 3B CHRONIC KIDNEY DISEASE, UNSPECIFIED ACUTE RENAL FAILURE TYPE (HCC): ICD-10-CM

## 2023-03-07 PROBLEM — R39.14 BENIGN PROSTATIC HYPERPLASIA WITH INCOMPLETE BLADDER EMPTYING: Status: ACTIVE | Noted: 2023-03-07

## 2023-03-07 PROBLEM — N40.1 BENIGN PROSTATIC HYPERPLASIA WITH INCOMPLETE BLADDER EMPTYING: Status: ACTIVE | Noted: 2023-03-07

## 2023-03-07 RX ORDER — NIFEDIPINE 60 MG/1
60 TABLET, EXTENDED RELEASE ORAL DAILY
Qty: 90 TABLET | Refills: 2 | Status: SHIPPED | OUTPATIENT
Start: 2023-03-07

## 2023-03-07 NOTE — ASSESSMENT & PLAN NOTE
Resolved with Kayexalate and hyperkalemia cocktail in hospital   Outpatient lab work shows potassium down to 4 8 and creatinine of 1 9

## 2023-03-07 NOTE — ASSESSMENT & PLAN NOTE
Lisinopril hydrochlorothiazide discontinued due to hyperkalemia  Patient continues on nifedipine 60 mg daily  Blood pressure satisfactory today in office    Continue with current regimen

## 2023-03-07 NOTE — ASSESSMENT & PLAN NOTE
Lab work reveals improved diabetic control with hemoglobin A1c down to 6 0    Continue diet and exercise

## 2023-03-07 NOTE — PROGRESS NOTES
Name: Kang Mcdonough      : 1945      MRN: [id number]  Encounter Provider: Clifton Burgess DO  Encounter Date: 3/7/2023   Encounter department: [address] [place] [address]  Acute hyperkalemia  Assessment & Plan:  Resolved with Kayexalate and hyperkalemia cocktail in hospital   Outpatient lab work shows potassium down to 4 8 and creatinine of 1 9      2  Acute renal failure superimposed on stage 3b chronic kidney disease, unspecified acute renal failure type [facility])  Assessment & Plan:  Lab Results   Component Value Date    EGFR 32 2023    EGFR 36 2023    EGFR 32 2023    CREATININE 1 95 (H) 2023    CREATININE 1 73 (H) 2023    CREATININE 1 93 (H) 2023   Creatinine has improved to 1 9, closer to baseline from high of 2 18 during hospitalization  Patient will continue off of ACE inhibitor  Follow-up with nephrology      3  Essential hypertension  Assessment & Plan:  Lisinopril hydrochlorothiazide discontinued due to hyperkalemia  Patient continues on nifedipine 60 mg daily  Blood pressure satisfactory today in office  Continue with current regimen    Orders:  -     NIFEdipine (PROCARDIA XL) 60 mg 24 hr tablet; Take 1 tablet (60 mg total) by mouth daily  -     Comprehensive metabolic panel; Future; Expected date: 2023  -     TSH, 3rd generation with Free T4 reflex; Future; Expected date: 2023    4  Abnormal serum glucose level  Assessment & Plan:  Lab work reveals improved diabetic control with hemoglobin A1c down to 6 0  Continue diet and exercise    Orders:  -     Hemoglobin A1C; Future; Expected date: 2023    5  Stage 3 chronic kidney disease, unspecified whether stage 3a or 3b CKD ([facility] [address] )    6  KALIA (obstructive sleep apnea)  Assessment & Plan:  Currently using CPAP for obstructive sleep apnea but patient finds it difficult to tolerate  Interested in discussing inspire device    Will refer to ENT for evaluation  Orders:  -     Ambulatory Referral to Otolaryngology; Future    7  MATILDE (acute kidney injury) ([facility] [address] )           Subjective      TCM Call     Date and time call was made  2/28/2023  2:15 PM    Hospital care reviewed  Records reviewed    Patient was hospitialized at  [address]    Date of Admission  02/24/23    Date of discharge  02/26/23    Diagnosis  acute hyperkalemia    Disposition  Home    Were the patients medications reviewed and updated  No    Current Symptoms  None      TCM Call     Post hospital issues  None    Should patient be enrolled in anticoag monitoring? No    Scheduled for follow up? Yes    Patients specialists  Nephrologist    Referrals needed  none    Did you obtain your prescribed medications  No    Why were you unable to obtain your medications  none prescribed    Do you need help managing your prescriptions or medications  No    Is transportation to your appointment needed  No    I have advised the patient to call PCP with any new or worsening symptoms    Tatiana Farris MA    Living Arrangements  Spouse or Significiant other    Support System  Spouse    The type of support provided  Emotional; Financial; Physical    Do you have social support  Yes, as much as I need    Are you recieving any outpatient services  No    Are you recieving home care services  No    Are you using any community resources  No    Current waiver services  No    Have you fallen in the last 12 months  No      Patient was seen in follow-up from recent hospitalization  This is a transition of care visit  He was admitted from 2/24 through 2/26  Reason for admission was acute kidney injury with hyperkalemia  Patient was found on outpatient labs to have a potassium of 6 3 with a creatinine greater than 2  Medication was adjusted and Kayexalate was ordered however patient was a unable to obtain Kayexalate from the outpatient pharmacy and was advised to be seen at the emergency room    At that point his potassium had gone up to 6 8 and he was admitted for Kayexalate and hyperkalemia cocktail  He was seen by nephrology  Lab work improved over 2 days and he was discharged  He was found to have an elevated postvoid residual on bladder and kidney ultrasound  Review of Systems   Constitutional: Negative  Respiratory: Negative  Cardiovascular: Negative  Gastrointestinal: Negative  Genitourinary: Negative  Musculoskeletal: Negative  Psychiatric/Behavioral: Negative  Current Outpatient Medications on File Prior to Visit   Medication Sig   • allopurinol (ZYLOPRIM) 300 mg tablet TAKE ONE TABLET BY MOUTH EVERY DAY   • aspirin 81 MG tablet Take 81 mg by mouth daily   • Cholecalciferol (VITAMIN D3) 5000 units TABS Take 1 tablet by mouth daily   • Omega-3 Fatty Acids (FISH OIL) 1200 MG CAPS Take 1 capsule by mouth 3 (three) times a day   • tamsulosin (FLOMAX) 0 4 mg Take 1 capsule (0 4 mg total) by mouth daily with dinner   • [DISCONTINUED] NIFEdipine (PROCARDIA XL) 60 mg 24 hr tablet Take 1 tablet (60 mg total) by mouth daily       Objective     /50 (BP Location: Left arm, Patient Position: Sitting, Cuff Size: Large)   Pulse 104   Temp 98 5 °F (36 9 °C)   Wt 130 kg (286 lb 9 6 oz)   SpO2 96%   BMI 44 46 kg/m²     Physical Exam  Vitals and nursing note reviewed  Constitutional:       General: He is not in acute distress  Appearance: He is well-developed  He is not diaphoretic  HENT:      Head: Normocephalic and atraumatic  Eyes:      General:         Right eye: No discharge  Conjunctiva/sclera: Conjunctivae normal       Pupils: Pupils are equal, round, and reactive to light  Neck:      Thyroid: No thyromegaly  Cardiovascular:      Rate and Rhythm: Normal rate and regular rhythm  Pulmonary:      Effort: Pulmonary effort is normal  No respiratory distress  Breath sounds: Normal breath sounds  Musculoskeletal:      Cervical back: Normal range of motion  Lymphadenopathy:      Cervical: No cervical adenopathy  Skin:     General: Skin is warm and dry  Neurological:      Mental Status: He is alert and oriented to person, place, and time  Psychiatric:         Behavior: Behavior normal          Thought Content:  Thought content normal          Judgment: Judgment normal        Patel Gaston DO

## 2023-03-07 NOTE — ASSESSMENT & PLAN NOTE
Currently using CPAP for obstructive sleep apnea but patient finds it difficult to tolerate  Interested in discussing inspire device  Will refer to ENT for evaluation

## 2023-03-07 NOTE — ASSESSMENT & PLAN NOTE
Elevated postvoid residual noted on bladder ultrasound during hospitalization  No hydronephrosis noted  Patient placed on tamsulosin 0 4 mg will continue  Discussed referral to urology but patient declines at this time  We will continue to monitor progress on tamsulosin

## 2023-03-07 NOTE — ASSESSMENT & PLAN NOTE
Lab Results   Component Value Date    EGFR 32 03/01/2023    EGFR 36 02/26/2023    EGFR 32 02/25/2023    CREATININE 1 95 (H) 03/01/2023    CREATININE 1 73 (H) 02/26/2023    CREATININE 1 93 (H) 02/25/2023   Creatinine has improved to 1 9, closer to baseline from high of 2 18 during hospitalization  Patient will continue off of ACE inhibitor    Follow-up with nephrology

## 2023-03-08 ENCOUNTER — TELEPHONE (OUTPATIENT)
Dept: NEPHROLOGY | Facility: CLINIC | Age: 78
End: 2023-03-08

## 2023-03-08 DIAGNOSIS — N17.9 AKI (ACUTE KIDNEY INJURY) (HCC): ICD-10-CM

## 2023-03-08 DIAGNOSIS — N18.9 CHRONIC KIDNEY DISEASE, UNSPECIFIED CKD STAGE: ICD-10-CM

## 2023-03-08 DIAGNOSIS — N17.9 ACUTE RENAL FAILURE SUPERIMPOSED ON STAGE 3B CHRONIC KIDNEY DISEASE, UNSPECIFIED ACUTE RENAL FAILURE TYPE (HCC): ICD-10-CM

## 2023-03-08 DIAGNOSIS — N18.32 ACUTE RENAL FAILURE SUPERIMPOSED ON STAGE 3B CHRONIC KIDNEY DISEASE, UNSPECIFIED ACUTE RENAL FAILURE TYPE (HCC): ICD-10-CM

## 2023-03-08 DIAGNOSIS — I10 ESSENTIAL HYPERTENSION: Primary | ICD-10-CM

## 2023-03-10 NOTE — TELEPHONE ENCOUNTER
Called patient and went over the following information:    I called and asked how he is urinating if he notices improvement or any difference since on tamsulosin   Make sure he is still taking it   Also have him do a BMP to make sure labs still ok  Patient verbally understood and had no further questions for me at this time  Patient states he does not notice a difference and will have the blood work drawn

## 2023-03-13 ENCOUNTER — TELEPHONE (OUTPATIENT)
Dept: NEPHROLOGY | Facility: CLINIC | Age: 78
End: 2023-03-13

## 2023-03-13 ENCOUNTER — APPOINTMENT (OUTPATIENT)
Dept: LAB | Facility: CLINIC | Age: 78
End: 2023-03-13

## 2023-03-13 DIAGNOSIS — N18.9 CHRONIC KIDNEY DISEASE, UNSPECIFIED CKD STAGE: ICD-10-CM

## 2023-03-13 DIAGNOSIS — I10 ESSENTIAL HYPERTENSION: ICD-10-CM

## 2023-03-13 DIAGNOSIS — N17.9 ACUTE RENAL FAILURE SUPERIMPOSED ON STAGE 3B CHRONIC KIDNEY DISEASE, UNSPECIFIED ACUTE RENAL FAILURE TYPE (HCC): ICD-10-CM

## 2023-03-13 DIAGNOSIS — N17.9 AKI (ACUTE KIDNEY INJURY) (HCC): ICD-10-CM

## 2023-03-13 DIAGNOSIS — N18.32 ACUTE RENAL FAILURE SUPERIMPOSED ON STAGE 3B CHRONIC KIDNEY DISEASE, UNSPECIFIED ACUTE RENAL FAILURE TYPE (HCC): ICD-10-CM

## 2023-03-13 LAB
ANION GAP SERPL CALCULATED.3IONS-SCNC: 6 MMOL/L (ref 4–13)
BUN SERPL-MCNC: 44 MG/DL (ref 5–25)
CALCIUM SERPL-MCNC: 9.7 MG/DL (ref 8.3–10.1)
CHLORIDE SERPL-SCNC: 111 MMOL/L (ref 96–108)
CO2 SERPL-SCNC: 19 MMOL/L (ref 21–32)
CREAT SERPL-MCNC: 1.9 MG/DL (ref 0.6–1.3)
GFR SERPL CREATININE-BSD FRML MDRD: 33 ML/MIN/1.73SQ M
GLUCOSE P FAST SERPL-MCNC: 131 MG/DL (ref 65–99)
POTASSIUM SERPL-SCNC: 4.8 MMOL/L (ref 3.5–5.3)
SODIUM SERPL-SCNC: 136 MMOL/L (ref 135–147)

## 2023-03-13 NOTE — TELEPHONE ENCOUNTER
----- Message from Ronald Donis MD sent at 3/13/2023  3:31 PM EDT -----  I called and told the patient that his potassium was normal creatinine was stable  Have him follow-up with me in June in the office and do a repeat BMP at that time  Thank you

## 2023-03-20 DIAGNOSIS — N40.0 BENIGN PROSTATIC HYPERPLASIA WITHOUT LOWER URINARY TRACT SYMPTOMS: ICD-10-CM

## 2023-03-20 RX ORDER — TAMSULOSIN HYDROCHLORIDE 0.4 MG/1
CAPSULE ORAL
Qty: 90 CAPSULE | Refills: 2 | Status: SHIPPED | OUTPATIENT
Start: 2023-03-20

## 2023-03-24 ENCOUNTER — TELEPHONE (OUTPATIENT)
Dept: NEPHROLOGY | Facility: CLINIC | Age: 78
End: 2023-03-24

## 2023-05-22 ENCOUNTER — TELEPHONE (OUTPATIENT)
Dept: NEPHROLOGY | Facility: CLINIC | Age: 78
End: 2023-05-22

## 2023-05-22 DIAGNOSIS — N17.9 ACUTE RENAL FAILURE SUPERIMPOSED ON STAGE 3B CHRONIC KIDNEY DISEASE, UNSPECIFIED ACUTE RENAL FAILURE TYPE (HCC): ICD-10-CM

## 2023-05-22 DIAGNOSIS — E87.20 METABOLIC ACIDOSIS: ICD-10-CM

## 2023-05-22 DIAGNOSIS — R39.14 BENIGN PROSTATIC HYPERPLASIA WITH INCOMPLETE BLADDER EMPTYING: ICD-10-CM

## 2023-05-22 DIAGNOSIS — E87.5 HYPERKALEMIA: ICD-10-CM

## 2023-05-22 DIAGNOSIS — N40.1 BENIGN PROSTATIC HYPERPLASIA WITH INCOMPLETE BLADDER EMPTYING: ICD-10-CM

## 2023-05-22 DIAGNOSIS — N18.32 ACUTE RENAL FAILURE SUPERIMPOSED ON STAGE 3B CHRONIC KIDNEY DISEASE, UNSPECIFIED ACUTE RENAL FAILURE TYPE (HCC): ICD-10-CM

## 2023-05-22 DIAGNOSIS — N17.9 AKI (ACUTE KIDNEY INJURY) (HCC): ICD-10-CM

## 2023-05-22 DIAGNOSIS — N18.9 CHRONIC KIDNEY DISEASE, UNSPECIFIED CKD STAGE: Primary | ICD-10-CM

## 2023-05-22 DIAGNOSIS — E55.9 VITAMIN D DEFICIENCY: ICD-10-CM

## 2023-05-22 NOTE — TELEPHONE ENCOUNTER
Patient called and was wondering if Dr Shorty Morris wanted any blood work drawn before the follow up appointment  BMP added to the system

## 2023-06-12 ENCOUNTER — TELEPHONE (OUTPATIENT)
Dept: NEPHROLOGY | Facility: CLINIC | Age: 78
End: 2023-06-12

## 2023-06-12 NOTE — TELEPHONE ENCOUNTER
Reschedule Appointment   Person speaking to  Patient   Date of original appointment 6/21/23    New appointment date 10/5/23   Location [facility]    Provider Sharmin Almonte    Additional Information

## 2023-07-31 ENCOUNTER — TELEPHONE (OUTPATIENT)
Dept: NEPHROLOGY | Facility: CLINIC | Age: 78
End: 2023-07-31

## 2023-07-31 NOTE — TELEPHONE ENCOUNTER
Called pt to reschedule 10/05/23 appt. Aspen Conti will not be in the office that day. Pt rescheduled to see Dr. Angelica Marsh on 11/16/23 in [facility]. Pt did not want to go to a different office for sooner appt.

## 2023-09-06 ENCOUNTER — APPOINTMENT (OUTPATIENT)
Dept: LAB | Facility: CLINIC | Age: 78
End: 2023-09-06
Payer: COMMERCIAL

## 2023-09-06 DIAGNOSIS — I10 ESSENTIAL HYPERTENSION: ICD-10-CM

## 2023-09-06 DIAGNOSIS — R73.09 ABNORMAL SERUM GLUCOSE LEVEL: ICD-10-CM

## 2023-09-06 LAB
ALBUMIN SERPL BCP-MCNC: 3.6 G/DL (ref 3.5–5)
ALP SERPL-CCNC: 83 U/L (ref 34–104)
ALT SERPL W P-5'-P-CCNC: 48 U/L (ref 7–52)
ANION GAP SERPL CALCULATED.3IONS-SCNC: 13 MMOL/L
AST SERPL W P-5'-P-CCNC: 49 U/L (ref 13–39)
BILIRUB SERPL-MCNC: 0.69 MG/DL (ref 0.2–1)
BUN SERPL-MCNC: 25 MG/DL (ref 5–25)
CALCIUM SERPL-MCNC: 9.8 MG/DL (ref 8.4–10.2)
CHLORIDE SERPL-SCNC: 101 MMOL/L (ref 96–108)
CO2 SERPL-SCNC: 23 MMOL/L (ref 21–32)
CREAT SERPL-MCNC: 1.57 MG/DL (ref 0.6–1.3)
EST. AVERAGE GLUCOSE BLD GHB EST-MCNC: 151 MG/DL
GFR SERPL CREATININE-BSD FRML MDRD: 41 ML/MIN/1.73SQ M
GLUCOSE P FAST SERPL-MCNC: 169 MG/DL (ref 65–99)
HBA1C MFR BLD: 6.9 %
POTASSIUM SERPL-SCNC: 4.3 MMOL/L (ref 3.5–5.3)
PROT SERPL-MCNC: 7.4 G/DL (ref 6.4–8.4)
SODIUM SERPL-SCNC: 137 MMOL/L (ref 135–147)
TSH SERPL DL<=0.05 MIU/L-ACNC: 4.14 UIU/ML (ref 0.45–4.5)

## 2023-09-06 PROCEDURE — 80053 COMPREHEN METABOLIC PANEL: CPT

## 2023-09-06 PROCEDURE — 84443 ASSAY THYROID STIM HORMONE: CPT

## 2023-09-06 PROCEDURE — 36415 COLL VENOUS BLD VENIPUNCTURE: CPT

## 2023-09-06 PROCEDURE — 83036 HEMOGLOBIN GLYCOSYLATED A1C: CPT

## 2023-09-12 ENCOUNTER — OFFICE VISIT (OUTPATIENT)
Dept: FAMILY MEDICINE CLINIC | Facility: CLINIC | Age: 78
End: 2023-09-12
Payer: COMMERCIAL

## 2023-09-12 VITALS
WEIGHT: 266.4 LBS | TEMPERATURE: 98.5 F | HEIGHT: 68 IN | HEART RATE: 81 BPM | OXYGEN SATURATION: 96 % | DIASTOLIC BLOOD PRESSURE: 66 MMHG | BODY MASS INDEX: 40.38 KG/M2 | SYSTOLIC BLOOD PRESSURE: 160 MMHG

## 2023-09-12 DIAGNOSIS — I10 ESSENTIAL HYPERTENSION: Primary | ICD-10-CM

## 2023-09-12 DIAGNOSIS — N40.1 BENIGN PROSTATIC HYPERPLASIA WITH INCOMPLETE BLADDER EMPTYING: ICD-10-CM

## 2023-09-12 DIAGNOSIS — D69.6 PLATELETS DECREASED (HCC): ICD-10-CM

## 2023-09-12 DIAGNOSIS — E79.0 HYPERURICEMIA: ICD-10-CM

## 2023-09-12 DIAGNOSIS — N18.9 CHRONIC KIDNEY DISEASE, UNSPECIFIED CKD STAGE: ICD-10-CM

## 2023-09-12 DIAGNOSIS — R39.14 BENIGN PROSTATIC HYPERPLASIA WITH INCOMPLETE BLADDER EMPTYING: ICD-10-CM

## 2023-09-12 DIAGNOSIS — Z00.00 MEDICARE ANNUAL WELLNESS VISIT, SUBSEQUENT: ICD-10-CM

## 2023-09-12 DIAGNOSIS — G47.33 OSA (OBSTRUCTIVE SLEEP APNEA): ICD-10-CM

## 2023-09-12 DIAGNOSIS — E78.1 ESSENTIAL HYPERTRIGLYCERIDEMIA: ICD-10-CM

## 2023-09-12 DIAGNOSIS — R73.09 ABNORMAL SERUM GLUCOSE LEVEL: ICD-10-CM

## 2023-09-12 PROBLEM — N18.30 ACUTE RENAL FAILURE SUPERIMPOSED ON STAGE 3 CHRONIC KIDNEY DISEASE (HCC): Status: RESOLVED | Noted: 2023-02-25 | Resolved: 2023-09-12

## 2023-09-12 PROBLEM — N17.9 ACUTE RENAL FAILURE SUPERIMPOSED ON STAGE 3 CHRONIC KIDNEY DISEASE (HCC): Status: RESOLVED | Noted: 2023-02-25 | Resolved: 2023-09-12

## 2023-09-12 PROCEDURE — 99214 OFFICE O/P EST MOD 30 MIN: CPT | Performed by: FAMILY MEDICINE

## 2023-09-12 PROCEDURE — G0439 PPPS, SUBSEQ VISIT: HCPCS | Performed by: FAMILY MEDICINE

## 2023-09-12 PROCEDURE — G0444 DEPRESSION SCREEN ANNUAL: HCPCS | Performed by: FAMILY MEDICINE

## 2023-09-12 NOTE — ASSESSMENT & PLAN NOTE
Continue nifedipine 60 mg daily Pt presented today for BPP.  Had sent in [employer] reporting elevated blood pressures yesterday at home, 141/79 and 143/85. 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  Had elevated BP at visit on 12/19, labs completed at that time were WNL.  BPs were normotensive today, with average of 118/74.  Discussed meeting criteria for GHTN at this time d/t elevated BPs > 4 hours apart. Denies symptoms. Has hx of GHTN and postpartum pre-eclampsia.    BPP completed today, report not finished at time of this note.   Discussed recommendation for IOL at 37 weeks.  Pt agreeable to plan.  Discussed possibility of cervical ripening with misoprostol or rose bulb prior to Pitocin (if needed).  Encouraged to continue to monitor BPs at home.  Reviewed ranges of blood pressures and signs/symptoms of pre-eclampsia, when to call/present to ED.  All patient's questions and concerns answered.    Rescheduled BPP at [facility] for 12/28 to coordinate with MARY visit. IOL scheduled 1/5/2023 at 1930.  Patient updated via [employer].     DEEPALI Hale CNM

## 2023-09-12 NOTE — ASSESSMENT & PLAN NOTE
Patient discontinued Flomax earlier this year due to weight gain. He did lose approximately 20 pounds since discontinuing it. He denies any urinary/voiding complaints at this time.

## 2023-09-12 NOTE — ASSESSMENT & PLAN NOTE
Lab Results   Component Value Date    EGFR 41 09/06/2023    EGFR 33 03/13/2023    EGFR 32 03/01/2023    CREATININE 1.57 (H) 09/06/2023    CREATININE 1.90 (H) 03/13/2023    CREATININE 1.95 (H) 03/01/2023   Creatinine improved at this time. Earlier this year was in the 1.9-2.0 range. Now at 1.57. Continue follow-up with nephrology.   Continue to avoid nephrotoxic drugs, NSAIDs, IV contrast

## 2023-09-12 NOTE — ASSESSMENT & PLAN NOTE
Hemoglobin A1c has climbed above 6.5. Reviewed results with patient. Recommended better attention to diet and exercise. He would like to avoid medication at this time and will hold off to see how he does with diet.

## 2023-09-12 NOTE — PROGRESS NOTES
Name: Hosea Boyd      : 1945      MRN: [id number]  Encounter Provider: Jp Hernandez DO  Encounter Date: 2023   Encounter department: [address]     1. Essential hypertension  Assessment & Plan:  Continue nifedipine 60 mg daily      2. Hyperuricemia  -     Uric acid; Future; Expected date: 2024    3. Abnormal serum glucose level  Assessment & Plan:  Hemoglobin A1c has climbed above 6.5. Reviewed results with patient. Recommended better attention to diet and exercise. He would like to avoid medication at this time and will hold off to see how he does with diet. Orders:  -     Hemoglobin A1C; Future; Expected date: 2024    4. Essential hypertriglyceridemia  Assessment & Plan:  Recheck lipids with next labs in March    Orders:  -     Comprehensive metabolic panel; Future; Expected date: 2024  -     Lipid Panel with Direct LDL reflex; Future; Expected date: 2024  -     TSH, 3rd generation; Future; Expected date: 2024    5. Medicare annual wellness visit, subsequent  Comments:  Questionnaire reviewed. Immunization and health screening history updated. Advance directive in place. 6. Benign prostatic hyperplasia with incomplete bladder emptying  Assessment & Plan:  Patient discontinued Flomax earlier this year due to weight gain. He did lose approximately 20 pounds since discontinuing it. He denies any urinary/voiding complaints at this time. 7. Chronic kidney disease, unspecified CKD stage  Assessment & Plan:  Lab Results   Component Value Date    EGFR 41 2023    EGFR 33 2023    EGFR 32 2023    CREATININE 1.57 (H) 2023    CREATININE 1.90 (H) 2023    CREATININE 1.95 (H) 2023   Creatinine improved at this time. Earlier this year was in the 1.9-2.0 range. Now at 1.57. Continue follow-up with nephrology. Continue to avoid nephrotoxic drugs, NSAIDs, IV contrast      8.  KALIA (obstructive sleep apnea)    9. Platelets decreased ([address])           Subjective      Patient was seen for routine follow-up of chronic medical problems. He is being treated for hypertension, chronic kidney disease, hyperuricemia. He has no complaints today. He is compliant with his medications with the exception of the fact that he discontinued his Flomax. He gained weight on it this past year and then since discontinuing it he has lost 20 pounds. He denies any urinary complaints. No nocturia frequency or urgency. Review of Systems   Constitutional: Negative. Respiratory: Negative. Cardiovascular: Negative. Gastrointestinal: Negative. Genitourinary: Negative. Musculoskeletal: Negative. Psychiatric/Behavioral: Negative. Current Outpatient Medications on File Prior to Visit   Medication Sig   • allopurinol (ZYLOPRIM) 300 mg tablet TAKE ONE TABLET BY MOUTH EVERY DAY   • aspirin 81 MG tablet Take 81 mg by mouth daily   • Cholecalciferol (VITAMIN D3) 5000 units TABS Take 1 tablet by mouth daily   • NIFEdipine (PROCARDIA XL) 60 mg 24 hr tablet Take 1 tablet (60 mg total) by mouth daily   • Omega-3 Fatty Acids (FISH OIL) 1200 MG CAPS Take 1 capsule by mouth 3 (three) times a day   • tamsulosin (FLOMAX) 0.4 mg TAKE 1 CAPSULE BY MOUTH EVERY DAY WITH DINNER (Patient not taking: Reported on 9/12/2023)       Objective     /66 (BP Location: Left arm, Patient Position: Sitting, Cuff Size: Large)   Pulse 81   Temp 98.5 °F (36.9 °C)   Ht 5' 7.75" (1.721 m)   Wt 121 kg (266 lb 6.4 oz)   SpO2 96%   BMI 40.81 kg/m²     Physical Exam  Vitals and nursing note reviewed. Constitutional:       General: He is not in acute distress. Appearance: He is well-developed. He is not diaphoretic. HENT:      Head: Normocephalic and atraumatic. Eyes:      General:         Right eye: No discharge.       Conjunctiva/sclera: Conjunctivae normal.      Pupils: Pupils are equal, round, and reactive to light.   Neck:      Thyroid: No thyromegaly. Cardiovascular:      Rate and Rhythm: Normal rate and regular rhythm. Pulmonary:      Effort: Pulmonary effort is normal. No respiratory distress. Breath sounds: Normal breath sounds. Musculoskeletal:      Cervical back: Normal range of motion. Lymphadenopathy:      Cervical: No cervical adenopathy. Skin:     General: Skin is warm and dry. Neurological:      Mental Status: He is alert and oriented to person, place, and time. Psychiatric:         Behavior: Behavior normal.         Thought Content:  Thought content normal.         Judgment: Judgment normal.       Keshawn Escalona, DO

## 2023-09-12 NOTE — PATIENT INSTRUCTIONS
Medicare Preventive Visit Patient Instructions  Thank you for completing your Welcome to Medicare Visit or Medicare Annual Wellness Visit today. Your next wellness visit will be due in one year (9/12/2024). The screening/preventive services that you may require over the next 5-10 years are detailed below. Some tests may not apply to you based off risk factors and/or age. Screening tests ordered at today's visit but not completed yet may show as past due. Also, please note that scanned in results may not display below. Preventive Screenings:  Service Recommendations Previous Testing/Comments   Colorectal Cancer Screening  · Colonoscopy    · Fecal Occult Blood Test (FOBT)/Fecal Immunochemical Test (FIT)  · Fecal DNA/Cologuard Test  · Flexible Sigmoidoscopy Age: 43-73 years old   Colonoscopy: every 10 years (May be performed more frequently if at higher risk)  OR  FOBT/FIT: every 1 year  OR  Cologuard: every 3 years  OR  Sigmoidoscopy: every 5 years  Screening may be recommended earlier than age 39 if at higher risk for colorectal cancer. Also, an individualized decision between you and your healthcare provider will decide whether screening between the ages of 77-80 would be appropriate.  Colonoscopy: 01/06/2017  FOBT/FIT: 02/25/2023  Cologuard: Not on file  Sigmoidoscopy: Not on file          Prostate Cancer Screening Individualized decision between patient and health care provider in men between ages of 53-66   Medicare will cover every 12 months beginning on the day after your 50th birthday PSA: 2.5 ng/mL     Screening Not Indicated     Hepatitis C Screening Once for adults born between 1945 and 1965  More frequently in patients at high risk for Hepatitis C Hep C Antibody: Not on file        Diabetes Screening 1-2 times per year if you're at risk for diabetes or have pre-diabetes Fasting glucose: 169 mg/dL (9/6/2023)  A1C: 6.9 % (9/6/2023)  Screening Current   Cholesterol Screening Once every 5 years if you don't have a lipid disorder. May order more often based on risk factors. Lipid panel: 02/24/2023  Screening Not Indicated  History Lipid Disorder      Other Preventive Screenings Covered by Medicare:  1. Abdominal Aortic Aneurysm (AAA) Screening: covered once if your at risk. You're considered to be at risk if you have a family history of AAA or a male between the age of 70-76 who smoking at least 100 cigarettes in your lifetime. 2. Lung Cancer Screening: covers low dose CT scan once per year if you meet all of the following conditions: (1) Age 48-67; (2) No signs or symptoms of lung cancer; (3) Current smoker or have quit smoking within the last 15 years; (4) You have a tobacco smoking history of at least 20 pack years (packs per day x number of years you smoked); (5) You get a written order from a healthcare provider. 3. Glaucoma Screening: covered annually if you're considered high risk: (1) You have diabetes OR (2) Family history of glaucoma OR (3)  aged 48 and older OR (3)  American aged 72 and older  3. Osteoporosis Screening: covered every 2 years if you meet one of the following conditions: (1) Have a vertebral abnormality; (2) On glucocorticoid therapy for more than 3 months; (3) Have primary hyperparathyroidism; (4) On osteoporosis medications and need to assess response to drug therapy. 5. HIV Screening: covered annually if you're between the age of 14-79. Also covered annually if you are younger than 13 and older than 72 with risk factors for HIV infection. For pregnant patients, it is covered up to 3 times per pregnancy.     Immunizations:  Immunization Recommendations   Influenza Vaccine Annual influenza vaccination during flu season is recommended for all persons aged >= 6 months who do not have contraindications   Pneumococcal Vaccine   * Pneumococcal conjugate vaccine = PCV13 (Prevnar 13), PCV15 (Vaxneuvance), PCV20 (Prevnar 20)  * Pneumococcal polysaccharide vaccine = PPSV23 (Pneumovax) Adults 2364 years old: 1-3 doses may be recommended based on certain risk factors  Adults 72 years old: 1-2 doses may be recommended based off what pneumonia vaccine you previously received   Hepatitis B Vaccine 3 dose series if at intermediate or high risk (ex: diabetes, end stage renal disease, liver disease)   Tetanus (Td) Vaccine - COST NOT COVERED BY MEDICARE PART B Following completion of primary series, a booster dose should be given every 10 years to maintain immunity against tetanus. Td may also be given as tetanus wound prophylaxis. Tdap Vaccine - COST NOT COVERED BY MEDICARE PART B Recommended at least once for all adults. For pregnant patients, recommended with each pregnancy. Shingles Vaccine (Shingrix) - COST NOT COVERED BY MEDICARE PART B  2 shot series recommended in those aged 48 and above     Health Maintenance Due:      Topic Date Due   • Hepatitis C Screening  Never done     Immunizations Due:      Topic Date Due   • COVID-19 Vaccine (3 - Moderna series) 04/13/2021   • Influenza Vaccine (1) 09/01/2023     Advance Directives   What are advance directives? Advance directives are legal documents that state your wishes and plans for medical care. These plans are made ahead of time in case you lose your ability to make decisions for yourself. Advance directives can apply to any medical decision, such as the treatments you want, and if you want to donate organs. What are the types of advance directives? There are many types of advance directives, and each state has rules about how to use them. You may choose a combination of any of the following:  · Living will: This is a written record of the treatment you want. You can also choose which treatments you do not want, which to limit, and which to stop at a certain time. This includes surgery, medicine, IV fluid, and tube feedings. · Durable power of  for healthcare [facility]):   This is a written record that states who you want to make healthcare choices for you when you are unable to make them for yourself. This person, called a proxy, is usually a family member or a friend. You may choose more than 1 proxy. · Do not resuscitate (DNR) order:  A DNR order is used in case your heart stops beating or you stop breathing. It is a request not to have certain forms of treatment, such as CPR. A DNR order may be included in other types of advance directives. · Medical directive: This covers the care that you want if you are in a coma, near death, or unable to make decisions for yourself. You can list the treatments you want for each condition. Treatment may include pain medicine, surgery, blood transfusions, dialysis, IV or tube feedings, and a ventilator (breathing machine). · Values history: This document has questions about your views, beliefs, and how you feel and think about life. This information can help others choose the care that you would choose. Why are advance directives important? An advance directive helps you control your care. Although spoken wishes may be used, it is better to have your wishes written down. Spoken wishes can be misunderstood, or not followed. Treatments may be given even if you do not want them. An advance directive may make it easier for your family to make difficult choices about your care. Weight Management   Why it is important to manage your weight:  Being overweight increases your risk of health conditions such as heart disease, high blood pressure, type 2 diabetes, and certain types of cancer. It can also increase your risk for osteoarthritis, sleep apnea, and other respiratory problems. Aim for a slow, steady weight loss. Even a small amount of weight loss can lower your risk of health problems. How to lose weight safely:  A safe and healthy way to lose weight is to eat fewer calories and get regular exercise.  You can lose up about 1 pound a week by decreasing the number of calories you eat by 500 calories each day. Healthy meal plan for weight management:  A healthy meal plan includes a variety of foods, contains fewer calories, and helps you stay healthy. A healthy meal plan includes the following:  · Eat whole-grain foods more often. A healthy meal plan should contain fiber. Fiber is the part of grains, fruits, and vegetables that is not broken down by your body. Whole-grain foods are healthy and provide extra fiber in your diet. Some examples of whole-grain foods are whole-wheat breads and pastas, oatmeal, brown rice, and bulgur. · Eat a variety of vegetables every day. Include dark, leafy greens such as spinach, kale, klever greens, and mustard greens. Eat yellow and orange vegetables such as carrots, sweet potatoes, and winter squash. · Eat a variety of fruits every day. Choose fresh or canned fruit (canned in its own juice or light syrup) instead of juice. Fruit juice has very little or no fiber. · Eat low-fat dairy foods. Drink fat-free (skim) milk or 1% milk. Eat fat-free yogurt and low-fat cottage cheese. Try low-fat cheeses such as mozzarella and other reduced-fat cheeses. · Choose meat and other protein foods that are low in fat. Choose beans or other legumes such as split peas or lentils. Choose fish, skinless poultry (chicken or turkey), or lean cuts of red meat (beef or pork). Before you cook meat or poultry, cut off any visible fat. · Use less fat and oil. Try baking foods instead of frying them. Add less fat, such as margarine, sour cream, regular salad dressing and mayonnaise to foods. Eat fewer high-fat foods. Some examples of high-fat foods include french fries, doughnuts, ice cream, and cakes. · Eat fewer sweets. Limit foods and drinks that are high in sugar. This includes candy, cookies, regular soda, and sweetened drinks. Exercise:  Exercise at least 30 minutes per day on most days of the week.  Some examples of exercise include walking, biking, dancing, and swimming. You can also fit in more physical activity by taking the stairs instead of the elevator or parking farther away from stores. Ask your healthcare provider about the best exercise plan for you. © Copyright [employer] 2018 Information is for End User's use only and may not be sold, redistributed or otherwise used for commercial purposes.  All illustrations and images included in CareNotes® are the copyrighted property of [employer].D.A[employer] Inc. or [address] [place] [address]

## 2023-11-08 ENCOUNTER — APPOINTMENT (OUTPATIENT)
Dept: LAB | Facility: CLINIC | Age: 78
End: 2023-11-08
Payer: COMMERCIAL

## 2023-11-08 DIAGNOSIS — E87.5 HYPERKALEMIA: ICD-10-CM

## 2023-11-08 DIAGNOSIS — N17.9 AKI (ACUTE KIDNEY INJURY) (HCC): ICD-10-CM

## 2023-11-08 DIAGNOSIS — R39.14 BENIGN PROSTATIC HYPERPLASIA WITH INCOMPLETE BLADDER EMPTYING: ICD-10-CM

## 2023-11-08 DIAGNOSIS — E87.20 METABOLIC ACIDOSIS: ICD-10-CM

## 2023-11-08 DIAGNOSIS — N17.9 ACUTE RENAL FAILURE SUPERIMPOSED ON STAGE 3B CHRONIC KIDNEY DISEASE, UNSPECIFIED ACUTE RENAL FAILURE TYPE (HCC): ICD-10-CM

## 2023-11-08 DIAGNOSIS — N18.32 ACUTE RENAL FAILURE SUPERIMPOSED ON STAGE 3B CHRONIC KIDNEY DISEASE, UNSPECIFIED ACUTE RENAL FAILURE TYPE (HCC): ICD-10-CM

## 2023-11-08 DIAGNOSIS — N18.9 CHRONIC KIDNEY DISEASE, UNSPECIFIED CKD STAGE: ICD-10-CM

## 2023-11-08 DIAGNOSIS — N40.1 BENIGN PROSTATIC HYPERPLASIA WITH INCOMPLETE BLADDER EMPTYING: ICD-10-CM

## 2023-11-08 DIAGNOSIS — E55.9 VITAMIN D DEFICIENCY: ICD-10-CM

## 2023-11-08 LAB
ANION GAP SERPL CALCULATED.3IONS-SCNC: 10 MMOL/L
BUN SERPL-MCNC: 30 MG/DL (ref 5–25)
CALCIUM SERPL-MCNC: 9.5 MG/DL (ref 8.4–10.2)
CHLORIDE SERPL-SCNC: 103 MMOL/L (ref 96–108)
CO2 SERPL-SCNC: 21 MMOL/L (ref 21–32)
CREAT SERPL-MCNC: 1.66 MG/DL (ref 0.6–1.3)
GFR SERPL CREATININE-BSD FRML MDRD: 38 ML/MIN/1.73SQ M
GLUCOSE P FAST SERPL-MCNC: 205 MG/DL (ref 65–99)
POTASSIUM SERPL-SCNC: 4.8 MMOL/L (ref 3.5–5.3)
SODIUM SERPL-SCNC: 134 MMOL/L (ref 135–147)

## 2023-11-08 PROCEDURE — 36415 COLL VENOUS BLD VENIPUNCTURE: CPT

## 2023-11-08 PROCEDURE — 80048 BASIC METABOLIC PNL TOTAL CA: CPT

## 2023-11-16 ENCOUNTER — OFFICE VISIT (OUTPATIENT)
Dept: NEPHROLOGY | Facility: CLINIC | Age: 78
End: 2023-11-16

## 2023-11-16 VITALS
HEIGHT: 68 IN | HEART RATE: 88 BPM | BODY MASS INDEX: 40.77 KG/M2 | DIASTOLIC BLOOD PRESSURE: 78 MMHG | WEIGHT: 269 LBS | SYSTOLIC BLOOD PRESSURE: 148 MMHG

## 2023-11-16 DIAGNOSIS — N18.9 CHRONIC KIDNEY DISEASE, UNSPECIFIED CKD STAGE: Primary | ICD-10-CM

## 2023-11-16 NOTE — PATIENT INSTRUCTIONS
You are here for follow-up I am glad you are feeling well since she got out of the hospital.  You are anxious but I want to reinforce that your kidney function is actually stable your baseline is a little better than you even left the hospital.  The creatinine is the blood test for the kidney function if it is 1 it is normal when I saw you last or the first time I noted that you ranged 1.5-1.7 and the most recent ones been 1.5 and 1.6 so you are doing fine there is no progression. You do not have protein in your urine so that is a good thing because aggressive kidney disease cause protein in the urine. In looking over your imaging of your kidneys by ultrasound you do have a smaller right kidney than your left and the left is actually a little large so I get a feeling that the right kidney although it works is doing less working is not 100% normal and that is probably why you have a little dysfunctional in the blood work but the way to help maintain that is blood pressure control routine health management. Weight loss or at least do not gain weight seated kidneys do not work harder. You been watching your blood sugar and you noticed that the numbers go up and down watch your carbohydrate intake try and lose weight that may help lower the sugar a little bit but your doctor is watching and if you need medication I would consider it but if you want to talk to me about it to me a call. Otherwise labs and follow-up as scheduled no changes    You did tell me I started this tamsulosin and it made you gain weight you stopped it and the weight went away. You feel it did not make a difference in your urination habits and you are emptying so of course it is okay to stop it if you have problems peeing or develop urinary symptoms you can give me a call.

## 2023-11-16 NOTE — PROGRESS NOTES
NEPHROLOGY PROGRESS NOTE    Kade Escobar 77 y.o. male MRN: [id number]  Unit/Bed#:  Encounter: [id number]  Reason for Consult: Chronic renal insufficiency. The patient is here for follow-up he had been admitted to the hospital in between my last visit and now for hyperkalemia and acute kidney injury. He has now been at home says things are fine but he did tell me when I started doxazosin on him to try and relieve prostatism symptoms he developed and gained 25 pounds  Says he was not short of breath and did not swell but he is now off the medication and his weight improved. When he was on it he said it did not really make a difference with his symptoms. ASSESSMENT/PLAN:  1. Renal    The patient has chronic renal insufficiency with no proteinuria his urine protein screen was negative in the past x2. His creatinine is 1.6 so he is still maintaining in his baseline range. His creatinine had been elevated with hospitalization but now he is stable at home feels he is emptying his bladder completely and again creatinines in his baseline range. Given lack of proteinuria he likely has just underlying nephrosclerosis at this point we need to monitor and manage just with blood pressure control routine health management. I do notice that his blood sugars have been running a little bit high and at this point he is on no medications we will try to monitor with his diet control. I told him to watch his carbohydrate intake try and lose some weight but certainly I warned him about symptoms of frequent urination excessive thirst with the sugar will go high to call his doctor and this will be monitored. I said at some point he may need medications but that would really be to help reduce risk of diabetic complications including kidney disease in the past so would be worth it if it is indicated.     Continue current medications  Labs and follow-up as scheduled call if there is any problems before next visit    I have spent a total time of 32 minutes on 11/16/23 in caring for this patient including Diagnostic results, Prognosis, Impressions, Reviewing / ordering tests, medicine, procedures  , and Obtaining or reviewing history  . SUBJECTIVE:  Review of Systems   Constitutional: Negative for chills, diaphoresis, fever and malaise/fatigue. HENT: Negative. Eyes: Negative. Cardiovascular:  Negative for chest pain, dyspnea on exertion, leg swelling and orthopnea. Respiratory: Negative. Negative for cough, shortness of breath, sputum production and wheezing. Gastrointestinal:  Negative for abdominal pain, diarrhea, nausea and vomiting. Genitourinary:  Negative for dysuria, flank pain, hematuria and incomplete emptying. Neurological:  Negative for dizziness, focal weakness, headaches and weakness. Psychiatric/Behavioral:  Negative for altered mental status, hallucinations and hypervigilance. The patient is not nervous/anxious. OBJECTIVE:  Current Weight: Weight - Scale: 122 kg (269 lb)  [email or web address]:     Height 5' 7.75" (1.721 m), weight 122 kg (269 lb). , Body mass index is 41.2 kg/m². [unfilled]    Physical Exam: Ht 5' 7.75" (1.721 m)   Wt 122 kg (269 lb)   BMI 41.20 kg/m²   Physical Exam  Constitutional:       General: He is not in acute distress. Appearance: He is not ill-appearing or toxic-appearing. HENT:      Head: Normocephalic and atraumatic. Nose: Nose normal.      Mouth/Throat:      Mouth: Mucous membranes are moist.   Eyes:      General: No scleral icterus. Extraocular Movements: Extraocular movements intact. Cardiovascular:      Rate and Rhythm: Normal rate and regular rhythm. Heart sounds: No friction rub. No gallop. Pulmonary:      Effort: Pulmonary effort is normal. No respiratory distress. Breath sounds: No wheezing, rhonchi or rales. Abdominal:      General: Bowel sounds are normal. There is no distension.       Palpations: Abdomen is soft. Tenderness: There is no abdominal tenderness. There is no rebound. Musculoskeletal:      Cervical back: Normal range of motion and neck supple. Neurological:      General: No focal deficit present. Mental Status: He is alert and oriented to person, place, and time. Mental status is at baseline. Psychiatric:         Mood and Affect: Mood normal.         Behavior: Behavior normal.         Thought Content:  Thought content normal.         Judgment: Judgment normal.         Medications:    Current Outpatient Medications:     allopurinol (ZYLOPRIM) 300 mg tablet, TAKE ONE TABLET BY MOUTH EVERY DAY, Disp: 90 tablet, Rfl: 2    aspirin 81 MG tablet, Take 81 mg by mouth daily, Disp: , Rfl:     Cholecalciferol (VITAMIN D3) 5000 units TABS, Take 1 tablet by mouth daily, Disp: , Rfl:     NIFEdipine (PROCARDIA XL) 60 mg 24 hr tablet, Take 1 tablet (60 mg total) by mouth daily, Disp: 90 tablet, Rfl: 2    Omega-3 Fatty Acids (FISH OIL) 1200 MG CAPS, Take 1 capsule by mouth 3 (three) times a day, Disp: , Rfl:     tamsulosin (FLOMAX) 0.4 mg, TAKE 1 CAPSULE BY MOUTH EVERY DAY WITH DINNER (Patient not taking: Reported on 9/12/2023), Disp: 90 capsule, Rfl: 2    Laboratory Results:  Lab Results   Component Value Date    WBC 9.15 02/25/2023    HGB 10.3 (L) 02/25/2023    HCT 32.0 (L) 02/25/2023     (H) 02/25/2023     (L) 02/25/2023     Lab Results   Component Value Date    SODIUM 134 (L) 11/08/2023    K 4.8 11/08/2023     11/08/2023    CO2 21 11/08/2023    BUN 30 (H) 11/08/2023    CREATININE 1.66 (H) 11/08/2023    GLUC 89 02/26/2023    CALCIUM 9.5 11/08/2023     Lab Results   Component Value Date    CALCIUM 9.5 11/08/2023    PHOS 3.6 01/11/2020     No results found for: "LABPROT"

## 2023-11-16 NOTE — LETTER
November 16, 2023     Tiffani Shaw DO  [address]  [address] [place] [address]    Patient: Jamey Kim   YOB: 1945   Date of Visit: 11/16/2023       Dear Dr. Lelo Lares: Thank you for referring Meenakshi Harden to me for evaluation. Below are my notes for this consultation. If you have questions, please do not hesitate to call me. I look forward to following your patient along with you. Sincerely,        Christopher Hollingsworth MD        CC: No Recipients    Christopher Hollingsworth MD  11/16/2023 12:57 PM  Sign when Signing Visit  NEPHROLOGY PROGRESS NOTE    Jamey Kim 77 y.o. male MRN: [id number]  Unit/Bed#:  Encounter: [id number]  Reason for Consult: Chronic renal insufficiency. The patient is here for follow-up he had been admitted to the hospital in between my last visit and now for hyperkalemia and acute kidney injury. He has now been at home says things are fine but he did tell me when I started doxazosin on him to try and relieve prostatism symptoms he developed and gained 25 pounds  Says he was not short of breath and did not swell but he is now off the medication and his weight improved. When he was on it he said it did not really make a difference with his symptoms. ASSESSMENT/PLAN:  1. Renal    The patient has chronic renal insufficiency with no proteinuria his urine protein screen was negative in the past x2. His creatinine is 1.6 so he is still maintaining in his baseline range. His creatinine had been elevated with hospitalization but now he is stable at home feels he is emptying his bladder completely and again creatinines in his baseline range. Given lack of proteinuria he likely has just underlying nephrosclerosis at this point we need to monitor and manage just with blood pressure control routine health management.     I do notice that his blood sugars have been running a little bit high and at this point he is on no medications we will try to monitor with his diet control. I told him to watch his carbohydrate intake try and lose some weight but certainly I warned him about symptoms of frequent urination excessive thirst with the sugar will go high to call his doctor and this will be monitored. I said at some point he may need medications but that would really be to help reduce risk of diabetic complications including kidney disease in the past so would be worth it if it is indicated. Continue current medications  Labs and follow-up as scheduled call if there is any problems before next visit    I have spent a total time of 32 minutes on 11/16/23 in caring for this patient including Diagnostic results, Prognosis, Impressions, Reviewing / ordering tests, medicine, procedures  , and Obtaining or reviewing history  . SUBJECTIVE:  Review of Systems   Constitutional: Negative for chills, diaphoresis, fever and malaise/fatigue. HENT: Negative. Eyes: Negative. Cardiovascular:  Negative for chest pain, dyspnea on exertion, leg swelling and orthopnea. Respiratory: Negative. Negative for cough, shortness of breath, sputum production and wheezing. Gastrointestinal:  Negative for abdominal pain, diarrhea, nausea and vomiting. Genitourinary:  Negative for dysuria, flank pain, hematuria and incomplete emptying. Neurological:  Negative for dizziness, focal weakness, headaches and weakness. Psychiatric/Behavioral:  Negative for altered mental status, hallucinations and hypervigilance. The patient is not nervous/anxious. OBJECTIVE:  Current Weight: Weight - Scale: 122 kg (269 lb)  [email or web address]:     Height 5' 7.75" (1.721 m), weight 122 kg (269 lb). , Body mass index is 41.2 kg/m². [unfilled]    Physical Exam: Ht 5' 7.75" (1.721 m)   Wt 122 kg (269 lb)   BMI 41.20 kg/m²   Physical Exam  Constitutional:       General: He is not in acute distress. Appearance: He is not ill-appearing or toxic-appearing.    HENT:      Head: Normocephalic and atraumatic. Nose: Nose normal.      Mouth/Throat:      Mouth: Mucous membranes are moist.   Eyes:      General: No scleral icterus. Extraocular Movements: Extraocular movements intact. Cardiovascular:      Rate and Rhythm: Normal rate and regular rhythm. Heart sounds: No friction rub. No gallop. Pulmonary:      Effort: Pulmonary effort is normal. No respiratory distress. Breath sounds: No wheezing, rhonchi or rales. Abdominal:      General: Bowel sounds are normal. There is no distension. Palpations: Abdomen is soft. Tenderness: There is no abdominal tenderness. There is no rebound. Musculoskeletal:      Cervical back: Normal range of motion and neck supple. Neurological:      General: No focal deficit present. Mental Status: He is alert and oriented to person, place, and time. Mental status is at baseline. Psychiatric:         Mood and Affect: Mood normal.         Behavior: Behavior normal.         Thought Content:  Thought content normal.         Judgment: Judgment normal.         Medications:    Current Outpatient Medications:   •  allopurinol (ZYLOPRIM) 300 mg tablet, TAKE ONE TABLET BY MOUTH EVERY DAY, Disp: 90 tablet, Rfl: 2  •  aspirin 81 MG tablet, Take 81 mg by mouth daily, Disp: , Rfl:   •  Cholecalciferol (VITAMIN D3) 5000 units TABS, Take 1 tablet by mouth daily, Disp: , Rfl:   •  NIFEdipine (PROCARDIA XL) 60 mg 24 hr tablet, Take 1 tablet (60 mg total) by mouth daily, Disp: 90 tablet, Rfl: 2  •  Omega-3 Fatty Acids (FISH OIL) 1200 MG CAPS, Take 1 capsule by mouth 3 (three) times a day, Disp: , Rfl:   •  tamsulosin (FLOMAX) 0.4 mg, TAKE 1 CAPSULE BY MOUTH EVERY DAY WITH DINNER (Patient not taking: Reported on 9/12/2023), Disp: 90 capsule, Rfl: 2    Laboratory Results:  Lab Results   Component Value Date    WBC 9.15 02/25/2023    HGB 10.3 (L) 02/25/2023    HCT 32.0 (L) 02/25/2023     (H) 02/25/2023     (L) 02/25/2023     Lab Results   Component Value Date    SODIUM 134 (L) 11/08/2023    K 4.8 11/08/2023     11/08/2023    CO2 21 11/08/2023    BUN 30 (H) 11/08/2023    CREATININE 1.66 (H) 11/08/2023    GLUC 89 02/26/2023    CALCIUM 9.5 11/08/2023     Lab Results   Component Value Date    CALCIUM 9.5 11/08/2023    PHOS 3.6 01/11/2020     No results found for: "LABPROT"

## 2023-11-17 DIAGNOSIS — I10 ESSENTIAL HYPERTENSION: ICD-10-CM

## 2023-11-17 RX ORDER — NIFEDIPINE 60 MG/1
60 TABLET, EXTENDED RELEASE ORAL DAILY
Qty: 90 TABLET | Refills: 1 | Status: SHIPPED | OUTPATIENT
Start: 2023-11-17

## 2023-11-17 NOTE — TELEPHONE ENCOUNTER
Last office visit: 9/12/23  Last refilled: 3/7/23 #90 x 3 refills  Last labs: 11/16/23 & 9/12/23  Next office visit: 3/18/2024

## 2023-12-20 DIAGNOSIS — E79.0 HYPERURICEMIA: ICD-10-CM

## 2023-12-20 RX ORDER — ALLOPURINOL 300 MG/1
300 TABLET ORAL DAILY
Qty: 90 TABLET | Refills: 2 | Status: SHIPPED | OUTPATIENT
Start: 2023-12-20

## 2023-12-21 ENCOUNTER — OFFICE VISIT (OUTPATIENT)
Dept: URGENT CARE | Facility: CLINIC | Age: 78
End: 2023-12-21
Payer: COMMERCIAL

## 2023-12-21 VITALS
OXYGEN SATURATION: 98 % | HEART RATE: 95 BPM | SYSTOLIC BLOOD PRESSURE: 138 MMHG | TEMPERATURE: 98 F | DIASTOLIC BLOOD PRESSURE: 76 MMHG | RESPIRATION RATE: 22 BRPM

## 2023-12-21 DIAGNOSIS — B96.89 ACUTE BACTERIAL BRONCHITIS: Primary | ICD-10-CM

## 2023-12-21 DIAGNOSIS — J20.8 ACUTE BACTERIAL BRONCHITIS: Primary | ICD-10-CM

## 2023-12-21 PROCEDURE — 99213 OFFICE O/P EST LOW 20 MIN: CPT | Performed by: NURSE PRACTITIONER

## 2023-12-21 PROCEDURE — S9088 SERVICES PROVIDED IN URGENT: HCPCS | Performed by: NURSE PRACTITIONER

## 2023-12-21 RX ORDER — AMOXICILLIN AND CLAVULANATE POTASSIUM 875; 125 MG/1; MG/1
1 TABLET, FILM COATED ORAL EVERY 12 HOURS SCHEDULED
Qty: 14 TABLET | Refills: 0 | Status: SHIPPED | OUTPATIENT
Start: 2023-12-21 | End: 2023-12-28

## 2023-12-21 NOTE — PROGRESS NOTES
[facility]        NAME: Irineo Rawls is a 78 y.o. male  : 1945    MRN: [id number]  DATE: 2023  TIME: 6:46 PM    Assessment and Plan   Acute bacterial bronchitis [J20.8, B96.89]  1. Acute bacterial bronchitis  amoxicillin-clavulanate (AUGMENTIN) 875-125 mg per tablet        Start course of Augmentin for bacterial bronchitis.  Recommend proceed to ER with new or worsening symptoms.  Follow-up with PCP.  Patient and wife in agreement plan.    Patient Instructions     Follow up with PCP in 3-5 days.  Proceed to  ER if symptoms worsen.    Chief Complaint     Chief Complaint   Patient presents with    Cough     Pt with wife, C/O Shortness of breath that increases with ambulation. Pt reports fatigued dizzy and using a cane for balance. Wife reports was ill kat about 3 weeks ago, pt started with cold like symptoms 2 weeks ago. Pt now with productive cough with thick, white/yellowing phellem.          History of Present Illness   Irineo Rawls presents to the clinic c/o    Cough (Pt with wife, C/O Shortness of breath that increases with ambulation. Pt reports fatigued dizzy and using a cane for balance. Wife reports was ill kat about 3 weeks ago, pt started with cold like symptoms 2 weeks ago. Pt now with productive cough with thick, white/yellowing phellem. )        Review of Systems   Review of Systems   All other systems reviewed and are negative.        Current Medications     Long-Term Medications   Medication Sig Dispense Refill    allopurinol (ZYLOPRIM) 300 mg tablet Take 1 tablet (300 mg total) by mouth daily 90 tablet 2    aspirin 81 MG tablet Take 81 mg by mouth daily      Cholecalciferol (VITAMIN D3) 5000 units TABS Take 1 tablet by mouth daily      NIFEdipine (PROCARDIA XL) 60 mg 24 hr tablet Take 1 tablet (60 mg total) by mouth daily 90 tablet 1    Omega-3 Fatty Acids (FISH OIL) 1200 MG CAPS Take 1 capsule by mouth 3 (three) times a day      tamsulosin (FLOMAX) 0.4  mg TAKE 1 CAPSULE BY MOUTH EVERY DAY WITH DINNER (Patient not taking: Reported on 9/12/2023) 90 capsule 2       Current Allergies     Allergies as of 12/21/2023    (No Known Allergies)            The following portions of the patient's history were reviewed and updated as appropriate: allergies, current medications, past family history, past medical history, past social history, past surgical history and problem list.    Objective   /76 (BP Location: Right arm, Patient Position: Sitting, Cuff Size: Large)   Pulse 95   Temp 98 °F (36.7 °C) (Tympanic)   Resp 22   SpO2 98%        Physical Exam     Physical Exam  Vitals and nursing note reviewed.   Constitutional:       Appearance: Normal appearance. He is well-developed.   HENT:      Head: Normocephalic and atraumatic.      Right Ear: Tympanic membrane, ear canal and external ear normal.      Left Ear: Tympanic membrane, ear canal and external ear normal.      Nose: Congestion present.      Mouth/Throat:      Mouth: Mucous membranes are moist.   Eyes:      General: Lids are normal.      Conjunctiva/sclera: Conjunctivae normal.      Pupils: Pupils are equal, round, and reactive to light.   Cardiovascular:      Rate and Rhythm: Normal rate and regular rhythm.      Pulses: Normal pulses.      Heart sounds: Normal heart sounds, S1 normal and S2 normal.   Pulmonary:      Effort: Pulmonary effort is normal.      Breath sounds: Rhonchi present. No wheezing.   Musculoskeletal:      Cervical back: Normal range of motion.   Skin:     General: Skin is warm and dry.   Neurological:      Mental Status: He is alert.   Psychiatric:         Speech: Speech normal.         Behavior: Behavior normal.         Thought Content: Thought content normal.         Judgment: Judgment normal.

## 2023-12-29 ENCOUNTER — OFFICE VISIT (OUTPATIENT)
Dept: URGENT CARE | Facility: CLINIC | Age: 78
End: 2023-12-29
Payer: COMMERCIAL

## 2023-12-29 VITALS
HEART RATE: 78 BPM | TEMPERATURE: 98.2 F | SYSTOLIC BLOOD PRESSURE: 134 MMHG | OXYGEN SATURATION: 94 % | RESPIRATION RATE: 20 BRPM | DIASTOLIC BLOOD PRESSURE: 76 MMHG

## 2023-12-29 DIAGNOSIS — R05.1 ACUTE COUGH: ICD-10-CM

## 2023-12-29 DIAGNOSIS — J20.9 ACUTE BRONCHITIS, UNSPECIFIED ORGANISM: Primary | ICD-10-CM

## 2023-12-29 PROCEDURE — 99213 OFFICE O/P EST LOW 20 MIN: CPT

## 2023-12-29 PROCEDURE — S9088 SERVICES PROVIDED IN URGENT: HCPCS

## 2023-12-29 RX ORDER — ALBUTEROL SULFATE 90 UG/1
2 AEROSOL, METERED RESPIRATORY (INHALATION) EVERY 6 HOURS PRN
Qty: 8.5 G | Refills: 0 | Status: ON HOLD | OUTPATIENT
Start: 2023-12-29

## 2023-12-29 NOTE — PROGRESS NOTES
"  [facility]        NAME: Irineo Rawls is a 78 y.o. male  : 1945    MRN: [id number]  DATE: 2023  TIME: 6:55 PM    Assessment and Plan   Acute bronchitis, unspecified organism [J20.9]  1. Acute bronchitis, unspecified organism  albuterol (ProAir HFA) 90 mcg/act inhaler      2. Acute cough  albuterol (ProAir HFA) 90 mcg/act inhaler          Patient refused CXR    Discussed with patient at length my recommendation to seek further evaluation and treatment in the ER. Patient refused and states \"I definitely am not going to go to the ER\".    Patient Instructions     Start albuterol as prescribed as prescribed  Vitamin D3 2000 IU daily  Vitamin C 1000mg twice per day  Multivitamin daily  Fluids and rest  Over the counter cold medication as needed (EX: Coricidin HBP, tylenol/motrin)  Follow up with PCP in 3-5 days.  Proceed to  ER if symptoms worsen.    Chief Complaint     Chief Complaint   Patient presents with    Cold Like Symptoms     Patient states that he has chest congestion and a bad cough. Finished antibiotic.Trouble sleeping with cpap at night.         History of Present Illness       Patient is a 77 yo male with no significant PMH presenting in the clinic today for cold sx x 3 weeks. Patient was seen in the clinic on 23, diagnosed with bronchitis, and treated with Augmentin.  Patient notes his sx have been gradually improving since onset. Admits cough, congestion, fatigue, and decreased appetite. Denies fever, chills, body aches, chest pain, SOB, abdominal pain, n/v/d. Admits the use of Augmentin and Mucinex for sx management. Positive recent sick contacts at home.        Review of Systems   Review of Systems   Constitutional:  Positive for appetite change and fatigue. Negative for chills and fever.   HENT:  Positive for congestion. Negative for ear pain, postnasal drip, rhinorrhea, sinus pressure, sinus pain and sore throat.    Respiratory:  Positive for cough. Negative " for shortness of breath.    Cardiovascular:  Negative for chest pain.   Gastrointestinal:  Negative for abdominal pain, diarrhea, nausea and vomiting.   Musculoskeletal:  Negative for myalgias.   Skin:  Negative for rash.   Neurological:  Negative for headaches.         Current Medications       Current Outpatient Medications:     albuterol (ProAir HFA) 90 mcg/act inhaler, Inhale 2 puffs every 6 (six) hours as needed for wheezing, Disp: 8.5 g, Rfl: 0    allopurinol (ZYLOPRIM) 300 mg tablet, Take 1 tablet (300 mg total) by mouth daily, Disp: 90 tablet, Rfl: 2    aspirin 81 MG tablet, Take 81 mg by mouth daily, Disp: , Rfl:     Cholecalciferol (VITAMIN D3) 5000 units TABS, Take 1 tablet by mouth daily, Disp: , Rfl:     NIFEdipine (PROCARDIA XL) 60 mg 24 hr tablet, Take 1 tablet (60 mg total) by mouth daily, Disp: 90 tablet, Rfl: 1    Omega-3 Fatty Acids (FISH OIL) 1200 MG CAPS, Take 1 capsule by mouth 3 (three) times a day, Disp: , Rfl:     tamsulosin (FLOMAX) 0.4 mg, TAKE 1 CAPSULE BY MOUTH EVERY DAY WITH DINNER (Patient not taking: Reported on 9/12/2023), Disp: 90 capsule, Rfl: 2    Current Allergies     Allergies as of 12/29/2023    (No Known Allergies)            The following portions of the patient's history were reviewed and updated as appropriate: allergies, current medications, past family history, past medical history, past social history, past surgical history and problem list.     Past Medical History:   Diagnosis Date    Acute renal failure superimposed on stage 3 chronic kidney disease (HCC) 2/25/2023    MATILDE (acute kidney injury) (HCC)     Chronic kidney disease     CPAP (continuous positive airway pressure) dependence     Gout     Hearing aid worn     sabrina    Hernia, inguinal     RIH and umbilical hernia    Hypertension     Metabolic acidosis     Morbidly obese (HCC)     Sleep apnea     Wears glasses        Past Surgical History:   Procedure Laterality Date    CARPAL TUNNEL RELEASE Bilateral      COLONOSCOPY N/A 1/6/2017    Procedure: COLONOSCOPY;  Surgeon: João Perez MD;  Location: [facility];  Service:     CYST REMOVAL      FOOT SURGERY      HERNIA REPAIR      JOINT REPLACEMENT      knee left     KY LAPAROSCOPY SURG RPR INITIAL INGUINAL HERNIA Right 3/16/2017    Procedure: REPAIR HERNIA INGUINAL, LAPAROSCOPIC WITH MESH;  Surgeon: Ortiz Salcedo MD;  Location: [facility];  Service: General    KY RPR UMBILICAL HRNA 5 YRS/> REDUCIBLE N/A 3/16/2017    Procedure: OPEN REPAIR HERNIA UMBILICAL;  Surgeon: Ortiz Salcedo MD;  Location: [facility];  Service: General    TONSILLECTOMY         Family History   Problem Relation Age of Onset    No Known Problems Mother     Diabetes Father     Diabetes Sister          Medications have been verified.        Objective   /76   Pulse 78   Temp 98.2 °F (36.8 °C) (Tympanic)   Resp 20   SpO2 94%        Physical Exam     Physical Exam  Vitals reviewed.   Constitutional:       General: He is not in acute distress.     Appearance: Normal appearance. He is normal weight. He is not ill-appearing.   HENT:      Head: Normocephalic.      Right Ear: Hearing, tympanic membrane, ear canal and external ear normal. No middle ear effusion. There is no impacted cerumen. Tympanic membrane is not erythematous or bulging.      Left Ear: Hearing, tympanic membrane, ear canal and external ear normal.  No middle ear effusion. There is no impacted cerumen. Tympanic membrane is not erythematous or bulging.      Nose: Congestion present. No rhinorrhea.      Right Sinus: No maxillary sinus tenderness or frontal sinus tenderness.      Left Sinus: No maxillary sinus tenderness or frontal sinus tenderness.      Mouth/Throat:      Lips: Pink.      Mouth: Mucous membranes are moist.      Pharynx: Oropharynx is clear. Uvula midline. No pharyngeal swelling, oropharyngeal exudate, posterior oropharyngeal erythema or uvula swelling.      Tonsils: No tonsillar exudate or tonsillar abscesses. 1+ on  the right. 1+ on the left.   Eyes:      General:         Right eye: No discharge.         Left eye: No discharge.      Conjunctiva/sclera: Conjunctivae normal.   Cardiovascular:      Rate and Rhythm: Normal rate and regular rhythm.      Pulses: Normal pulses.      Heart sounds: Normal heart sounds. No murmur heard.     No friction rub. No gallop.   Pulmonary:      Effort: Pulmonary effort is normal. No respiratory distress.      Breath sounds: No stridor. Rhonchi present. No wheezing or rales.   Musculoskeletal:      Cervical back: Normal range of motion and neck supple. No tenderness.   Lymphadenopathy:      Cervical: No cervical adenopathy.   Skin:     General: Skin is warm.      Findings: No rash.   Neurological:      Mental Status: He is alert.   Psychiatric:         Mood and Affect: Mood normal.         Behavior: Behavior normal.

## 2023-12-29 NOTE — PATIENT INSTRUCTIONS
Start albuterol as prescribed as prescribed  Vitamin D3 2000 IU daily  Vitamin C 1000mg twice per day  Multivitamin daily  Fluids and rest  Over the counter cold medication as needed (EX: Coricidin HBP, tylenol/motrin)  Follow up with PCP in 3-5 days.  Proceed to ER if symptoms worsen.

## 2024-01-01 NOTE — PROGRESS NOTES
RECORD     [x] Admission Note          [] Progress Note          [] Discharge Summary     AYLIN Sandoval is a well-appearing male infant born on 2025 at 9:40 AM via  . His mother is a 39 y.o.   . Prenatal serologies were negative. GBS was negative. ROM occurred 5h 05m  prior to delivery. Prenatal course unremarkable. Delivery was uncomplicated. Presentation was  . APGAR scores were  and  at one and five minutes, respectively. Birth Weight: N/A which is appropriate for his gestational age. Birth Length: N/A. Birth Head Circumference: N/A.       History     Mother's Prenatal Labs  ABO / Rh Lab Results   Component Value Date/Time    ABORH B POSITIVE 2024 11:34 AM       HIV Lab Results   Component Value Date/Time    HIVEXTERN Non-Reactive 2023 12:00 AM       RPR / TP-PA Lab Results   Component Value Date/Time    TREPPALEXT Non-Reactive 2023 12:00 AM       Rubella Lab Results   Component Value Date/Time    RUBEXTERN Immune 2023 12:00 AM       HBsAg Lab Results   Component Value Date/Time    HEPBEXTERN Negative 2023 12:00 AM       C. Trachomatis Lab Results   Component Value Date/Time    CTRACHEXT Negative 2023 12:00 AM       N. Gonorrhoeae Lab Results   Component Value Date/Time    GONEXTERN Negative 2023 12:00 AM       Group B Strep Lab Results   Component Value Date/Time    GBSEXTERN Negative 2024 12:00 AM         ABO / Rh B pos   HIV Negative   RPR / TP-PA Negative   Rubella Immune   HBsAg Negative   C. Trachomatis Negative   N. Gonorrhoeae Negative   Group B Strep Negative     Mother's Medical History  Past Medical History:   Diagnosis Date    Abnormal Pap smear of cervix     Anemia     Asthma     Herpes simplex virus (HSV) infection     Hypertension     Mental disorder     Thyroid disease     Trauma         Current Outpatient Medications   Medication Instructions    aspirin 81 mg, Oral, DAILY    levothyroxine (SYNTHROID) 75 mcg, Oral,  [facility]      NAME: Thomas Moura  AGE: 67 y o  SEX: male  : 1945   MRN: [id number]    DATE: 2021  TIME: 8:37 AM    Assessment and Plan     Problem List Items Addressed This Visit     Vitamin D deficiency    Obesity, morbid ([address] )    Hyperuricemia     Continue allopurinol 100 mg daily  Uric acid 4 5         Relevant Medications    allopurinol (ZYLOPRIM) 300 mg tablet    Essential hypertriglyceridemia     Well controlled presently with triglycerides of 285 however total cholesterol 149 and LDL and HDL are at goal          Essential hypertension     Well controlled on lisinopril hydrochlorothiazide and nifedipine  Relevant Medications    lisinopril-hydrochlorothiazide (PRINZIDE,ZESTORETIC) 20-12 5 MG per tablet    NIFEdipine (PROCARDIA XL) 60 mg 24 hr tablet    Other Relevant Orders    TSH, 3rd generation with Free T4 reflex    Chronic kidney disease, stage III (moderate)     Lab Results   Component Value Date    EGFR 37 2020    EGFR 42 2020    EGFR 42 2019    CREATININE 1 76 (H) 2020    CREATININE 1 60 (H) 2020    CREATININE 1 60 (H) 2019   Creatinine has increased to 1 75  Encourage patient to be seen at Nephrology but he refuses at this time  Relevant Orders    Comprehensive metabolic panel    Abnormal serum glucose level     Hemoglobin A1c improved to 5 8  Continue with current diet management  Relevant Orders    Hemoglobin A1C      Other Visit Diagnoses     Medicare annual wellness visit, subsequent    -  Primary      Questionnaire reviewed  Immunization and screening history updated  Advance directive in place  Return to office in:  6 months    Chief Complaint     Chief Complaint   Patient presents with    Medicare Wellness Visit    Follow-up     6 months       History of Present Illness     Patient was seen for routine follow-up of chronic medical problems    He is being treated for hyper lipidemia, hypertension, hyperuricemia, increase serum glucose level as well as chronic kidney disease  He has no complaints at this time and feels relatively well  He is taking allopurinol to manage his uric acid  He takes lisinopril hydrochlorothiazide and nifedipine for his blood pressure  He is on no medications for lipids or his blood sugar  He saw all Nephrology several years ago for his, chronic kidney disease but did not return  States that he is not interested in going back to a specialist again at this time  The following portions of the patient's history were reviewed and updated as appropriate: allergies, current medications, past family history, past medical history, past social history, past surgical history and problem list     Review of Systems   Review of Systems   Constitutional: Negative  Respiratory: Negative  Cardiovascular: Negative  Gastrointestinal: Negative  Genitourinary: Negative  Musculoskeletal: Negative  Psychiatric/Behavioral: Negative  Active Problem List     Patient Active Problem List   Diagnosis    Abnormal serum glucose level    Chronic kidney disease, stage III (moderate)    Essential hypertriglyceridemia    Gout    KALIA (obstructive sleep apnea)    Essential hypertension    Vitamin D deficiency    Knee pain    Localized, primary osteoarthritis    Osteoarthritis of knee    Hyperuricemia    Rectus diastasis    Obesity, morbid (HCC)       Objective   /80 (BP Location: Left arm, Patient Position: Sitting, Cuff Size: Large)   Pulse 95   Temp (!) 97 4 °F (36 3 °C) (Tympanic)   Resp 18   Ht 5' 7 72" (1 72 m)   Wt 120 kg (265 lb)   SpO2 94%   BMI 40 63 kg/m²     Physical Exam  Vitals signs and nursing note reviewed  Constitutional:       General: He is not in acute distress  Appearance: He is well-developed  He is not diaphoretic  HENT:      Head: Normocephalic and atraumatic     Eyes:      General:         Right eye: No discharge  Conjunctiva/sclera: Conjunctivae normal       Pupils: Pupils are equal, round, and reactive to light  Neck:      Musculoskeletal: Normal range of motion  Thyroid: No thyromegaly  Cardiovascular:      Rate and Rhythm: Normal rate and regular rhythm  Pulmonary:      Effort: Pulmonary effort is normal  No respiratory distress  Breath sounds: Normal breath sounds  Lymphadenopathy:      Cervical: No cervical adenopathy  Skin:     General: Skin is warm and dry  Neurological:      Mental Status: He is alert and oriented to person, place, and time  Psychiatric:         Behavior: Behavior normal          Thought Content:  Thought content normal          Judgment: Judgment normal            Current Medications     Current Outpatient Medications:     allopurinol (ZYLOPRIM) 300 mg tablet, Take 1 tablet (300 mg total) by mouth daily, Disp: 90 tablet, Rfl: 2    aspirin 81 MG tablet, Take 81 mg by mouth daily, Disp: , Rfl:     Cholecalciferol (VITAMIN D3) 5000 units TABS, Take 1 tablet by mouth daily, Disp: , Rfl:     lisinopril-hydrochlorothiazide (PRINZIDE,ZESTORETIC) 20-12 5 MG per tablet, Take 2 tablets by mouth daily, Disp: 180 tablet, Rfl: 2    NIFEdipine (PROCARDIA XL) 60 mg 24 hr tablet, Take 1 tablet (60 mg total) by mouth daily, Disp: 90 tablet, Rfl: 2    Omega-3 Fatty Acids (FISH OIL) 1200 MG CAPS, Take 1 capsule by mouth 3 (three) times a day, Disp: , Rfl:     Health Maintenance     Health Maintenance   Topic Date Due    DTaP,Tdap,and Td Vaccines (1 - Tdap) 07/05/1965    Medicare Annual Wellness Visit (AWV)  01/24/2021    BMI: Followup Plan  01/24/2021    COVID-19 Vaccine (2 of 2 - Moderna series) 02/16/2021    Fall Risk  02/05/2022    Depression Screening PHQ  02/05/2022    BMI: Adult  02/05/2022    Pneumococcal Vaccine: 65+ Years  Completed    Influenza Vaccine  Completed    HIB Vaccine  Aged Out    Hepatitis B Vaccine  Aged Out    IPV Vaccine  Aged C/ Dejuan Stephen 19 or tags.   Nose Nares normal. Septum midline. Mucosa normal.   Throat Lips, mucosa, and tongue normal. Palate intact.   Neck Normal structure.   Back   Symmetric, no evidence of spinal defect.   Lungs   Clear to auscultation bilaterally.    Chest Wall  Symmetric movement with respiration. No retractions.   Heart  Regular rate and rhythm, S1, S2 normal, no murmur.   Abdomen   Soft, non-tender. Bowel sounds active. No masses or organomegaly.   Genitalia  Normal external male genitalia.    Rectal  Appropriately positioned and patent anal opening.    MSK No clavicular crepitus. Negative Ely and Ortolani. Leg lengths grossly symmetric. Five fingers on each hand and five toes on each foot.   Pulses 2+ and symmetric.   Skin Normal in color. No rashes or lesions   Neurologic Normal tone. Root, suck, grasp, and Yusuf reflexes present. Moves all extremities equally.          Assessment     AYLIN Sandoval is a well-appearing infant born at a gestational age of 37w2d . His physical exam is without concerning findings. His vital signs are within acceptable ranges.     Plan     - Continue routine  care  - Follow-up with Pediatrician in 1 to 2 days    Family in agreement with plan of care and opportunity for questions provided.      Signed: Elizabeth Khanna MD        Hepatitis A Vaccine  Aged Out    Meningococcal ACWY Vaccine  Aged Out    HPV Vaccine  Aged Out     Immunization History   Administered Date(s) Administered    INFLUENZA 01/19/2005, 09/27/2007, 10/17/2008, 11/05/2011, 12/21/2012, 11/15/2013, 11/01/2015, 11/01/2016, 11/16/2018    Influenza Quadrivalent, 6-35 Months IM 11/01/2015    Influenza Split High Dose Preservative Free IM 08/14/2017, 11/02/2019    Influenza, high dose seasonal 0 7 mL 10/07/2020    Influenza, seasonal, injectable 11/01/2016    Pneumococcal Conjugate 13-Valent 07/29/2016    Pneumococcal Polysaccharide PPV23 11/06/2009, 08/14/2017    SARS-CoV-2 / COVID-19 mRNA IM (Angeles Mandujano) 01/19/2021    Td (adult), Unspecified 01/01/1993    Zoster 02/08/2012, 07/29/2013       Yvonne Mclean,   [facility]

## 2024-01-03 ENCOUNTER — APPOINTMENT (EMERGENCY)
Dept: CT IMAGING | Facility: HOSPITAL | Age: 79
DRG: 638 | End: 2024-01-03
Payer: COMMERCIAL

## 2024-01-03 ENCOUNTER — HOSPITAL ENCOUNTER (INPATIENT)
Facility: HOSPITAL | Age: 79
LOS: 4 days | Discharge: HOME | DRG: 638 | End: 2024-01-08
Attending: EMERGENCY MEDICINE | Admitting: INTERNAL MEDICINE
Payer: COMMERCIAL
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
## 2024-01-03 DIAGNOSIS — I10 ESSENTIAL HYPERTENSION: ICD-10-CM

## 2024-01-03 DIAGNOSIS — E11.65 HYPERGLYCEMIC CRISIS IN DIABETES MELLITUS (HCC): ICD-10-CM

## 2024-01-03 DIAGNOSIS — S22.31XA CLOSED FRACTURE OF ONE RIB OF RIGHT SIDE, INITIAL ENCOUNTER: ICD-10-CM

## 2024-01-03 DIAGNOSIS — R53.1 GENERALIZED WEAKNESS: ICD-10-CM

## 2024-01-03 DIAGNOSIS — E11.00 HYPEROSMOLAR HYPERGLYCEMIC STATE (HHS) (HCC): Primary | ICD-10-CM

## 2024-01-03 DIAGNOSIS — E86.0 DEHYDRATION: ICD-10-CM

## 2024-01-03 DIAGNOSIS — E11.9 TYPE 2 DIABETES MELLITUS (HCC): ICD-10-CM

## 2024-01-03 DIAGNOSIS — N17.9 AKI (ACUTE KIDNEY INJURY) (HCC): ICD-10-CM

## 2024-01-03 DIAGNOSIS — E87.1 HYPONATREMIA: ICD-10-CM

## 2024-01-03 LAB
ALBUMIN SERPL BCP-MCNC: 3.8 G/DL (ref 3.5–5)
ALP SERPL-CCNC: 145 U/L (ref 34–104)
ALT SERPL W P-5'-P-CCNC: 60 U/L (ref 7–52)
ANION GAP SERPL CALCULATED.3IONS-SCNC: 13 MMOL/L
AST SERPL W P-5'-P-CCNC: 45 U/L (ref 13–39)
BACTERIA UR QL AUTO: NORMAL /HPF
BASOPHILS # BLD MANUAL: 0 THOUSAND/UL (ref 0–0.1)
BASOPHILS NFR MAR MANUAL: 0 % (ref 0–1)
BILIRUB SERPL-MCNC: 0.6 MG/DL (ref 0.2–1)
BILIRUB UR QL STRIP: NEGATIVE
BUN SERPL-MCNC: 47 MG/DL (ref 5–25)
CALCIUM SERPL-MCNC: 10 MG/DL (ref 8.4–10.2)
CARDIAC TROPONIN I PNL SERPL HS: 15 NG/L
CHLORIDE SERPL-SCNC: 80 MMOL/L (ref 96–108)
CLARITY UR: CLEAR
CO2 SERPL-SCNC: 23 MMOL/L (ref 21–32)
COLOR UR: YELLOW
CREAT SERPL-MCNC: 2.14 MG/DL (ref 0.6–1.3)
EOSINOPHIL # BLD MANUAL: 0.44 THOUSAND/UL (ref 0–0.4)
EOSINOPHIL NFR BLD MANUAL: 4 % (ref 0–6)
ERYTHROCYTE [DISTWIDTH] IN BLOOD BY AUTOMATED COUNT: 12.4 % (ref 11.6–15.1)
GFR SERPL CREATININE-BSD FRML MDRD: 28 ML/MIN/1.73SQ M
GLUCOSE SERPL-MCNC: 892 MG/DL (ref 65–140)
GLUCOSE UR STRIP-MCNC: ABNORMAL MG/DL
HCT VFR BLD AUTO: 37.5 % (ref 36.5–49.3)
HGB BLD-MCNC: 13.2 G/DL (ref 12–17)
HGB UR QL STRIP.AUTO: ABNORMAL
KETONES UR STRIP-MCNC: NEGATIVE MG/DL
LEUKOCYTE ESTERASE UR QL STRIP: NEGATIVE
LYMPHOCYTES # BLD AUTO: 2.31 THOUSAND/UL (ref 0.6–4.47)
LYMPHOCYTES # BLD AUTO: 21 % (ref 14–44)
MCH RBC QN AUTO: 34.6 PG (ref 26.8–34.3)
MCHC RBC AUTO-ENTMCNC: 35.2 G/DL (ref 31.4–37.4)
MCV RBC AUTO: 98 FL (ref 82–98)
MONOCYTES # BLD AUTO: 0.99 THOUSAND/UL (ref 0–1.22)
MONOCYTES NFR BLD: 9 % (ref 4–12)
NEUTROPHILS # BLD MANUAL: 7.27 THOUSAND/UL (ref 1.85–7.62)
NEUTS BAND NFR BLD MANUAL: 1 % (ref 0–8)
NEUTS SEG NFR BLD AUTO: 65 % (ref 43–75)
NITRITE UR QL STRIP: NEGATIVE
NON-SQ EPI CELLS URNS QL MICRO: NORMAL /HPF
PH UR STRIP.AUTO: 5.5 [PH] (ref 4.5–8)
PLATELET # BLD AUTO: 133 THOUSANDS/UL (ref 149–390)
PLATELET BLD QL SMEAR: ABNORMAL
PMV BLD AUTO: 12.7 FL (ref 8.9–12.7)
POTASSIUM SERPL-SCNC: 5.1 MMOL/L (ref 3.5–5.3)
PROT SERPL-MCNC: 7.5 G/DL (ref 6.4–8.4)
PROT UR STRIP-MCNC: NEGATIVE MG/DL
RBC # BLD AUTO: 3.82 MILLION/UL (ref 3.88–5.62)
RBC #/AREA URNS AUTO: NORMAL /HPF
RBC MORPH BLD: NORMAL
SODIUM SERPL-SCNC: 116 MMOL/L (ref 135–147)
SP GR UR STRIP.AUTO: <=1.005 (ref 1–1.03)
TSH SERPL DL<=0.05 MIU/L-ACNC: 2.63 UIU/ML (ref 0.45–4.5)
UROBILINOGEN UR QL STRIP.AUTO: 0.2 E.U./DL
WBC # BLD AUTO: 11.02 THOUSAND/UL (ref 4.31–10.16)
WBC #/AREA URNS AUTO: NORMAL /HPF

## 2024-01-03 PROCEDURE — G1004 CDSM NDSC: HCPCS

## 2024-01-03 PROCEDURE — 82010 KETONE BODYS QUAN: CPT | Performed by: EMERGENCY MEDICINE

## 2024-01-03 PROCEDURE — 83935 ASSAY OF URINE OSMOLALITY: CPT | Performed by: NURSE PRACTITIONER

## 2024-01-03 PROCEDURE — 96360 HYDRATION IV INFUSION INIT: CPT

## 2024-01-03 PROCEDURE — 74176 CT ABD & PELVIS W/O CONTRAST: CPT

## 2024-01-03 PROCEDURE — 85007 BL SMEAR W/DIFF WBC COUNT: CPT | Performed by: EMERGENCY MEDICINE

## 2024-01-03 PROCEDURE — 81001 URINALYSIS AUTO W/SCOPE: CPT

## 2024-01-03 PROCEDURE — 84300 ASSAY OF URINE SODIUM: CPT | Performed by: NURSE PRACTITIONER

## 2024-01-03 PROCEDURE — 85027 COMPLETE CBC AUTOMATED: CPT | Performed by: EMERGENCY MEDICINE

## 2024-01-03 PROCEDURE — 72125 CT NECK SPINE W/O DYE: CPT

## 2024-01-03 PROCEDURE — 96361 HYDRATE IV INFUSION ADD-ON: CPT

## 2024-01-03 PROCEDURE — 83690 ASSAY OF LIPASE: CPT | Performed by: EMERGENCY MEDICINE

## 2024-01-03 PROCEDURE — 84484 ASSAY OF TROPONIN QUANT: CPT | Performed by: EMERGENCY MEDICINE

## 2024-01-03 PROCEDURE — 93005 ELECTROCARDIOGRAM TRACING: CPT

## 2024-01-03 PROCEDURE — 80053 COMPREHEN METABOLIC PANEL: CPT | Performed by: EMERGENCY MEDICINE

## 2024-01-03 PROCEDURE — 70450 CT HEAD/BRAIN W/O DYE: CPT

## 2024-01-03 PROCEDURE — 99291 CRITICAL CARE FIRST HOUR: CPT

## 2024-01-03 PROCEDURE — 84443 ASSAY THYROID STIM HORMONE: CPT | Performed by: EMERGENCY MEDICINE

## 2024-01-03 PROCEDURE — 71250 CT THORAX DX C-: CPT

## 2024-01-03 PROCEDURE — 36415 COLL VENOUS BLD VENIPUNCTURE: CPT | Performed by: EMERGENCY MEDICINE

## 2024-01-03 RX ADMIN — SODIUM CHLORIDE 1000 ML: 0.9 INJECTION, SOLUTION INTRAVENOUS at 22:11

## 2024-01-03 RX ADMIN — SODIUM CHLORIDE 1000 ML: 0.9 INJECTION, SOLUTION INTRAVENOUS at 23:51

## 2024-01-04 PROBLEM — E11.65 HYPERGLYCEMIC CRISIS IN DIABETES MELLITUS (HCC): Status: ACTIVE | Noted: 2024-01-04

## 2024-01-04 PROBLEM — E87.1 HYPONATREMIA: Status: ACTIVE | Noted: 2024-01-04
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
## 2024-01-04 PROBLEM — R74.8 ELEVATED LIPASE: Chronic | Status: ACTIVE | Noted: 2024-01-04
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
## 2024-01-04 LAB
2HR DELTA HS TROPONIN: 2 NG/L
4HR DELTA HS TROPONIN: 3 NG/L
ANION GAP SERPL CALCULATED.3IONS-SCNC: 10 MMOL/L
ATRIAL RATE: 100 BPM
ATRIAL RATE: 103 BPM
BASE EX.OXY STD BLDV CALC-SCNC: 93.5 % (ref 60–80)
BASE EXCESS BLDV CALC-SCNC: -2.9 MMOL/L
BETA-HYDROXYBUTYRATE: 0.9 MMOL/L
BUN SERPL-MCNC: 41 MG/DL (ref 5–25)
CALCIUM SERPL-MCNC: 10.2 MG/DL (ref 8.4–10.2)
CARDIAC TROPONIN I PNL SERPL HS: 17 NG/L
CARDIAC TROPONIN I PNL SERPL HS: 18 NG/L
CHLORIDE SERPL-SCNC: 96 MMOL/L (ref 96–108)
CO2 SERPL-SCNC: 22 MMOL/L (ref 21–32)
CREAT SERPL-MCNC: 1.77 MG/DL (ref 0.6–1.3)
ERYTHROCYTE [DISTWIDTH] IN BLOOD BY AUTOMATED COUNT: 12 % (ref 11.6–15.1)
GFR SERPL CREATININE-BSD FRML MDRD: 35 ML/MIN/1.73SQ M
GLUCOSE SERPL-MCNC: 174 MG/DL (ref 65–140)
GLUCOSE SERPL-MCNC: 184 MG/DL (ref 65–140)
GLUCOSE SERPL-MCNC: 185 MG/DL (ref 65–140)
GLUCOSE SERPL-MCNC: 213 MG/DL (ref 65–140)
GLUCOSE SERPL-MCNC: 248 MG/DL (ref 65–140)
GLUCOSE SERPL-MCNC: 337 MG/DL (ref 65–140)
GLUCOSE SERPL-MCNC: 368 MG/DL (ref 65–140)
GLUCOSE SERPL-MCNC: 377 MG/DL (ref 65–140)
GLUCOSE SERPL-MCNC: 411 MG/DL (ref 65–140)
GLUCOSE SERPL-MCNC: 578 MG/DL (ref 65–140)
GLUCOSE SERPL-MCNC: 98 MG/DL (ref 65–140)
GLUCOSE SERPL-MCNC: >500 MG/DL (ref 65–140)
GLUCOSE SERPL-MCNC: >500 MG/DL (ref 65–140)
HCO3 BLDV-SCNC: 21.2 MMOL/L (ref 24–30)
HCT VFR BLD AUTO: 37.2 % (ref 36.5–49.3)
HGB BLD-MCNC: 13.2 G/DL (ref 12–17)
LIPASE SERPL-CCNC: 128 U/L (ref 11–82)
MAGNESIUM SERPL-MCNC: 1.9 MG/DL (ref 1.9–2.7)
MCH RBC QN AUTO: 33.3 PG (ref 26.8–34.3)
MCHC RBC AUTO-ENTMCNC: 35.5 G/DL (ref 31.4–37.4)
MCV RBC AUTO: 94 FL (ref 82–98)
NRBC BLD AUTO-RTO: 0 /100 WBCS
O2 CT BLDV-SCNC: 18.1 ML/DL
OSMOLALITY UR: 451 MMOL/KG
P AXIS: 85 DEGREES
P AXIS: 85 DEGREES
PCO2 BLDV: 35 MM HG (ref 42–50)
PH BLDV: 7.4 [PH] (ref 7.3–7.4)
PHOSPHATE SERPL-MCNC: 2.5 MG/DL (ref 2.3–4.1)
PLATELET # BLD AUTO: 141 THOUSANDS/UL (ref 149–390)
PMV BLD AUTO: 12 FL (ref 8.9–12.7)
PO2 BLDV: 87.8 MM HG (ref 35–45)
POTASSIUM SERPL-SCNC: 3.7 MMOL/L (ref 3.5–5.3)
PR INTERVAL: 188 MS
PR INTERVAL: 222 MS
QRS AXIS: 19 DEGREES
QRS AXIS: 6 DEGREES
QRSD INTERVAL: 82 MS
QRSD INTERVAL: 88 MS
QT INTERVAL: 330 MS
QT INTERVAL: 332 MS
QTC INTERVAL: 425 MS
QTC INTERVAL: 434 MS
RBC # BLD AUTO: 3.96 MILLION/UL (ref 3.88–5.62)
SODIUM 24H UR-SCNC: 26 MOL/L
SODIUM SERPL-SCNC: 128 MMOL/L (ref 135–147)
T WAVE AXIS: 34 DEGREES
T WAVE AXIS: 82 DEGREES
VENTRICULAR RATE: 100 BPM
VENTRICULAR RATE: 103 BPM
WBC # BLD AUTO: 11.76 THOUSAND/UL (ref 4.31–10.16)

## 2024-01-04 PROCEDURE — 82948 REAGENT STRIP/BLOOD GLUCOSE: CPT

## 2024-01-04 PROCEDURE — 84100 ASSAY OF PHOSPHORUS: CPT | Performed by: NURSE PRACTITIONER

## 2024-01-04 PROCEDURE — 99223 1ST HOSP IP/OBS HIGH 75: CPT | Performed by: INTERNAL MEDICINE

## 2024-01-04 PROCEDURE — 93005 ELECTROCARDIOGRAM TRACING: CPT

## 2024-01-04 PROCEDURE — 83735 ASSAY OF MAGNESIUM: CPT | Performed by: NURSE PRACTITIONER

## 2024-01-04 PROCEDURE — 82805 BLOOD GASES W/O2 SATURATION: CPT | Performed by: EMERGENCY MEDICINE

## 2024-01-04 PROCEDURE — 84484 ASSAY OF TROPONIN QUANT: CPT | Performed by: EMERGENCY MEDICINE

## 2024-01-04 PROCEDURE — 99291 CRITICAL CARE FIRST HOUR: CPT | Performed by: EMERGENCY MEDICINE

## 2024-01-04 PROCEDURE — 96361 HYDRATE IV INFUSION ADD-ON: CPT

## 2024-01-04 PROCEDURE — 85027 COMPLETE CBC AUTOMATED: CPT | Performed by: NURSE PRACTITIONER

## 2024-01-04 PROCEDURE — 80048 BASIC METABOLIC PNL TOTAL CA: CPT | Performed by: NURSE PRACTITIONER

## 2024-01-04 PROCEDURE — 82947 ASSAY GLUCOSE BLOOD QUANT: CPT | Performed by: INTERNAL MEDICINE

## 2024-01-04 PROCEDURE — 97167 OT EVAL HIGH COMPLEX 60 MIN: CPT

## 2024-01-04 PROCEDURE — 36415 COLL VENOUS BLD VENIPUNCTURE: CPT | Performed by: EMERGENCY MEDICINE

## 2024-01-04 RX ORDER — ALLOPURINOL 300 MG/1
300 TABLET ORAL DAILY
Status: DISCONTINUED | OUTPATIENT
Start: 2024-01-04 | End: 2024-01-08 | Stop reason: HOSPADM

## 2024-01-04 RX ORDER — SIMETHICONE 80 MG
80 TABLET,CHEWABLE ORAL 4 TIMES DAILY PRN
Status: DISCONTINUED | OUTPATIENT
Start: 2024-01-04 | End: 2024-01-08 | Stop reason: HOSPADM

## 2024-01-04 RX ORDER — INSULIN LISPRO 100 [IU]/ML
10 INJECTION, SOLUTION INTRAVENOUS; SUBCUTANEOUS
Status: DISCONTINUED | OUTPATIENT
Start: 2024-01-04 | End: 2024-01-05

## 2024-01-04 RX ORDER — HEPARIN SODIUM 5000 [USP'U]/ML
5000 INJECTION, SOLUTION INTRAVENOUS; SUBCUTANEOUS EVERY 8 HOURS SCHEDULED
Status: DISCONTINUED | OUTPATIENT
Start: 2024-01-04 | End: 2024-01-08 | Stop reason: HOSPADM

## 2024-01-04 RX ORDER — MAGNESIUM HYDROXIDE/ALUMINUM HYDROXICE/SIMETHICONE 120; 1200; 1200 MG/30ML; MG/30ML; MG/30ML
30 SUSPENSION ORAL EVERY 6 HOURS PRN
Status: DISCONTINUED | OUTPATIENT
Start: 2024-01-04 | End: 2024-01-08 | Stop reason: HOSPADM

## 2024-01-04 RX ORDER — BISACODYL 5 MG/1
10 TABLET, DELAYED RELEASE ORAL DAILY PRN
Status: DISCONTINUED | OUTPATIENT
Start: 2024-01-04 | End: 2024-01-08 | Stop reason: HOSPADM

## 2024-01-04 RX ORDER — INSULIN LISPRO 100 [IU]/ML
5 INJECTION, SOLUTION INTRAVENOUS; SUBCUTANEOUS
Status: DISCONTINUED | OUTPATIENT
Start: 2024-01-04 | End: 2024-01-04

## 2024-01-04 RX ORDER — INSULIN LISPRO 100 [IU]/ML
1-5 INJECTION, SOLUTION INTRAVENOUS; SUBCUTANEOUS
Status: DISCONTINUED | OUTPATIENT
Start: 2024-01-04 | End: 2024-01-08 | Stop reason: HOSPADM

## 2024-01-04 RX ORDER — MELATONIN
5000 DAILY
Status: DISCONTINUED | OUTPATIENT
Start: 2024-01-04 | End: 2024-01-08 | Stop reason: HOSPADM

## 2024-01-04 RX ORDER — ONDANSETRON 2 MG/ML
4 INJECTION INTRAMUSCULAR; INTRAVENOUS EVERY 6 HOURS PRN
Status: DISCONTINUED | OUTPATIENT
Start: 2024-01-04 | End: 2024-01-08 | Stop reason: HOSPADM

## 2024-01-04 RX ORDER — CHLORAL HYDRATE 500 MG
1000 CAPSULE ORAL 3 TIMES DAILY
Status: DISCONTINUED | OUTPATIENT
Start: 2024-01-04 | End: 2024-01-08 | Stop reason: HOSPADM

## 2024-01-04 RX ORDER — INSULIN GLARGINE 100 [IU]/ML
20 INJECTION, SOLUTION SUBCUTANEOUS EVERY MORNING
Status: DISCONTINUED | OUTPATIENT
Start: 2024-01-04 | End: 2024-01-04

## 2024-01-04 RX ORDER — ACETAMINOPHEN 325 MG/1
650 TABLET ORAL EVERY 6 HOURS PRN
Status: DISCONTINUED | OUTPATIENT
Start: 2024-01-04 | End: 2024-01-08 | Stop reason: HOSPADM

## 2024-01-04 RX ORDER — NIFEDIPINE 60 MG/1
60 TABLET, EXTENDED RELEASE ORAL DAILY
Status: DISCONTINUED | OUTPATIENT
Start: 2024-01-04 | End: 2024-01-08 | Stop reason: HOSPADM

## 2024-01-04 RX ORDER — SODIUM CHLORIDE 9 MG/ML
100 INJECTION, SOLUTION INTRAVENOUS CONTINUOUS
Status: DISPENSED | OUTPATIENT
Start: 2024-01-04 | End: 2024-01-04

## 2024-01-04 RX ORDER — ALBUTEROL SULFATE 90 UG/1
2 AEROSOL, METERED RESPIRATORY (INHALATION) EVERY 6 HOURS PRN
Status: DISCONTINUED | OUTPATIENT
Start: 2024-01-04 | End: 2024-01-08 | Stop reason: HOSPADM

## 2024-01-04 RX ORDER — INSULIN GLARGINE 100 [IU]/ML
30 INJECTION, SOLUTION SUBCUTANEOUS EVERY MORNING
Status: DISCONTINUED | OUTPATIENT
Start: 2024-01-05 | End: 2024-01-06

## 2024-01-04 RX ORDER — INSULIN LISPRO 100 [IU]/ML
1-6 INJECTION, SOLUTION INTRAVENOUS; SUBCUTANEOUS
Status: DISCONTINUED | OUTPATIENT
Start: 2024-01-04 | End: 2024-01-08 | Stop reason: HOSPADM

## 2024-01-04 RX ORDER — INSULIN GLARGINE 100 [IU]/ML
25 INJECTION, SOLUTION SUBCUTANEOUS EVERY MORNING
Status: DISCONTINUED | OUTPATIENT
Start: 2024-01-05 | End: 2024-01-04

## 2024-01-04 RX ADMIN — INSULIN LISPRO 6 UNITS: 100 INJECTION, SOLUTION INTRAVENOUS; SUBCUTANEOUS at 16:37

## 2024-01-04 RX ADMIN — INSULIN LISPRO 10 UNITS: 100 INJECTION, SOLUTION INTRAVENOUS; SUBCUTANEOUS at 16:37

## 2024-01-04 RX ADMIN — SODIUM CHLORIDE 10 UNITS/HR: 9 INJECTION, SOLUTION INTRAVENOUS at 01:26

## 2024-01-04 RX ADMIN — OMEGA-3 FATTY ACIDS CAP 1000 MG 1000 MG: 1000 CAP at 09:06

## 2024-01-04 RX ADMIN — INSULIN LISPRO 5 UNITS: 100 INJECTION, SOLUTION INTRAVENOUS; SUBCUTANEOUS at 12:08

## 2024-01-04 RX ADMIN — INSULIN LISPRO 4 UNITS: 100 INJECTION, SOLUTION INTRAVENOUS; SUBCUTANEOUS at 21:33

## 2024-01-04 RX ADMIN — HEPARIN SODIUM 5000 UNITS: 5000 INJECTION INTRAVENOUS; SUBCUTANEOUS at 05:25

## 2024-01-04 RX ADMIN — HEPARIN SODIUM 5000 UNITS: 5000 INJECTION INTRAVENOUS; SUBCUTANEOUS at 21:33

## 2024-01-04 RX ADMIN — Medication 5000 UNITS: at 09:06

## 2024-01-04 RX ADMIN — ASPIRIN 81 MG: 81 TABLET, COATED ORAL at 09:07

## 2024-01-04 RX ADMIN — OMEGA-3 FATTY ACIDS CAP 1000 MG 1000 MG: 1000 CAP at 17:17

## 2024-01-04 RX ADMIN — HEPARIN SODIUM 5000 UNITS: 5000 INJECTION INTRAVENOUS; SUBCUTANEOUS at 14:52

## 2024-01-04 RX ADMIN — SODIUM CHLORIDE 100 ML/HR: 0.9 INJECTION, SOLUTION INTRAVENOUS at 05:24

## 2024-01-04 RX ADMIN — INSULIN GLARGINE 20 UNITS: 100 INJECTION, SOLUTION SUBCUTANEOUS at 10:05

## 2024-01-04 RX ADMIN — ALLOPURINOL 300 MG: 100 TABLET ORAL at 09:05

## 2024-01-04 RX ADMIN — NIFEDIPINE 60 MG: 60 TABLET, FILM COATED, EXTENDED RELEASE ORAL at 09:10

## 2024-01-04 NOTE — CASE MANAGEMENT
Case Management Assessment & Discharge Planning Note    Patient name Irineo Rawls  Location ICU 05/ICU 05 MRN [id number]  : 1945 Date 2024       Current Admission Date: 1/3/2024  Current Admission Diagnosis:Hyperglycemic crisis in diabetes mellitus (HCC)   Patient Active Problem List    Diagnosis Date Noted    Fall at home, initial encounter 2024    Elevated lipase 2024    Hyponatremia 2024    Hyperglycemic crisis in diabetes mellitus (HCC) 2024    Thrombocytopenia (HCC) 2023    Benign prostatic hyperplasia with incomplete bladder emptying 2023    Acute hyperkalemia 2023    Microcytic anemia 2023    Hyperkalemia     Calcification of liver 2021    Obesity, morbid (HCC) 2021    Rectus diastasis 10/07/2020    Hyperuricemia 07/15/2019    Knee pain 2018    Localized, primary osteoarthritis 2018    Osteoarthritis of knee 2018    Abnormal serum glucose level 2016    Essential hypertriglyceridemia 2016    KALIA (obstructive sleep apnea) 2015    Gout 2015    Vitamin D deficiency 2012    Chronic kidney disease, stage III (moderate) ([facility]) 2012    Essential hypertension 2009      LOS (days): 0  Geometric Mean LOS (GMLOS) (days):   Days to GMLOS:     OBJECTIVE:    Risk of Unplanned Readmission Score: 16.23         Current admission status: Inpatient       Preferred Pharmacy:   [facility]/pharmacy #[id number] - [place] - [address]  [address] [address]  Phone: [phone] Fax: [phone]    [facility] - [place], [place] - [address]  [address]  [facility] [address]  Phone: [phone] Fax: [phone]    Primary Care Provider: Obi Esquivel DO    Primary Insurance: [facility] REP  Secondary Insurance:     ASSESSMENT:  Active Health Care Proxies       Shireen Rawls [facility] Care Representative - Spouse   Primary Phone:  [phone] (Home)                 Advance Directives  Does patient have a Health Care POA?: Yes  Does patient have Advance Directives?: Yes  Advance Directives: Living will, Power of  for health care, Power of  for finance  Primary Contact: Wife Shireen              Patient Information  Admitted from:: Home  Mental Status: Alert  During Assessment patient was accompanied by: Spouse, Daughter  Assessment information provided by:: Patient, Spouse  Primary Caregiver: Self  Support Systems: Spouse/significant other, Family members  County of Residence: [place]  What city do you live in?: [place]  Home entry access options. Select all that apply.: Stairs  Number of steps to enter home.: 5  Type of Current Residence: [facility]  Living Arrangements: Lives w/ Spouse/significant other    Activities of Daily Living Prior to Admission  Functional Status: Independent  Completes ADLs independently?: Yes  Ambulates independently?: Yes  Does patient use assisted devices?: No  Does patient currently own DME?: Yes  What DME does the patient currently own?: Straight Cane, Walker, Other (Comment) (raised toilet seat)  Does patient have a history of Outpatient Therapy (PT/OT)?: Yes  Does the patient have a history of Short-Term Rehab?: No  Does patient have a history of HHC?: No  Does patient currently have HHC?: No         Patient Information Continued  Income Source: SSI/SSD  Does patient receive dialysis treatments?: No  Does patient have a history of substance abuse?: No  Does patient have a history of Mental Health Diagnosis?: No         Means of Transportation  Means of Transport to [facility]:: Drives Self      Housing Stability: Low Risk  (1/4/2024)    Housing Stability Vital Sign     Unable to Pay for Housing in the Last Year: No     Number of Places Lived in the Last Year: 1     Unstable Housing in the Last Year: No   Food Insecurity: No Food Insecurity (1/4/2024)    Hunger Vital Sign     Worried About Running Out of  Food in the Last Year: Never true     Ran Out of Food in the Last Year: Never true   Transportation Needs: No Transportation Needs (1/4/2024)    PRAPARE - Transportation     Lack of Transportation (Medical): No     Lack of Transportation (Non-Medical): No   Utilities: Not At Risk (1/4/2024)    [facility] Utilities     Threatened with loss of utilities: No       DISCHARGE DETAILS:    Discharge planning discussed with:: Patient, spouse, and daughter        CM contacted family/caregiver?: Yes             Contacts  Relationship to Patient:: Family  Contact Method: In Person  Reason/Outcome: Emergency Contact, Discharge Planning              Other Referral/Resources/Interventions Provided:  Interventions: None Indicated

## 2024-01-04 NOTE — ASSESSMENT & PLAN NOTE
Moderate hyponatremia, Na 116, sodium correction for hyponatremia 129  Avoid overcorrection, will aim for increase of 6-8mEq/L over 24h  Will obtain serum osmolality, urine osmolality and urine sodium  IV hydration 0.9NS  Oral fluid restriction 1500ml/daily  Nephrology consult if no improvement

## 2024-01-04 NOTE — PLAN OF CARE
Problem: PAIN - ADULT  Goal: Verbalizes/displays adequate comfort level or baseline comfort level  Description: Interventions:  - Encourage patient to monitor pain and request assistance  - Assess pain using appropriate pain scale  - Administer analgesics based on type and severity of pain and evaluate response  - Implement non-pharmacological measures as appropriate and evaluate response  - Consider cultural and social influences on pain and pain management  - Notify physician/advanced practitioner if interventions unsuccessful or patient reports new pain  Outcome: Progressing     Problem: INFECTION - ADULT  Goal: Absence or prevention of progression during hospitalization  Description: INTERVENTIONS:  - Assess and monitor for signs and symptoms of infection  - Monitor lab/diagnostic results  - Monitor all insertion sites, i.e. indwelling lines, tubes, and drains  - Monitor endotracheal if appropriate and nasal secretions for changes in amount and color  - [place] appropriate cooling/warming therapies per order  - Administer medications as ordered  - Instruct and encourage patient and family to use good hand hygiene technique  - Identify and instruct in appropriate isolation precautions for identified infection/condition  Outcome: Progressing  Goal: Absence of fever/infection during neutropenic period  Description: INTERVENTIONS:  - Monitor WBC    Outcome: Progressing     Problem: SAFETY ADULT  Goal: Patient will remain free of falls  Description: INTERVENTIONS:  - Educate patient/family on patient safety including physical limitations  - Instruct patient to call for assistance with activity   - Consult OT/PT to assist with strengthening/mobility   - Keep Call bell within reach  - Keep bed low and locked with side rails adjusted as appropriate  - Keep care items and personal belongings within reach  - Initiate and maintain comfort rounds  - Make Fall Risk Sign visible to staff  - Apply yellow socks and bracelet  for high fall risk patients  - Consider moving patient to room near nurses station  Outcome: Progressing

## 2024-01-04 NOTE — ED PROVIDER NOTES
"History  Chief Complaint   Patient presents with    Fall     Via EMS/BLS w/report of frequent falls (x1 today, x2 yesterday, multiple prior also); states was treated for URI x2 weeks ago with antibiotic \"and I have been weak since\"; denies dizziness, headaches, changes in vision, SOB and/or CP with each fall; states \"my legs just get weak\"; denies hitting head during any falls; denies any injury from fall; denies use of blood thinners; denies N/V, fever and/or diarrhea; denies problem w/urination     Patient presents from home for generalized weakness and increasing falls over the past few weeks.  Patient states that he has been feeling more lightheaded and dizzy recently.  No syncopal events at home.  No chest pain or shortness of breath associated with falls.  Patient had multiple falls yesterday and today.  Tonight, patient's wife was unable to assist him up and EMS was called.  Patient states that he did hurt his back last night and landed on a TV screen hurting his right chest and flank as well.  Patient states that he feels very weak when he gets up and stumbles when he walks.  Patient states that he mostly falls to his right side but denies any focal weakness.  Patient's wife is here and provides history as well.  She states that he walks with a cane and holds it in his right side.  She has not noticed any focal neurologic deficit at home.      History provided by:  Patient and spouse   used: No    Fall  Associated symptoms: back pain    Associated symptoms: no abdominal pain, no chest pain, no nausea, no neck pain and no vomiting        Prior to Admission Medications   Prescriptions Last Dose Informant Patient Reported? Taking?   Cholecalciferol (VITAMIN D3) 5000 units TABS  Self Yes No   Sig: Take 1 tablet by mouth daily   NIFEdipine (PROCARDIA XL) 60 mg 24 hr tablet   No No   Sig: Take 1 tablet (60 mg total) by mouth daily   Omega-3 Fatty Acids (FISH OIL) 1200 MG CAPS  Self Yes No "   Sig: Take 1 capsule by mouth 3 (three) times a day   albuterol (ProAir HFA) 90 mcg/act inhaler   No No   Sig: Inhale 2 puffs every 6 (six) hours as needed for wheezing   allopurinol (ZYLOPRIM) 300 mg tablet   No No   Sig: Take 1 tablet (300 mg total) by mouth daily   aspirin 81 MG tablet  Self Yes No   Sig: Take 81 mg by mouth daily   tamsulosin (FLOMAX) 0.4 mg   No No   Sig: TAKE 1 CAPSULE BY MOUTH EVERY DAY WITH DINNER   Patient not taking: Reported on 9/12/2023      Facility-Administered Medications: None       Past Medical History:   Diagnosis Date    Acute renal failure superimposed on stage 3 chronic kidney disease (HCC) 2/25/2023    MATILDE (acute kidney injury) (HCC)     Chronic kidney disease     CPAP (continuous positive airway pressure) dependence     Gout     Hearing aid worn     sabrina    Hernia, inguinal     RIH and umbilical hernia    Hypertension     Metabolic acidosis     Morbidly obese (HCC)     Sleep apnea     Wears glasses        Past Surgical History:   Procedure Laterality Date    CARPAL TUNNEL RELEASE Bilateral     COLONOSCOPY N/A 1/6/2017    Procedure: COLONOSCOPY;  Surgeon: João Perez MD;  Location: [facility];  Service:     CYST REMOVAL      FOOT SURGERY      HERNIA REPAIR      JOINT REPLACEMENT      knee left     W[place] LAPAROSCOPY SURG RPR INITIAL INGUINAL HERNIA Right 3/16/2017    Procedure: REPAIR HERNIA INGUINAL, LAPAROSCOPIC WITH MESH;  Surgeon: Ortiz Salcedo MD;  Location: [facility];  Service: General    W[place] RPR UMBILICAL HRNA 5 YRS/> REDUCIBLE N/A 3/16/2017    Procedure: OPEN REPAIR HERNIA UMBILICAL;  Surgeon: Ortiz Salcedo MD;  Location: [facility];  Service: General    TONSILLECTOMY         Family History   Problem Relation Age of Onset    No Known Problems Mother     Diabetes Father     Diabetes Sister      I have reviewed and agree with the history as documented.    E-Cigarette/Vaping    E-Cigarette Use Never User      E-Cigarette/Vaping Substances    Nicotine No     THC No     CBD  No     Flavoring No     Other No     Unknown No      Social History     Tobacco Use    Smoking status: Former     Current packs/day: 0.00     Types: Cigarettes     Quit date: 3/10/2003     Years since quittin.8    Smokeless tobacco: Never   Vaping Use    Vaping status: Never Used   Substance Use Topics    Alcohol use: No    Drug use: No       Review of Systems   Constitutional:  Positive for activity change and appetite change. Negative for chills and fever.   Respiratory:  Negative for cough and shortness of breath.    Cardiovascular:  Negative for chest pain.   Gastrointestinal:  Negative for abdominal pain, diarrhea, nausea and vomiting.   Musculoskeletal:  Positive for back pain. Negative for joint swelling, neck pain and neck stiffness.   Skin:  Negative for color change and pallor.   Allergic/Immunologic: Negative for immunocompromised state.   Neurological:  Positive for dizziness, weakness and light-headedness. Negative for numbness.   Hematological:  Does not bruise/bleed easily.   All other systems reviewed and are negative.      Physical Exam  Physical Exam  Vitals and nursing note reviewed.   Constitutional:       General: He is not in acute distress.     Appearance: He is well-developed. He is not ill-appearing, toxic-appearing or diaphoretic.   HENT:      Head: Normocephalic and atraumatic.      Right Ear: External ear normal.      Left Ear: External ear normal.      Nose: No congestion.      Mouth/Throat:      Mouth: Mucous membranes are dry.   Eyes:      General: No scleral icterus.     Conjunctiva/sclera: Conjunctivae normal.      Right eye: Right conjunctiva is not injected.      Left eye: Left conjunctiva is not injected.      Pupils: Pupils are equal, round, and reactive to light.   Neck:      Trachea: No tracheal deviation.   Cardiovascular:      Rate and Rhythm: Regular rhythm. Tachycardia present.      Pulses: Normal pulses.      Heart sounds: Normal heart sounds. No murmur heard.     No  friction rub.   Pulmonary:      Effort: Pulmonary effort is normal. No respiratory distress.      Breath sounds: Normal breath sounds. No stridor. No wheezing or rales.   Abdominal:      General: There is no distension.      Palpations: Abdomen is soft.      Tenderness: There is no abdominal tenderness. There is no guarding or rebound.   Musculoskeletal:         General: No tenderness or deformity. Normal range of motion.      Cervical back: Normal range of motion and neck supple.   Skin:     General: Skin is warm and dry.      Capillary Refill: Capillary refill takes less than 2 seconds.      Coloration: Skin is not pale.      Findings: No erythema or rash.      Comments: Dry and cracked   Neurological:      General: No focal deficit present.      Mental Status: He is alert and oriented to person, place, and time.      Cranial Nerves: No cranial nerve deficit.      Motor: No abnormal muscle tone or pronator drift.      Comments: Finger-to-nose exam is shaky bilaterally but equal.   Psychiatric:         Mood and Affect: Mood normal.         Behavior: Behavior normal.         Vital Signs  ED Triage Vitals   Temperature Pulse Respirations Blood Pressure SpO2   01/03/24 2336 01/03/24 2117 01/03/24 2117 01/03/24 2117 01/03/24 2117   98.2 °F (36.8 °C) 105 20 163/89 96 %      Temp Source Heart Rate Source Patient Position - Orthostatic VS BP Location FiO2 (%)   01/03/24 2336 01/03/24 2117 01/03/24 2117 01/03/24 2117 --   Oral Monitor Lying Left arm       Pain Score       01/03/24 2117       No Pain           Vitals:    01/03/24 2117 01/03/24 2345   BP: 163/89 (!) 171/80   Pulse: 105 97   Patient Position - Orthostatic VS: Lying          Visual Acuity  Visual Acuity      Flowsheet Row Most Recent Value   L Pupil Size (mm) 3   R Pupil Size (mm) 3            ED Medications  Medications   insulin regular (HumuLIN R,NovoLIN R) 1 Units/mL in sodium chloride 0.9 % 100 mL infusion (has no administration in time range)   sodium  chloride 0.9 % bolus 1,000 mL (0 mL Intravenous Stopped 1/3/24 2343)   sodium chloride 0.9 % bolus 1,000 mL (1,000 mL Intravenous New Bag 1/3/24 2351)       Diagnostic Studies  Results Reviewed       Procedure Component Value Units Date/Time    HS Troponin I 2hr [[id number]]  (Normal) Collected: 01/04/24 0010    Lab Status: Final result Specimen: Blood from Arm, Left Updated: 01/04/24 0049     hs TnI 2hr 17 ng/L      Delta 2hr hsTnI 2 ng/L     Blood gas, venous [[id number]]  (Abnormal) Collected: 01/04/24 0010    Lab Status: Final result Specimen: Blood from Arm, Left Updated: 01/04/24 0025     pH, Andrea 7.401     pCO2, Andrea 35.0 mm Hg      pO2, Andrea 87.8 mm Hg      HCO3, Andrea 21.2 mmol/L      Base Excess, Andrea -2.9 mmol/L      O2 Content, Andrea 18.1 ml/dL      O2 HGB, VENOUS 93.5 %     Lipase [[id number]]  (Abnormal) Collected: 01/03/24 2350    Lab Status: Final result Specimen: Blood from Arm, Left Updated: 01/04/24 0020     Lipase 128 u/L     Beta Hydroxybutyrate [[id number]]  (Abnormal) Collected: 01/03/24 2350    Lab Status: Final result Specimen: Blood from Arm, Left Updated: 01/04/24 0009     BETA-HYDROXYBUTYRATE 0.9 mmol/L     HS Troponin I 4hr [[id number]]     Lab Status: No result Specimen: Blood     Comprehensive metabolic panel [[id number]]  (Abnormal) Collected: 01/03/24 2203    Lab Status: Final result Specimen: Blood from Arm, Right Updated: 01/03/24 2338     Sodium 116 mmol/L      Potassium 5.1 mmol/L      Chloride 80 mmol/L      CO2 23 mmol/L      ANION GAP 13 mmol/L      BUN 47 mg/dL      Creatinine 2.14 mg/dL      Glucose 892 mg/dL      Calcium 10.0 mg/dL      AST 45 U/L      ALT 60 U/L      Alkaline Phosphatase 145 U/L      Total Protein 7.5 g/dL      Albumin 3.8 g/dL      Total Bilirubin 0.60 mg/dL      eGFR 28 ml/min/1.73sq m     Narrative:      [employer] guidelines for Chronic Kidney Disease (CKD):     Stage 1 with normal or high GFR (GFR > 90 mL/min/1.73 square meters)    Stage 2  Mild CKD (GFR = 60-89 mL/min/1.73 square meters)    Stage 3A Moderate CKD (GFR = 45-59 mL/min/1.73 square meters)    Stage 3B Moderate CKD (GFR = 30-44 mL/min/1.73 square meters)    Stage 4 Severe CKD (GFR = 15-29 mL/min/1.73 square meters)    Stage 5 End Stage CKD (GFR <15 mL/min/1.73 square meters)  Note: GFR calculation is accurate only with a steady state creatinine    Urine Microscopic [[id number]]  (Normal) Collected: 01/03/24 2246    Lab Status: Final result Specimen: Urine, Clean Catch Updated: 01/03/24 2302     RBC, UA None Seen /hpf      WBC, UA 1-2 /hpf      Epithelial Cells None Seen /hpf      Bacteria, UA None Seen /hpf     Manual Differential(PHLEBS Do Not Order) [[id number]]  (Abnormal) Collected: 01/03/24 2203    Lab Status: Final result Specimen: Blood from Arm, Right Updated: 01/03/24 2259     Segmented % 65 %      Bands % 1 %      Lymphocytes % 21 %      Monocytes % 9 %      Eosinophils, % 4 %      Basophils % 0 %      Absolute Neutrophils 7.27 Thousand/uL      Lymphocytes Absolute 2.31 Thousand/uL      Monocytes Absolute 0.99 Thousand/uL      Eosinophils Absolute 0.44 Thousand/uL      Basophils Absolute 0.00 Thousand/uL      Total Counted --     RBC Morphology Normal     Platelet Estimate Borderline    Urine Macroscopic, POC [[id number]]  (Abnormal) Collected: 01/03/24 2246    Lab Status: Final result Specimen: Urine Updated: 01/03/24 2248     Color, UA Yellow     Clarity, UA Clear     pH, UA 5.5     Leukocytes, UA Negative     Nitrite, UA Negative     Protein, UA Negative mg/dl      Glucose,  (1/2%) mg/dl      Ketones, UA Negative mg/dl      Urobilinogen, UA 0.2 E.U./dl      Bilirubin, UA Negative     Occult Blood, UA Trace     Specific Gravity, UA <=1.005    Narrative:      CLINITEK RESULT    TSH, 3rd generation with Free T4 reflex [[id number]]  (Normal) Collected: 01/03/24 2203    Lab Status: Final result Specimen: Blood from Arm, Right Updated: 01/03/24 2240     TSH 3RD GENERATON 2.626  uIU/mL     CBC and differential [[id number]]  (Abnormal) Collected: 01/03/24 2203    Lab Status: Final result Specimen: Blood from Arm, Right Updated: 01/03/24 2236     WBC 11.02 Thousand/uL      RBC 3.82 Million/uL      Hemoglobin 13.2 g/dL      Hematocrit 37.5 %      MCV 98 fL      MCH 34.6 pg      MCHC 35.2 g/dL      RDW 12.4 %      MPV 12.7 fL      Platelets 133 Thousands/uL     Narrative:      This is an appended report.  These results have been appended to a previously verified report.    HS Troponin 0hr (reflex protocol) [[id number]]  (Normal) Collected: 01/03/24 2203    Lab Status: Final result Specimen: Blood from Arm, Right Updated: 01/03/24 2233     hs TnI 0hr 15 ng/L                    CT head without contrast   ED Interpretation by Adam Bunn Jr., DO (01/04 0000)   Per [facility]:  MPRESSION: 1. Presumably age-related and chronic changes without acute intracranial abnormality. 2. 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        CT cervical spine without contrast   ED Interpretation by Adam Bunn Jr., DO (01/04 0000)   Per [facility]:    MPRESSION: 1. Multilevel degenerative change without acute injury identified. 2. Please see the dedicated interpretation of the thorax for findings in that region.         CT recon only thoracolumbar   ED Interpretation by Adam Bunn Jr., DO (01/04 0002)   Per [facility]:    IMPRESSION: 1. There is slight anterolisthesis of L5 on S1 with bilateral pars interarticularis defects. These are most likely degenerative in nature. 2. Degenerative changes without acute abnormality detected. 3. Please see the dedicated interpretation of abdomen and pelvis for findings in that region.        CT chest abdomen pelvis wo contrast   ED Interpretation by Adam Bunn Jr., DO (01/04 0001)   Per [facility]:    IMPRESSION: 1. There is a right posterior 11th rib fracture which is minimally displaced. 2. Please see the dedicated interpretation of abdomen and  pelvis for findings in that region.     IMPRESSION: 1. No acute traumatic injury is identified. 2. Please see the dedicated interpretation of the thorax for findings in that region. 3. Interval growth of a right midpole renal lesion measuring up to 3.6 cm (image 133 series 2), nonemergent follow-up ultrasound is recommended.                    Procedures  ECG 12 Lead Documentation Only    Date/Time: 1/3/2024 10:12 PM    Performed by: Adam Bunn Jr., DO  Authorized by: Adam Bunn Jr., DO    Indications / Diagnosis:  Weakness  ECG reviewed by me, the ED Provider: yes    Patient location:  ED  Previous ECG:     Previous ECG:  Compared to current    Similarity:  Changes noted  Interpretation:     Interpretation: non-specific    Quality:     Tracing quality:  Limited by artifact  Rate:     ECG rate:  103    ECG rate assessment: tachycardic    Rhythm:     Rhythm: sinus tachycardia and A-V block      Rhythm comment:  1st degree  Ectopy:     Ectopy: none    QRS:     QRS intervals:  Normal  Conduction:     Conduction: normal    ST segments:     ST segments:  Normal  T waves:     T waves: non-specific    CriticalCare Time    Date/Time: 1/4/2024 12:46 AM    Performed by: Adam Bunn Jr., DO  Authorized by: Adam Bunn Jr., DO    Critical care provider statement:     Critical care time (minutes):  60    Critical care time was exclusive of:  Separately billable procedures and treating other patients and teaching time    Critical care was necessary to treat or prevent imminent or life-threatening deterioration of the following conditions:  Endocrine crisis and dehydration    Critical care was time spent personally by me on the following activities:  Blood draw for specimens, obtaining history from patient or surrogate, development of treatment plan with patient or surrogate, discussions with consultants, evaluation of patient's response to treatment, examination of patient, interpretation of cardiac  output measurements, ordering and performing treatments and interventions, ordering and review of laboratory studies, ordering and review of radiographic studies, review of old charts and re-evaluation of patient's condition    I assumed direction of critical care for this patient from another provider in my specialty: no             ED Course  ED Course as of 01/04/24 0057   Wed Jan 03, 2024   2310 Urine Microscopic  Normal      2310 Urine Macroscopic, POC(!)  Elevated glucose.  Otherwise normal     2310 CBC and differential(!)  Mild leukocytosis.  Otherwise, normal     2341 Comprehensive metabolic panel(!!)  Markedly elevated glucose with an MATILDE, hyponatremia and transaminitis.  1 L of IV fluids already given.  Will continue with hydration.  Does not appear consistent with DKA but will add on a VBG and a beta hydroxybutyric acid.   Thu Jan 04, 2024   0007 HS Troponin 0hr (reflex protocol)  Slight elevation but within the acceptable range.  Will obtain a delta   0007 TSH, 3rd generation with Free T4 reflex  Normal   0025 Blood gas, venous(!)  No acidosis    0027 Beta Hydroxybutyrate(!)  Very slight elevation.  Not consistent with DKA   0027 Lipase(!)  Elevated but not consistent with pancreatitis                                SBIRT 22yo+      Flowsheet Row Most Recent Value   Initial Alcohol Screen: US AUDIT-C     1. How often do you have a drink containing alcohol? 0 Filed at: 01/03/2024 2119   2. How many drinks containing alcohol do you have on a typical day you are drinking?  0 Filed at: 01/03/2024 2119   3b. FEMALE Any Age, or MALE 65+: How often do you have 4 or more drinks on one occassion? 0 Filed at: 01/03/2024 2119   Audit-C Score 0 Filed at: 01/03/2024 2119   [place]: How many times in the past year have you...    Used an illegal drug or used a prescription medication for non-medical reasons? Never Filed at: 01/03/2024 2119                      Medical Decision Making  Patient with undifferentiated  generalized weakness at this time.  No signs of stroke on exam.  Patient has fallen multiple times over the past few weeks.  Most recent fall yesterday and today did cause more severe back pain, right-sided chest and flank pain.  No overt signs of injury to the chest or abdomen but patient is tender in the back.  Will check basic labs to further evaluate his generalized weakness.  Patient clinically appears dehydrated with tachycardia, dry mucous membranes and cracking of his skin.  Will give IV fluids.  Tachycardia could also be from underlying trauma so we will obtain CT scans to further evaluate this.  Patient has no infectious causes at this time.  Will add on a urine but he has no fevers, chills, respiratory complaints or dysuria.  Patient states that he has been urinating more so over the past few weeks.  Last known blood sugar was over 200 when ordered by his PCP a few months ago.  Patient is not on any antidiabetic medications.    12:05 AM  Only traumatic injury noted was a mild right-sided posterior rib fracture, #11.  No other acute traumatic injuries identified.  Other incidental findings noted and discussed with the patient and his wife.  Blood sugar is almost 900.  No evidence of DKA at this time.  I did add on a VBG, beta hydroxybutyric acid and a lipase to further investigate this.  Will discuss with both critical care and internal medicine when those are are resulted.  Second liter of IV fluids given at this time.  Patient has remained stable without clinical decline.    12:31 AM  No acidosis identified.  Tiger text sent to both critical care and internal medicine to discuss disposition and further treatment.    Amount and/or Complexity of Data Reviewed  Labs: ordered. Decision-making details documented in ED Course.  Radiology: ordered and independent interpretation performed.    Risk  Decision regarding hospitalization.             Disposition  Final diagnoses:   Hyperosmolar hyperglycemic state  (HHS) ([facility])   Fall, initial encounter   MATILDE (acute kidney injury) ([facility])   Dehydration   Closed fracture of one rib of right side, initial encounter   Hyponatremia   Generalized weakness     Time reflects when diagnosis was documented in both MDM as applicable and the Disposition within this note       Time User Action Codes Description Comment    1/4/2024 12:33 AM KhurramglAdam doll Add [E11.00] Hyperosmolar hyperglycemic state (HHS) ([facility])     1/4/2024 12:33 AM MaudeeriglAdam doll Add [W19.XXXA] Fall, initial encounter     1/4/2024 12:33 AM SmeriglDarrell dollory Add [N17.9] MATILDE (acute kidney injury) ([facility])     1/4/2024 12:33 AM SmeriglAdam doll Add [E86.0] Dehydration     1/4/2024 12:34 AM SmeriAdam emlara Add [S22.31XA] Closed fracture of one rib of right side, initial encounter     1/4/2024 12:34 AM KhurramglAdam doll Add [E87.1] Hyponatremia     1/4/2024 12:34 AM Adam Bunn Add [R53.1] Generalized weakness           ED Disposition       ED Disposition   Admit    Condition   Stable    Date/Time   Thu Jan 4, 2024 0033    Comment   Case was discussed with BASILIA and the patient's admission status was agreed to be Admission Status: inpatient status to the service of Dr. Zapata .               Follow-up Information    None         Patient's Medications   Discharge Prescriptions    No medications on file       No discharge procedures on file.    PDMP Review       None            ED Provider  Electronically Signed by             Adam Bunn Jr., DO  01/04/24 0057

## 2024-01-04 NOTE — ASSESSMENT & PLAN NOTE
"Mild transaminitis and elevated lipase  Previous CT in 2019 showing: Stable cluster of dystrophic calcifications in the right lobe of the liver, superior to the gallbladder fossa.  No evidence of solid mass.  Possible sequela of prior inflammatory or traumatic insult.   Seen outpatient by surgery Apr 2021: \"No further follow-up needed.  I reassured him that his findings were benign.\"  CT CAP ordered. Follow-up read from radiology  Patient follow-up with gastroenterology  "

## 2024-01-04 NOTE — ED TRIAGE NOTES
"Via EMS/BLS w/report of frequent falls (x1 today, x2 yesterday, multiple prior also); states was treated for URI x2 weeks ago with antibiotic \"and I have been weak since\"; denies dizziness, headaches, changes in vision, SOB and/or CP with each fall; states \"my legs just get weak\"; denies hitting head during any falls; denies any injury from fall; denies use of blood thinners; denies N/V, fever and/or diarrhea; denies problem w/urination  "

## 2024-01-04 NOTE — ASSESSMENT & PLAN NOTE
Lab Results   Component Value Date    EGFR 28 01/03/2024    EGFR 38 11/08/2023    EGFR 41 09/06/2023    CREATININE 2.14 (H) 01/03/2024    CREATININE 1.66 (H) 11/08/2023    CREATININE 1.57 (H) 09/06/2023     CKD at baseline

## 2024-01-04 NOTE — ASSESSMENT & PLAN NOTE
Lab Results   Component Value Date    HGBA1C 6.9 (H) 09/06/2023     New dx DM 2  Hyperglycemia 892. pH 7.4, B hydroxybutyrate 0.9  IV hydration and IV insulin infusion  Blood glucose monitoring every 2 hours  Monitor lites  N.p.o. for now  Endocrinology consult    Recent Labs     01/04/24  0108 01/04/24  0212   POCGLU >500* >500*       Blood Sugar Average: Last 72 hrs:

## 2024-01-04 NOTE — PLAN OF CARE
Problem: OCCUPATIONAL THERAPY ADULT  Goal: Performs self-care activities at highest level of function for planned discharge setting.  See evaluation for individualized goals.  Description: Treatment Interventions: ADL retraining, Functional transfer training, UE strengthening/ROM, Endurance training, Patient/family training, Cognitive reorientation, Compensatory technique education, Continued evaluation          See flowsheet documentation for full assessment, interventions and recommendations.   Note: Limitation: Decreased ADL status, Decreased UE strength, Decreased Safe judgement during ADL, Decreased endurance, Decreased high-level ADLs  Prognosis: Good  Assessment: Pt is a 79y/o male admitted to the hospital 2* symptoms of falls at home. Pt noted with elevated blood sugars(i.e.892); denies hx DM. Pt noted with hyperglycemia, R 11th rib fx, L1 irregularity. Pt with PMH CKD, gout, HTN, metabolic acidosis, L TKR, foot sx. PTA family states pt was independent with his ADLs, transfers, ambulation--recent use of SPC; +, +falls=2, has lift chair; neg home alone. During initial eval, pt demonstrated deficits with her functional balance, functional mobility, ADL status, transfer safety, b/l UE strength, and activity tolerance(currently fair=15-20mins). Pt would benefit from continued OT tx for the above deficits.3-5xwk/1-2wks. The patient's raw score on the AM-PAC Daily Activity Inpatient Short Form is 17. A raw score of less than 19 suggests the patient may benefit from discharge to post-acute rehabilitation services. Please refer to the recommendation of the Occupational Therapist for safe discharge planning.     Rehab Resource Intensity Level, OT: II (Moderate Resource Intensity)

## 2024-01-04 NOTE — OCCUPATIONAL THERAPY NOTE
Occupational Therapy Evaluation     Patient Name: Irineo Rawls  Today's Date: 1/4/2024  Problem List  Principal Problem:    Hyperglycemic crisis in diabetes mellitus (HCC)  Active Problems:    Chronic kidney disease, stage III (moderate) (HCC)    KALIA (obstructive sleep apnea)    Calcification of liver    Thrombocytopenia (HCC)    Fall at home, initial encounter    Elevated lipase    Hyponatremia    Past Medical History  Past Medical History:   Diagnosis Date    Acute renal failure superimposed on stage 3 chronic kidney disease (HCC) 2/25/2023    MATILDE (acute kidney injury) (HCC)     Chronic kidney disease     CPAP (continuous positive airway pressure) dependence     Gout     Hearing aid worn     sabrina    Hernia, inguinal     RIH and umbilical hernia    Hypertension     Metabolic acidosis     Morbidly obese (HCC)     Sleep apnea     Wears glasses      Past Surgical History  Past Surgical History:   Procedure Laterality Date    CARPAL TUNNEL RELEASE Bilateral     COLONOSCOPY N/A 1/6/2017    Procedure: COLONOSCOPY;  Surgeon: João Perez MD;  Location: [facility];  Service:     CYST REMOVAL      FOOT SURGERY      HERNIA REPAIR      JOINT REPLACEMENT      knee left     [place] LAPAROSCOPY SURG RPR INITIAL INGUINAL HERNIA Right 3/16/2017    Procedure: REPAIR HERNIA INGUINAL, LAPAROSCOPIC WITH MESH;  Surgeon: Ortiz Salcedo MD;  Location: [facility];  Service: General    FL RPR UMBILICAL HRNA 5 YRS/> REDUCIBLE N/A 3/16/2017    Procedure: OPEN REPAIR HERNIA UMBILICAL;  Surgeon: Ortiz Salcedo MD;  Location: [facility];  Service: General    TONSILLECTOMY             01/04/24 1889   Note Type   Note type Evaluation   Pain Assessment   Pain Assessment Tool FLACC   Pain Rating: FLACC (Rest) - Face 0   Pain Rating: FLACC (Rest) - Legs 0   Pain Rating: FLACC (Rest) - Activity 0   Pain Rating: FLACC (Rest) - Cry 0   Pain Rating: FLACC (Rest) - Consolability 0   Score: FLACC (Rest) 0   Restrictions/Precautions   Weight Bearing  "Precautions Per Order No   Other Precautions Multiple lines;Fall Risk;Chair Alarm;Bed Alarm   Home Living   Type of Home House   Home Layout One level  (5 shyam--with railing)   Bathroom Shower/Tub Tub/shower unit   Bathroom Toilet Raised   Home Equipment Walker;Cane   Prior Function   Lives With Spouse   Falls in the last 6 months 1 to 4  (2)   Lifestyle   Autonomy PTA family states pt was independent with his ADLs, transfers, ambulation--recent use of SPC; +, +falls=2, has lift chair; neg home alone   Reciprocal Relationships supportive family   Service to Others retired ?   Intrinsic Gratification watching TV   Subjective   Subjective \"I'm feeling better now then I did when I came in here.\"   ADL   Where Assessed Edge of bed   Eating Assistance 6  Modified independent   Grooming Assistance 6  Modified Independent   UB Bathing Assistance 5  Supervision/Setup   LB Bathing Assistance 4  Minimal Assistance   UB Dressing Assistance 5  Supervision/Setup   LB Dressing Assistance 4  Minimal Assistance   Toileting Assistance  5  Supervision/Setup   Transfers   Sit to Stand 4  Minimal assistance   Additional items Assist x 1;Increased time required;Verbal cues   Stand to Sit 5  Supervision   Additional items Increased time required;Verbal cues   Toilet transfer 4  Minimal assistance   Additional items Assist x 1;Increased time required;Standard toilet;Verbal cues   Functional Mobility   Functional Mobility 4  Minimal assistance   Additional Comments x1   Additional items Rolling walker   Balance   Static Sitting Good   Dynamic Sitting Fair +   Static Standing Fair   Dynamic Standing Fair -   Activity Tolerance   Activity Tolerance Patient limited by fatigue   Medical Staff Made Aware nsg   RUE Assessment   RUE Assessment WFL   RUE Strength   RUE Overall Strength Within Functional Limits - able to perform ADL tasks with strength  (4/5 throughout)   LUE Assessment   LUE Assessment WFL   LUE Strength   LUE Overall " Strength Within Functional Limits - able to perform ADL tasks with strength  (4/5 throughout)   Hand Function   Gross Motor Coordination Functional   Fine Motor Coordination Functional   Sensation   Light Touch No apparent deficits   Proprioception   Proprioception No apparent deficits   Vision-Basic Assessment   Current Vision   (glasses)   Vision - Complex Assessment   Acuity Able to read clock/calendar on wall without difficulty   Psychosocial   Psychosocial (WDL) WDL   Perception   Inattention/Neglect Appears intact   Cognition   Overall Cognitive Status WFL   Arousal/Participation Alert   Attention Attends with cues to redirect   Orientation Level Oriented X4   Memory Decreased recall of precautions   Following Commands Follows one step commands without difficulty   Comments wife states no baseline hx cognitive deficits   Assessment   Limitation Decreased ADL status;Decreased UE strength;Decreased Safe judgement during ADL;Decreased endurance;Decreased high-level ADLs   Prognosis Good   Assessment Pt is a 77y/o male admitted to the hospital 2* symptoms of falls at home. Pt noted with elevated blood sugars(i.e.892); denies hx DM. Pt noted with hyperglycemia, R 11th rib fx, L1 irregularity. Pt with PMH CKD, gout, HTN, metabolic acidosis, L TKR, foot sx. PTA family states pt was independent with his ADLs, transfers, ambulation--recent use of SPC; +, +falls=2, has lift chair; neg home alone. During initial eval, pt demonstrated deficits with her functional balance, functional mobility, ADL status, transfer safety, b/l UE strength, and activity tolerance(currently fair=15-20mins). Pt would benefit from continued OT tx for the above deficits.3-5xwk/1-2wks. The patient's raw score on the AM-PAC Daily Activity Inpatient Short Form is 17. A raw score of less than 19 suggests the patient may benefit from discharge to post-acute rehabilitation services. Please refer to the recommendation of the Occupational  "Therapist for safe discharge planning.   Goals   Patient Goals \"to get better\"   STG Time Frame   (1-7 days)   Short Term Goal #1 Pt will tolerate continued cognitive/home-safety assessment and appropriate d/c recommendations will be provided.   Short Term Goal #2 Pt will demonstrate improved activity tolerance to good(20-30mins) and standing tolerance to 3-5mins to assist with ADLs.   Short Term Goal  Pt will independently verbalize 2-3 potential fall risks/transfer safety hazards and their appropriate compensation techniques   LTG Time Frame   (7-14 days)   Long Term Goal #1 Pt will demonstrate proper walker/transfer safety 100% of the time.   Long Term Goal #2 Pt will demonstrate g/g- balance with all functional activities.   Long Term Goal Pt will demonstrate mod I with their UE and LE bathing/dresssing.   Plan   Treatment Interventions ADL retraining;Functional transfer training;UE strengthening/ROM;Endurance training;Patient/family training;Cognitive reorientation;Compensatory technique education;Continued evaluation   Goal Expiration Date 01/18/24   OT Treatment Day 0   OT Frequency 3-5x/wk   Discharge Recommendation   Rehab Resource Intensity Level, OT II (Moderate Resource Intensity)   AM-PAC Daily Activity Inpatient   Lower Body Dressing 3   Bathing 2   Toileting 2   Upper Body Dressing 2   Grooming 4   Eating 4   Daily Activity Raw Score 17   Daily Activity Standardized Score (Calc for Raw Score >=11) 37.26   AM-PAC Applied Cognition Inpatient   Following a Speech/Presentation 4   Understanding Ordinary Conversation 3   Taking Medications 3   Remembering Where Things Are Placed or Put Away 3   Remembering List of 4-5 Errands 3   Taking Care of Complicated Tasks 3   Applied Cognition Raw Score 19   Applied Cognition Standardized Score 39.77   Mitch Royal         "

## 2024-01-04 NOTE — H&P
[facility]  H&P  Name: Irineo Rawls 78 y.o. male I MRN: [id number]  Unit/Bed#: ICU 05 I Date of Admission: 1/3/2024   Date of Service: 1/4/2024 I Hospital Day: 0      Assessment/Plan   * Hyperglycemic crisis in diabetes mellitus (HCC)  Assessment & Plan  Lab Results   Component Value Date    HGBA1C 6.9 (H) 09/06/2023     New dx DM 2  Hyperglycemia 892. pH 7.4, B hydroxybutyrate 0.9  IV hydration and IV insulin infusion  Blood glucose monitoring every 2 hours  Monitor lites  N.p.o. for now  Endocrinology consult    Recent Labs     01/04/24  0108 01/04/24  0212   POCGLU >500* >500*       Blood Sugar Average: Last 72 hrs:        Hyponatremia  Assessment & Plan  Moderate hyponatremia, Na 116, sodium correction for hyponatremia 129  Avoid overcorrection, will aim for increase of 6-8mEq/L over 24h  Will obtain serum osmolality, urine osmolality and urine sodium  IV hydration 0.9NS  Oral fluid restriction 1500ml/daily  Nephrology consult if no improvement    Fall at home, initial encounter  Assessment & Plan  CTH neg for acute intracranial abnormality  CT C-spine unremarkable  vRad: CT CAP: 11th rib fracture   vRad: CT thoracolumbar negative for fracture or traumatic malalignment.  Follow-up radiology read  PT OT consult    Elevated lipase  Assessment & Plan  Lipase 128. Chronically elevated     Chronic kidney disease, stage III (moderate) (HCC)  Assessment & Plan  Lab Results   Component Value Date    EGFR 28 01/03/2024    EGFR 38 11/08/2023    EGFR 41 09/06/2023    CREATININE 2.14 (H) 01/03/2024    CREATININE 1.66 (H) 11/08/2023    CREATININE 1.57 (H) 09/06/2023     CKD at baseline    Thrombocytopenia (HCC)  Assessment & Plan  Chronic thrombocytopenia    Calcification of liver  Assessment & Plan  Mild transaminitis and elevated lipase  Previous CT in 2019 showing: Stable cluster of dystrophic calcifications in the right lobe of the liver, superior to the gallbladder fossa.  No evidence  "of solid mass.  Possible sequela of prior inflammatory or traumatic insult.   Seen outpatient by surgery Apr 2021: \"No further follow-up needed.  I reassured him that his findings were benign.\"  CT CAP ordered. Follow-up read from radiology  Patient follow-up with gastroenterology    KALIA (obstructive sleep apnea)  Assessment & Plan  Stopped using CPAP due to discomfort and dryness of mucosa         VTE Prophylaxis: Heparin  / reason for no mechanical VTE prophylaxis ac    Code Status: DNR/I  POLST: POLST is not applicable to this patient  Discussion with family:     Anticipated Length of Stay:  Patient will be admitted on an Inpatient basis with an anticipated length of stay of  > 2 midnights.   Justification for Hospital Stay: Hyperglycemic crisis in  new diabetic, fall, rib fracture    Total Time for Visit, including Counseling / Coordination of Care: 45 minutes.  Greater than 50% of this total time spent on direct patient counseling and coordination of care.    Chief Complaint:   \"Fell over\"    History of Present Illness:    Irineo Rawls is a 78 y.o. male who presents with complaints of fall x2.  PMH as above.  Reports ambulating with cane for the last 2-1/2 weeks due to weakness. Tonight as he was walking to bed, he picked up his pace without his control and fell landing on his right side.  Denies arthralgias or limitation in ROM.  Denies head strike.  Noted with elevated blood glucose 892, denies history of diabetes.  ROS positive for polyuria and polydipsia.  Denies polyphagia, reports loss of appetite.  Patient is very hard of hearing.    Review of Systems:    Review of Systems   Unable to perform ROS: Other   Constitutional: Negative.    Respiratory: Negative.     Cardiovascular: Negative.    Gastrointestinal: Negative.    Endocrine: Positive for polydipsia and polyuria. Negative for polyphagia.   Musculoskeletal:  Positive for gait problem.   Neurological:  Positive for weakness. "   Psychiatric/Behavioral:  Negative for confusion.        Past Medical and Surgical History:     Past Medical History:   Diagnosis Date    Acute renal failure superimposed on stage 3 chronic kidney disease (HCC) 2/25/2023    MATILDE (acute kidney injury) (HCC)     Chronic kidney disease     CPAP (continuous positive airway pressure) dependence     Gout     Hearing aid worn     sabrina    Hernia, inguinal     RIH and umbilical hernia    Hypertension     Metabolic acidosis     Morbidly obese (HCC)     Sleep apnea     Wears glasses        Past Surgical History:   Procedure Laterality Date    CARPAL TUNNEL RELEASE Bilateral     COLONOSCOPY N/A 1/6/2017    Procedure: COLONOSCOPY;  Surgeon: João Perez MD;  Location: [facility];  Service:     CYST REMOVAL      FOOT SURGERY      HERNIA REPAIR      JOINT REPLACEMENT      knee left     [place] LAPAROSCOPY SURG RPR INITIAL INGUINAL HERNIA Right 3/16/2017    Procedure: REPAIR HERNIA INGUINAL, LAPAROSCOPIC WITH MESH;  Surgeon: Ortiz Salcedo MD;  [facility];  Service: General    [place] RPR UMBILICAL HRNA 5 YRS/> REDUCIBLE N/A 3/16/2017    Procedure: OPEN REPAIR HERNIA UMBILICAL;  Surgeon: Ortiz Salcedo MD;  Location[facility];  Service: General    TONSILLECTOMY         Meds/Allergies:    Prior to Admission medications    Medication Sig Start Date End Date Taking? Authorizing Provider   albuterol (ProAir HFA) 90 mcg/act inhaler Inhale 2 puffs every 6 (six) hours as needed for wheezing 12/29/23  Yes Ange Wolf PA-C   allopurinol (ZYLOPRIM) 300 mg tablet Take 1 tablet (300 mg total) by mouth daily 12/20/23  Yes Obi Esquivel, DO   aspirin 81 MG tablet Take 81 mg by mouth daily   Yes Historical Provider, MD   Cholecalciferol (VITAMIN D3) 5000 units TABS Take 1 tablet by mouth daily   Yes Historical Provider, MD   Omega-3 Fatty Acids (FISH OIL) 1200 MG CAPS Take 1 capsule by mouth 3 (three) times a day   Yes Historical Provider, MD   NIFEdipine (PROCARDIA XL) 60 mg 24 hr tablet  "Take 1 tablet (60 mg total) by mouth daily  Patient not taking: Reported on 23   Obi Esquivel DO   tamsulosin (FLOMAX) 0.4 mg TAKE 1 CAPSULE BY MOUTH EVERY DAY WITH DINNER  Patient not taking: Reported on 2023 3/20/23   Elisabeth Owens PA-C     I have reviewed home medications with patient personally.    Allergies: No Known Allergies    Social History:     Marital Status: /Civil Union   Occupation: retired  Patient Pre-hospital Living Situation: resides w spouse  Patient Pre-hospital Level of Mobility: ambulatory, cane for 2.5 weeks  Patient Pre-hospital Diet Restrictions:   Substance Use History:   Social History     Substance and Sexual Activity   Alcohol Use No     Social History     Tobacco Use   Smoking Status Former    Current packs/day: 0.00    Types: Cigarettes    Quit date: 3/10/2003    Years since quittin.8   Smokeless Tobacco Never     Social History     Substance and Sexual Activity   Drug Use No       Family History:    Family History   Problem Relation Age of Onset    No Known Problems Mother     Diabetes Father     Diabetes Sister        Physical Exam:     Vitals:   Blood Pressure: 141/73 (24)  Pulse: 86 (24)  Temperature: 97.8 °F (36.6 °C) (24)  Temp Source: Oral (24)  Respirations: 18 (24)  Height: 5' 7\" (170.2 cm) (24)  Weight - Scale: 112 kg (246 lb 7.6 oz) (24)  SpO2: 96 % (24)    Physical Exam  Constitutional:       General: He is not in acute distress.     Appearance: Normal appearance. He is obese. He is not ill-appearing, toxic-appearing or diaphoretic.   HENT:      Ears:      Comments: Very hard of hearing     Nose: No rhinorrhea.   Eyes:      Comments: Eyeglasses   Cardiovascular:      Rate and Rhythm: Normal rate and regular rhythm.   Pulmonary:      Effort: Pulmonary effort is normal.      Breath sounds: Normal breath sounds.   Abdominal:      General: Bowel " sounds are normal.      Palpations: Abdomen is soft.   Musculoskeletal:      Right lower leg: No edema.      Left lower leg: No edema.      Comments: Normal range of motion   Skin:     General: Skin is warm.   Neurological:      Mental Status: He is alert and oriented to person, place, and time.   Psychiatric:         Mood and Affect: Mood normal.         Behavior: Behavior normal.         Thought Content: Thought content normal.         Judgment: Judgment normal.      Comments: Mild cognitive decline       Additional Data:     Lab Results: I have personally reviewed pertinent reports.      Results from last 7 days   Lab Units 01/03/24  2203   WBC Thousand/uL 11.02*   HEMOGLOBIN g/dL 13.2   HEMATOCRIT % 37.5   PLATELETS Thousands/uL 133*   BANDS PCT % 1   LYMPHO PCT % 21   MONO PCT % 9   EOS PCT % 4     Results from last 7 days   Lab Units 01/04/24  0220 01/03/24  2203   SODIUM mmol/L  --  116*   POTASSIUM mmol/L  --  5.1   CHLORIDE mmol/L  --  80*   CO2 mmol/L  --  23   BUN mg/dL  --  47*   CREATININE mg/dL  --  2.14*   ANION GAP mmol/L  --  13   CALCIUM mg/dL  --  10.0   ALBUMIN g/dL  --  3.8   TOTAL BILIRUBIN mg/dL  --  0.60   ALK PHOS U/L  --  145*   ALT U/L  --  60*   AST U/L  --  45*   GLUCOSE RANDOM mg/dL 578* 892*         Results from last 7 days   Lab Units 01/04/24  0412 01/04/24  0212 01/04/24  0108   POC GLUCOSE mg/dl 248* >500* >500*               Imaging: I have personally reviewed pertinent reports.      CT head without contrast   ED Interpretation by Adam Bunn Jr., DO (01/04 0000)   Per [facility]:  MPRESSION: 1. Presumably age-related and chronic changes without acute intracranial abnormality. 2. 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        Final Result by Pete Cruz MD (01/04 0130)      No acute intracranial abnormality.                  Workstation performed: [id number]         CT cervical spine without contrast   ED Interpretation by Adam Bunn  DO Shaun (01/04 0000)   Per [facility]:    MPRESSION: 1. Multilevel degenerative change without acute injury identified. 2. Please see the dedicated interpretation of the thorax for findings in that region.         Final Result by Pete Cruz MD (01/04 0134)      No acute cervical spine fracture or traumatic malalignment.                  Workstation performed: [id number]         CT recon only thoracolumbar   ED Interpretation by Adam Bunn Jr., DO (01/04 0002)   Per [facility]:    IMPRESSION: 1. There is slight anterolisthesis of L5 on S1 with bilateral pars interarticularis defects. These are most likely degenerative in nature. 2. Degenerative changes without acute abnormality detected. 3. Please see the dedicated interpretation of abdomen and pelvis for findings in that region.        CT chest abdomen pelvis wo contrast   ED Interpretation by Adam Bunn Jr., DO (01/04 0001)   Per [facility]:    IMPRESSION: 1. There is a right posterior 11th rib fracture which is minimally displaced. 2. Please see the dedicated interpretation of abdomen and pelvis for findings in that region.     IMPRESSION: 1. No acute traumatic injury is identified. 2. Please see the dedicated interpretation of the thorax for findings in that region. 3. Interval growth of a right midpole renal lesion measuring up to 3.6 cm (image 133 series 2), nonemergent follow-up ultrasound is recommended.             EKG, Pathology, and Other Studies Reviewed on Admission:   ct    Allscripts / Epic Records Reviewed: Yes     ** Please Note: This note has been constructed using a voice recognition system. **

## 2024-01-04 NOTE — UTILIZATION REVIEW
"Initial Clinical Review    1/3 Treatment/ Care started in ED    Admission: Date/Time/Statement:   Admission Orders (From admission, onward)       Ordered        01/04/24 0056  INPATIENT ADMISSION  Once                          Orders Placed This Encounter   Procedures    INPATIENT ADMISSION     Standing Status:   Standing     Number of Occurrences:   1     Order Specific Question:   Level of Care     Answer:   Level 2 Stepdown / HOT [14]     Order Specific Question:   Estimated length of stay     Answer:   More than 2 Midnights     Order Specific Question:   Certification     Answer:   I certify that inpatient services are medically necessary for this patient for a duration of greater than two midnights. See H&P and MD Progress Notes for additional information about the patient's course of treatment.     ED Arrival Information       Expected   -    Arrival   1/3/2024 21:12    Acuity   Urgent              Means of arrival   Ambulance    Escorted by   [facility]    Service   Hospitalist    Admission type   Emergency              Arrival complaint   fall             Chief Complaint   Patient presents with    Fall     Via EMS/BLS w/report of frequent falls (x1 today, x2 yesterday, multiple prior also); states was treated for URI x2 weeks ago with antibiotic \"and I have been weak since\"; denies dizziness, headaches, changes in vision, SOB and/or CP with each fall; states \"my legs just get weak\"; denies hitting head during any falls; denies any injury from fall; denies use of blood thinners; denies N/V, fever and/or diarrhea; denies problem w/urination       Initial Presentation: 78 y.o. male to ED presents for Fall x2. Per pt, he has been ambulating with cane for the last t 2-1/2 weeks due to weakness. Tonight as he was walking to bed, he picked up his pace without his control and fell landing on his right side. Denies head strike.  Noted with elevated blood glucose 892, denies history of diabetes.  ROS positive " for polyuria and polydipsia. Reports loss of appetite. Pt very hard of hearing. PMH for CKD stage III. Thrombocytopenia, KALIA. Calcification of liver  Admit Inpatient level of care for Falls, Hyperglycemic crisis in DM- newly diagnosed diabetes, Hyponatremia, Elevated lipase. Hyperglycemia 892. pH 7.4, B hydroxybutyrate 0.9. IVFs and IV Insulin infusion. Bld glucose monitoring q2h. NPO for now. Endocrinology consult. Na 116, sodium correction for hyponatremia 129. Avoid overcorrection, will aim for increase of 6-8mEq/L over 24h. Serum osmolality, urine osmolality and urine sodium. Fluid restriction 1500 ml/daily. Lipase chronically elevated, 128.     1/4  Progress notes; Hyperglycemic crisis in type 2 diabetes- blood glucose 800 on admit. Has improved to 170 on insulin gtt and fluids. Start diet. Start lantus of 20 units and adjust as needed. Start lispro of 5 units tid. Start insulin sliding scale.   Hyponatremia- pseudohyponatremia and hypovolemic hyponatremia- improving with blood glucose control and fluids. Check bmp in am. Pain control.     Date: 1/5    Day 3: Has surpassed a 2nd midnight with active treatments and services, which include continue treatment of DM, Insulin infusion and close glucose monitoring.    ED Triage Vitals   Temperature Pulse Respirations Blood Pressure SpO2   01/03/24 2336 01/03/24 2117 01/03/24 2117 01/03/24 2117 01/03/24 2117   98.2 °F (36.8 °C) 105 20 163/89 96 %      Temp Source Heart Rate Source Patient Position - Orthostatic VS BP Location FiO2 (%)   01/03/24 2336 01/03/24 2117 01/03/24 2117 01/03/24 2117 --   Oral Monitor Lying Left arm       Pain Score       01/03/24 2117       No Pain          Wt Readings from Last 1 Encounters:   01/04/24 112 kg (246 lb 7.6 oz)     Additional Vital Signs:   01/04/24 0700 98.3 °F (36.8 °C) 76 14 142/71 106 96 % -- --   01/04/24 0600 -- 82 18 143/71 106 95 % -- --   01/04/24 0500 -- 104 20 139/84 109 94 % -- --   01/04/24 0400 -- 86 18 141/73 92  96 % --      01/04/24 0105 -- 91 20 169/69 99 -- None (Room air) Sitting   01/03/24 2345 -- 97 21 171/80 Abnormal  113 95 % -- --   01/03/24 2336 98.2 °F (36.8 °C) -- -- -- -- -- -- --   01/03/24 2152 -- -- -- -- -- -- None (Room air) --     Pertinent Labs/Diagnostic Test Results:   CT head without contrast   ED Interpretation by Adam Bunn Jr., DO (01/04 0000)   Per [facility]:  MPRESSION: 1. Presumably age-related and chronic changes without acute intracranial abnormality. 2. 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        Final Result by Pete Cruz MD (01/04 0130)      No acute intracranial abnormality.                  Workstation performed: [id number]         CT cervical spine without contrast   ED Interpretation by Adam Bunn Jr., DO (01/04 0000)   Per [facility]:    MPRESSION: 1. Multilevel degenerative change without acute injury identified. 2. Please see the dedicated interpretation of the thorax for findings in that region.         Final Result by Pete Cruz MD (01/04 0134)      No acute cervical spine fracture or traumatic malalignment.                  Workstation performed: [id number]         CT recon only thoracolumbar   ED Interpretation by Adam Bunn Jr., DO (01/04 0002)   Per [facility]:    IMPRESSION: 1. There is slight anterolisthesis of L5 on S1 with bilateral pars interarticularis defects. These are most likely degenerative in nature. 2. Degenerative changes without acute abnormality detected. 3. Please see the dedicated interpretation of abdomen and pelvis for findings in that region.        Final Result by Trevon Scanlon MD (01/04 0825)      Slight irregularity of the left L1 transverse process may be related to soft tissue attenuation artifact, although a nondisplaced fracture is possible.      No additional evidence of fracture or traumatic subluxation.      The major findings are in agreement with the preliminary report provided by  [facility] which was provided shortly after completion of the exam. The additional finding of left L1 transverse process irregularity will now be communicated with    patient's clinical team by our radiology liaison.      The study was marked in EPIC for immediate notification.      Workstation performed: [id number]         CT chest abdomen pelvis wo contrast   ED Interpretation by Adam Bunn Jr., DO (01/04 0001)   Per [facility]:    IMPRESSION: 1. There is a right posterior 11th rib fracture which is minimally displaced. 2. Please see the dedicated interpretation of abdomen and pelvis for findings in that region.     IMPRESSION: 1. No acute traumatic injury is identified. 2. Please see the dedicated interpretation of the thorax for findings in that region. 3. Interval growth of a right midpole renal lesion measuring up to 3.6 cm (image 133 series 2), nonemergent follow-up ultrasound is recommended.         Final Result by Trevon Scanlon MD (01/04 0818)      1. Acute slightly angulated right posterior 11th rib fracture with mild overlying subcutaneous contusion.      2. No evidence of visceral traumatic injury in the chest, abdomen or pelvis.      3. A 3.5 cm right mid renal probable cyst is increased in size since 5/23/2019. Nonemergent renal ultrasound is suggested, given the increased size.      Findings are consistent with the preliminary report from [facility] which was provided shortly after completion of the exam.         Workstation performed: [id number]               Results from last 7 days   Lab Units 01/04/24  0518 01/03/24  2203   WBC Thousand/uL 11.76* 11.02*   HEMOGLOBIN g/dL 13.2 13.2   HEMATOCRIT % 37.2 37.5   PLATELETS Thousands/uL 141* 133*   BANDS PCT %  --  1         Results from last 7 days   Lab Units 01/04/24  0518 01/03/24  2203   SODIUM mmol/L 128* 116*   POTASSIUM mmol/L 3.7 5.1   CHLORIDE mmol/L 96 80*   CO2 mmol/L 22 23   ANION GAP mmol/L 10 13   BUN mg/dL 41* 47*    CREATININE mg/dL 1.77* 2.14*   EGFR ml/min/1.73sq m 35 28   CALCIUM mg/dL 10.2 10.0   MAGNESIUM mg/dL 1.9  --    PHOSPHORUS mg/dL 2.5  --      Results from last 7 days   Lab Units 01/03/24  2203   AST U/L 45*   ALT U/L 60*   ALK PHOS U/L 145*   TOTAL PROTEIN g/dL 7.5   ALBUMIN g/dL 3.8   TOTAL BILIRUBIN mg/dL 0.60     Results from last 7 days   Lab Units 01/04/24  0544 01/04/24  0412 01/04/24  0212 01/04/24  0108   POC GLUCOSE mg/dl 185* 248* >500* >500*     Results from last 7 days   Lab Units 01/04/24  0518 01/04/24  0220 01/03/24  2203   GLUCOSE RANDOM mg/dL 174* 578* 892*             BETA-HYDROXYBUTYRATE   Date Value Ref Range Status   01/03/2024 0.9 (H) <0.6 mmol/L Final          Results from last 7 days   Lab Units 01/04/24  0010   PH LEON  7.401*   PCO2 LEON mm Hg 35.0*   PO2 LEON mm Hg 87.8*   HCO3 LEON mmol/L 21.2*   BASE EXC LEON mmol/L -2.9   O2 CONTENT LEON ml/dL 18.1   O2 HGB, VENOUS % 93.5*             Results from last 7 days   Lab Units 01/04/24  0220 01/04/24  0010 01/03/24 2203   HS TNI 0HR ng/L  --   --  15   HS TNI 2HR ng/L  --  17  --    HSTNI D2 ng/L  --  2  --    HS TNI 4HR ng/L 18  --   --    HSTNI D4 ng/L 3  --   --              Results from last 7 days   Lab Units 01/03/24  2203   TSH 3RD GENERATON uIU/mL 2.626       Results from last 7 days   Lab Units 01/03/24  2350   LIPASE u/L 128*       Results from last 7 days   Lab Units 01/03/24  2246   CLARITY UA  Clear   COLOR UA  Yellow   SPEC GRAV UA  <=1.005   PH UA  5.5   GLUCOSE UA mg/dl 500 (1/2%)*   KETONES UA mg/dl Negative   BLOOD UA  Trace*   PROTEIN UA mg/dl Negative   NITRITE UA  Negative   BILIRUBIN UA  Negative   UROBILINOGEN UA E.U./dl 0.2   LEUKOCYTES UA  Negative   WBC UA /hpf 1-2   RBC UA /hpf None Seen   BACTERIA UA /hpf None Seen   EPITHELIAL CELLS WET PREP /hpf None Seen   SODIUM UR  26       ED Treatment:   Medication Administration from 01/03/2024 2112 to 01/04/2024 0210         Date/Time Order Dose Route Action     01/03/2024  2211 EST sodium chloride 0.9 % bolus 1,000 mL 1,000 mL Intravenous New Bag     01/03/2024 2351 EST sodium chloride 0.9 % bolus 1,000 mL 1,000 mL Intravenous New Bag     01/04/2024 0126 EST insulin regular (HumuLIN R,NovoLIN R) 1 Units/mL in sodium chloride 0.9 % 100 mL infusion 10 Units/hr Intravenous New Bag          Past Medical History:   Diagnosis Date    Acute renal failure superimposed on stage 3 chronic kidney disease (HCC) 2/25/2023    MATILDE (acute kidney injury) ([facility])     Chronic kidney disease     CPAP (continuous positive airway pressure) dependence     Gout     Hearing aid worn     sabrina    Hernia, inguinal     RIH and umbilical hernia    Hypertension     Metabolic acidosis     Morbidly obese (HCC)     Sleep apnea     Wears glasses      Present on Admission:   Thrombocytopenia (HCC)   Elevated lipase   Hyponatremia   Hyperglycemic crisis in diabetes mellitus ([facility])   Chronic kidney disease, stage III (moderate) ([facility])   Calcification of liver   KALIA (obstructive sleep apnea)      Admitting Diagnosis: Dehydration [E86.0]  Hyponatremia [E87.1]  MATILDE (acute kidney injury) ([facility]) [N17.9]  Generalized weakness [R53.1]  Fall in elderly patient [R29.6]  Closed fracture of one rib of right side, initial encounter [S22.31XA]  Other injury of unspecified body region, initial encounter [T14.8XXA]  Hyperosmolar hyperglycemic state (HHS) ([facility]) [E11.00]  Age/Sex: 78 y.o. male    Admission Orders:  Scheduled Medications:  allopurinol, 300 mg, Oral, Daily  aspirin, 81 mg, Oral, Daily  cholecalciferol, 5,000 Units, Oral, Daily  fish oil, 1,000 mg, Oral, TID  heparin (porcine), 5,000 Units, Subcutaneous, Q8H RENNY  NIFEdipine, 60 mg, Oral, Daily      Continuous IV Infusions:  insulin regular (HumuLIN R,NovoLIN R) 1 Units/mL in sodium chloride 0.9 % 100 mL infusion, 0.3-21 Units/hr, Intravenous, Titrated  sodium chloride, 100 mL/hr, Intravenous, Continuous      PRN Meds:  acetaminophen, 650 mg, Oral, Q6H PRN  albuterol, 2 puff,  Inhalation, Q6H PRN  aluminum-magnesium hydroxide-simethicone, 30 mL, Oral, Q6H PRN  bisacodyl, 10 mg, Oral, Daily PRN  ondansetron, 4 mg, Intravenous, Q6H PRN  simethicone, 80 mg, Oral, 4x Daily PRN        IP CONSULT TO ENDOCRINOLOGY    Network Utilization Review Department  ATTENTION: Please call with any questions or concerns to [phone] and carefully listen to the prompts so that you are directed to the right person. All voicemails are confidential.   For Discharge needs, contact Care Management DC Support Team at [phone] opt. [phone]  Send all requests for admission clinical reviews, approved or denied determinations and any other requests to dedicated fax number below belonging to the campus where the patient is receiving treatment. List of dedicated fax numbers for the Facilities:  FACILITY NAME UR FAX NUMBER   ADMISSION DENIALS (Administrative/Medical Necessity) [phone]   DISCHARGE SUPPORT TEAM (NETWORK) [phone]   PARENT CHILD HEALTH (Maternity/NICU/Pediatrics) [phone]   [facility] [phone]   [facility] [phone]   [facility] [phone]   [facility] [phone]   [facility] [phone]   [facility] [phone]   [facility] [phone]   [facility] [phone]   [facility] [phone]   [facility] [phone]   [facility] [phone]

## 2024-01-04 NOTE — ASSESSMENT & PLAN NOTE
CTH neg for acute intracranial abnormality  CT C-spine unremarkable  [facility]: CT CAP: 11th rib fracture   [facility]: CT thoracolumbar negative for fracture or traumatic malalignment.  Follow-up radiology read  PT OT consult

## 2024-01-05 PROBLEM — N28.1 RENAL CYST: Status: ACTIVE | Noted: 2024-01-05

## 2024-01-05 PROBLEM — S22.39XA RIB FRACTURE: Status: ACTIVE | Noted: 2024-01-05

## 2024-01-05 LAB
ANION GAP SERPL CALCULATED.3IONS-SCNC: 7 MMOL/L
BUN SERPL-MCNC: 38 MG/DL (ref 5–25)
CALCIUM SERPL-MCNC: 9.4 MG/DL (ref 8.4–10.2)
CHLORIDE SERPL-SCNC: 97 MMOL/L (ref 96–108)
CO2 SERPL-SCNC: 24 MMOL/L (ref 21–32)
CREAT SERPL-MCNC: 1.64 MG/DL (ref 0.6–1.3)
ERYTHROCYTE [DISTWIDTH] IN BLOOD BY AUTOMATED COUNT: 12.6 % (ref 11.6–15.1)
GFR SERPL CREATININE-BSD FRML MDRD: 39 ML/MIN/1.73SQ M
GLUCOSE SERPL-MCNC: 239 MG/DL (ref 65–140)
GLUCOSE SERPL-MCNC: 260 MG/DL (ref 65–140)
GLUCOSE SERPL-MCNC: 276 MG/DL (ref 65–140)
GLUCOSE SERPL-MCNC: 314 MG/DL (ref 65–140)
GLUCOSE SERPL-MCNC: 355 MG/DL (ref 65–140)
HCT VFR BLD AUTO: 35.9 % (ref 36.5–49.3)
HGB BLD-MCNC: 12.4 G/DL (ref 12–17)
MCH RBC QN AUTO: 33.6 PG (ref 26.8–34.3)
MCHC RBC AUTO-ENTMCNC: 34.5 G/DL (ref 31.4–37.4)
MCV RBC AUTO: 97 FL (ref 82–98)
PLATELET # BLD AUTO: 125 THOUSANDS/UL (ref 149–390)
PMV BLD AUTO: 12.5 FL (ref 8.9–12.7)
POTASSIUM SERPL-SCNC: 4.3 MMOL/L (ref 3.5–5.3)
RBC # BLD AUTO: 3.69 MILLION/UL (ref 3.88–5.62)
SODIUM SERPL-SCNC: 128 MMOL/L (ref 135–147)
WBC # BLD AUTO: 8.51 THOUSAND/UL (ref 4.31–10.16)

## 2024-01-05 PROCEDURE — 80048 BASIC METABOLIC PNL TOTAL CA: CPT | Performed by: NURSE PRACTITIONER

## 2024-01-05 PROCEDURE — 85027 COMPLETE CBC AUTOMATED: CPT | Performed by: NURSE PRACTITIONER

## 2024-01-05 PROCEDURE — 99222 1ST HOSP IP/OBS MODERATE 55: CPT | Performed by: INTERNAL MEDICINE

## 2024-01-05 PROCEDURE — 99232 SBSQ HOSP IP/OBS MODERATE 35: CPT | Performed by: INTERNAL MEDICINE

## 2024-01-05 PROCEDURE — 82948 REAGENT STRIP/BLOOD GLUCOSE: CPT

## 2024-01-05 PROCEDURE — 97163 PT EVAL HIGH COMPLEX 45 MIN: CPT

## 2024-01-05 RX ORDER — INSULIN LISPRO 100 [IU]/ML
5 INJECTION, SOLUTION INTRAVENOUS; SUBCUTANEOUS
Status: DISCONTINUED | OUTPATIENT
Start: 2024-01-05 | End: 2024-01-05

## 2024-01-05 RX ORDER — INSULIN LISPRO 100 [IU]/ML
10 INJECTION, SOLUTION INTRAVENOUS; SUBCUTANEOUS
Status: DISCONTINUED | OUTPATIENT
Start: 2024-01-05 | End: 2024-01-08 | Stop reason: HOSPADM

## 2024-01-05 RX ADMIN — INSULIN LISPRO 10 UNITS: 100 INJECTION, SOLUTION INTRAVENOUS; SUBCUTANEOUS at 08:20

## 2024-01-05 RX ADMIN — Medication 5000 UNITS: at 08:20

## 2024-01-05 RX ADMIN — INSULIN LISPRO 3 UNITS: 100 INJECTION, SOLUTION INTRAVENOUS; SUBCUTANEOUS at 22:18

## 2024-01-05 RX ADMIN — HEPARIN SODIUM 5000 UNITS: 5000 INJECTION INTRAVENOUS; SUBCUTANEOUS at 22:18

## 2024-01-05 RX ADMIN — INSULIN LISPRO 10 UNITS: 100 INJECTION, SOLUTION INTRAVENOUS; SUBCUTANEOUS at 16:44

## 2024-01-05 RX ADMIN — INSULIN LISPRO 3 UNITS: 100 INJECTION, SOLUTION INTRAVENOUS; SUBCUTANEOUS at 16:44

## 2024-01-05 RX ADMIN — INSULIN GLARGINE 30 UNITS: 100 INJECTION, SOLUTION SUBCUTANEOUS at 08:20

## 2024-01-05 RX ADMIN — NIFEDIPINE 60 MG: 60 TABLET, FILM COATED, EXTENDED RELEASE ORAL at 09:44

## 2024-01-05 RX ADMIN — ALLOPURINOL 300 MG: 100 TABLET ORAL at 08:20

## 2024-01-05 RX ADMIN — OMEGA-3 FATTY ACIDS CAP 1000 MG 1000 MG: 1000 CAP at 22:10

## 2024-01-05 RX ADMIN — INSULIN LISPRO 10 UNITS: 100 INJECTION, SOLUTION INTRAVENOUS; SUBCUTANEOUS at 12:00

## 2024-01-05 RX ADMIN — ASPIRIN 81 MG: 81 TABLET, COATED ORAL at 08:20

## 2024-01-05 RX ADMIN — INSULIN LISPRO 6 UNITS: 100 INJECTION, SOLUTION INTRAVENOUS; SUBCUTANEOUS at 08:19

## 2024-01-05 RX ADMIN — HEPARIN SODIUM 5000 UNITS: 5000 INJECTION INTRAVENOUS; SUBCUTANEOUS at 13:24

## 2024-01-05 RX ADMIN — OMEGA-3 FATTY ACIDS CAP 1000 MG 1000 MG: 1000 CAP at 16:44

## 2024-01-05 RX ADMIN — INSULIN LISPRO 3 UNITS: 100 INJECTION, SOLUTION INTRAVENOUS; SUBCUTANEOUS at 11:59

## 2024-01-05 RX ADMIN — HEPARIN SODIUM 5000 UNITS: 5000 INJECTION INTRAVENOUS; SUBCUTANEOUS at 06:06

## 2024-01-05 RX ADMIN — OMEGA-3 FATTY ACIDS CAP 1000 MG 1000 MG: 1000 CAP at 08:20

## 2024-01-05 NOTE — ASSESSMENT & PLAN NOTE
Lab Results   Component Value Date    EGFR 39 01/05/2024    EGFR 35 01/04/2024    EGFR 28 01/03/2024    CREATININE 1.64 (H) 01/05/2024    CREATININE 1.77 (H) 01/04/2024    CREATININE 2.14 (H) 01/03/2024     Baseline 1.5-1.7  Creatinine on admit was 2.14 as a result of prerenal from diuresis due to hyperglycemia  S/p fluids and at currently at baseline

## 2024-01-05 NOTE — PROGRESS NOTES
"Critical access hospital  Progress Note  Name: Irineo Rawls I  MRN: [id number]  Unit/Bed#: E2 -01 I Date of Admission: 1/3/2024   Date of Service: 1/5/2024 I Hospital Day: 1    Assessment/Plan   * Hyperglycemic crisis in diabetes mellitus (HCC)  Assessment & Plan  Lab Results   Component Value Date    HGBA1C 6.9 (H) 09/06/2023     New dx DM 2  Hyperglycemia 892. pH 7.4, B hydroxybutyrate 0.9  IV hydration and IV insulin infusion  Changed to sq insulin yesterday which lantus has been increased to 30 units and 10 units of lispro with meals  Appreciate endocrine recommendations  Will provide insulin teaching      Recent Labs     01/04/24  1624 01/04/24  1721 01/04/24  2127 01/05/24  0721   POCGLU 368* 411* 377* 355*         Blood Sugar Average: Last 72 hrs:  (P) 277.6      Renal cyst  Assessment & Plan  Incidental finding  Will require outpt renal u/s    Rib fracture  Assessment & Plan  S/p fall  Continue supportive care     Hyponatremia  Assessment & Plan  Moderate hyponatremia, Na 116, sodium correction for hyponatremia 129  S/p iv fluids and sodium continues to improve with correction for hyperglycemia      Elevated lipase  Assessment & Plan  Lipase 128. Chronically elevated     Fall at home, initial encounter  Assessment & Plan  CTH neg for acute intracranial abnormality  CT C-spine unremarkable  [facility]: CT CAP: 11th rib fracture   [facility]: CT thoracolumbar negative for fracture or traumatic malalignment.  OT stating moderate intensity. Awaiting physical therapy eval     Thrombocytopenia (HCC)  Assessment & Plan  Chronic thrombocytopenia    Calcification of liver  Assessment & Plan  Mild transaminitis and elevated lipase  Previous CT in 2019 showing: Stable cluster of dystrophic calcifications in the right lobe of the liver, superior to the gallbladder fossa.  No evidence of solid mass.  Possible sequela of prior inflammatory or traumatic insult.   Seen outpatient by surgery Apr 2021: \"No " "further follow-up needed.  I reassured him that his findings were benign.\"  CT CAP ordered. Follow-up read from radiology  Patient follow-up with gastroenterology    KALIA (obstructive sleep apnea)  Assessment & Plan  Stopped using CPAP due to discomfort and dryness of mucosa    MATILDE (acute kidney injury) (HCC)  Assessment & Plan  Lab Results   Component Value Date    EGFR 39 2024    EGFR 35 2024    EGFR 28 2024    CREATININE 1.64 (H) 2024    CREATININE 1.77 (H) 2024    CREATININE 2.14 (H) 2024     Baseline 1.5-1.7  Creatinine on admit was 2.14 as a result of prerenal from diuresis due to hyperglycemia  S/p fluids and at currently at baseline                VTE Pharmacologic Prophylaxis: VTE Score: 4 heparin     Mobility:   Basic Mobility Inpatient Raw Score: 19  JH-HLM Goal: 6: Walk 10 steps or more  JH-HLM Achieved: 6: Walk 10 steps or more      Patient Centered Rounds:  discussed with his nurse and case management       Education and Discussions with Family / Patient: Updated  (wife) via phone.    Total Time Spent on Date of Encounter in care of patient: 40 mins. This time was spent on one or more of the following: performing physical exam; counseling and coordination of care; obtaining or reviewing history; documenting in the medical record; reviewing/ordering tests, medications or procedures; communicating with other healthcare professionals and discussing with patient's family/caregivers.    Current Length of Stay: 1 day(s)  Current Patient Status: Inpatient     Discharge Plan: either home with hhpt vs str. Awaiting physical therapy eval     Code Status: Level 3 - DNAR and DNI    Subjective:   Pt seen and examined. He states he is doing well. No f/c no cp no sob no n/v/d no abd pain     Objective:     Vitals:   Temp (24hrs), Av.8 °F (36.6 °C), Min:97.1 °F (36.2 °C), Max:98.2 °F (36.8 °C)    Temp:  [97.1 °F (36.2 °C)-98.2 °F (36.8 °C)] 97.1 °F (36.2 °C)  HR:  " [64-78] 78  Resp:  [16-18] 18  BP: (131-163)/(79-98) 131/79  SpO2:  [95 %-98 %] 95 %  Body mass index is 36.77 kg/m².     Input and Output Summary (last 24 hours):     Intake/Output Summary (Last 24 hours) at 1/5/2024 0957  Last data filed at 1/5/2024 0825  Gross per 24 hour   Intake 313.97 ml   Output 800 ml   Net -486.03 ml       Physical Exam:   Physical Exam  Constitutional:       Appearance: Normal appearance.   HENT:      Head: Normocephalic and atraumatic.   Eyes:      Extraocular Movements: Extraocular movements intact.      Pupils: Pupils are equal, round, and reactive to light.   Cardiovascular:      Rate and Rhythm: Normal rate and regular rhythm.      Heart sounds: No murmur heard.     No friction rub. No gallop.   Pulmonary:      Effort: Pulmonary effort is normal. No respiratory distress.      Breath sounds: Normal breath sounds. No wheezing, rhonchi or rales.   Abdominal:      General: Bowel sounds are normal. There is no distension.      Palpations: Abdomen is soft.      Tenderness: There is no abdominal tenderness. There is no guarding or rebound.   Neurological:      Mental Status: He is alert and oriented to person, place, and time.          Additional Data:     Labs:  Results from last 7 days   Lab Units 01/05/24  0506 01/04/24  0518 01/03/24  2203   WBC Thousand/uL 8.51   < > 11.02*   HEMOGLOBIN g/dL 12.4   < > 13.2   HEMATOCRIT % 35.9*   < > 37.5   PLATELETS Thousands/uL 125*   < > 133*   BANDS PCT %  --   --  1   LYMPHO PCT %  --   --  21   MONO PCT %  --   --  9   EOS PCT %  --   --  4    < > = values in this interval not displayed.     Results from last 7 days   Lab Units 01/05/24  0506 01/04/24  0220 01/03/24  2203   SODIUM mmol/L 128*   < > 116*   POTASSIUM mmol/L 4.3   < > 5.1   CHLORIDE mmol/L 97   < > 80*   CO2 mmol/L 24   < > 23   BUN mg/dL 38*   < > 47*   CREATININE mg/dL 1.64*   < > 2.14*   ANION GAP mmol/L 7   < > 13   CALCIUM mg/dL 9.4   < > 10.0   ALBUMIN g/dL  --   --  3.8    TOTAL BILIRUBIN mg/dL  --   --  0.60   ALK PHOS U/L  --   --  145*   ALT U/L  --   --  60*   AST U/L  --   --  45*   GLUCOSE RANDOM mg/dL 314*   < > 892*    < > = values in this interval not displayed.         Results from last 7 days   Lab Units 01/05/24  0721 01/04/24  2127 01/04/24  1721 01/04/24  1624 01/04/24  1354 01/04/24  1113 01/04/24  0959 01/04/24  0753 01/04/24  0544 01/04/24  0412 01/04/24  0212 01/04/24  0108   POC GLUCOSE mg/dl 355* 377* 411* 368* 337* 98 213* 184* 185* 248* >500* >500*               Lines/Drains:  Invasive Devices       Peripheral Intravenous Line  Duration             Peripheral IV 01/03/24 Dorsal (posterior);Left Hand 1 day    Peripheral IV 01/04/24 Left Forearm 1 day                          Imaging: No pertinent imaging reviewed.    Recent Cultures (last 7 days):         Last 24 Hours Medication List:   Current Facility-Administered Medications   Medication Dose Route Frequency Provider Last Rate    acetaminophen  650 mg Oral Q6H PRN RAFIA Arteaga      albuterol  2 puff Inhalation Q6H PRN RAFIA Arteaga      allopurinol  300 mg Oral Daily RAFIA Arteaga      aluminum-magnesium hydroxide-simethicone  30 mL Oral Q6H PRN RAFIA Arteaga      aspirin  81 mg Oral Daily RAFIA Arteaga      bisacodyl  10 mg Oral Daily PRN RAFIA Arteaga      cholecalciferol  5,000 Units Oral Daily RAFIA Arteaga      fish oil  1,000 mg Oral TID RAFIA Arteaga      heparin (porcine)  5,000 Units Subcutaneous Q8H RENNY RAFIA Arteaga      insulin glargine  30 Units Subcutaneous QAM RAFIA Arteaga      insulin lispro  1-5 Units Subcutaneous HS RAFIA Arteaga      insulin lispro  1-6 Units Subcutaneous TID AC RAFIA Arteaga      insulin lispro  10 Units Subcutaneous TID With Meals RAFIA Arteaga      NIFEdipine  60 mg Oral Daily RAFIA Arteaga      ondansetron  4 mg  Intravenous Q6H PRN RAFIA Arteaga      simethicone  80 mg Oral 4x Daily PRN RAFIA Arteaga          Today, Patient Was Seen By: Susan Giron DO

## 2024-01-05 NOTE — ASSESSMENT & PLAN NOTE
CTH neg for acute intracranial abnormality  CT C-spine unremarkable  [facility]: CT CAP: 11th rib fracture   [facility]: CT thoracolumbar negative for fracture or traumatic malalignment.  OT stating moderate intensity. Awaiting physical therapy eval

## 2024-01-05 NOTE — PLAN OF CARE
Problem: PAIN - ADULT  Goal: Verbalizes/displays adequate comfort level or baseline comfort level  Description: Interventions:  - Encourage patient to monitor pain and request assistance  - Assess pain using appropriate pain scale  - Administer analgesics based on type and severity of pain and evaluate response  - Implement non-pharmacological measures as appropriate and evaluate response  - Consider cultural and social influences on pain and pain management  - Notify physician/advanced practitioner if interventions unsuccessful or patient reports new pain  Outcome: Progressing     Problem: Nutrition/Hydration-ADULT  Goal: Nutrient/Hydration intake appropriate for improving, restoring or maintaining nutritional needs  Description: Monitor and assess patient's nutrition/hydration status for malnutrition. Collaborate with interdisciplinary team and initiate plan and interventions as ordered.  Monitor patient's weight and dietary intake as ordered or per policy. Utilize nutrition screening tool and intervene as necessary. Determine patient's food preferences and provide high-protein, high-caloric foods as appropriate.     INTERVENTIONS:  - Monitor oral intake, urinary output, labs, and treatment plans  - Assess nutrition and hydration status and recommend course of action  - Evaluate amount of meals eaten  - Assist patient with eating if necessary   - Allow adequate time for meals  - Recommend/ encourage appropriate diets, oral nutritional supplements, and vitamin/mineral supplements  - Order, calculate, and assess calorie counts as needed  - Recommend, monitor, and adjust tube feedings and TPN/PPN based on assessed needs  - Assess need for intravenous fluids  - Provide specific nutrition/hydration education as appropriate  - Include patient/family/caregiver in decisions related to nutrition  Outcome: Progressing     Problem: INFECTION - ADULT  Goal: Absence or prevention of progression during  hospitalization  Description: INTERVENTIONS:  - Assess and monitor for signs and symptoms of infection  - Monitor lab/diagnostic results  - Monitor all insertion sites, i.e. indwelling lines, tubes, and drains  - Monitor endotracheal if appropriate and nasal secretions for changes in amount and color  - [place] appropriate cooling/warming therapies per order  - Administer medications as ordered  - Instruct and encourage patient and family to use good hand hygiene technique  - Identify and instruct in appropriate isolation precautions for identified infection/condition  Outcome: Progressing     Problem: SAFETY ADULT  Goal: Patient will remain free of falls  Description: INTERVENTIONS:  - Educate patient/family on patient safety including physical limitations  - Instruct patient to call for assistance with activity   - Consult OT/PT to assist with strengthening/mobility   - Keep Call bell within reach  - Keep bed low and locked with side rails adjusted as appropriate  - Keep care items and personal belongings within reach  - Initiate and maintain comfort rounds  - Make Fall Risk Sign visible to staff  - Offer Toileting every 2 Hours, in advance of need  - Obtain necessary fall risk management equipment: non slip socks, call bell   - Apply yellow socks and bracelet for high fall risk patients  - Consider moving patient to room near nurses station  Outcome: Progressing

## 2024-01-05 NOTE — CONSULTS
e-Consult ([facility])  -endocrinology  Irineo Rawls 78 y.o. male MRN: [id number]  Unit/Bed#: E2 -01 Encounter: [id number]          Endocrinology has been consulted to evaluate Irineo Rawls    We were consulted by Kasie Kaba concerning this patient with   Newly diagnosed diabetes with severe hyperglycemia\    Inpatient consult to Endocrinology  Consult performed by: Latoya Yan MD  Consult ordered by: RAFIA Arteaga      78-year-old male with type 2 diabetes (hemoglobin A1c 6.8 in August 2022) who was not on any pharmacological therapy for diabetes presented to the hospital after falls at home-he complained of weakness, polyuria, polydipsia prior to admission-on admission he was found to have severe hyperglycemia and acute kidney injury-initially treated with IV insulin infusion and was transitioned to his basal bolus insulin therapy yesterday-since then sugars have ranged ranged between 300s to 400    01/05/24    Assessment/Recommendation:   Type 2 diabetes with severe hyperglycemia  Acute kidney injury    -Given the degree of hyperglycemia insulin therapy is the optimal choice-  Dose of Lantus was increased by primary team this morning-agree with Lantus at 30 units once a day and Humalog 10 units before meals.  Dose can be adjusted if fingersticks remain above 250 after 24 hours.  Fingerstick goal for now between 100-200    -He has baseline CKD as well so given his advanced age as well as CKD he is at risk for severe hypoglycemia  -He will need glucometer and insulin teaching prior to discharge     For discharge, if Lantus solostar is not the preferred basal insulin for the patient's insurance company, please substitute with Tresiba flexpen, Basaglar Kwikpen , Levemir flexpen, Toujeo solostar pen  or equivalent insulin vials at the same dose instead.      For discharge, if Humalog  Kwik pen is not the preferred mealtime insulin for the patient's insurance, please substitute with  NovoLog flexpen, Fiasp  flextouch, Admelog solostar  or Apidra solostar pen or equivalent insulin vials at the same dose instead.         Please prescribe insulin pen needles, glucometer, test strips, lancets and insulin syringes (only when using insulin vials) on discharge.      Outpatient follow-up with endocrinology 3 to 4 weeks after discharge    11-20 minutes, >50% of the total time devoted to medical consultative verbal/EMR discussion between providers. Written report will be generated in the EMR.     Thank you for allowing [facility] to participate in the care of Irineo Rawls. Please don't hesitate to call or TigerText us with any questions.     Latoya Yan MD

## 2024-01-05 NOTE — PHYSICAL THERAPY NOTE
PT EVALUATION    Pt. Name: Irineo Rawls  Pt. Age: 78 y.o.  MRN: [id number]  LENGTH OF STAY: 1      Admitting Diagnoses:   Dehydration [E86.0]  Hyponatremia [E87.1]  MATILDE (acute kidney injury) ([facility]) [N17.9]  Generalized weakness [R53.1]  Fall in elderly patient [R29.6]  Closed fracture of one rib of right side, initial encounter [S22.31XA]  Other injury of unspecified body region, initial encounter [T14.8XXA]  Hyperosmolar hyperglycemic state (HHS) ([facility]) [E11.00]    Past Medical History:   Diagnosis Date    Acute renal failure superimposed on stage 3 chronic kidney disease ([facility]) 2/25/2023    MATILDE (acute kidney injury) ([facility])     Chronic kidney disease     CPAP (continuous positive airway pressure) dependence     Gout     Hearing aid worn     sabrina    Hernia, inguinal     RIH and umbilical hernia    Hypertension     Metabolic acidosis     Morbidly obese (HCC)     Sleep apnea     Wears glasses        Past Surgical History:   Procedure Laterality Date    CARPAL TUNNEL RELEASE Bilateral     COLONOSCOPY N/A 1/6/2017    Procedure: COLONOSCOPY;  Surgeon: João Perez MD;  Location: [facility];  Service:     CYST REMOVAL      FOOT SURGERY      HERNIA REPAIR      JOINT REPLACEMENT      knee left     GA LAPAROSCOPY SURG RPR INITIAL INGUINAL HERNIA Right 3/16/2017    Procedure: REPAIR HERNIA INGUINAL, LAPAROSCOPIC WITH MESH;  Surgeon: Ortiz Salcedo MD;  Location: [facility];  Service: General    GA RPR UMBILICAL HRNA 5 YRS/> REDUCIBLE N/A 3/16/2017    Procedure: OPEN REPAIR HERNIA UMBILICAL;  Surgeon: Ortiz Salcedo MD;  Location: [facility];  Service: General    TONSILLECTOMY         Imaging Studies:  RADIOLOGY RESULTS   Final Result by  (01/05 1128)      CT head without contrast   ED Interpretation by Adam Bunn Jr., DO (01/04 0000)   Per [facility]:  MPRESSION: 1. Presumably age-related and chronic changes without acute intracranial abnormality. 2. There is hypodensity in the left frontal lobe suggesting old  infarct.        Final Result by Pete Cruz MD (01/04 0130)      No acute intracranial abnormality.                  Workstation performed: [id number]         CT cervical spine without contrast   ED Interpretation by Adam Bunn Jr., DO (01/04 0000)   Per [facility]:    MPRESSION: 1. Multilevel degenerative change without acute injury identified. 2. Please see the dedicated interpretation of the thorax for findings in that region.         Final Result by Pete Cruz MD (01/04 0134)      No acute cervical spine fracture or traumatic malalignment.                  Workstation performed: [id number]         CT recon only thoracolumbar   ED Interpretation by Adam Bunn Jr., DO (01/04 0002)   Per [facility]:    IMPRESSION: 1. There is slight anterolisthesis of L5 on S1 with bilateral pars interarticularis defects. These are most likely degenerative in nature. 2. Degenerative changes without acute abnormality detected. 3. Please see the dedicated interpretation of abdomen and pelvis for findings in that region.        Final Result by Trevon Scanlon MD (01/04 0825)      Slight irregularity of the left L1 transverse process may be related to soft tissue attenuation artifact, although a nondisplaced fracture is possible.      No additional evidence of fracture or traumatic subluxation.      The major findings are in agreement with the preliminary report provided by [facility] which was provided shortly after completion of the exam. The additional finding of left L1 transverse process irregularity will now be communicated with    patient's clinical team by our radiology liaison.      The study was marked in EPIC for immediate notification.      Workstation performed: [id number]         CT chest abdomen pelvis wo contrast   ED Interpretation by Adam Bunn Jr., DO (01/04 0001)   Per [facility]:    IMPRESSION: 1. There is a right posterior 11th rib fracture which is minimally displaced. 2. Please see  the dedicated interpretation of abdomen and pelvis for findings in that region.     IMPRESSION: 1. No acute traumatic injury is identified. 2. Please see the dedicated interpretation of the thorax for findings in that region. 3. Interval growth of a right midpole renal lesion measuring up to 3.6 cm (image 133 series 2), nonemergent follow-up ultrasound is recommended.         Final Result by Trevon Scanlon MD (01/04 0818)      1. Acute slightly angulated right posterior 11th rib fracture with mild overlying subcutaneous contusion.      2. No evidence of visceral traumatic injury in the chest, abdomen or pelvis.      3. A 3.5 cm right mid renal probable cyst is increased in size since 5/23/2019. Nonemergent renal ultrasound is suggested, given the increased size.      Findings are consistent with the preliminary report from [facility] which was provided shortly after completion of the exam.         Workstation performed: [id number]               01/05/24 1152   PT Last Visit   PT Visit Date 01/05/24   Note Type   Note type Evaluation   Pain Assessment   Pain Score No Pain   Restrictions/Precautions   Weight Bearing Precautions Per Order No   Other Precautions Fall Risk   Home Living   Type of Home House   Home Layout One level;Stairs to enter with rails  (5STE)   Bathroom Shower/Tub Tub/shower unit   Bathroom Toilet Raised   Home Equipment Walker;Cane   Prior Function   Level of [place] Independent with functional mobility;Independent with ADLs  (w/ SPC)   Lives With Spouse   Receives Help From Family   Falls in the last 6 months 1 to 4  (2x in the past 2 weeks)   Comments (+)    General   Family/Caregiver Present No   Cognition   Overall Cognitive Status WFL   Arousal/Participation Alert   Attention Within functional limits   Orientation Level Oriented X4   Following Commands Follows one step commands without difficulty   Comments pleasant & cooperative   Subjective   Subjective Pt agreeable to  PT eval.   RUE Assessment   RUE Assessment WFL  (4/5 grossly)   LUE Assessment   LUE Assessment WFL  (4/5 grossly)   RLE Assessment   RLE Assessment WFL  (4-/5 grossly)   LLE Assessment   LLE Assessment WFL  (4-/5 grossly)   Coordination   Movements are Fluid and Coordinated 1   Sensation WFL   Bed Mobility   Supine to Sit Unable to assess   Sit to Supine Unable to assess   Additional Comments pt seated EOB pre & post-session   Transfers   Sit to Stand 4  Minimal assistance   Additional items Assist x 1;Increased time required;Verbal cues   Stand to Sit 4  Minimal assistance   Additional items Assist x 1;Increased time required;Verbal cues   Additional Comments cues for techniques & safety   Ambulation/Elevation   Gait pattern Wide FABRICIO;Decreased foot clearance;Excessively slow  (inc lateral sway)   Gait Assistance 4  Minimal assist   Additional items Assist x 1;Verbal cues;Tactile cues   Assistive Device Rolling walker   Distance 40'x1   Ambulation/Elevation Additional Comments unsteady gait; pt states his current amb is not at baseline   Balance   Static Sitting Good   Dynamic Sitting Fair +   Static Standing Fair   Dynamic Standing Fair -   Ambulatory Poor +   Endurance Deficit   Endurance Deficit Yes   Endurance Deficit Description fatigue   Activity Tolerance   Activity Tolerance Patient limited by fatigue;Treatment limited secondary to medical complications (Comment)   Nurse Made Aware yes   Assessment   Prognosis Good   Problem List Decreased strength;Decreased endurance;Impaired balance;Decreased mobility;Impaired judgement;Impaired hearing;Obesity   Assessment t. 78 y.o.male presented w/ c/o 2 falls. Pt found to have elevated blood glucose of 892, denies history of diabetes. Pt admitted for Hyperglycemic crisis in diabetes mellitus (HCC) w/ Dehydration (E86.0)  Hyponatremia (E87.1)  MATILDE (acute kidney injury) ([facility]) (N17.9)  Closed fracture of one rib of right side, initial encounter (S22.31XA)  Hyperosmolar  "hyperglycemic state (HHS) (HCC) (E11.00). Pt referred to PT for mobility assessment & D/C planning w/ orders of Up and OOB as tolerated . Please see above for information re: home set-up & PLOF as well as objective findings during PT assessment. PTA, pt reports being I w/ SPC. On eval, pt functioning below baseline hence will continue skilled PT to improve function & safety. Pt require minAx1 for transfers & amb w/ RW + cues for techniques & safety. Gait deviations as above but no gross LOB noted. Dec amb tolerance 2* to fatigue. The patient's AM-PAC Basic Mobility Inpatient Short Form Raw Score is 17. A Raw score of greater than 16 suggests the patient may benefit from discharge to home. Please also refer to the recommendation of the Physical Therapist for safe discharge planning. From PT standpoint, will recommend Level II (moderate resource intensity) rehab services at D/C, pending progress. Pt prefers to go to STR at this point. No SOB & dizziness reported t/o session. Nsg staff most recent vital signs as follows: /79 (BP Location: Left arm)   Pulse 78   Temp (!) 97.1 °F (36.2 °C) (Temporal)   Resp 18   Ht 5' 7\" (1.702 m)   Wt 107 kg (234 lb 12.6 oz)   SpO2 95%   BMI 36.77 kg/m² . At end of session, pt  remain seated EOB in stable condition. All needs in reach . Fall precautions reinforced w/ good understanding. CM to follow. Nsg staff to continue to mobilized pt (OOB in chair for all meals & ambulate in room/unit) as tolerated to prevent further decline in function. Will also recommend Restorative for daily amb &/or daily OOB in chair as appropriate. Nsg notified. Co-eval was necessary to complete this PT eval for the pt's best interest given pt's medical acuity & complexity.   Goals   Patient Goals to get stronger   STG Expiration Date 01/15/24   Short Term Goal #1 Goals to be met in 10 days; pt will be able to: 1) inc strength & balance by 1/2 grade to improve overall functional mobility & dec " fall risk; 2) inc bed mobility to modified I for pt to be able to get in/OOB safely w/ proper techniques 100% of the time, to dec caregiver burden & safely function at home; 3) inc transfers to modified I for pt to transition safely from one surface to another w/o % of the time, to dec caregiver burden & safely function at home; 4) inc amb w/ RW approx. >250' w/ modified I for pt to ambulate community distances w/o any % of the time, to dec caregiver burden & safely function at home; 5) negotiate stairs w/ S for inc safety during stair mgt inside/outside of home & dec caregiver burden; 6) pt/caregiver ed   PT Treatment Day 0   Plan   Treatment/Interventions Functional transfer training;LE strengthening/ROM;Elevations;Therapeutic exercise;Endurance training;Patient/family training;Bed mobility;Gait training;Spoke to nursing;Spoke to case management   PT Frequency 3-5x/wk   Discharge Recommendation   Rehab Resource Intensity Level, PT II (Moderate Resource Intensity)   Equipment Recommended Walker   Additional Comments restorative for daily amb   AM-PAC Basic Mobility Inpatient   Turning in Flat Bed Without Bedrails 3   Lying on Back to Sitting on Edge of Flat Bed Without Bedrails 3   Moving Bed to Chair 3   Standing Up From Chair Using Arms 3   Walk in Room 3   Climb 3-5 Stairs With Railing 2   Basic Mobility Inpatient Raw Score 17   Basic Mobility Standardized Score 39.67   Highest Level Of Mobility   -HLM Goal 5: Stand one or more mins   -HLM Achieved 7: Walk 25 feet or more   End of Consult   Patient Position at End of Consult Seated edge of bed;All needs within reach   End of Consult Comments Pt in stable condition. All needs in reach.   Hx/personal factors: co-morbidities, inaccessible home, advanced age, use of AD, h/o of falls, fall risk, assist w/ ADL's, and obesity  Examination: dec mobility, dec balance, dec endurance, dec amb, risk for falls, pain  Clinical: unpredictable (ongoing medical  status, abnormal lab values, and risk for falls)  Complexity: high    Jesus Maia

## 2024-01-05 NOTE — UTILIZATION REVIEW
NOTIFICATION OF INPATIENT ADMISSION   AUTHORIZATION REQUEST   SERVICING FACILITY:   [place] [address]  Tax ID: [id number]  NPI: [id number] ATTENDING PROVIDER:  Attending Name and NPI#: Susan Giron Do [[id number]]  Address: [address] [place] [address]  Phone: [phone]     ADMISSION INFORMATION:  Place of Service: Inpatient [facility]  Place of Service Code: 21  Inpatient Admission Date/Time: 1/4/24 12:56 AM  Discharge Date/Time: No discharge date for patient encounter.  Admitting Diagnosis Code/Description:  Dehydration [E86.0]  Hyponatremia [E87.1]  MATILDE (acute kidney injury) (HCC) [N17.9]  Generalized weakness [R53.1]  Fall in elderly patient [R29.6]  Closed fracture of one rib of right side, initial encounter [S22.31XA]  Other injury of unspecified body region, initial encounter [T14.8XXA]  Hyperosmolar hyperglycemic state (HHS) (HCC) [E11.00]     UTILIZATION REVIEW CONTACT:  Lilia Jaen, Utilization   Network Utilization Review Department  Phone: [phone]  Fax [phone]  Email: [email or web address]  Contact for approvals/pending authorizations, clinical reviews, and discharge.     PHYSICIAN ADVISORY SERVICES:  Medical Necessity Denial & Qgid-pb-Deoq Review  Phone: [phone]  Fax: [phone]  Email: [email or web address]     DISCHARGE SUPPORT TEAM:  For Patients Discharge Needs & Updates  Phone: [phone] opt. 2 Fax: [phone]  Email: [email or web address]

## 2024-01-05 NOTE — PLAN OF CARE
Problem: PHYSICAL THERAPY ADULT  Goal: Performs mobility at highest level of function for planned discharge setting.  See evaluation for individualized goals.  Description: Treatment/Interventions: Functional transfer training, LE strengthening/ROM, Elevations, Therapeutic exercise, Endurance training, Patient/family training, Bed mobility, Gait training, Spoke to nursing, Spoke to case management  Equipment Recommended: Walker       See flowsheet documentation for full assessment, interventions and recommendations.  Note: Prognosis: Good  Problem List: Decreased strength, Decreased endurance, Impaired balance, Decreased mobility, Impaired judgement, Impaired hearing, Obesity  Assessment: t. 78 y.o.male presented w/ c/o 2 falls. Pt found to have elevated blood glucose of 892, denies history of diabetes. Pt admitted for Hyperglycemic crisis in diabetes mellitus ([facility]) w/ Dehydration (E86.0)  Hyponatremia (E87.1)  MATILDE (acute kidney injury) ([facility]) (N17.9)  Closed fracture of one rib of right side, initial encounter (S22.31XA)  Hyperosmolar hyperglycemic state (HHS) ([facility]) (E11.00). Pt referred to PT for mobility assessment & D/C planning w/ orders of Up and OOB as tolerated . Please see above for information re: home set-up & PLOF as well as objective findings during PT assessment. PTA, pt reports being I w/ SPC. On eval, pt functioning below baseline hence will continue skilled PT to improve function & safety. Pt require minAx1 for transfers & amb w/ RW + cues for techniques & safety. Gait deviations as above but no gross LOB noted. Dec amb tolerance 2* to fatigue. The patient's AM-PAC Basic Mobility Inpatient Short Form Raw Score is 17. A Raw score of greater than 16 suggests the patient may benefit from discharge to home. Please also refer to the recommendation of the Physical Therapist for safe discharge planning. From PT standpoint, will recommend Level II (moderate resource intensity) rehab services at D/C,  "pending progress. Pt prefers to go to STR at this point. No SOB & dizziness reported t/o session. Nsg staff most recent vital signs as follows: /79 (BP Location: Left arm)   Pulse 78   Temp (!) 97.1 °F (36.2 °C) (Temporal)   Resp 18   Ht 5' 7\" (1.702 m)   Wt 107 kg (234 lb 12.6 oz)   SpO2 95%   BMI 36.77 kg/m² . At end of session, pt  remain seated EOB in stable condition. All needs in reach . Fall precautions reinforced w/ good understanding. CM to follow. Nsg staff to continue to mobilized pt (OOB in chair for all meals & ambulate in room/unit) as tolerated to prevent further decline in function. Will also recommend Restorative for daily amb &/or daily OOB in chair as appropriate. Nsg notified. Co-eval was necessary to complete this PT eval for the pt's best interest given pt's medical acuity & complexity.        Rehab Resource Intensity Level, PT: II (Moderate Resource Intensity)    See flowsheet documentation for full assessment.        "

## 2024-01-05 NOTE — ASSESSMENT & PLAN NOTE
Lab Results   Component Value Date    HGBA1C 6.9 (H) 09/06/2023     New dx DM 2  Hyperglycemia 892. pH 7.4, B hydroxybutyrate 0.9  IV hydration and IV insulin infusion  Changed to sq insulin yesterday which lantus has been increased to 30 units and 10 units of lispro with meals  Appreciate endocrine recommendations  Will provide insulin teaching      Recent Labs     01/04/24  1624 01/04/24  1721 01/04/24  2127 01/05/24  0721   POCGLU 368* 411* 377* 355*         Blood Sugar Average: Last 72 hrs:  (P) 277.6

## 2024-01-05 NOTE — ASSESSMENT & PLAN NOTE
Moderate hyponatremia, Na 116, sodium correction for hyponatremia 129  S/p iv fluids and sodium continues to improve with correction for hyperglycemia

## 2024-01-05 NOTE — PLAN OF CARE
Problem: Nutrition/Hydration-ADULT  Goal: Nutrient/Hydration intake appropriate for improving, restoring or maintaining nutritional needs  Description: Monitor and assess patient's nutrition/hydration status for malnutrition. Collaborate with interdisciplinary team and initiate plan and interventions as ordered.  Monitor patient's weight and dietary intake as ordered or per policy. Utilize nutrition screening tool and intervene as necessary. Determine patient's food preferences and provide high-protein, high-caloric foods as appropriate.     INTERVENTIONS:  - Monitor oral intake, urinary output, labs, and treatment plans  - Assess nutrition and hydration status and recommend course of action  - Evaluate amount of meals eaten  - Assist patient with eating if necessary   - Allow adequate time for meals  - Recommend/ encourage appropriate diets, oral nutritional supplements, and vitamin/mineral supplements  - Order, calculate, and assess calorie counts as needed  - Recommend, monitor, and adjust tube feedings and TPN/PPN based on assessed needs  - Assess need for intravenous fluids  - Provide specific nutrition/hydration education as appropriate  - Include patient/family/caregiver in decisions related to nutrition  Outcome: Progressing     Problem: PAIN - ADULT  Goal: Verbalizes/displays adequate comfort level or baseline comfort level  Description: Interventions:  - Encourage patient to monitor pain and request assistance  - Assess pain using appropriate pain scale  - Administer analgesics based on type and severity of pain and evaluate response  - Implement non-pharmacological measures as appropriate and evaluate response  - Consider cultural and social influences on pain and pain management  - Notify physician/advanced practitioner if interventions unsuccessful or patient reports new pain  Outcome: Progressing     Problem: INFECTION - ADULT  Goal: Absence or prevention of progression during  hospitalization  Description: INTERVENTIONS:  - Assess and monitor for signs and symptoms of infection  - Monitor lab/diagnostic results  - Monitor all insertion sites, i.e. indwelling lines, tubes, and drains  - Monitor endotracheal if appropriate and nasal secretions for changes in amount and color  - [place] appropriate cooling/warming therapies per order  - Administer medications as ordered  - Instruct and encourage patient and family to use good hand hygiene technique  - Identify and instruct in appropriate isolation precautions for identified infection/condition  Outcome: Progressing  Goal: Absence of fever/infection during neutropenic period  Description: INTERVENTIONS:  - Monitor WBC    Outcome: Progressing     Problem: SAFETY ADULT  Goal: Patient will remain free of falls  Description: INTERVENTIONS:  - Educate patient/family on patient safety including physical limitations  - Instruct patient to call for assistance with activity   - Consult OT/PT to assist with strengthening/mobility   - Keep Call bell within reach  - Keep bed low and locked with side rails adjusted as appropriate  - Keep care items and personal belongings within reach  - Initiate and maintain comfort rounds  - Make Fall Risk Sign visible to staff  - Offer Toileting every 2 Hours, in advance of need  - Initiate/Maintain 2alarm  - Obtain necessary fall risk management equipment: 2  - Apply yellow socks and bracelet for high fall risk patients  - Consider moving patient to room near nurses station  Outcome: Progressing  Goal: Maintain or return to baseline ADL function  Description: INTERVENTIONS:  -  Assess patient's ability to carry out ADLs; assess patient's baseline for ADL function and identify physical deficits which impact ability to perform ADLs (bathing, care of mouth/teeth, toileting, grooming, dressing, etc.)  - Assess/evaluate cause of self-care deficits   - Assess range of motion  - Assess patient's mobility; develop plan if  impaired  - Assess patient's need for assistive devices and provide as appropriate  - Encourage maximum independence but intervene and supervise when necessary  - Involve family in performance of ADLs  - Assess for home care needs following discharge   - Consider OT consult to assist with ADL evaluation and planning for discharge  - Provide patient education as appropriate  Outcome: Progressing  Goal: Maintains/Returns to pre admission functional level  Description: INTERVENTIONS:  - Perform AM-PAC 6 Click Basic Mobility/ Daily Activity assessment daily.  - Set and communicate daily mobility goal to care team and patient/family/caregiver.   - Collaborate with rehabilitation services on mobility goals if consulted  - Perform Range of Motion 2 times a day.  - Reposition patient every 2 hours.  - Dangle patient 2 times a day  - Stand patient 2 times a day  - Ambulate patient 2 times a day  - Out of bed to chair 2 times a day   - Out of bed for meals 2 times a day  - Out of bed for toileting  - Record patient progress and toleration of activity level   Outcome: Progressing     Problem: DISCHARGE PLANNING  Goal: Discharge to home or other facility with appropriate resources  Description: INTERVENTIONS:  - Identify barriers to discharge w/patient and caregiver  - Arrange for needed discharge resources and transportation as appropriate  - Identify discharge learning needs (meds, wound care, etc.)  - Arrange for interpretive services to assist at discharge as needed  - Refer to Case Management Department for coordinating discharge planning if the patient needs post-hospital services based on physician/advanced practitioner order or complex needs related to functional status, cognitive ability, or social support system  Outcome: Progressing     Problem: Knowledge Deficit  Goal: Patient/family/caregiver demonstrates understanding of disease process, treatment plan, medications, and discharge instructions  Description:  Complete learning assessment and assess knowledge base.  Interventions:  - Provide teaching at level of understanding  - Provide teaching via preferred learning methods  Outcome: Progressing

## 2024-01-05 NOTE — CASE MANAGEMENT
Case Management Discharge Planning Note    Patient name Irineo Rawls  Location [facility] /E2 -* MRN [id number]  : 1945 Date 2024       Current Admission Date: 1/3/2024  Current Admission Diagnosis:Hyperglycemic crisis in diabetes mellitus (HCC)   Patient Active Problem List    Diagnosis Date Noted    Rib fracture 2024    Renal cyst 2024    Fall at home, initial encounter 2024    Elevated lipase 2024    Hyponatremia 2024    Hyperglycemic crisis in diabetes mellitus (HCC) 2024    Thrombocytopenia (HCC) 2023    Benign prostatic hyperplasia with incomplete bladder emptying 2023    Acute hyperkalemia 2023    Microcytic anemia 2023    Hyperkalemia     Calcification of liver 2021    Obesity, morbid (HCC) 2021    Rectus diastasis 10/07/2020    Hyperuricemia 07/15/2019    Knee pain 2018    Localized, primary osteoarthritis 2018    Osteoarthritis of knee 2018    Abnormal serum glucose level 2016    Essential hypertriglyceridemia 2016    KALIA (obstructive sleep apnea) 2015    Gout 2015    Vitamin D deficiency 2012    MATILDE (acute kidney injury) ([facility]) 2012    Essential hypertension 2009      LOS (days): 1  Geometric Mean LOS (GMLOS) (days):   Days to GMLOS:     OBJECTIVE:  Risk of Unplanned Readmission Score: 16.13         Current admission status: Inpatient   Preferred Pharmacy:   [facility]/pharmacy #[id number] - [place] - [address]  [place] [address]  Phone: [phone] Fax: [phone]    [facility] - [place] - [address]  [address]  [place] [address]  Phone: [phone] Fax: [phone]    Primary Care Provider: bOi Esquivel DO    Primary Insurance: [employer]  Secondary Insurance:     DISCHARGE DETAILS:    Discharge planning discussed with:: Patient  Freedom of Choice: Yes  Comments -  Freedom of Choice: Agreeable to short term rehab. Referrals placed  CM contacted family/caregiver?: Yes  Were Treatment Team discharge recommendations reviewed with patient/caregiver?: Yes  Did patient/caregiver verbalize understanding of patient care needs?: N/A- going to facility  Were patient/caregiver advised of the risks associated with not following Treatment Team discharge recommendations?: Yes    Contacts  Patient Contacts: Shireen Rawls  Relationship to Patient:: Family  Contact Method: In Person  Reason/Outcome: Discharge Planning    Requested Home Health Care         Is the patient interested in HHC at discharge?: No    DME Referral Provided  Referral made for DME?: No    Other Referral/Resources/Interventions Provided:  Interventions: Acute Rehab, Short Term Rehab  Referral Comments: Referrals placed via aidin         Treatment Team Recommendation: Acute Rehab, Short Term Rehab

## 2024-01-06 LAB
ANION GAP SERPL CALCULATED.3IONS-SCNC: 6 MMOL/L
BUN SERPL-MCNC: 34 MG/DL (ref 5–25)
CALCIUM SERPL-MCNC: 9.6 MG/DL (ref 8.4–10.2)
CHLORIDE SERPL-SCNC: 96 MMOL/L (ref 96–108)
CO2 SERPL-SCNC: 25 MMOL/L (ref 21–32)
CREAT SERPL-MCNC: 1.55 MG/DL (ref 0.6–1.3)
GFR SERPL CREATININE-BSD FRML MDRD: 41 ML/MIN/1.73SQ M
GLUCOSE SERPL-MCNC: 195 MG/DL (ref 65–140)
GLUCOSE SERPL-MCNC: 251 MG/DL (ref 65–140)
GLUCOSE SERPL-MCNC: 296 MG/DL (ref 65–140)
GLUCOSE SERPL-MCNC: 319 MG/DL (ref 65–140)
GLUCOSE SERPL-MCNC: 345 MG/DL (ref 65–140)
POTASSIUM SERPL-SCNC: 4.2 MMOL/L (ref 3.5–5.3)
SODIUM SERPL-SCNC: 127 MMOL/L (ref 135–147)

## 2024-01-06 PROCEDURE — 82948 REAGENT STRIP/BLOOD GLUCOSE: CPT

## 2024-01-06 PROCEDURE — 99232 SBSQ HOSP IP/OBS MODERATE 35: CPT | Performed by: INTERNAL MEDICINE

## 2024-01-06 PROCEDURE — 80048 BASIC METABOLIC PNL TOTAL CA: CPT | Performed by: INTERNAL MEDICINE

## 2024-01-06 RX ORDER — INSULIN GLARGINE 100 [IU]/ML
32 INJECTION, SOLUTION SUBCUTANEOUS EVERY MORNING
Status: DISCONTINUED | OUTPATIENT
Start: 2024-01-07 | End: 2024-01-08 | Stop reason: HOSPADM

## 2024-01-06 RX ADMIN — OMEGA-3 FATTY ACIDS CAP 1000 MG 1000 MG: 1000 CAP at 22:16

## 2024-01-06 RX ADMIN — INSULIN LISPRO 2 UNITS: 100 INJECTION, SOLUTION INTRAVENOUS; SUBCUTANEOUS at 16:30

## 2024-01-06 RX ADMIN — HEPARIN SODIUM 5000 UNITS: 5000 INJECTION INTRAVENOUS; SUBCUTANEOUS at 06:31

## 2024-01-06 RX ADMIN — Medication 5000 UNITS: at 08:10

## 2024-01-06 RX ADMIN — INSULIN GLARGINE 30 UNITS: 100 INJECTION, SOLUTION SUBCUTANEOUS at 08:10

## 2024-01-06 RX ADMIN — INSULIN LISPRO 10 UNITS: 100 INJECTION, SOLUTION INTRAVENOUS; SUBCUTANEOUS at 11:36

## 2024-01-06 RX ADMIN — INSULIN LISPRO 4 UNITS: 100 INJECTION, SOLUTION INTRAVENOUS; SUBCUTANEOUS at 11:36

## 2024-01-06 RX ADMIN — ASPIRIN 81 MG: 81 TABLET, COATED ORAL at 08:10

## 2024-01-06 RX ADMIN — OMEGA-3 FATTY ACIDS CAP 1000 MG 1000 MG: 1000 CAP at 16:31

## 2024-01-06 RX ADMIN — ALLOPURINOL 300 MG: 100 TABLET ORAL at 08:10

## 2024-01-06 RX ADMIN — INSULIN LISPRO 5 UNITS: 100 INJECTION, SOLUTION INTRAVENOUS; SUBCUTANEOUS at 08:09

## 2024-01-06 RX ADMIN — NIFEDIPINE 60 MG: 60 TABLET, FILM COATED, EXTENDED RELEASE ORAL at 08:10

## 2024-01-06 RX ADMIN — INSULIN LISPRO 10 UNITS: 100 INJECTION, SOLUTION INTRAVENOUS; SUBCUTANEOUS at 08:10

## 2024-01-06 RX ADMIN — INSULIN LISPRO 10 UNITS: 100 INJECTION, SOLUTION INTRAVENOUS; SUBCUTANEOUS at 16:31

## 2024-01-06 RX ADMIN — HEPARIN SODIUM 5000 UNITS: 5000 INJECTION INTRAVENOUS; SUBCUTANEOUS at 13:17

## 2024-01-06 RX ADMIN — OMEGA-3 FATTY ACIDS CAP 1000 MG 1000 MG: 1000 CAP at 08:10

## 2024-01-06 RX ADMIN — INSULIN LISPRO 2 UNITS: 100 INJECTION, SOLUTION INTRAVENOUS; SUBCUTANEOUS at 22:17

## 2024-01-06 RX ADMIN — HEPARIN SODIUM 5000 UNITS: 5000 INJECTION INTRAVENOUS; SUBCUTANEOUS at 22:17

## 2024-01-06 NOTE — PROGRESS NOTES
[facility]  Progress Note  Name: Irineo Rawls I  MRN: [id number]  Unit/Bed#: E2 -02 I Date of Admission: 1/3/2024   Date of Service: 1/6/2024 I Hospital Day: 2    Assessment/Plan   * Hyperglycemic crisis in diabetes mellitus (HCC)  Assessment & Plan  Lab Results   Component Value Date    HGBA1C 6.9 (H) 09/06/2023     New dx DM 2  Hyperglycemia 892. pH 7.4, B hydroxybutyrate 0.9  IV hydration and IV insulin infusion  Changed to sq insulin yesterday which lantus has been increased to 30 units and 10 units of lispro with meals- will increase lantus to 32 units. Continue lispro of 10 units with meals   Appreciate endocrine recommendations  Will provide insulin teaching      Recent Labs     01/05/24  1151 01/05/24  1604 01/05/24  2155 01/06/24  0727   POCGLU 239* 260* 276* 319*         Blood Sugar Average: Last 72 hrs:  (P) 276.2103426374524285      Renal cyst  Assessment & Plan  Incidental finding  Will require outpt renal u/s    Rib fracture  Assessment & Plan  S/p fall  Continue supportive care     Hyponatremia  Assessment & Plan  Moderate hyponatremia, Na 116, sodium correction for hyponatremia 129 on admission   S/p iv fluids and sodium continues to improve with correction for hyperglycemia to 133      Elevated lipase  Assessment & Plan  Lipase 128. Chronically elevated     Fall at home, initial encounter  Assessment & Plan  CTH neg for acute intracranial abnormality  CT C-spine unremarkable  vRad: CT CAP: 11th rib fracture   vRad: CT thoracolumbar negative for fracture or traumatic malalignment.  Pt/ot recommended str- pt is agreeable     Thrombocytopenia (HCC)  Assessment & Plan  Chronic thrombocytopenia    Calcification of liver  Assessment & Plan  Mild transaminitis and elevated lipase  Previous CT in 2019 showing: Stable cluster of dystrophic calcifications in the right lobe of the liver, superior to the gallbladder fossa.  No evidence of solid mass.  Possible sequela of  "prior inflammatory or traumatic insult.   Seen outpatient by surgery 2021: \"No further follow-up needed.  I reassured him that his findings were benign.\"  CT CAP ordered. Follow-up read from radiology  Patient follow-up with gastroenterology    KALIA (obstructive sleep apnea)  Assessment & Plan  Stopped using CPAP due to discomfort and dryness of mucosa    MATILDE (acute kidney injury) ([facility])  Assessment & Plan  Lab Results   Component Value Date    EGFR 41 2024    EGFR 39 2024    EGFR 35 2024    CREATININE 1.55 (H) 2024    CREATININE 1.64 (H) 2024    CREATININE 1.77 (H) 2024     Baseline 1.5-1.7  Creatinine on admit was 2.14 as a result of prerenal from diuresis due to hyperglycemia  S/p fluids and at currently at baseline            VTE Pharmacologic Prophylaxis: VTE Score: 4 heparin     Mobility:   Basic Mobility Inpatient Raw Score: 17  -[facility] Goal: 5: Stand one or more mins  -[facility] Achieved: 3: Sit at edge of bed    Patient Centered Rounds: I performed bedside rounds with nursing staff today.     Education and Discussions with Family / Patient: Updated  (wife) via phone.    Total Time Spent on Date of Encounter in care of patient: 40 mins. This time was spent on one or more of the following: performing physical exam; counseling and coordination of care; obtaining or reviewing history; documenting in the medical record; reviewing/ordering tests, medications or procedures; communicating with other healthcare professionals and discussing with patient's family/caregivers.    Current Length of Stay: 2 day(s)  Current Patient Status: Inpatient     Discharge Plan: medically cleared. Awaiting str.     Code Status: Level 3 - DNAR and DNI    Subjective:   Pt seen and examined. He is doing well. No f/c no cp no sob no n/v/d no abd pain     Objective:     Vitals:   Temp (24hrs), Av.2 °F (36.8 °C), Min:97.6 °F (36.4 °C), Max:98.7 °F (37.1 °C)    Temp:  [97.6 °F (36.4 " °C)-98.7 °F (37.1 °C)] 97.6 °F (36.4 °C)  HR:  [57-91] 57  Resp:  [18] 18  BP: (138-149)/(72-86) 138/72  SpO2:  [93 %-94 %] 94 %  Body mass index is 36.77 kg/m².     Input and Output Summary (last 24 hours):     Intake/Output Summary (Last 24 hours) at 1/6/2024 0935  Last data filed at 1/6/2024 0601  Gross per 24 hour   Intake --   Output 1950 ml   Net -1950 ml       Physical Exam:   Physical Exam  Constitutional:       Appearance: Normal appearance.   HENT:      Head: Normocephalic and atraumatic.   Eyes:      Extraocular Movements: Extraocular movements intact.      Pupils: Pupils are equal, round, and reactive to light.   Cardiovascular:      Rate and Rhythm: Normal rate and regular rhythm.      Heart sounds: No murmur heard.     No friction rub. No gallop.   Pulmonary:      Effort: Pulmonary effort is normal. No respiratory distress.      Breath sounds: Normal breath sounds. No wheezing, rhonchi or rales.   Abdominal:      General: Bowel sounds are normal. There is no distension.      Palpations: Abdomen is soft.      Tenderness: There is no abdominal tenderness. There is no guarding or rebound.   Neurological:      Mental Status: He is alert and oriented to person, place, and time.          Additional Data:     Labs:  Results from last 7 days   Lab Units 01/05/24  0506 01/04/24  0518 01/03/24  2203   WBC Thousand/uL 8.51   < > 11.02*   HEMOGLOBIN g/dL 12.4   < > 13.2   HEMATOCRIT % 35.9*   < > 37.5   PLATELETS Thousands/uL 125*   < > 133*   BANDS PCT %  --   --  1   LYMPHO PCT %  --   --  21   MONO PCT %  --   --  9   EOS PCT %  --   --  4    < > = values in this interval not displayed.     Results from last 7 days   Lab Units 01/06/24  0840 01/04/24  0220 01/03/24  2203   SODIUM mmol/L 127*   < > 116*   POTASSIUM mmol/L 4.2   < > 5.1   CHLORIDE mmol/L 96   < > 80*   CO2 mmol/L 25   < > 23   BUN mg/dL 34*   < > 47*   CREATININE mg/dL 1.55*   < > 2.14*   ANION GAP mmol/L 6   < > 13   CALCIUM mg/dL 9.6   < > 10.0    ALBUMIN g/dL  --   --  3.8   TOTAL BILIRUBIN mg/dL  --   --  0.60   ALK PHOS U/L  --   --  145*   ALT U/L  --   --  60*   AST U/L  --   --  45*   GLUCOSE RANDOM mg/dL 345*   < > 892*    < > = values in this interval not displayed.         Results from last 7 days   Lab Units 01/06/24  0727 01/05/24  2155 01/05/24  1604 01/05/24  1151 01/05/24  0721 01/04/24  2127 01/04/24  1721 01/04/24  1624 01/04/24  1354 01/04/24  1113 01/04/24  0959 01/04/24  0753   POC GLUCOSE mg/dl 319* 276* 260* 239* 355* 377* 411* 368* 337* 98 213* 184*               Lines/Drains:  Invasive Devices       Peripheral Intravenous Line  Duration             Peripheral IV 01/03/24 Dorsal (posterior);Left Hand 2 days    Peripheral IV 01/04/24 Left Forearm 2 days                          Imaging: No pertinent imaging reviewed.    Recent Cultures (last 7 days):         Last 24 Hours Medication List:   Current Facility-Administered Medications   Medication Dose Route Frequency Provider Last Rate    acetaminophen  650 mg Oral Q6H PRN RAFIA Arteaga      albuterol  2 puff Inhalation Q6H PRN RAFIA Arteaga      allopurinol  300 mg Oral Daily RAFIA Arteaga      aluminum-magnesium hydroxide-simethicone  30 mL Oral Q6H PRN RAFIA Arteaga      aspirin  81 mg Oral Daily RAFIA Arteaga      bisacodyl  10 mg Oral Daily PRN RAFIA Arteaga      cholecalciferol  5,000 Units Oral Daily RAFIA Arteaga      fish oil  1,000 mg Oral TID RAFIA Arteaga      heparin (porcine)  5,000 Units Subcutaneous Q8H RENNY RAFIA Arteaga      [START ON 1/7/2024] insulin glargine  32 Units Subcutaneous QAM Susan Giron DO      insulin lispro  1-5 Units Subcutaneous HS RAFIA Arteaga      insulin lispro  1-6 Units Subcutaneous TID AC RAFIA Arteaga      insulin lispro  10 Units Subcutaneous TID With Meals RAFIA Arteaga      NIFEdipine  60 mg Oral Daily Vania  RAFIA Kaba      ondansetron  4 mg Intravenous Q6H PRN RAFIA Arteaga      simethicone  80 mg Oral 4x Daily PRN RAFIA Arteaga          Today, Patient Was Seen By: Susan Giron DO

## 2024-01-06 NOTE — ASSESSMENT & PLAN NOTE
CTH neg for acute intracranial abnormality  CT C-spine unremarkable  vRad: CT CAP: 11th rib fracture   vRad: CT thoracolumbar negative for fracture or traumatic malalignment.  Pt/ot recommended str- pt is agreeable

## 2024-01-06 NOTE — ASSESSMENT & PLAN NOTE
Lab Results   Component Value Date    HGBA1C 6.9 (H) 09/06/2023     New dx DM 2  Hyperglycemia 892. pH 7.4, B hydroxybutyrate 0.9  IV hydration and IV insulin infusion  Changed to sq insulin yesterday which lantus has been increased to 30 units and 10 units of lispro with meals- will increase lantus to 32 units. Continue lispro of 10 units with meals   Appreciate endocrine recommendations  Will provide insulin teaching      Recent Labs     01/05/24  1151 01/05/24  1604 01/05/24  2155 01/06/24  0727   POCGLU 239* 260* 276* 319*         Blood Sugar Average: Last 72 hrs:  (P) [id number]

## 2024-01-06 NOTE — ASSESSMENT & PLAN NOTE
Lab Results   Component Value Date    EGFR 41 01/06/2024    EGFR 39 01/05/2024    EGFR 35 01/04/2024    CREATININE 1.55 (H) 01/06/2024    CREATININE 1.64 (H) 01/05/2024    CREATININE 1.77 (H) 01/04/2024     Baseline 1.5-1.7  Creatinine on admit was 2.14 as a result of prerenal from diuresis due to hyperglycemia  S/p fluids and at currently at baseline    Yes

## 2024-01-06 NOTE — ASSESSMENT & PLAN NOTE
Moderate hyponatremia, Na 116, sodium correction for hyponatremia 129 on admission   S/p iv fluids and sodium continues to improve with correction for hyperglycemia to 133

## 2024-01-06 NOTE — PLAN OF CARE
Problem: Nutrition/Hydration-ADULT  Goal: Nutrient/Hydration intake appropriate for improving, restoring or maintaining nutritional needs  Description: Monitor and assess patient's nutrition/hydration status for malnutrition. Collaborate with interdisciplinary team and initiate plan and interventions as ordered.  Monitor patient's weight and dietary intake as ordered or per policy. Utilize nutrition screening tool and intervene as necessary. Determine patient's food preferences and provide high-protein, high-caloric foods as appropriate.     INTERVENTIONS:  - Monitor oral intake, urinary output, labs, and treatment plans  - Assess nutrition and hydration status and recommend course of action  - Evaluate amount of meals eaten  - Assist patient with eating if necessary   - Allow adequate time for meals  - Recommend/ encourage appropriate diets, oral nutritional supplements, and vitamin/mineral supplements  - Order, calculate, and assess calorie counts as needed  - Recommend, monitor, and adjust tube feedings and TPN/PPN based on assessed needs  - Assess need for intravenous fluids  - Provide specific nutrition/hydration education as appropriate  - Include patient/family/caregiver in decisions related to nutrition  Outcome: Progressing     Problem: PAIN - ADULT  Goal: Verbalizes/displays adequate comfort level or baseline comfort level  Description: Interventions:  - Encourage patient to monitor pain and request assistance  - Assess pain using appropriate pain scale  - Administer analgesics based on type and severity of pain and evaluate response  - Implement non-pharmacological measures as appropriate and evaluate response  - Consider cultural and social influences on pain and pain management  - Notify physician/advanced practitioner if interventions unsuccessful or patient reports new pain  Outcome: Progressing     Problem: INFECTION - ADULT  Goal: Absence or prevention of progression during  hospitalization  Description: INTERVENTIONS:  - Assess and monitor for signs and symptoms of infection  - Monitor lab/diagnostic results  - Monitor all insertion sites, i.e. indwelling lines, tubes, and drains  - Monitor endotracheal if appropriate and nasal secretions for changes in amount and color  - [place] appropriate cooling/warming therapies per order  - Administer medications as ordered  - Instruct and encourage patient and family to use good hand hygiene technique  - Identify and instruct in appropriate isolation precautions for identified infection/condition  Outcome: Progressing     Problem: SAFETY ADULT  Goal: Patient will remain free of falls  Description: INTERVENTIONS:  - Educate patient/family on patient safety including physical limitations  - Instruct patient to call for assistance with activity   - Consult OT/PT to assist with strengthening/mobility   - Keep Call bell within reach  - Keep bed low and locked with side rails adjusted as appropriate  - Keep care items and personal belongings within reach  - Initiate and maintain comfort rounds  - Make Fall Risk Sign visible to staff  - Offer Toileting every 2 Hours, in advance of need  - Initiate/Maintain bed alarm  - Obtain necessary fall risk management equipment: non slip socks, call bell  - Apply yellow socks and bracelet for high fall risk patients  - Consider moving patient to room near nurses station  Outcome: Progressing

## 2024-01-07 LAB
ANION GAP SERPL CALCULATED.3IONS-SCNC: 8 MMOL/L
BUN SERPL-MCNC: 29 MG/DL (ref 5–25)
CALCIUM SERPL-MCNC: 9.3 MG/DL (ref 8.4–10.2)
CHLORIDE SERPL-SCNC: 100 MMOL/L (ref 96–108)
CO2 SERPL-SCNC: 23 MMOL/L (ref 21–32)
CREAT SERPL-MCNC: 1.44 MG/DL (ref 0.6–1.3)
ERYTHROCYTE [DISTWIDTH] IN BLOOD BY AUTOMATED COUNT: 12.3 % (ref 11.6–15.1)
GFR SERPL CREATININE-BSD FRML MDRD: 45 ML/MIN/1.73SQ M
GLUCOSE SERPL-MCNC: 210 MG/DL (ref 65–140)
GLUCOSE SERPL-MCNC: 214 MG/DL (ref 65–140)
GLUCOSE SERPL-MCNC: 221 MG/DL (ref 65–140)
GLUCOSE SERPL-MCNC: 221 MG/DL (ref 65–140)
GLUCOSE SERPL-MCNC: 319 MG/DL (ref 65–140)
HCT VFR BLD AUTO: 35.1 % (ref 36.5–49.3)
HGB BLD-MCNC: 12.1 G/DL (ref 12–17)
MCH RBC QN AUTO: 33.8 PG (ref 26.8–34.3)
MCHC RBC AUTO-ENTMCNC: 34.5 G/DL (ref 31.4–37.4)
MCV RBC AUTO: 98 FL (ref 82–98)
PLATELET # BLD AUTO: 127 THOUSANDS/UL (ref 149–390)
PMV BLD AUTO: 11.7 FL (ref 8.9–12.7)
POTASSIUM SERPL-SCNC: 4.1 MMOL/L (ref 3.5–5.3)
RBC # BLD AUTO: 3.58 MILLION/UL (ref 3.88–5.62)
SODIUM SERPL-SCNC: 131 MMOL/L (ref 135–147)
WBC # BLD AUTO: 6.99 THOUSAND/UL (ref 4.31–10.16)

## 2024-01-07 PROCEDURE — 99232 SBSQ HOSP IP/OBS MODERATE 35: CPT | Performed by: INTERNAL MEDICINE

## 2024-01-07 PROCEDURE — 85027 COMPLETE CBC AUTOMATED: CPT | Performed by: INTERNAL MEDICINE

## 2024-01-07 PROCEDURE — 80048 BASIC METABOLIC PNL TOTAL CA: CPT | Performed by: INTERNAL MEDICINE

## 2024-01-07 PROCEDURE — 82948 REAGENT STRIP/BLOOD GLUCOSE: CPT

## 2024-01-07 RX ADMIN — INSULIN LISPRO 2 UNITS: 100 INJECTION, SOLUTION INTRAVENOUS; SUBCUTANEOUS at 16:23

## 2024-01-07 RX ADMIN — Medication 5000 UNITS: at 08:14

## 2024-01-07 RX ADMIN — HEPARIN SODIUM 5000 UNITS: 5000 INJECTION INTRAVENOUS; SUBCUTANEOUS at 13:20

## 2024-01-07 RX ADMIN — INSULIN LISPRO 2 UNITS: 100 INJECTION, SOLUTION INTRAVENOUS; SUBCUTANEOUS at 08:12

## 2024-01-07 RX ADMIN — HEPARIN SODIUM 5000 UNITS: 5000 INJECTION INTRAVENOUS; SUBCUTANEOUS at 21:19

## 2024-01-07 RX ADMIN — INSULIN LISPRO 10 UNITS: 100 INJECTION, SOLUTION INTRAVENOUS; SUBCUTANEOUS at 11:45

## 2024-01-07 RX ADMIN — INSULIN LISPRO 3 UNITS: 100 INJECTION, SOLUTION INTRAVENOUS; SUBCUTANEOUS at 21:19

## 2024-01-07 RX ADMIN — ASPIRIN 81 MG: 81 TABLET, COATED ORAL at 08:13

## 2024-01-07 RX ADMIN — ALLOPURINOL 300 MG: 100 TABLET ORAL at 08:13

## 2024-01-07 RX ADMIN — OMEGA-3 FATTY ACIDS CAP 1000 MG 1000 MG: 1000 CAP at 21:19

## 2024-01-07 RX ADMIN — INSULIN LISPRO 10 UNITS: 100 INJECTION, SOLUTION INTRAVENOUS; SUBCUTANEOUS at 16:23

## 2024-01-07 RX ADMIN — HEPARIN SODIUM 5000 UNITS: 5000 INJECTION INTRAVENOUS; SUBCUTANEOUS at 06:33

## 2024-01-07 RX ADMIN — INSULIN LISPRO 2 UNITS: 100 INJECTION, SOLUTION INTRAVENOUS; SUBCUTANEOUS at 11:45

## 2024-01-07 RX ADMIN — OMEGA-3 FATTY ACIDS CAP 1000 MG 1000 MG: 1000 CAP at 16:23

## 2024-01-07 RX ADMIN — OMEGA-3 FATTY ACIDS CAP 1000 MG 1000 MG: 1000 CAP at 08:13

## 2024-01-07 RX ADMIN — INSULIN GLARGINE 32 UNITS: 100 INJECTION, SOLUTION SUBCUTANEOUS at 08:12

## 2024-01-07 RX ADMIN — INSULIN LISPRO 10 UNITS: 100 INJECTION, SOLUTION INTRAVENOUS; SUBCUTANEOUS at 08:12

## 2024-01-07 RX ADMIN — NIFEDIPINE 60 MG: 60 TABLET, FILM COATED, EXTENDED RELEASE ORAL at 08:13

## 2024-01-07 NOTE — PROGRESS NOTES
[facility]  Progress Note  Name: Irineo Rawls I  MRN: [id number]  Unit/Bed#: E2 -02 I Date of Admission: 1/3/2024   Date of Service: 1/7/2024  Hospital Day: 3    Assessment/Plan   * Hyperglycemic crisis in diabetes mellitus (HCC)  Assessment & Plan  Lab Results   Component Value Date    HGBA1C 6.9 (H) 09/06/2023     New dx DM 2  Hyperglycemia 892. pH 7.4, B hydroxybutyrate 0.9  IV hydration and IV insulin infusion  Changed to sq insulin yesterday which lantus has been increased to 30 units and 10 units of lispro with meals- continue lantus at increase of 32 units. Continue lispro of 10 units with meals   Appreciate endocrine recommendations  Pt has received insulin teaching and can administer injections  Will consult nutrition for education about diet       Recent Labs     01/06/24  1101 01/06/24  1630 01/06/24  2133 01/07/24  0620   POCGLU 296* 195* 251* 210*         Blood Sugar Average: Last 72 hrs:  (P) 267.8956584349314902      Renal cyst  Assessment & Plan  Incidental finding  Will require outpt renal u/s    Rib fracture  Assessment & Plan  S/p fall  Continue supportive care     Hyponatremia  Assessment & Plan  Moderate hyponatremia, Na 116, sodium correction for hyponatremia 129 on admission   S/p iv fluids and sodium continues to improve with correction for hyperglycemia to 133      Elevated lipase  Assessment & Plan  Lipase 128. Chronically elevated     Fall at home, initial encounter  Assessment & Plan  CTH neg for acute intracranial abnormality  CT C-spine unremarkable  vRad: CT CAP: 11th rib fracture   vRad: CT thoracolumbar negative for fracture or traumatic malalignment.  Pt/ot recommended str- pt is agreeable     Thrombocytopenia (HCC)  Assessment & Plan  Chronic thrombocytopenia    Calcification of liver  Assessment & Plan  Mild transaminitis and elevated lipase  Previous CT in 2019 showing: Stable cluster of dystrophic calcifications in the right lobe of the  "liver, superior to the gallbladder fossa.  No evidence of solid mass.  Possible sequela of prior inflammatory or traumatic insult.   Seen outpatient by surgery Apr 2021: \"No further follow-up needed.  I reassured him that his findings were benign.\"  CT CAP ordered. Follow-up read from radiology  Patient follow-up with gastroenterology    KALIA (obstructive sleep apnea)  Assessment & Plan  Stopped using CPAP due to discomfort and dryness of mucosa    MATILDE (acute kidney injury) (HCC)  Assessment & Plan  Lab Results   Component Value Date    EGFR 45 01/07/2024    EGFR 41 01/06/2024    EGFR 39 01/05/2024    CREATININE 1.44 (H) 01/07/2024    CREATININE 1.55 (H) 01/06/2024    CREATININE 1.64 (H) 01/05/2024     Baseline 1.5-1.7  Creatinine on admit was 2.14 as a result of prerenal from diuresis due to hyperglycemia  S/p fluids and at currently at baseline                VTE Pharmacologic Prophylaxis: VTE Score: 4 heparin     Mobility:   Basic Mobility Inpatient Raw Score: 18  -HLM Goal: 6: Walk 10 steps or more  -HLM Achieved: 3: Sit at edge of bed      Patient Centered Rounds:  discussed with his nurse       Education and Discussions with Family / Patient: Updated  (wife) via phone.    Total Time Spent on Date of Encounter in care of patient: 40 mins. This time was spent on one or more of the following: performing physical exam; counseling and coordination of care; obtaining or reviewing history; documenting in the medical record; reviewing/ordering tests, medications or procedures; communicating with other healthcare professionals and discussing with patient's family/caregivers.    Current Length of Stay: 3 day(s)  Current Patient Status: Inpatient     Discharge Plan: awaiting str. Medically cleared to go to str.     Code Status: Level 3 - DNAR and DNI    Subjective:   Pt seen and examined. He is doing well. He has been able to administer insulin injections. No f/c no cp no sob no n/v/d no abd pain "     Objective:     Vitals:   Temp (24hrs), Av °F (37.2 °C), Min:97.8 °F (36.6 °C), Max:99.6 °F (37.6 °C)    Temp:  [97.8 °F (36.6 °C)-99.6 °F (37.6 °C)] 97.8 °F (36.6 °C)  HR:  [73-96] 73  Resp:  [16-18] 16  BP: (131-166)/(68-81) 131/68  SpO2:  [94 %-96 %] 94 %  Body mass index is 36.77 kg/m².     Input and Output Summary (last 24 hours):     Intake/Output Summary (Last 24 hours) at 2024 0973  Last data filed at 2024 0358  Gross per 24 hour   Intake --   Output 800 ml   Net -800 ml       Physical Exam:   Physical Exam  Constitutional:       Appearance: Normal appearance.   HENT:      Head: Normocephalic and atraumatic.   Eyes:      Extraocular Movements: Extraocular movements intact.      Pupils: Pupils are equal, round, and reactive to light.   Cardiovascular:      Rate and Rhythm: Normal rate and regular rhythm.      Heart sounds: No murmur heard.     No friction rub. No gallop.   Pulmonary:      Effort: Pulmonary effort is normal. No respiratory distress.      Breath sounds: Normal breath sounds. No wheezing, rhonchi or rales.   Abdominal:      General: Bowel sounds are normal. There is no distension.      Palpations: Abdomen is soft.      Tenderness: There is no abdominal tenderness. There is no guarding or rebound.   Neurological:      Mental Status: He is alert and oriented to person, place, and time.         Additional Data:     Labs:  Results from last 7 days   Lab Units 24  0625 24  0518 24  2203   WBC Thousand/uL 6.99   < > 11.02*   HEMOGLOBIN g/dL 12.1   < > 13.2   HEMATOCRIT % 35.1*   < > 37.5   PLATELETS Thousands/uL 127*   < > 133*   BANDS PCT %  --   --  1   LYMPHO PCT %  --   --  21   MONO PCT %  --   --  9   EOS PCT %  --   --  4    < > = values in this interval not displayed.     Results from last 7 days   Lab Units 24  0625 24  0220 24  2203   SODIUM mmol/L 131*   < > 116*   POTASSIUM mmol/L 4.1   < > 5.1   CHLORIDE mmol/L 100   < > 80*   CO2 mmol/L  23   < > 23   BUN mg/dL 29*   < > 47*   CREATININE mg/dL 1.44*   < > 2.14*   ANION GAP mmol/L 8   < > 13   CALCIUM mg/dL 9.3   < > 10.0   ALBUMIN g/dL  --   --  3.8   TOTAL BILIRUBIN mg/dL  --   --  0.60   ALK PHOS U/L  --   --  145*   ALT U/L  --   --  60*   AST U/L  --   --  45*   GLUCOSE RANDOM mg/dL 214*   < > 892*    < > = values in this interval not displayed.         Results from last 7 days   Lab Units 01/07/24  0620 01/06/24  2133 01/06/24  1630 01/06/24  1101 01/06/24  0727 01/05/24  2155 01/05/24  1604 01/05/24  1151 01/05/24  0721 01/04/24  2127 01/04/24  1721 01/04/24  1624   POC GLUCOSE mg/dl 210* 251* 195* 296* 319* 276* 260* 239* 355* 377* 411* 368*               Lines/Drains:  Invasive Devices       Peripheral Intravenous Line  Duration             Peripheral IV 01/04/24 Left Forearm 3 days                          Imaging: No pertinent imaging reviewed.    Recent Cultures (last 7 days):         Last 24 Hours Medication List:   Current Facility-Administered Medications   Medication Dose Route Frequency Provider Last Rate    acetaminophen  650 mg Oral Q6H PRN RAFIA Arteaga      albuterol  2 puff Inhalation Q6H PRN RAFIA Arteaga      allopurinol  300 mg Oral Daily RAFIA Arteaga      aluminum-magnesium hydroxide-simethicone  30 mL Oral Q6H PRN RAFIA Arteaga      aspirin  81 mg Oral Daily RAFIA Arteaga      bisacodyl  10 mg Oral Daily PRN RAFIA Arteaga      cholecalciferol  5,000 Units Oral Daily RAFIA Arteaga      fish oil  1,000 mg Oral TID RAFIA Arteaga      heparin (porcine)  5,000 Units Subcutaneous Q8H RENNY RAFIA Arteaga      insulin glargine  32 Units Subcutaneous QAM Susan Giron, DO      insulin lispro  1-5 Units Subcutaneous HS RAFIA Arteaga      insulin lispro  1-6 Units Subcutaneous TID AC RAFIA Arteaga      insulin lispro  10 Units Subcutaneous TID With Meals Vania  RAFIA Kaba      NIFEdipine  60 mg Oral Daily RAFIA Arteaga      ondansetron  4 mg Intravenous Q6H PRN RAFIA Arteaga      simethicone  80 mg Oral 4x Daily PRN RAFIA Arteaga          Today, Patient Was Seen By: Susan Giron DO

## 2024-01-07 NOTE — ASSESSMENT & PLAN NOTE
Lab Results   Component Value Date    EGFR 45 01/07/2024    EGFR 41 01/06/2024    EGFR 39 01/05/2024    CREATININE 1.44 (H) 01/07/2024    CREATININE 1.55 (H) 01/06/2024    CREATININE 1.64 (H) 01/05/2024     Baseline 1.5-1.7  Creatinine on admit was 2.14 as a result of prerenal from diuresis due to hyperglycemia  S/p fluids and at currently at baseline

## 2024-01-07 NOTE — PLAN OF CARE
Problem: PAIN - ADULT  Goal: Verbalizes/displays adequate comfort level or baseline comfort level  Description: Interventions:  - Encourage patient to monitor pain and request assistance  - Assess pain using appropriate pain scale  - Administer analgesics based on type and severity of pain and evaluate response  - Implement non-pharmacological measures as appropriate and evaluate response  - Consider cultural and social influences on pain and pain management  - Notify physician/advanced practitioner if interventions unsuccessful or patient reports new pain  Outcome: Progressing     Problem: SAFETY ADULT  Goal: Patient will remain free of falls  Description: INTERVENTIONS:  - Educate patient/family on patient safety including physical limitations  - Instruct patient to call for assistance with activity   - Consult OT/PT to assist with strengthening/mobility   - Keep Call bell within reach  - Keep bed low and locked with side rails adjusted as appropriate  - Keep care items and personal belongings within reach  - Initiate and maintain comfort rounds  - Make Fall Risk Sign visible to staff  - Offer Toileting every 2 Hours, in advance of need  - Initiate/Maintain bed alarm  - Obtain necessary fall risk management equipment: walker  - Apply yellow socks and bracelet for high fall risk patients  - Consider moving patient to room near nurses station  Outcome: Progressing

## 2024-01-07 NOTE — ASSESSMENT & PLAN NOTE
Lab Results   Component Value Date    HGBA1C 6.9 (H) 09/06/2023     New dx DM 2  Hyperglycemia 892. pH 7.4, B hydroxybutyrate 0.9  IV hydration and IV insulin infusion  Changed to sq insulin yesterday which lantus has been increased to 30 units and 10 units of lispro with meals- continue lantus at increase of 32 units. Continue lispro of 10 units with meals   Appreciate endocrine recommendations  Pt has received insulin teaching and can administer injections  Will consult nutrition for education about diet       Recent Labs     01/06/24  1101 01/06/24  1630 01/06/24  2133 01/07/24  0620   POCGLU 296* 195* 251* 210*         Blood Sugar Average: Last 72 hrs:  (P) 267.3082161716900798

## 2024-01-07 NOTE — PLAN OF CARE
Problem: Nutrition/Hydration-ADULT  Goal: Nutrient/Hydration intake appropriate for improving, restoring or maintaining nutritional needs  Description: Monitor and assess patient's nutrition/hydration status for malnutrition. Collaborate with interdisciplinary team and initiate plan and interventions as ordered.  Monitor patient's weight and dietary intake as ordered or per policy. Utilize nutrition screening tool and intervene as necessary. Determine patient's food preferences and provide high-protein, high-caloric foods as appropriate.     INTERVENTIONS:  - Monitor oral intake, urinary output, labs, and treatment plans  - Assess nutrition and hydration status and recommend course of action  - Evaluate amount of meals eaten  - Assist patient with eating if necessary   - Allow adequate time for meals  - Recommend/ encourage appropriate diets, oral nutritional supplements, and vitamin/mineral supplements  - Order, calculate, and assess calorie counts as needed  - Recommend, monitor, and adjust tube feedings and TPN/PPN based on assessed needs  - Assess need for intravenous fluids  - Provide specific nutrition/hydration education as appropriate  - Include patient/family/caregiver in decisions related to nutrition  Outcome: Progressing     Problem: PAIN - ADULT  Goal: Verbalizes/displays adequate comfort level or baseline comfort level  Description: Interventions:  - Encourage patient to monitor pain and request assistance  - Assess pain using appropriate pain scale  - Administer analgesics based on type and severity of pain and evaluate response  - Implement non-pharmacological measures as appropriate and evaluate response  - Consider cultural and social influences on pain and pain management  - Notify physician/advanced practitioner if interventions unsuccessful or patient reports new pain  Outcome: Progressing     Problem: INFECTION - ADULT  Goal: Absence or prevention of progression during  hospitalization  Description: INTERVENTIONS:  - Assess and monitor for signs and symptoms of infection  - Monitor lab/diagnostic results  - Monitor all insertion sites, i.e. indwelling lines, tubes, and drains  - Monitor endotracheal if appropriate and nasal secretions for changes in amount and color  - [place] appropriate cooling/warming therapies per order  - Administer medications as ordered  - Instruct and encourage patient and family to use good hand hygiene technique  - Identify and instruct in appropriate isolation precautions for identified infection/condition  Outcome: Progressing

## 2024-01-08 VITALS
SYSTOLIC BLOOD PRESSURE: 147 MMHG | WEIGHT: 234.79 LBS | TEMPERATURE: 97.3 F | RESPIRATION RATE: 18 BRPM | BODY MASS INDEX: 36.85 KG/M2 | OXYGEN SATURATION: 97 % | DIASTOLIC BLOOD PRESSURE: 87 MMHG | HEIGHT: 67 IN | HEART RATE: 75 BPM

## 2024-01-08 LAB
ANION GAP SERPL CALCULATED.3IONS-SCNC: 7 MMOL/L
BUN SERPL-MCNC: 27 MG/DL (ref 5–25)
CALCIUM SERPL-MCNC: 9.3 MG/DL (ref 8.4–10.2)
CHLORIDE SERPL-SCNC: 100 MMOL/L (ref 96–108)
CO2 SERPL-SCNC: 25 MMOL/L (ref 21–32)
CREAT SERPL-MCNC: 1.35 MG/DL (ref 0.6–1.3)
GFR SERPL CREATININE-BSD FRML MDRD: 49 ML/MIN/1.73SQ M
GLUCOSE SERPL-MCNC: 192 MG/DL (ref 65–140)
GLUCOSE SERPL-MCNC: 194 MG/DL (ref 65–140)
GLUCOSE SERPL-MCNC: 213 MG/DL (ref 65–140)
GLUCOSE SERPL-MCNC: 281 MG/DL (ref 65–140)
POTASSIUM SERPL-SCNC: 3.8 MMOL/L (ref 3.5–5.3)
SODIUM SERPL-SCNC: 132 MMOL/L (ref 135–147)

## 2024-01-08 PROCEDURE — 97530 THERAPEUTIC ACTIVITIES: CPT

## 2024-01-08 PROCEDURE — 82948 REAGENT STRIP/BLOOD GLUCOSE: CPT

## 2024-01-08 PROCEDURE — 97110 THERAPEUTIC EXERCISES: CPT

## 2024-01-08 PROCEDURE — 97535 SELF CARE MNGMENT TRAINING: CPT

## 2024-01-08 PROCEDURE — 97116 GAIT TRAINING THERAPY: CPT

## 2024-01-08 PROCEDURE — 99232 SBSQ HOSP IP/OBS MODERATE 35: CPT | Performed by: INTERNAL MEDICINE

## 2024-01-08 PROCEDURE — 80048 BASIC METABOLIC PNL TOTAL CA: CPT | Performed by: INTERNAL MEDICINE

## 2024-01-08 PROCEDURE — 99239 HOSP IP/OBS DSCHRG MGMT >30: CPT | Performed by: INTERNAL MEDICINE

## 2024-01-08 RX ORDER — PEN NEEDLE, DIABETIC 32GX 5/32"
NEEDLE, DISPOSABLE MISCELLANEOUS
Qty: 100 EACH | Refills: 0 | Status: SHIPPED | OUTPATIENT
Start: 2024-01-08 | End: 2024-01-08

## 2024-01-08 RX ORDER — INSULIN LISPRO 100 [IU]/ML
10 INJECTION, SOLUTION INTRAVENOUS; SUBCUTANEOUS
Qty: 9 ML | Refills: 0 | Status: SHIPPED | OUTPATIENT
Start: 2024-01-08 | End: 2024-01-08

## 2024-01-08 RX ORDER — BLOOD SUGAR DIAGNOSTIC
STRIP MISCELLANEOUS
Qty: 200 EACH | Refills: 0 | Status: SHIPPED | OUTPATIENT
Start: 2024-01-08 | End: 2024-01-19 | Stop reason: SDUPTHER

## 2024-01-08 RX ORDER — NIFEDIPINE 60 MG/1
60 TABLET, EXTENDED RELEASE ORAL DAILY
Qty: 90 TABLET | Refills: 0 | Status: SHIPPED | OUTPATIENT
Start: 2024-01-08 | End: 2024-01-08

## 2024-01-08 RX ORDER — GLUCOSAMINE HCL/CHONDROITIN SU 500-400 MG
CAPSULE ORAL
Qty: 200 EACH | Refills: 0 | Status: SHIPPED | OUTPATIENT
Start: 2024-01-08

## 2024-01-08 RX ORDER — INSULIN DETEMIR 100 [IU]/ML
32 INJECTION, SOLUTION SUBCUTANEOUS DAILY
Qty: 10 ML | Refills: 0 | Status: SHIPPED | OUTPATIENT
Start: 2024-01-09 | End: 2024-01-19 | Stop reason: SDUPTHER

## 2024-01-08 RX ORDER — LANCETS 33 GAUGE
EACH MISCELLANEOUS
Qty: 200 EACH | Refills: 0 | Status: SHIPPED | OUTPATIENT
Start: 2024-01-08 | End: 2024-01-19 | Stop reason: SDUPTHER

## 2024-01-08 RX ORDER — BLOOD-GLUCOSE METER
KIT MISCELLANEOUS
Qty: 1 KIT | Refills: 0 | Status: SHIPPED | OUTPATIENT
Start: 2024-01-08

## 2024-01-08 RX ORDER — BLOOD SUGAR DIAGNOSTIC
STRIP MISCELLANEOUS
Qty: 200 EACH | Refills: 0 | Status: SHIPPED | OUTPATIENT
Start: 2024-01-08 | End: 2024-01-08

## 2024-01-08 RX ORDER — PEN NEEDLE, DIABETIC 32GX 5/32"
NEEDLE, DISPOSABLE MISCELLANEOUS
Qty: 100 EACH | Refills: 0 | Status: SHIPPED | OUTPATIENT
Start: 2024-01-08 | End: 2024-01-19 | Stop reason: SDUPTHER

## 2024-01-08 RX ORDER — BLOOD-GLUCOSE METER
KIT MISCELLANEOUS
Qty: 1 KIT | Refills: 0 | Status: SHIPPED | OUTPATIENT
Start: 2024-01-08 | End: 2024-01-08

## 2024-01-08 RX ORDER — INSULIN LISPRO 100 [IU]/ML
10 INJECTION, SOLUTION INTRAVENOUS; SUBCUTANEOUS
Qty: 9 ML | Refills: 0 | Status: SHIPPED | OUTPATIENT
Start: 2024-01-08 | End: 2024-01-17 | Stop reason: ALTCHOICE

## 2024-01-08 RX ORDER — GLUCOSAMINE HCL/CHONDROITIN SU 500-400 MG
CAPSULE ORAL
Qty: 200 EACH | Refills: 0 | Status: SHIPPED | OUTPATIENT
Start: 2024-01-08 | End: 2024-01-08

## 2024-01-08 RX ORDER — LANCETS 33 GAUGE
EACH MISCELLANEOUS
Qty: 200 EACH | Refills: 0 | Status: SHIPPED | OUTPATIENT
Start: 2024-01-08 | End: 2024-01-08

## 2024-01-08 RX ORDER — NIFEDIPINE 60 MG/1
60 TABLET, EXTENDED RELEASE ORAL DAILY
Qty: 90 TABLET | Refills: 0 | Status: SHIPPED | OUTPATIENT
Start: 2024-01-08

## 2024-01-08 RX ORDER — INSULIN DETEMIR 100 [IU]/ML
32 INJECTION, SOLUTION SUBCUTANEOUS DAILY
Qty: 10 ML | Refills: 0 | Status: SHIPPED | OUTPATIENT
Start: 2024-01-09 | End: 2024-01-08

## 2024-01-08 RX ADMIN — HEPARIN SODIUM 5000 UNITS: 5000 INJECTION INTRAVENOUS; SUBCUTANEOUS at 06:14

## 2024-01-08 RX ADMIN — INSULIN LISPRO 2 UNITS: 100 INJECTION, SOLUTION INTRAVENOUS; SUBCUTANEOUS at 07:56

## 2024-01-08 RX ADMIN — INSULIN LISPRO 10 UNITS: 100 INJECTION, SOLUTION INTRAVENOUS; SUBCUTANEOUS at 07:57

## 2024-01-08 RX ADMIN — NIFEDIPINE 60 MG: 60 TABLET, FILM COATED, EXTENDED RELEASE ORAL at 08:00

## 2024-01-08 RX ADMIN — ALLOPURINOL 300 MG: 100 TABLET ORAL at 08:00

## 2024-01-08 RX ADMIN — ASPIRIN 81 MG: 81 TABLET, COATED ORAL at 08:00

## 2024-01-08 RX ADMIN — INSULIN LISPRO 10 UNITS: 100 INJECTION, SOLUTION INTRAVENOUS; SUBCUTANEOUS at 16:33

## 2024-01-08 RX ADMIN — Medication 5000 UNITS: at 08:00

## 2024-01-08 RX ADMIN — OMEGA-3 FATTY ACIDS CAP 1000 MG 1000 MG: 1000 CAP at 08:00

## 2024-01-08 RX ADMIN — INSULIN GLARGINE 32 UNITS: 100 INJECTION, SOLUTION SUBCUTANEOUS at 08:00

## 2024-01-08 RX ADMIN — INSULIN LISPRO 4 UNITS: 100 INJECTION, SOLUTION INTRAVENOUS; SUBCUTANEOUS at 12:04

## 2024-01-08 RX ADMIN — INSULIN LISPRO 2 UNITS: 100 INJECTION, SOLUTION INTRAVENOUS; SUBCUTANEOUS at 16:32

## 2024-01-08 RX ADMIN — INSULIN LISPRO 10 UNITS: 100 INJECTION, SOLUTION INTRAVENOUS; SUBCUTANEOUS at 12:05

## 2024-01-08 RX ADMIN — HEPARIN SODIUM 5000 UNITS: 5000 INJECTION INTRAVENOUS; SUBCUTANEOUS at 12:05

## 2024-01-08 NOTE — PHYSICAL THERAPY NOTE
Physical Therapy Treatment Note     01/08/24 1404   PT Last Visit   PT Visit Date 01/08/24   Note Type   Note Type Treatment   Pain Assessment   Pain Assessment Tool 0-10   Pain Score No Pain   Restrictions/Precautions   Weight Bearing Precautions Per Order No   Other Precautions Fall Risk   General   Chart Reviewed Yes   Family/Caregiver Present Yes   Subjective   Subjective Pt. agreeable to PT   Bed Mobility   Sit to Supine 5  Supervision   Additional items Bedrails   Transfers   Sit to Stand 5  Supervision   Additional items Verbal cues   Stand to Sit 5  Supervision   Additional items Verbal cues   Stand pivot 5  Supervision   Additional items Increased time required   Ambulation/Elevation   Gait pattern Improper Weight shift;Decreased foot clearance;Forward Flexion;Short stride;Excessively slow;Decreased heel strike;Decreased toe off   Gait Assistance 5  Supervision   Additional items Assist x 1   Assistive Device Rolling walker;SPC   Distance 280ft with RW and 70ft, 20ft with SPC   Stair Management Assistance 5  Supervision   Additional items Assist x 1   Stair Management Technique Two rails;One rail L;With cane;Reciprocal;Foreward;Alternating pattern   Number of Stairs 8   Balance   Static Sitting Normal   Dynamic Sitting Good   Static Standing Fair   Dynamic Standing Fair -   Ambulatory Fair -   Endurance Deficit   Endurance Deficit Yes   Endurance Deficit Description Fatigue   Activity Tolerance   Activity Tolerance Patient tolerated treatment well   Nurse Made Aware yes   Exercises   Balance training  standing balance activities with 1UE or no UE support, Repepated STS x 10 with EC, and no hands   Assessment   Prognosis Good   Problem List Impaired balance;Decreased endurance;Decreased range of motion;Impaired hearing;Impaired judgement;Decreased mobility   Assessment Pt. progressing well with mobility. Pt. noted with mild LOB when ambulating with SPC. Pt. negotiated steps with no LOB. Cues to stay within  RW during ambulation give as he tend to lean forward excessively. Discussed with Mariya White pt.'s mobility progress and agreeable to change DCP to OPPT. CM was informed of the same and patien's wife and OTR in agreement. Pt. back in bed with all needs within reach and wife still present in room. Wife was happy with patient's mobility progress. Will coninue o follow per PT POC.   Barriers to Discharge None   Goals   Patient Goals None reported   STG Expiration Date 01/15/24   PT Treatment Day 1   Plan   Treatment/Interventions Functional transfer training;LE strengthening/ROM;Elevations;Therapeutic exercise;Spoke to case management;Spoke to nursing;OT;Gait training;Bed mobility;Equipment eval/education;Patient/family training  (PT)   Progress Progressing toward goals   PT Frequency 3-5x/wk   Discharge Recommendation   Rehab Resource Intensity Level, PT III (Minimum Resource Intensity)   Equipment Recommended Walker   AM-PAC Basic Mobility Inpatient   Turning in Flat Bed Without Bedrails 4   Lying on Back to Sitting on Edge of Flat Bed Without Bedrails 4   Moving Bed to Chair 4   Standing Up From Chair Using Arms 4   Walk in Room 4   Climb 3-5 Stairs With Railing 4   Basic Mobility Inpatient Raw Score 24   Basic Mobility Standardized Score 57.68   Highest Level Of Mobility   JH-HLM Goal 8: Walk 250 feet or more   JH-HLM Achieved 8: Walk 250 feet ot more   End of Consult   Patient Position at End of Consult All needs within reach;Supine         Tyra Franco PTA    An AM-PAC basic mobility standardized score less than 42.9 suggest the patient may benefit from discharge to post-acute rehab services.

## 2024-01-08 NOTE — CASE MANAGEMENT
Case Management Discharge Planning Note    Patient name Irineo Rawls  [facility] /E2 -* MRN [id number]  : 1945 Date 2024       Current Admission Date: 1/3/2024  Current Admission Diagnosis:Hyperglycemic crisis in diabetes mellitus (HCC)   Patient Active Problem List    Diagnosis Date Noted    Rib fracture 2024    Renal cyst 2024    Fall at home, initial encounter 2024    Elevated lipase 2024    Hyponatremia 2024    Hyperglycemic crisis in diabetes mellitus (HCC) 2024    Thrombocytopenia (HCC) 2023    Benign prostatic hyperplasia with incomplete bladder emptying 2023    Acute hyperkalemia 2023    Microcytic anemia 2023    Hyperkalemia     Calcification of liver 2021    Obesity, morbid (HCC) 2021    Rectus diastasis 10/07/2020    Hyperuricemia 07/15/2019    Knee pain 2018    Localized, primary osteoarthritis 2018    Osteoarthritis of knee 2018    Abnormal serum glucose level 2016    Essential hypertriglyceridemia 2016    KALIA (obstructive sleep apnea) 2015    Gout 2015    Vitamin D deficiency 2012    MATILDE (acute kidney injury) ([facility]) 2012    Essential hypertension 2009      LOS (days): 4  Geometric Mean LOS (GMLOS) (days): 3.3  Days to GMLOS:-1.4     OBJECTIVE:  Risk of Unplanned Readmission Score: 16.61         Current admission status: Inpatient   Preferred Pharmacy:   [facility]/pharmacy #[id number] - [place] - [address]  [place] [address]  Phone: [phone] Fax: [phone]    [facility] - [place] - [address]  [address]  [place] [address]  Phone: [phone] Fax: [phone]    Primary Care Provider: Obi Esquivel DO    Primary Insurance: [employer]  Secondary Insurance:     DISCHARGE DETAILS:    Discharge planning discussed with:: Patient  Freedom of Choice:  (No  needs. Will be doing OPPT)     CM contacted family/caregiver?: Yes  Were Treatment Team discharge recommendations reviewed with patient/caregiver?: Yes  Did patient/caregiver verbalize understanding of patient care needs?: Yes  Were patient/caregiver advised of the risks associated with not following Treatment Team discharge recommendations?: Yes    Contacts  Patient Contacts: Shireen Rawls  Relationship to Patient:: Family  Contact Method: In Person  Reason/Outcome: Discharge Planning    Requested Home Health Care         Is the patient interested in HHC at discharge?: No    DME Referral Provided  Referral made for DME?: No    Other Referral/Resources/Interventions Provided:  Interventions: Outpatient PT         Treatment Team Recommendation: Home (w/ OPPT)  Discharge Destination Plan:: Home (w/ OPPT)  Transport at Discharge : Family                       Accompanied by: Family member     IMM Given (Date):: 01/08/24  IMM Given to:: Patient  Family notified:: Shireen Rawls

## 2024-01-08 NOTE — PLAN OF CARE
Problem: OCCUPATIONAL THERAPY ADULT  Goal: Performs self-care activities at highest level of function for planned discharge setting.  See evaluation for individualized goals.  Description: Treatment Interventions: ADL retraining, UE strengthening/ROM, Functional transfer training, Endurance training, Cognitive reorientation, Patient/family training, Equipment evaluation/education, Compensatory technique education, Energy conservation, Activityengagement  Equipment Recommended: Shower/Tub chair with back ($) (vs. transfer bench)       See flowsheet documentation for full assessment, interventions and recommendations.   Outcome: Progressing  Note: Limitation: Decreased ADL status, Decreased UE strength, Decreased Safe judgement during ADL, Decreased endurance, Decreased high-level ADLs  Prognosis: Good  Assessment: Pt seen for skilled OT session focused on ADLs, functional transfers and mobility. Pt w/ good progress since initial eval w/ improvement to grooming at sink at supervision and LB Dressing to don pants w/ supervision and good safety w/ one UE support on RW. Pt w/ supervision functional transfers from bed and toilet w/ cues initially for hand placement and then pt able to provide good carryover t/o session. Pt educated on home safety and fall prevention and receptive. Pt and wife agreeable that patient is performing much better functionally to return home. Will continue to follow. Recommend level III minimal resources.   The patient's raw score on the AM-PAC Daily Activity Inpatient Short Form is 21. A raw score of greater than or equal to 19 suggests the patient may benefit from discharge to home. Please refer to the recommendation of the Occupational Therapist for safe discharge planning.     Rehab Resource Intensity Level, OT: III (Minimum Resource Intensity)

## 2024-01-08 NOTE — PLAN OF CARE
Problem: SAFETY ADULT  Goal: Patient will remain free of falls  Description: INTERVENTIONS:  - Educate patient/family on patient safety including physical limitations  - Instruct patient to call for assistance with activity   - Consult OT/PT to assist with strengthening/mobility   - Keep Call bell within reach  - Keep bed low and locked with side rails adjusted as appropriate  - Keep care items and personal belongings within reach  - Initiate and maintain comfort rounds  - Make Fall Risk Sign visible to staff  - Offer Toileting every 2 Hours, in advance of need  - Initiate/Maintain bed alarm  - Obtain necessary fall risk management equipment: walker  - Apply yellow socks and bracelet for high fall risk patients  - Consider moving patient to room near nurses station  Outcome: Progressing

## 2024-01-08 NOTE — PLAN OF CARE
Problem: PHYSICAL THERAPY ADULT  Goal: Performs mobility at highest level of function for planned discharge setting.  See evaluation for individualized goals.  Description: Treatment/Interventions: Functional transfer training, LE strengthening/ROM, Elevations, Therapeutic exercise, Endurance training, Patient/family training, Bed mobility, Gait training, Spoke to nursing, Spoke to case management  Equipment Recommended: Walker       See flowsheet documentation for full assessment, interventions and recommendations.  Outcome: Progressing  Note: Prognosis: Good  Problem List: Impaired balance, Decreased endurance, Decreased range of motion, Impaired hearing, Impaired judgement, Decreased mobility  Assessment: Pt. progressing well with mobility. Pt. noted with mild LOB when ambulating with SPC. Pt. negotiated steps with no LOB. Cues to stay within RW during ambulation give as he tend to lean forward excessively. Discussed with Mariya White pt.'s mobility progress and agreeable to change DCP to OPPT. CM was informed of the same and patien's wife and OTR in agreement. Pt. back in bed with all needs within reach and wife still present in room. Wife was happy with patient's mobility progress. Will coninue o follow per PT POC.  Barriers to Discharge: None     Rehab Resource Intensity Level, PT: III (Minimum Resource Intensity)    See flowsheet documentation for full assessment.

## 2024-01-08 NOTE — OCCUPATIONAL THERAPY NOTE
Occupational Therapy Progress Note     Patient Name: Irineo Rawls  Today's Date: 1/8/2024  Problem List  Principal Problem:    Hyperglycemic crisis in diabetes mellitus (HCC)  Active Problems:    MATILDE (acute kidney injury) (HCC)    KALIA (obstructive sleep apnea)    Calcification of liver    Thrombocytopenia (HCC)    Fall at home, initial encounter    Elevated lipase    Hyponatremia    Rib fracture    Renal cyst        01/08/24 1319   OT Last Visit   OT Visit Date 01/08/24   Note Type   Note Type Treatment   Pain Assessment   Pain Assessment Tool 0-10   Pain Score No Pain   Restrictions/Precautions   Weight Bearing Precautions Per Order No   Other Precautions Fall Risk   ADL   Where Assessed Edge of bed   Grooming Assistance 5  Supervision/Setup   Grooming Deficit Setup   LB Dressing Assistance 5  Supervision/Setup   LB Dressing Deficit Setup;Supervision/safety;Increased time to complete;Thread LLE into pants;Thread RLE into pants   LB Dressing Comments close supervision w/ one UE support on RW to pull up over hips   Toileting Assistance  5  Supervision/Setup   Toileting Deficit Setup;Supervison/safety;Increased time to complete;Clothing management down;Clothing management up   Bed Mobility   Supine to Sit 5  Supervision   Additional items Increased time required   Transfers   Sit to Stand 5  Supervision   Additional items Bedrails;Verbal cues   Stand to Sit 5  Supervision   Additional items Armrests;Bedrails   Toilet transfer 5  Supervision   Additional items Increased time required;Verbal cues;Standard toilet  (grab bars)   Additional Comments initially cues for hand placement and then improved w/ good carryover   Functional Mobility   Functional Mobility 5  Supervision   Additional Comments close supervision w/ RW in homelike environment, educated on use of bag to transport items and removal of throw rrugs,   Additional items Rolling walker   Therapeutic Excerise-Strength   UE Strength   (10reps sit<>stands  "w/ close supervision)   Subjective   Subjective \"I feel better\"   Cognition   Overall Cognitive Status WFL   Arousal/Participation Alert;Responsive;Cooperative   Attention Within functional limits   Orientation Level Oriented X4   Memory Within functional limits   Following Commands Follows all commands and directions without difficulty   Comments pleasant and cooperative; demonstrated good safety awareness   Additional Activities   Additional Activities   (education on home safety and benefit of shower seat)   Additional Activities Comments pt receptive   Activity Tolerance   Activity Tolerance Patient tolerated treatment well   Medical Staff Made Aware appropriate to see per ryan MOBLEY   Assessment   Assessment Pt seen for skilled OT session focused on ADLs, functional transfers and mobility. Pt w/ good progress since initial eval w/ improvement to grooming at sink at supervision and LB Dressing to don pants w/ supervision and good safety w/ one UE support on RW. Pt w/ supervision functional transfers from bed and toilet w/ cues initially for hand placement and then pt able to provide good carryover t/o session. Pt educated on home safety and fall prevention and receptive. Pt and wife agreeable that patient is performing much better functionally to return home. Will continue to follow. Recommend level III minimal resources.   The patient's raw score on the -PAC Daily Activity Inpatient Short Form is 21. A raw score of greater than or equal to 19 suggests the patient may benefit from discharge to home. Please refer to the recommendation of the Occupational Therapist for safe discharge planning.   Plan   Treatment Interventions ADL retraining;UE strengthening/ROM;Functional transfer training;Endurance training;Cognitive reorientation;Patient/family training;Equipment evaluation/education;Compensatory technique education;Energy conservation;Activityengagement   Goal Expiration Date 01/18/24   OT Treatment Day 1   OT " Frequency 3-5x/wk   Discharge Recommendation   Rehab Resource Intensity Level, OT III (Minimum Resource Intensity)   Equipment Recommended Shower/Tub chair with back ($)  (vs. transfer bench)   AM-PAC Daily Activity Inpatient   Lower Body Dressing 3   Bathing 3   Toileting 3   Upper Body Dressing 4   Grooming 4   Eating 4   Daily Activity Raw Score 21   Daily Activity Standardized Score (Calc for Raw Score >=11) 44.27   AM-PAC Applied Cognition Inpatient   Following a Speech/Presentation 4   Understanding Ordinary Conversation 4   Taking Medications 3   Remembering Where Things Are Placed or Put Away 3   Remembering List of 4-5 Errands 3   Taking Care of Complicated Tasks 3   Applied Cognition Raw Score 20   Applied Cognition Standardized Score 41.76   End of Consult   Education Provided Yes   Patient Position at End of Consult All needs within reach;Bed/Chair alarm activated     Documentation completed by: Deja King MS, OTR/L

## 2024-01-08 NOTE — ASSESSMENT & PLAN NOTE
Moderate hyponatremia, Na 116, sodium correction for hyponatremia 129 on admission   S/p iv fluids and sodium improved to 132

## 2024-01-08 NOTE — PROGRESS NOTES
[facility]  Progress Note  Name: Irineo Rawls I  MRN: [id number]  Unit/Bed#: E2 -02 I Date of Admission: 1/3/2024   Date of Service: 1/8/2024 I Hospital Day: 4    Assessment/Plan   * Hyperglycemic crisis in diabetes mellitus (HCC)  Assessment & Plan  Lab Results   Component Value Date    HGBA1C 6.9 (H) 09/06/2023     New dx DM 2  Hyperglycemia 892. pH 7.4, B hydroxybutyrate 0.9  IV hydration and IV insulin infusion  Changed to sq insulin which lantus has been increased to 32 units and 10 units of lispro with meals  Appreciate endocrine recommendations  Pt has received insulin teaching and can administer injections  Awaiting nutrition to education about diet       Recent Labs     01/07/24  1122 01/07/24  1605 01/07/24 2027 01/08/24  0611   POCGLU 221* 221* 319* 192*         Blood Sugar Average: Last 72 hrs:  (P) 258      Renal cyst  Assessment & Plan  Incidental finding  Will require outpt renal u/s    Rib fracture  Assessment & Plan  S/p fall  Continue supportive care     Hyponatremia  Assessment & Plan  Moderate hyponatremia, Na 116, sodium correction for hyponatremia 129 on admission   S/p iv fluids and sodium improved to 132      Elevated lipase  Assessment & Plan  Lipase 128. Chronically elevated     Fall at home, initial encounter  Assessment & Plan  CTH neg for acute intracranial abnormality  CT C-spine unremarkable  vRad: CT CAP: 11th rib fracture   v[facility]: CT thoracolumbar negative for fracture or traumatic malalignment.  Pt/ot recommended str- pt is agreeable     Thrombocytopenia (HCC)  Assessment & Plan  Chronic thrombocytopenia    Calcification of liver  Assessment & Plan  Mild transaminitis and elevated lipase  Previous CT in 2019 showing: Stable cluster of dystrophic calcifications in the right lobe of the liver, superior to the gallbladder fossa.  No evidence of solid mass.  Possible sequela of prior inflammatory or traumatic insult.   Seen outpatient by surgery  "2021: \"No further follow-up needed.  I reassured him that his findings were benign.\"  CT CAP ordered. Follow-up read from radiology  Patient follow-up with gastroenterology    KALIA (obstructive sleep apnea)  Assessment & Plan  Stopped using CPAP due to discomfort and dryness of mucosa    MATILDE (acute kidney injury) (HCC)  Assessment & Plan  Lab Results   Component Value Date    EGFR 49 2024    EGFR 45 2024    EGFR 41 2024    CREATININE 1.35 (H) 2024    CREATININE 1.44 (H) 2024    CREATININE 1.55 (H) 2024     Baseline 1.5-1.7  Creatinine on admit was 2.14 as a result of prerenal from diuresis due to hyperglycemia  S/p fluids and at currently at baseline                VTE Pharmacologic Prophylaxis: VTE Score: 4 heparin     Mobility:   Basic Mobility Inpatient Raw Score: 18  JH-HLM Goal: 6: Walk 10 steps or more  JH-HLM Achieved: 3: Sit at edge of bed    Education and Discussions with Family / Patient: Updated  (wife) via phone.    Total Time Spent on Date of Encounter in care of patient: 40 mins. This time was spent on one or more of the following: performing physical exam; counseling and coordination of care; obtaining or reviewing history; documenting in the medical record; reviewing/ordering tests, medications or procedures; communicating with other healthcare professionals and discussing with patient's family/caregivers.    Current Length of Stay: 4 day(s)  Current Patient Status: Inpatient     Discharge Plan: Anticipate discharge in 24-48 hrs to rehab facility. Awaiting auth    Code Status: Level 3 - DNAR and DNI    Subjective:   Pt seen and examined. Pt doing well. No f/c no cp no sob no n/v/d no abd pain. He is able to administer insulin     Objective:     Vitals:   Temp (24hrs), Av.7 °F (36.5 °C), Min:97.3 °F (36.3 °C), Max:98.2 °F (36.8 °C)    Temp:  [97.3 °F (36.3 °C)-98.2 °F (36.8 °C)] 97.3 °F (36.3 °C)  HR:  [75-81] 75  Resp:  [18-20] 18  BP: " (147-153)/(74-95) 147/87  SpO2:  [94 %-97 %] 97 %  Body mass index is 36.77 kg/m².     Input and Output Summary (last 24 hours):     Intake/Output Summary (Last 24 hours) at 1/8/2024 0931  Last data filed at 1/8/2024 0752  Gross per 24 hour   Intake --   Output 1400 ml   Net -1400 ml       Physical Exam:   Physical Exam  Constitutional:       Appearance: Normal appearance.   HENT:      Head: Normocephalic and atraumatic.   Eyes:      Extraocular Movements: Extraocular movements intact.      Pupils: Pupils are equal, round, and reactive to light.   Cardiovascular:      Rate and Rhythm: Normal rate and regular rhythm.      Heart sounds: No murmur heard.     No friction rub. No gallop.   Pulmonary:      Effort: Pulmonary effort is normal. No respiratory distress.      Breath sounds: Normal breath sounds. No wheezing, rhonchi or rales.   Abdominal:      General: Bowel sounds are normal. There is no distension.      Palpations: Abdomen is soft.      Tenderness: There is no abdominal tenderness. There is no guarding or rebound.   Musculoskeletal:      Right lower leg: No edema.      Left lower leg: No edema.   Neurological:      Mental Status: He is alert and oriented to person, place, and time.          Additional Data:     Labs:  Results from last 7 days   Lab Units 01/07/24  0625 01/04/24  0518 01/03/24  2203   WBC Thousand/uL 6.99   < > 11.02*   HEMOGLOBIN g/dL 12.1   < > 13.2   HEMATOCRIT % 35.1*   < > 37.5   PLATELETS Thousands/uL 127*   < > 133*   BANDS PCT %  --   --  1   LYMPHO PCT %  --   --  21   MONO PCT %  --   --  9   EOS PCT %  --   --  4    < > = values in this interval not displayed.     Results from last 7 days   Lab Units 01/08/24  0447 01/04/24  0220 01/03/24  2203   SODIUM mmol/L 132*   < > 116*   POTASSIUM mmol/L 3.8   < > 5.1   CHLORIDE mmol/L 100   < > 80*   CO2 mmol/L 25   < > 23   BUN mg/dL 27*   < > 47*   CREATININE mg/dL 1.35*   < > 2.14*   ANION GAP mmol/L 7   < > 13   CALCIUM mg/dL 9.3   < >  10.0   ALBUMIN g/dL  --   --  3.8   TOTAL BILIRUBIN mg/dL  --   --  0.60   ALK PHOS U/L  --   --  145*   ALT U/L  --   --  60*   AST U/L  --   --  45*   GLUCOSE RANDOM mg/dL 194*   < > 892*    < > = values in this interval not displayed.         Results from last 7 days   Lab Units 01/08/24  0611 01/07/24  2027 01/07/24  1605 01/07/24  1122 01/07/24  0620 01/06/24  2133 01/06/24  1630 01/06/24  1101 01/06/24  0727 01/05/24  2155 01/05/24  1604 01/05/24  1151   POC GLUCOSE mg/dl 192* 319* 221* 221* 210* 251* 195* 296* 319* 276* 260* 239*               Lines/Drains:  Invasive Devices       Peripheral Intravenous Line  Duration             Peripheral IV 01/04/24 Left Forearm 4 days                          Imaging: No pertinent imaging reviewed.    Recent Cultures (last 7 days):         Last 24 Hours Medication List:   Current Facility-Administered Medications   Medication Dose Route Frequency Provider Last Rate    acetaminophen  650 mg Oral Q6H PRN RAFIA Arteaga      albuterol  2 puff Inhalation Q6H PRN RAFIA Arteaga      allopurinol  300 mg Oral Daily RAFIA Arteaga      aluminum-magnesium hydroxide-simethicone  30 mL Oral Q6H PRN RAFIA Arteaga      aspirin  81 mg Oral Daily RAFIA Aretaga      bisacodyl  10 mg Oral Daily PRN RAFIA Arteaga      cholecalciferol  5,000 Units Oral Daily RAFIA Arteaga      fish oil  1,000 mg Oral TID RAFIA Arteaga      heparin (porcine)  5,000 Units Subcutaneous Q8H RENNY RAFIA Arteaga      insulin glargine  32 Units Subcutaneous QAM Susan Giron DO      insulin lispro  1-5 Units Subcutaneous HS RAFIA Arteaga      insulin lispro  1-6 Units Subcutaneous TID AC RAFIA Arteaga      insulin lispro  10 Units Subcutaneous TID With Meals RAFIA Arteaga      NIFEdipine  60 mg Oral Daily RAFIA Arteaga      ondansetron  4 mg Intravenous Q6H PRN Vania  RAFIA Kaba      simethicone  80 mg Oral 4x Daily PRN RAFIA Arteaga          Today, Patient Was Seen By: Susan Giron DO

## 2024-01-08 NOTE — ASSESSMENT & PLAN NOTE
Lab Results   Component Value Date    HGBA1C 6.9 (H) 09/06/2023     New dx DM 2  Hyperglycemia 892. pH 7.4, B hydroxybutyrate 0.9  IV hydration and IV insulin infusion  Changed to sq insulin which lantus has been increased to 32 units and 10 units of lispro with meals  Appreciate endocrine recommendations  Pt has received insulin teaching and can administer injections  Awaiting nutrition to education about diet       Recent Labs     01/07/24  1122 01/07/24  1605 01/07/24 2027 01/08/24  0611   POCGLU 221* 221* 319* 192*         Blood Sugar Average: Last 72 hrs:  (P) 258

## 2024-01-08 NOTE — PLAN OF CARE
Problem: Nutrition/Hydration-ADULT  Goal: Nutrient/Hydration intake appropriate for improving, restoring or maintaining nutritional needs  Description: Monitor and assess patient's nutrition/hydration status for malnutrition. Collaborate with interdisciplinary team and initiate plan and interventions as ordered.  Monitor patient's weight and dietary intake as ordered or per policy. Utilize nutrition screening tool and intervene as necessary. Determine patient's food preferences and provide high-protein, high-caloric foods as appropriate.     INTERVENTIONS:  - Monitor oral intake, urinary output, labs, and treatment plans  - Assess nutrition and hydration status and recommend course of action  - Evaluate amount of meals eaten  - Assist patient with eating if necessary   - Allow adequate time for meals  - Recommend/ encourage appropriate diets, oral nutritional supplements, and vitamin/mineral supplements  - Order, calculate, and assess calorie counts as needed  - Recommend, monitor, and adjust tube feedings and TPN/PPN based on assessed needs  - Assess need for intravenous fluids  - Provide specific nutrition/hydration education as appropriate  - Include patient/family/caregiver in decisions related to nutrition  Outcome: Progressing     Problem: PAIN - ADULT  Goal: Verbalizes/displays adequate comfort level or baseline comfort level  Description: Interventions:  - Encourage patient to monitor pain and request assistance  - Assess pain using appropriate pain scale  - Administer analgesics based on type and severity of pain and evaluate response  - Implement non-pharmacological measures as appropriate and evaluate response  - Consider cultural and social influences on pain and pain management  - Notify physician/advanced practitioner if interventions unsuccessful or patient reports new pain  Outcome: Progressing     Problem: INFECTION - ADULT  Goal: Absence or prevention of progression during  hospitalization  Description: INTERVENTIONS:  - Assess and monitor for signs and symptoms of infection  - Monitor lab/diagnostic results  - Monitor all insertion sites, i.e. indwelling lines, tubes, and drains  - Monitor endotracheal if appropriate and nasal secretions for changes in amount and color  - [place] appropriate cooling/warming therapies per order  - Administer medications as ordered  - Instruct and encourage patient and family to use good hand hygiene technique  - Identify and instruct in appropriate isolation precautions for identified infection/condition  Outcome: Progressing     Problem: SAFETY ADULT  Goal: Patient will remain free of falls  Description: INTERVENTIONS:  - Educate patient/family on patient safety including physical limitations  - Instruct patient to call for assistance with activity   - Consult OT/PT to assist with strengthening/mobility   - Keep Call bell within reach  - Keep bed low and locked with side rails adjusted as appropriate  - Keep care items and personal belongings within reach  - Initiate and maintain comfort rounds  - Make Fall Risk Sign visible to staff  - Offer Toileting every 2 Hours, in advance of need  - Obtain necessary fall risk management equipment: non slip socks, call bell   - Apply yellow socks and bracelet for high fall risk patients  - Consider moving patient to room near nurses station  Outcome: Progressing

## 2024-01-08 NOTE — [facility] ADMISSION
Reviewed patient with physician and he has been approved for [facility] pending medical stability, insurance authorization and bed availability at [facility].  CM has been updated in [facility].

## 2024-01-08 NOTE — ARC ADMISSION
[facility]  contacted the patient and or family: introduced self, explained role, reviewed ARC program, services offered, acute rehab criteria, approval process, insurance authorization process, AR[facility] locations and preferences. Patient / family preferred ARC location is  [facility] and second option is [facility]. Patient / family made aware ARC Reviewer will keep their Care Manager updated regarding referral status.       Thank you,   Bette Cerda  [facility] Admissions

## 2024-01-08 NOTE — DISCHARGE SUMMARY
Discharge Summary - [facility] Internal Medicine    Patient Information: Irineo Rawls 78 y.o. male MRN: [id number]  Unit/Bed#: E2 -02 Encounter: [id number]    Discharging Physician / Practitioner: Susan Giron DO  PCP: Obi Esquivel DO  Admission Date: 1/3/2024  Discharge Date: 01/08/24    Disposition:     Home with VNA Services (Reminder: Complete face to face encounter)     Reason for Admission: hyperglycemic stephanie in diabetes     Discharge Diagnoses:     Principal Problem:    Hyperglycemic crisis in diabetes mellitus (HCC)  Active Problems:    MATILDE (acute kidney injury) (HCC)    KALIA (obstructive sleep apnea)    Calcification of liver    Thrombocytopenia (HCC)    Fall at home, initial encounter    Elevated lipase    Hyponatremia    Rib fracture    Renal cyst  Resolved Problems:    * No resolved hospital problems. *      Consultations During Hospital Stay:  Endocrine     Procedures Performed:     none    Significant Findings / Test Results:   CT recon only thoracolumbar  Result Date: 1/4/2024  Impression: Slight irregularity of the left L1 transverse process may be related to soft tissue attenuation artifact, although a nondisplaced fracture is possible. No additional evidence of fracture or traumatic subluxation. The major findings are in agreement with the preliminary report provided by [facility] which was provided shortly after completion of the exam. The additional finding of left L1 transverse process irregularity will now be communicated with patient's clinical team by our radiology liaison.     CT chest abdomen pelvis wo contrast  Result Date: 1/4/2024  Impression: 1. Acute slightly angulated right posterior 11th rib fracture with mild overlying subcutaneous contusion. 2. No evidence of visceral traumatic injury in the chest, abdomen or pelvis. 3. A 3.5 cm right mid renal probable cyst is increased in size since 5/23/2019. Nonemergent renal ultrasound is suggested, given the  increased size.     CT cervical spine without contrast  Result Date: 1/4/2024  Impression: No acute cervical spine fracture or traumatic malalignment.    CT head without contrast  Result Date: 1/4/2024  Impression: No acute intracranial abnormality.    Incidental Findings:   none    Test Results Pending at Discharge (will require follow up):   none     Outpatient Tests Requested:  none    Hospital Course:     Irineo Rawls is a 78 y.o. male patient who originally presented to the hospital on 1/3/2024 due to hyperglycemic crisis in diabetes. He had hyperglycemia of 892. He was given iv hydration and started on insulin gtt. He was changed to lantus of 32 units and lispro with 10 units tid. He also had terrie due to diuresis from hyperglycemia. He was hydrated and creatinine improved to 1.3. Pt had hyponatremia due to pseudohyponatremia from hyperglycemia and hypovolemic hyponatremia. His sodium improved with iv hydration and insulin treatment. He was evaluated by physical therapy who originally recommended str. He progressed with his strength while he was in the hospital and physical therapy then recommended hhpt. He was discharged to home.     Discharge Day Visit / Exam:     * Please refer to separate progress note for these details *    Discharge instructions/Information to patient and family:   See after visit summary for information provided to patient and family.      Provisions for Follow-Up Care:  See after visit summary for information related to follow-up care and any pertinent home health orders.      Discharge Statement:  I spent 40 minutes discharging the patient. This time was spent on the day of discharge. I had direct contact with the patient on the day of discharge. Greater than 50% of the total time was spent examining patient, answering all patient questions, arranging and discussing plan of care with patient as well as directly providing post-discharge instructions.  Additional time then spent on  discharge activities.    Discharge Medications:  See after visit summary for reconciled discharge medications provided to patient and family.

## 2024-01-08 NOTE — ASSESSMENT & PLAN NOTE
Lab Results   Component Value Date    EGFR 49 01/08/2024    EGFR 45 01/07/2024    EGFR 41 01/06/2024    CREATININE 1.35 (H) 01/08/2024    CREATININE 1.44 (H) 01/07/2024    CREATININE 1.55 (H) 01/06/2024     Baseline 1.5-1.7  Creatinine on admit was 2.14 as a result of prerenal from diuresis due to hyperglycemia  S/p fluids and at currently at baseline

## 2024-01-09 ENCOUNTER — TRANSITIONAL CARE MANAGEMENT (OUTPATIENT)
Dept: FAMILY MEDICINE CLINIC | Facility: CLINIC | Age: 79
End: 2024-01-09

## 2024-01-09 NOTE — UTILIZATION REVIEW
NOTIFICATION OF ADMISSION DISCHARGE   This is a Notification of Discharge from [facility]. Please be advised that this patient has been discharge from our facility. Below you will find the admission and discharge date and time including the patient’s disposition.   UTILIZATION REVIEW CONTACT:  Lilia Jane MA  Utilization   Network Utilization Review Department  Phone: [phone] x carefully listen to the prompts. All voicemails are confidential.  Email: [email or web address]     ADMISSION INFORMATION  PRESENTATION DATE: 1/3/2024  9:12 PM  OBERVATION ADMISSION DATE:  INPATIENT ADMISSION DATE: 1/4/24 12:56 AM   DISCHARGE DATE: 1/8/2024  6:54 PM   DISPOSITION:Home with Home Health Care    Network Utilization Review Department  ATTENTION: Please call with any questions or concerns to [phone] and carefully listen to the prompts so that you are directed to the right person. All voicemails are confidential.   For Discharge needs, contact Care Management DC Support Team at [phone] opt. [phone]  Send all requests for admission clinical reviews, approved or denied determinations and any other requests to dedicated fax number below belonging to the campus where the patient is receiving treatment. List of dedicated fax numbers for the Facilities:  FACILITY NAME UR FAX NUMBER   ADMISSION DENIALS (Administrative/Medical Necessity) [phone]   DISCHARGE SUPPORT TEAM ([facility]) [phone]   PARENT CHILD HEALTH (Maternity/NICU/Pediatrics) [phone]   [facility] [phone]   [facility] [phone]   [facility] [phone]   [facility] [phone]   Critical access hospital [phone]   [facility] [phone]   [facility] [phone]   [facility]  [phone]   [facility] [phone]   [facility] [phone]   [facility] [phone]

## 2024-01-10 ENCOUNTER — TELEPHONE (OUTPATIENT)
Dept: NEPHROLOGY | Facility: CLINIC | Age: 79
End: 2024-01-10

## 2024-01-10 ENCOUNTER — OFFICE VISIT (OUTPATIENT)
Dept: FAMILY MEDICINE CLINIC | Facility: CLINIC | Age: 79
End: 2024-01-10
Payer: COMMERCIAL

## 2024-01-10 VITALS
TEMPERATURE: 98.7 F | DIASTOLIC BLOOD PRESSURE: 70 MMHG | HEART RATE: 105 BPM | WEIGHT: 237.4 LBS | SYSTOLIC BLOOD PRESSURE: 140 MMHG | BODY MASS INDEX: 37.18 KG/M2 | OXYGEN SATURATION: 96 %

## 2024-01-10 DIAGNOSIS — N18.32 ACUTE RENAL FAILURE SUPERIMPOSED ON STAGE 3B CHRONIC KIDNEY DISEASE, UNSPECIFIED ACUTE RENAL FAILURE TYPE (HCC): ICD-10-CM

## 2024-01-10 DIAGNOSIS — E66.01 MORBID OBESITY WITH BMI OF 40.0-44.9, ADULT (HCC): ICD-10-CM

## 2024-01-10 DIAGNOSIS — N18.31 STAGE 3A CHRONIC KIDNEY DISEASE (HCC): ICD-10-CM

## 2024-01-10 DIAGNOSIS — N17.9 ACUTE RENAL FAILURE SUPERIMPOSED ON STAGE 3B CHRONIC KIDNEY DISEASE, UNSPECIFIED ACUTE RENAL FAILURE TYPE (HCC): ICD-10-CM

## 2024-01-10 DIAGNOSIS — S22.31XD CLOSED FRACTURE OF ONE RIB OF RIGHT SIDE WITH ROUTINE HEALING, SUBSEQUENT ENCOUNTER: ICD-10-CM

## 2024-01-10 DIAGNOSIS — E11.65 TYPE 2 DIABETES MELLITUS WITH HYPERGLYCEMIA, UNSPECIFIED WHETHER LONG TERM INSULIN USE (HCC): Primary | ICD-10-CM

## 2024-01-10 PROCEDURE — 99496 TRANSJ CARE MGMT HIGH F2F 7D: CPT | Performed by: FAMILY MEDICINE

## 2024-01-10 NOTE — PATIENT INSTRUCTIONS
Increase daily insulin, Levemir from 32 units a day up to 40 units a day.  Increase the mealtime insulin Humalog from 10 units before each meal up to 15 units with each meal

## 2024-01-10 NOTE — PROGRESS NOTES
Name: Irineo Rawls      : 1945      MRN: [id number]  Encounter Provider: Obi Esquivel DO  Encounter Date: 1/10/2024   Encounter department: [facility]    Assessment & Plan     1. Type 2 diabetes mellitus with hyperglycemia, unspecified whether long term insulin use (HCC)  Assessment & Plan:    Lab Results   Component Value Date    HGBA1C 6.9 (H) 2023   Recent hospitalization for hyperglycemic crisis.  Now on basal/bolus insulin.  Will increase basal insulin from 35 to 40 units daily.  And increase mealtime insulin from 10 to 15 units daily.  Patient will be instructed in the office today for proper technique for injecting with insulin pens and checking fingerstick blood sugars.  Will check to see if patient is a candidate for CGM.    Orders:  -     Continuous Blood Gluc Sensor (FreeStyle Peter 2 Sensor) MISC; Check blood sugars multiple times per day  -     Continuous Blood Gluc  (FreeStyle Peter 2 [place]) MONISHA; Check blood sugars multiple times per day    2. Stage 3a chronic kidney disease ([facility])    3. Morbid obesity with BMI of 40.0-44.9, adult ([facility])    4. Acute renal failure superimposed on stage 3b chronic kidney disease, unspecified acute renal failure type ([facility])  Assessment & Plan:  Lab Results   Component Value Date    EGFR 49 2024    EGFR 45 2024    EGFR 41 2024    CREATININE 1.35 (H) 2024    CREATININE 1.44 (H) 2024    CREATININE 1.55 (H) 2024   Renal status improving.  Continue to monitor.      5. Closed fracture of one rib of right side with routine healing, subsequent encounter  Assessment & Plan:  Patient with healing rib fracture from fall which occurred prior to his hospitalization.  Still some pain but improving         Hospital records reviewed.  Patient instructed on insulin administration and fingerstick for blood sugars.  Reviewed all medications.  Patient has follow-up appointment with me in March and will  have full blood work done the beginning of March.  He is to call with any blood sugars consistently greater than 300 or below 100.    Subjective      TCM Call     Date and time call was made  1/9/2024  2:05 PM    Hospital care reviewed  Other diagnostic studies pending    Patient was hospitialized at  [facility]    Date of Admission  01/03/24    Date of discharge  01/08/24    Diagnosis  acute hyperkalemia    Disposition  Home; Home health services    Were the patients medications reviewed and updated  Yes  just hasn't   started insulin, pt reports he received no education at discharge    Current Symptoms  None      TCM Call     Post hospital issues  None    Should patient be enrolled in anticoag monitoring?  No    Scheduled for follow up?  Yes    Patients specialists  Nephrologist    Referrals needed  none    Did you obtain your prescribed medications  Yes    Why were you unable to obtain your medications  none prescribed    Do you need help managing your prescriptions or medications  No    Is transportation to your appointment needed  No    I have advised the patient to call PCP with any new or worsening symptoms    Lety Villa RN BSN    Living Arrangements  Spouse or Significiant other    Support System  Spouse    The type of support provided  Emotional; Financial; Physical    Do you have social support  Yes, as much as I need    Are you recieving any outpatient services  No    Are you recieving home care services  No    Are you using any community resources  No    Current waiver services  No    Have you fallen in the last 12 months  No     Patient was seen for follow-up from recent hospitalization.  This is a transition of care visit.  He was admitted for elevated blood sugar and HHNK with glucose greater than 800.  Since discharge from the hospital several days ago his blood sugars have been in the 200-400 range.  He is having some difficulty with insulin injections and fingersticks to check sugar.   Fortunately his son is a diabetic and instructed him on usage of his insulin pens.           Review of Systems   Constitutional:  Positive for fatigue.   Respiratory: Negative.     Cardiovascular: Negative.    Gastrointestinal: Negative.    Genitourinary: Negative.    Musculoskeletal: Negative.    Psychiatric/Behavioral: Negative.         Current Outpatient Medications on File Prior to Visit   Medication Sig   • albuterol (ProAir HFA) 90 mcg/act inhaler Inhale 2 puffs every 6 (six) hours as needed for wheezing   • Alcohol Swabs 70 % PADS May substitute brand based on insurance coverage. Check glucose ACHS.   • allopurinol (ZYLOPRIM) 300 mg tablet Take 1 tablet (300 mg total) by mouth daily   • aspirin 81 MG tablet Take 81 mg by mouth daily   • Blood Glucose Monitoring Suppl (OneTouch Verio Reflect) w/Device KIT May substitute brand based on insurance coverage. Check glucose ACHS.   • Cholecalciferol (VITAMIN D3) 5000 units TABS Take 1 tablet by mouth daily   • glucose blood (OneTouch Verio) test strip May substitute brand based on insurance coverage. Check glucose ACHS.   • insulin detemir (Levemir FlexPen) 100 Units/mL injection pen Inject 32 Units under the skin daily Do not start before January 9, 2024.   • insulin lispro (HumaLOG KwikPen) 100 units/mL injection pen Inject 10 Units under the skin 3 (three) times a day with meals   • Insulin Pen Needle (BD Pen Needle Shelby 2nd Gen) 32G X 4 MM MISC For use with insulin pen. Pharmacy may dispense brand covered by insurance.   • Insulin Pen Needle (BD Pen Needle Shelby 2nd Gen) 32G X 4 MM MISC For use with insulin pen. Pharmacy may dispense brand covered by insurance.   • NIFEdipine (PROCARDIA XL) 60 mg 24 hr tablet Take 1 tablet (60 mg total) by mouth daily   • Omega-3 Fatty Acids (FISH OIL) 1200 MG CAPS Take 1 capsule by mouth 3 (three) times a day   • OneTouch Delica Lancets 33G MISC May substitute brand based on insurance coverage. Check glucose ACHS.        Objective     /70 (BP Location: Left arm, Patient Position: Sitting, Cuff Size: Large)   Pulse 105   Temp 98.7 °F (37.1 °C)   Wt 108 kg (237 lb 6.4 oz)   SpO2 96%   BMI 37.18 kg/m²     Physical Exam  Vitals and nursing note reviewed.   Constitutional:       General: He is not in acute distress.     Appearance: Normal appearance. He is well-developed. He is not diaphoretic.   HENT:      Head: Normocephalic and atraumatic.   Eyes:      General:         Right eye: No discharge.      Conjunctiva/sclera: Conjunctivae normal.      Pupils: Pupils are equal, round, and reactive to light.   Neck:      Thyroid: No thyromegaly.   Cardiovascular:      Rate and Rhythm: Normal rate and regular rhythm.   Pulmonary:      Effort: Pulmonary effort is normal. No respiratory distress.      Breath sounds: Normal breath sounds.   Musculoskeletal:      Cervical back: Normal range of motion.   Lymphadenopathy:      Cervical: No cervical adenopathy.   Skin:     General: Skin is warm and dry.   Neurological:      Mental Status: He is alert and oriented to person, place, and time.   Psychiatric:         Mood and Affect: Mood normal.         Behavior: Behavior normal.         Thought Content: Thought content normal.         Judgment: Judgment normal.       Obi Esquivel, DO

## 2024-01-10 NOTE — ASSESSMENT & PLAN NOTE
Lab Results   Component Value Date    HGBA1C 6.9 (H) 09/06/2023   Recent hospitalization for hyperglycemic crisis.  Now on basal/bolus insulin.  Will increase basal insulin from 35 to 40 units daily.  And increase mealtime insulin from 10 to 15 units daily.  Patient will be instructed in the office today for proper technique for injecting with insulin pens and checking fingerstick blood sugars.  Will check to see if patient is a candidate for CGM.

## 2024-01-10 NOTE — ASSESSMENT & PLAN NOTE
Lab Results   Component Value Date    EGFR 49 01/08/2024    EGFR 45 01/07/2024    EGFR 41 01/06/2024    CREATININE 1.35 (H) 01/08/2024    CREATININE 1.44 (H) 01/07/2024    CREATININE 1.55 (H) 01/06/2024   Renal status improving.  Continue to monitor.

## 2024-01-10 NOTE — ASSESSMENT & PLAN NOTE
Patient with healing rib fracture from fall which occurred prior to his hospitalization.  Still some pain but improving

## 2024-01-11 NOTE — TELEPHONE ENCOUNTER
I scheduled the patient for a follow up on 3/19/24 in the [place] office. Patient is asking if he needs to have any labs prior to his appointment.

## 2024-01-11 NOTE — TELEPHONE ENCOUNTER
Called patient and let him know that Dr. Garcia would like blood work before the follow up appointment. Labs already in they system.

## 2024-01-15 ENCOUNTER — EVALUATION (OUTPATIENT)
Facility: REHABILITATION | Age: 79
End: 2024-01-15
Payer: COMMERCIAL

## 2024-01-15 DIAGNOSIS — R26.9 GAIT DISTURBANCE: ICD-10-CM
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
## 2024-01-15 DIAGNOSIS — R53.81 PHYSICAL DECONDITIONING: Primary | ICD-10-CM
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
## 2024-01-15 PROCEDURE — 97112 NEUROMUSCULAR REEDUCATION: CPT | Performed by: PHYSICAL THERAPIST

## 2024-01-15 PROCEDURE — 97162 PT EVAL MOD COMPLEX 30 MIN: CPT | Performed by: PHYSICAL THERAPIST

## 2024-01-15 NOTE — PROGRESS NOTES
PT Evaluation     Name: Irineo Rawls  Date: 01/15/24  : 1945  Referring Provider: Obi Esquivel DO  AUTHORIZATION:   Insurance: Payor: P[employer]  REP / Plan: [employer] [facility] / Product Type: Medicare PPO /   1 of 16 visits through 3/15/24, PN due 2/15/24        SUBJECTIVE:  HPI: Irineo Rawls is a 78 y.o. male referred to outpatient physical therapy for the following diagnosis   Encounter Diagnoses   Name Primary?    Physical deconditioning Yes    Gait disturbance     Falls, subsequent encounter     Fall, initial encounter          Patient Active Problem List   Diagnosis    Abnormal serum glucose level    Acute renal failure superimposed on stage 3b chronic kidney disease, unspecified acute renal failure type (HCC)    Essential hypertriglyceridemia    Gout    KALIA (obstructive sleep apnea)    Essential hypertension    Vitamin D deficiency    Knee pain    Localized, primary osteoarthritis    Osteoarthritis of knee    Hyperuricemia    Rectus diastasis    Obesity, morbid (HCC)    Calcification of liver    Acute hyperkalemia    Microcytic anemia    Hyperkalemia    Benign prostatic hyperplasia with incomplete bladder emptying    Thrombocytopenia (HCC)    Fall at home, initial encounter    Elevated lipase    Hyponatremia    Type 2 diabetes mellitus with hyperglycemia (HCC)    Rib fracture    Renal cyst    Stage 3a chronic kidney disease (HCC)       Past Medical History  Past Medical History:   Diagnosis Date    Acute renal failure superimposed on stage 3 chronic kidney disease (HCC) 2023    MATILDE (acute kidney injury) (HCC)     Chronic kidney disease     CPAP (continuous positive airway pressure) dependence     Gout     Hearing aid worn     sabrina    Hernia, inguinal     RIH and umbilical hernia    Hypertension     Metabolic acidosis     Morbidly obese (HCC)     Sleep apnea     Wears glasses        Past Surgical History  Past Surgical History:   Procedure Laterality Date    CARPAL TUNNEL  RELEASE Bilateral     COLONOSCOPY N/A 1/6/2017    Procedure: COLONOSCOPY;  Surgeon: João Perez MD;  Location: [facility];  Service:     CYST REMOVAL      FOOT SURGERY      HERNIA REPAIR      JOINT REPLACEMENT      knee left     MN LAPAROSCOPY SURG RPR INITIAL INGUINAL HERNIA Right 3/16/2017    Procedure: REPAIR HERNIA INGUINAL, LAPAROSCOPIC WITH MESH;  Surgeon: Ortiz Salcedo MD;  Location: [facility];  Service: General    MN RPR UMBILICAL HRNA 5 YRS/> REDUCIBLE N/A 3/16/2017    Procedure: OPEN REPAIR HERNIA UMBILICAL;  Surgeon: Ortiz Salcedo MD;  Location: [facility];  Service: General    TONSILLECTOMY       Patient reports was sick for a month and was very confused. He has various falls and had difficulty getting up. He was taken to the hospital and was treated for  due to hyperglycemic crisis in diabetes.   He reports has rib pain 4/10 at its worse. He started using his cane after he became sick.    Falls Hx: Various falls: Right rib fractures.  Home Environment: has a raised range house, has a basement recreation room and his garage. He short his car in the garage. Lives with wife.     Patient-Specific Functional Scale   Task is scored 0 (unable to perform activity) to 10 (ability to perform activity independently)  Activity 1/15/24    Negotiate steps 4    2.   Walk outdoors without the cane. 4         Past Medical History:   Diagnosis Date    Acute renal failure superimposed on stage 3 chronic kidney disease (HCC) 2/25/2023    MATILDE (acute kidney injury) ([facility])     Chronic kidney disease     CPAP (continuous positive airway pressure) dependence     Gout     Hearing aid worn     sabrina    Hernia, inguinal     RIH and umbilical hernia    Hypertension     Metabolic acidosis     Morbidly obese (HCC)     Sleep apnea     Wears glasses        Current Outpatient Medications:     albuterol (ProAir HFA) 90 mcg/act inhaler, Inhale 2 puffs every 6 (six) hours as needed for wheezing, Disp: 8.5 g, Rfl: 0    Alcohol Swabs 70 %  PADS, May substitute brand based on insurance coverage. Check glucose ACHS., Disp: 200 each, Rfl: 0    allopurinol (ZYLOPRIM) 300 mg tablet, Take 1 tablet (300 mg total) by mouth daily, Disp: 90 tablet, Rfl: 2    aspirin 81 MG tablet, Take 81 mg by mouth daily, Disp: , Rfl:     Blood Glucose Monitoring Suppl (OneTouch Verio Reflect) w/Device KIT, May substitute brand based on insurance coverage. Check glucose ACHS., Disp: 1 kit, Rfl: 0    Cholecalciferol (VITAMIN D3) 5000 units TABS, Take 1 tablet by mouth daily, Disp: , Rfl:     Continuous Blood Gluc  (FreeStyle Peter 2 [place]) MONISHA, Check blood sugars multiple times per day, Disp: 1 each, Rfl: 0    Continuous Blood Gluc Sensor (FreeStyle Peter 2 Sensor) MISC, Check blood sugars multiple times per day, Disp: 6 each, Rfl: 3    glucose blood (OneTouch Verio) test strip, May substitute brand based on insurance coverage. Check glucose ACHS., Disp: 200 each, Rfl: 0    insulin detemir (Levemir FlexPen) 100 Units/mL injection pen, Inject 32 Units under the skin daily Do not start before January 9, 2024., Disp: 10 mL, Rfl: 0    insulin lispro (HumaLOG KwikPen) 100 units/mL injection pen, Inject 10 Units under the skin 3 (three) times a day with meals, Disp: 9 mL, Rfl: 0    Insulin Pen Needle (BD Pen Needle Shelby 2nd Gen) 32G X 4 MM MISC, For use with insulin pen. Pharmacy may dispense brand covered by insurance., Disp: 100 each, Rfl: 0    Insulin Pen Needle (BD Pen Needle Shelby 2nd Gen) 32G X 4 MM MISC, For use with insulin pen. Pharmacy may dispense brand covered by insurance., Disp: 100 each, Rfl: 0    NIFEdipine (PROCARDIA XL) 60 mg 24 hr tablet, Take 1 tablet (60 mg total) by mouth daily, Disp: 90 tablet, Rfl: 0    Omega-3 Fatty Acids (FISH OIL) 1200 MG CAPS, Take 1 capsule by mouth 3 (three) times a day, Disp: , Rfl:     OneTouch Delica Lancets 33G MISC, May substitute brand based on insurance coverage. Check glucose ACHS., Disp: 200 each, Rfl:  0  1/15/2024      Objective      Core Movement Tasks Description of task    Sitting Normal   Sitting to Standing Poor control in descent and Other: slowly with rocking forwards   Standing Other: increased base of support, decreased knee extenson   Walking Decreased foot clearance, Lacks heel strike, Lacks knee extension during stance phase gait, Decreased hip extension, and Decreased arm swing   Step-up Requires use of railing(s) and Abnormal foot placement   Reach, grasp, manipulate Decreased reaching overhead     AROM Shoulder Left Right   Flexion 140 90 old fall              Mobility Measures 1/15/2024   Assistive device used? None   Walking speed .86 meters/second   6 Minute Walk Test (100 foot circular course) 550 feet    HR 98 Spo2 96%   5 times sit to stand 16 seconds   3 meter timed up and go 12 seconds   Functional Gait Assessment or Lynch Balance Scale 16/30   Patient-Reported Outcome Measure:     Patient Specific Functional Scale (PSFS)    ABC       8/20      86.25            Functional Gait Assessment  2 Gait level surface  2 Change in gait speed  2 Gait with horizontal head turns  2 Gait with vertical head turns  2 Gait and pivot turn  2 Step over obstacle  0 Gait with narrow base of support  1 Gait with eyes closed  1 Ambulating backwards  2 Steps  16/30 Total score      Assessment/Plan    Assessment:  Patient is a very pleasant male who was referred to Out patient physical therapy s/p physical deterioration and hospital stay to address hyperglycemic crisis in diabetes. Upon assessment he presents with decreased strength, dynamic balance, endurance, and gait deviations including those listed in observation section. These impairments are limiting the patient's ability to participate in community activities and assist with house shores. His wife is afraid of continued falls and  insists on his use of a cane at this time. Patient will benefit from skilled outpatient physical therapy to address the above  impairments in order to improve patient independence and safety in home and the community.     STG's:     - Patient improves 5 times sit to stand by 4 seconds for improved muscle strength within4 weeks    - Patient is independent with initial home exercise program for improvements at home within 2 weeks    - Patient ambulates for 8 consecutive min with appropriate vital sign response for improved endurance within 4 weeks  LTG's:6-8 weeks   - Patient improves distance ambulated on 6MWT by 30% within 6 weeks for improved endurance   - Patient decreased time to complete TUG by 3 seconds for decreased risk of falls   - Patient improves gait speed to within 10% of age matched norms within 8 weeks   - Patient improves score on FGA by 5 points for improved dynamic balance   - Patient will show improvement in PSFS by 8 points for improved quality of life    Plan:  Patient will benefit from skilled outpatient physical therapy 2 x/wk for  8 wk. Therapeutic exercises, Neuromuscular re-education, manual therapy and therapeutic activities.

## 2024-01-16 ENCOUNTER — TELEPHONE (OUTPATIENT)
Age: 79
End: 2024-01-16

## 2024-01-16 NOTE — TELEPHONE ENCOUNTER
Patient calling stating his flex pen is not working. States he turned the dial to the appropriate units selected but nothing is coming out. I instructed him to contact the pharmacy for guidance.

## 2024-01-17 ENCOUNTER — APPOINTMENT (OUTPATIENT)
Facility: REHABILITATION | Age: 79
End: 2024-01-17
Payer: COMMERCIAL

## 2024-01-17 ENCOUNTER — TELEPHONE (OUTPATIENT)
Age: 79
End: 2024-01-17

## 2024-01-17 DIAGNOSIS — E11.65 TYPE 2 DIABETES MELLITUS WITH HYPERGLYCEMIA, UNSPECIFIED WHETHER LONG TERM INSULIN USE (HCC): Primary | ICD-10-CM

## 2024-01-17 RX ORDER — INSULIN ASPART 100 [IU]/ML
10 INJECTION, SOLUTION INTRAVENOUS; SUBCUTANEOUS
Qty: 30 ML | Refills: 1 | Status: SHIPPED | OUTPATIENT
Start: 2024-01-17 | End: 2024-01-19 | Stop reason: SDUPTHER

## 2024-01-17 NOTE — TELEPHONE ENCOUNTER
Patient states his Humalog is not covered anymore. He is asking for a script for Novolog which will be covered for him. This can be sent to [facility]

## 2024-01-19 DIAGNOSIS — E11.65 TYPE 2 DIABETES MELLITUS WITH HYPERGLYCEMIA, UNSPECIFIED WHETHER LONG TERM INSULIN USE (HCC): ICD-10-CM

## 2024-01-19 DIAGNOSIS — E11.9 TYPE 2 DIABETES MELLITUS (HCC): ICD-10-CM

## 2024-01-19 RX ORDER — INSULIN DETEMIR 100 [IU]/ML
40 INJECTION, SOLUTION SUBCUTANEOUS DAILY
Qty: 10 ML | Refills: 0 | Status: SHIPPED | OUTPATIENT
Start: 2024-01-19 | End: 2024-01-22 | Stop reason: SDUPTHER

## 2024-01-19 RX ORDER — PEN NEEDLE, DIABETIC 32GX 5/32"
NEEDLE, DISPOSABLE MISCELLANEOUS
Qty: 200 EACH | Refills: 3 | Status: SHIPPED | OUTPATIENT
Start: 2024-01-19 | End: 2024-01-22 | Stop reason: SDUPTHER

## 2024-01-19 RX ORDER — BLOOD SUGAR DIAGNOSTIC
STRIP MISCELLANEOUS
Qty: 200 EACH | Refills: 3 | Status: SHIPPED | OUTPATIENT
Start: 2024-01-19 | End: 2024-01-22 | Stop reason: SDUPTHER

## 2024-01-19 RX ORDER — PEN NEEDLE, DIABETIC 32GX 5/32"
NEEDLE, DISPOSABLE MISCELLANEOUS
Qty: 100 EACH | Refills: 0 | OUTPATIENT
Start: 2024-01-19

## 2024-01-19 RX ORDER — LANCETS 33 GAUGE
EACH MISCELLANEOUS
Qty: 200 EACH | Refills: 3 | Status: SHIPPED | OUTPATIENT
Start: 2024-01-19 | End: 2024-01-22 | Stop reason: SDUPTHER

## 2024-01-19 RX ORDER — INSULIN ASPART 100 [IU]/ML
10 INJECTION, SOLUTION INTRAVENOUS; SUBCUTANEOUS
Qty: 30 ML | Refills: 1 | Status: SHIPPED | OUTPATIENT
Start: 2024-01-19

## 2024-01-19 NOTE — TELEPHONE ENCOUNTER
Reason for call:   [x] Refill   [] Prior Auth  [] Other:     Office:   [x] PCP/Provider -   [] Specialty/Provider -     Medication: One touch verio test strip    Dose/Frequency: Check glucose    Quantity: #200    Pharmacy: [facility]    Does the patient have enough for 3 days?   [] Yes   [] No - Send as HP to POD            Reason for call:   [x] Refill   [] Prior Auth  [] Other:     Office:   [x] PCP/Provider -   [] Specialty/Provider -     Medication:Novolog Flex Pen    Dose/Frequency: 100 unit    Quantity: 30 ml    Pharmacy: [facility]    Does the patient have enough for 3 days?   [] Yes   [] No - Send as HP to POD            Reason for call:   [x] Refill   [] Prior Auth  [] Other:     Office:   [x] PCP/Provider -   [] Specialty/Provider -     Medication: Levemir Flexpen    Dose/Frequency: 100 unit    Quantity: 10 ml    Pharmacy: [facility]    Does the patient have enough for 3 days?   [] Yes   [] No - Send as HP to POD            Reason for call:   [x] Refill   [] Prior Auth  [] Other:     Office:   [x] PCP/Provider -   [] Specialty/Provider -     Medication: BD pen needle robbie     Dose/Frequency: 32g x 4mm    Quantity: #100    Pharmacy: [facility]    Does the patient have enough for 3 days?   [] Yes   [x] No - Send as HP to POD            Reason for call:   [x] Refill   [] Prior Auth  [] Other:     Office:   [x] PCP/Provider -   [] Specialty/Provider -     Medication: BD Pen Needle Robbie    Dose/Frequency: 32g x 4 mm     Quantity: #100    Pharmacy: [facility]    Does the patient have enough for 3 days?   [] Yes   [x] No - Send as HP to POD            Reason for call:   [x] Refill   [] Prior Auth  [] Other:     Office:   [x] PCP/Provider -   [] Specialty/Provider -     Medication: One touch Delica Lancets    Dose/Frequency: 33g     Quantity: 200    Pharmacy: [facility]    Does the patient have enough for 3 days?   [] Yes   [x] No - Send as HP to POD

## 2024-01-19 NOTE — TELEPHONE ENCOUNTER
Per f/u note, provider increased Levemir to 40 units. Update the sig and resent all scripts under PCP

## 2024-01-22 ENCOUNTER — APPOINTMENT (OUTPATIENT)
Facility: REHABILITATION | Age: 79
End: 2024-01-22
Payer: COMMERCIAL

## 2024-01-22 ENCOUNTER — TELEPHONE (OUTPATIENT)
Age: 79
End: 2024-01-22

## 2024-01-22 DIAGNOSIS — E11.9 TYPE 2 DIABETES MELLITUS (HCC): ICD-10-CM

## 2024-01-22 RX ORDER — BLOOD SUGAR DIAGNOSTIC
STRIP MISCELLANEOUS
Qty: 200 EACH | Refills: 3 | Status: SHIPPED | OUTPATIENT
Start: 2024-01-22 | End: 2024-01-23 | Stop reason: SDUPTHER

## 2024-01-22 RX ORDER — LANCETS 33 GAUGE
EACH MISCELLANEOUS
Qty: 200 EACH | Refills: 3 | Status: SHIPPED | OUTPATIENT
Start: 2024-01-22 | End: 2024-01-23 | Stop reason: SDUPTHER

## 2024-01-22 RX ORDER — PEN NEEDLE, DIABETIC 32GX 5/32"
NEEDLE, DISPOSABLE MISCELLANEOUS
Qty: 200 EACH | Refills: 3 | Status: SHIPPED | OUTPATIENT
Start: 2024-01-22 | End: 2024-01-23 | Stop reason: SDUPTHER

## 2024-01-22 RX ORDER — INSULIN DETEMIR 100 [IU]/ML
40 INJECTION, SOLUTION SUBCUTANEOUS DAILY
Qty: 10 ML | Refills: 0 | Status: SHIPPED | OUTPATIENT
Start: 2024-01-22 | End: 2024-01-23 | Stop reason: SDUPTHER

## 2024-01-22 NOTE — TELEPHONE ENCOUNTER
[facility] called. Needs new scripts send over for 90 days supply.    OneTouch verio test strip #400  OneTouch Delica Lancets 33g #400  BD pen Needle Shelby 32 g x 4 mm #400  Levemir Flexpen 45 ml

## 2024-01-23 DIAGNOSIS — E11.9 TYPE 2 DIABETES MELLITUS (HCC): ICD-10-CM

## 2024-01-23 RX ORDER — INSULIN DETEMIR 100 [IU]/ML
40 INJECTION, SOLUTION SUBCUTANEOUS DAILY
Qty: 45 ML | Refills: 1 | Status: SHIPPED | OUTPATIENT
Start: 2024-01-23

## 2024-01-23 RX ORDER — BLOOD SUGAR DIAGNOSTIC
STRIP MISCELLANEOUS
Qty: 400 EACH | Refills: 1 | Status: SHIPPED | OUTPATIENT
Start: 2024-01-23

## 2024-01-23 RX ORDER — LANCETS 33 GAUGE
EACH MISCELLANEOUS
Qty: 400 EACH | Refills: 1 | Status: SHIPPED | OUTPATIENT
Start: 2024-01-23

## 2024-01-23 RX ORDER — PEN NEEDLE, DIABETIC 32GX 5/32"
NEEDLE, DISPOSABLE MISCELLANEOUS
Qty: 400 EACH | Refills: 1 | Status: SHIPPED | OUTPATIENT
Start: 2024-01-23

## 2024-01-23 NOTE — TELEPHONE ENCOUNTER
Reason for call:   [facility] calling, need scripts corrected to 90 day supply and resent to mail order    [x] Refill   [] Prior Auth  [] Other:     Office:   [x] PCP/Provider - Dr Palumbo  [] Specialty/Provider -     Medication: 90 days supply needed  OneTouch verio test strip #400  OneTouch Delica Lancets 33g #400  BD pen Needle Shelby 32 g x 4 mm #400  Levemir Flexpen 45 ml      Pharmacy: [facility]    Does the patient have enough for 3 days?   [x] Yes   [] No - Send as HP to POD

## 2024-01-25 ENCOUNTER — APPOINTMENT (OUTPATIENT)
Facility: REHABILITATION | Age: 79
End: 2024-01-25
Payer: COMMERCIAL

## 2024-01-30 ENCOUNTER — APPOINTMENT (OUTPATIENT)
Facility: REHABILITATION | Age: 79
End: 2024-01-30
Payer: COMMERCIAL

## 2024-02-20 NOTE — TELEPHONE ENCOUNTER
Left voice mail to schedule appointment  This is the 2nd attempt  The patient was educated about the following:\\nIt is normal to have skin reactions during radiation therapy treatment. \\nPatients may develop redness at the treatment site similar to a sunburn. \\nTopical creams may be prescribed that will help reduce side effects and limit irritation. Detail Level: Zone Over 60 plan:\\nWe discussed several treatment regimens ranging from 4-6 weeks with the patient. The patient understands that any treatment regimen has possible side effects. The optimal cosmetic and clinical results tend to be achieved using a slower, more protracted regimen that uses lower daily radiation doses. A faster regimen can be used and may be equally effective in eradication the cancer, however does carry a higher risk of side effects. We extensively discussed EBT vs Mohs surgery. After discussion the patient would like to proceed with a protracted treatment regimen. We will schedule initial simulations today. At this time all questions appear to be answered to the patientâs satisfaction. Thank you for allowing the radiation team to participate in the care of this patient. Statement Selected The patient was educated about the following:\\nIt is normal to have skin reactions during radiation therapy treatment. \\nPatients may develop redness at the treatment site similar to a sunburn.\\nTopical creams may be prescribed that will help reduce side effects and limit irritation. Over 60 plan:\\nWe discussed several treatment regimens ranging from 4-6 weeks with the patient. The patient understands that any treatment regimen has possible side effects.  The optimal cosmetic and clinical results tend to be achieved using a slower, more protracted regimen that uses lower daily radiation doses.  A faster regimen can be used and may be equally effective in eradication the cancer, however does carry a higher risk of side effects. We extensively discussed EBT vs Mohs surgery. After discussion the patient would like to proceed with a protracted treatment regimen. We will schedule initial simulations today.  At this time all questions appear to be answered to the patient’s satisfaction. Thank you for allowing the radiation team to participate in the care of this patient.

## 2024-03-08 ENCOUNTER — RA CDI HCC (OUTPATIENT)
Dept: OTHER | Facility: HOSPITAL | Age: 79
End: 2024-03-08

## 2024-03-12 ENCOUNTER — APPOINTMENT (OUTPATIENT)
Dept: LAB | Facility: CLINIC | Age: 79
End: 2024-03-12
Payer: COMMERCIAL

## 2024-03-12 ENCOUNTER — RA CDI HCC (OUTPATIENT)
Dept: OTHER | Facility: HOSPITAL | Age: 79
End: 2024-03-12

## 2024-03-12 DIAGNOSIS — N18.9 CHRONIC KIDNEY DISEASE, UNSPECIFIED CKD STAGE: ICD-10-CM

## 2024-03-12 DIAGNOSIS — E79.0 HYPERURICEMIA: ICD-10-CM

## 2024-03-12 DIAGNOSIS — E78.1 ESSENTIAL HYPERTRIGLYCERIDEMIA: ICD-10-CM

## 2024-03-12 DIAGNOSIS — R73.09 ABNORMAL SERUM GLUCOSE LEVEL: ICD-10-CM

## 2024-03-12 PROBLEM — I12.9 HYPERTENSIVE KIDNEY DISEASE WITH CHRONIC KIDNEY DISEASE: Status: ACTIVE | Noted: 2024-03-12

## 2024-03-12 LAB
ALBUMIN SERPL BCP-MCNC: 3.9 G/DL (ref 3.5–5)
ALP SERPL-CCNC: 89 U/L (ref 34–104)
ALT SERPL W P-5'-P-CCNC: 35 U/L (ref 7–52)
ANION GAP SERPL CALCULATED.3IONS-SCNC: 11 MMOL/L (ref 4–13)
AST SERPL W P-5'-P-CCNC: 38 U/L (ref 13–39)
BILIRUB SERPL-MCNC: 0.56 MG/DL (ref 0.2–1)
BUN SERPL-MCNC: 21 MG/DL (ref 5–25)
CALCIUM SERPL-MCNC: 9.9 MG/DL (ref 8.4–10.2)
CHLORIDE SERPL-SCNC: 103 MMOL/L (ref 96–108)
CHOLEST SERPL-MCNC: 164 MG/DL
CO2 SERPL-SCNC: 25 MMOL/L (ref 21–32)
CREAT SERPL-MCNC: 1.44 MG/DL (ref 0.6–1.3)
EST. AVERAGE GLUCOSE BLD GHB EST-MCNC: 120 MG/DL
GFR SERPL CREATININE-BSD FRML MDRD: 45 ML/MIN/1.73SQ M
GLUCOSE P FAST SERPL-MCNC: 95 MG/DL (ref 65–99)
HBA1C MFR BLD: 5.8 %
HDLC SERPL-MCNC: 38 MG/DL
LDLC SERPL CALC-MCNC: 91 MG/DL (ref 0–100)
POTASSIUM SERPL-SCNC: 4.4 MMOL/L (ref 3.5–5.3)
PROT SERPL-MCNC: 7.6 G/DL (ref 6.4–8.4)
SODIUM SERPL-SCNC: 139 MMOL/L (ref 135–147)
TRIGL SERPL-MCNC: 177 MG/DL
TSH SERPL DL<=0.05 MIU/L-ACNC: 4.18 UIU/ML (ref 0.45–4.5)
URATE SERPL-MCNC: 5.9 MG/DL (ref 3.5–8.5)

## 2024-03-12 PROCEDURE — 84443 ASSAY THYROID STIM HORMONE: CPT

## 2024-03-12 PROCEDURE — 36415 COLL VENOUS BLD VENIPUNCTURE: CPT

## 2024-03-12 PROCEDURE — 83036 HEMOGLOBIN GLYCOSYLATED A1C: CPT

## 2024-03-12 PROCEDURE — 80061 LIPID PANEL: CPT

## 2024-03-12 PROCEDURE — 80053 COMPREHEN METABOLIC PANEL: CPT

## 2024-03-12 PROCEDURE — 84550 ASSAY OF BLOOD/URIC ACID: CPT

## 2024-03-13 NOTE — PROGRESS NOTES
HCC coding opportunities     I12.9     Chart Reviewed number of suggestions sent to Provider: 1   GR    Patients Insurance     Medicare Insurance: [facility] [employer]age

## 2024-03-15 DIAGNOSIS — I10 ESSENTIAL HYPERTENSION: ICD-10-CM

## 2024-03-15 RX ORDER — NIFEDIPINE 60 MG/1
60 TABLET, EXTENDED RELEASE ORAL DAILY
Qty: 90 TABLET | Refills: 1 | Status: SHIPPED | OUTPATIENT
Start: 2024-03-15

## 2024-03-18 ENCOUNTER — OFFICE VISIT (OUTPATIENT)
Dept: FAMILY MEDICINE CLINIC | Facility: CLINIC | Age: 79
End: 2024-03-18
Payer: COMMERCIAL

## 2024-03-18 VITALS
SYSTOLIC BLOOD PRESSURE: 150 MMHG | HEIGHT: 68 IN | DIASTOLIC BLOOD PRESSURE: 70 MMHG | TEMPERATURE: 98.9 F | BODY MASS INDEX: 38.77 KG/M2 | WEIGHT: 255.8 LBS | OXYGEN SATURATION: 97 % | HEART RATE: 105 BPM

## 2024-03-18 DIAGNOSIS — E11.65 TYPE 2 DIABETES MELLITUS WITH HYPERGLYCEMIA, WITH LONG-TERM CURRENT USE OF INSULIN (HCC): Primary | ICD-10-CM

## 2024-03-18 DIAGNOSIS — N18.31 STAGE 3A CHRONIC KIDNEY DISEASE (HCC): ICD-10-CM

## 2024-03-18 DIAGNOSIS — E66.01 OBESITY, MORBID (HCC): ICD-10-CM

## 2024-03-18 DIAGNOSIS — E79.0 HYPERURICEMIA: ICD-10-CM

## 2024-03-18 DIAGNOSIS — Z79.4 TYPE 2 DIABETES MELLITUS WITH HYPERGLYCEMIA, WITH LONG-TERM CURRENT USE OF INSULIN (HCC): Primary | ICD-10-CM

## 2024-03-18 DIAGNOSIS — D69.6 THROMBOCYTOPENIA (HCC): ICD-10-CM

## 2024-03-18 DIAGNOSIS — E78.1 ESSENTIAL HYPERTRIGLYCERIDEMIA: ICD-10-CM

## 2024-03-18 DIAGNOSIS — G47.33 OSA (OBSTRUCTIVE SLEEP APNEA): ICD-10-CM

## 2024-03-18 DIAGNOSIS — D50.9 MICROCYTIC ANEMIA: ICD-10-CM

## 2024-03-18 DIAGNOSIS — I10 ESSENTIAL HYPERTENSION: ICD-10-CM

## 2024-03-18 PROBLEM — S22.39XA RIB FRACTURE: Status: RESOLVED | Noted: 2024-01-05 | Resolved: 2024-03-18

## 2024-03-18 LAB
CREAT UR-MCNC: 57.7 MG/DL
MICROALBUMIN UR-MCNC: 92.2 MG/L
MICROALBUMIN/CREAT 24H UR: 160 MG/G CREATININE (ref 0–30)

## 2024-03-18 PROCEDURE — 99214 OFFICE O/P EST MOD 30 MIN: CPT | Performed by: FAMILY MEDICINE

## 2024-03-18 PROCEDURE — G2211 COMPLEX E/M VISIT ADD ON: HCPCS | Performed by: FAMILY MEDICINE

## 2024-03-18 PROCEDURE — 82043 UR ALBUMIN QUANTITATIVE: CPT | Performed by: FAMILY MEDICINE

## 2024-03-18 PROCEDURE — 82570 ASSAY OF URINE CREATININE: CPT | Performed by: FAMILY MEDICINE

## 2024-03-18 NOTE — ASSESSMENT & PLAN NOTE
Lab Results   Component Value Date    HGBA1C 5.8 (H) 03/12/2024   Hemoglobin A1c improved at 5.8.  Continue on basal/bolus insulin regimen.  Advised patient on strategies to avoid hypoglycemia.

## 2024-03-18 NOTE — ASSESSMENT & PLAN NOTE
Lipids stable with LDL cholesterol at goal, less than 100.  Currently managing without medication.

## 2024-03-18 NOTE — PROGRESS NOTES
Name: Irineo Rawls      : 1945      MRN: [id number]  Encounter Provider: Obi Esquivel DO  Encounter Date: 3/18/2024   Encounter department: [facility]    Assessment & Plan     1. Type 2 diabetes mellitus with hyperglycemia, with long-term current use of insulin ([facility])  Assessment & Plan:    Lab Results   Component Value Date    HGBA1C 5.8 (H) 2024   Hemoglobin A1c improved at 5.8.  Continue on basal/bolus insulin regimen.  Advised patient on strategies to avoid hypoglycemia.    Orders:  -     Albumin / creatinine urine ratio  -     Hemoglobin A1C; Future; Expected date: 2024    2. Essential hypertriglyceridemia  Assessment & Plan:  Lipids stable with LDL cholesterol at goal, less than 100.  Currently managing without medication.      3. KALIA (obstructive sleep apnea)    4. Essential hypertension  Assessment & Plan:  Blood pressure well-controlled on nifedipine XL 60 mg daily    Orders:  -     Comprehensive metabolic panel; Future; Expected date: 2024  -     TSH, 3rd generation with Free T4 reflex; Future; Expected date: 2024    5. Hyperuricemia  Assessment & Plan:  Allopurinol at 300 mg.  Recent uric acid levels acceptable.      6. Obesity, morbid ([facility])    7. Microcytic anemia    8. Thrombocytopenia ([facility])  -     CBC and differential; Future; Expected date: 2024    9. Stage 3a chronic kidney disease (HCC)           Subjective      Patient was seen for follow-up of chronic medical problems including type 2 diabetes, hyperlipidemia, hypertension, hyperuricemia.  He has no concerns.  He does note that he has had 2 episodes of blood sugars below 70.      Review of Systems   Constitutional: Negative.    Respiratory: Negative.     Cardiovascular: Negative.    Gastrointestinal: Negative.    Genitourinary: Negative.    Musculoskeletal: Negative.    Psychiatric/Behavioral: Negative.         Current Outpatient Medications on File Prior to Visit   Medication  "Sig   • albuterol (ProAir HFA) 90 mcg/act inhaler Inhale 2 puffs every 6 (six) hours as needed for wheezing   • Alcohol Swabs 70 % PADS May substitute brand based on insurance coverage. Check glucose ACHS.   • allopurinol (ZYLOPRIM) 300 mg tablet Take 1 tablet (300 mg total) by mouth daily   • aspirin 81 MG tablet Take 81 mg by mouth daily   • Blood Glucose Monitoring Suppl (OneTouch Verio Reflect) w/Device KIT May substitute brand based on insurance coverage. Check glucose ACHS.   • Cholecalciferol (VITAMIN D3) 5000 units TABS Take 1 tablet by mouth daily   • Continuous Blood Gluc  (FreeStyle Peter 2 [place]) MONISHA Check blood sugars multiple times per day   • Continuous Blood Gluc Sensor (FreeStyle Peter 2 Sensor) MISC Check blood sugars multiple times per day   • glucose blood (OneTouch Verio) test strip May substitute brand based on insurance coverage. Check glucose ACHS.   • insulin aspart (NovoLOG FlexPen) 100 UNIT/ML injection pen Inject 10 Units under the skin 3 (three) times a day with meals   • insulin detemir (Levemir FlexPen) 100 Units/mL injection pen Inject 40 Units under the skin daily   • Insulin Pen Needle (BD Pen Needle [place] 2nd Gen) 32G X 4 MM MISC For use with insulin pen 4xdaily. Pharmacy may dispense brand covered by insurance.   • NIFEdipine (PROCARDIA XL) 60 mg 24 hr tablet Take 1 tablet (60 mg total) by mouth daily   • Omega-3 Fatty Acids (FISH OIL) 1200 MG CAPS Take 1 capsule by mouth 3 (three) times a day   • OneTouch Delica Lancets 33G MISC May substitute brand based on insurance coverage. Check glucose ACHS.       Objective     /70 (BP Location: Left arm, Patient Position: Sitting, Cuff Size: Large)   Pulse 105   Temp 98.9 °F (37.2 °C)   Ht 5' 7.5\" (1.715 m)   Wt 116 kg (255 lb 12.8 oz)   SpO2 97%   BMI 39.47 kg/m²     Physical Exam  Vitals and nursing note reviewed.   Constitutional:       General: He is not in acute distress.     Appearance: Normal appearance. He is " well-developed. He is not diaphoretic.   HENT:      Head: Normocephalic and atraumatic.   Eyes:      General:         Right eye: No discharge.      Conjunctiva/sclera: Conjunctivae normal.      Pupils: Pupils are equal, round, and reactive to light.   Neck:      Thyroid: No thyromegaly.   Cardiovascular:      Rate and Rhythm: Normal rate and regular rhythm.   Pulmonary:      Effort: Pulmonary effort is normal. No respiratory distress.      Breath sounds: Normal breath sounds.   Musculoskeletal:      Cervical back: Normal range of motion.   Lymphadenopathy:      Cervical: No cervical adenopathy.   Skin:     General: Skin is warm and dry.   Neurological:      Mental Status: He is alert and oriented to person, place, and time.   Psychiatric:         Mood and Affect: Mood normal.         Behavior: Behavior normal.         Thought Content: Thought content normal.         Judgment: Judgment normal.       Obi Esquivel, DO

## 2024-03-18 NOTE — PROGRESS NOTES
Diabetic Foot Exam    Patient's shoes and socks removed.    Right Foot/Ankle   Right Foot Inspection  Skin Exam: skin normal, skin intact and dry skin. No warmth, no callus, no erythema, no maceration, no abnormal color, no pre-ulcer, no ulcer and no callus.     Toe Exam: ROM and strength within normal limits.     Sensory   Vibration: intact  Proprioception: intact  Monofilament testing: intact    Vascular  Capillary refills: < 3 seconds  The right DP pulse is 2+. The right PT pulse is 2+.     Left Foot/Ankle  Left Foot Inspection  Skin Exam: skin normal, skin intact and dry skin. No warmth, no erythema, no maceration, normal color, no pre-ulcer, no ulcer and no callus.     Toe Exam: ROM and strength within normal limits.     Sensory   Vibration: intact  Proprioception: intact  Monofilament testing: intact    Vascular  Capillary refills: < 3 seconds  The left DP pulse is 2+. The left PT pulse is 2+.     Assign Risk Category  No deformity present  No loss of protective sensation  No weak pulses  Risk: 0

## 2024-03-19 ENCOUNTER — OFFICE VISIT (OUTPATIENT)
Dept: NEPHROLOGY | Facility: CLINIC | Age: 79
End: 2024-03-19
Payer: COMMERCIAL

## 2024-03-19 VITALS
BODY MASS INDEX: 38.8 KG/M2 | DIASTOLIC BLOOD PRESSURE: 62 MMHG | WEIGHT: 256 LBS | HEART RATE: 88 BPM | HEIGHT: 68 IN | SYSTOLIC BLOOD PRESSURE: 140 MMHG

## 2024-03-19 DIAGNOSIS — R80.9 MICROALBUMINURIA: ICD-10-CM

## 2024-03-19 DIAGNOSIS — N18.9 CHRONIC KIDNEY DISEASE, UNSPECIFIED CKD STAGE: Primary | ICD-10-CM

## 2024-03-19 PROBLEM — N18.31 STAGE 3A CHRONIC KIDNEY DISEASE (HCC): Status: RESOLVED | Noted: 2024-01-10 | Resolved: 2024-03-19

## 2024-03-19 PROCEDURE — 99214 OFFICE O/P EST MOD 30 MIN: CPT | Performed by: INTERNAL MEDICINE

## 2024-03-19 NOTE — PROGRESS NOTES
NEPHROLOGY PROGRESS NOTE    Irineo Rawls 78 y.o. male MRN: [id number]  Unit/Bed#:  Encounter: [id number]  Reason for Consult: Chronic renal insufficiency    The patient is here for routine follow-up he looks well feels well but he did tell me that he was admitted to the hospital in January with severely elevated blood sugar and was admitted treated for his diabetes and now is on insulin with good sugar control.  At the time he was very dehydrated confused weak and now has recovered back to himself.    ASSESSMENT/PLAN:  1.  Renal    The patient has chronic renal insufficiency with very low levels of proteinuria.  He was in the past of borderline diabetic but now has diabetes but this has nothing to do with his renal insufficiency as it is a recent diagnosis.  The other good thing is with very low levels of protein that goes against having a serious intrinsic process.  I think he has underlying glomerulosclerosis creatinine is declined back to 1.4 electrolytes are normal he has excellent glycemic control on insulin.    Blood pressure is acceptable as well today.    Continue current medications  Blood pressure and lipid control he did lose weight so I encouraged him to not gain weight to reduce hyperfiltration injury  BMP and urine protein estimation in 6 months with follow-up    Glycemic control to avoid diabetic complications in the future.    I told him to call me if there is any problems or concerns before next visit.    I have spent a total time of 30 minutes on 03/19/24 in caring for this patient including Diagnostic results, Prognosis, Impressions, Reviewing / ordering tests, medicine, procedures  , and Obtaining or reviewing history  .     SUBJECTIVE:  Review of Systems   Constitutional: Negative for chills, diaphoresis and fever.   HENT: Negative.     Eyes: Negative.    Cardiovascular:  Negative for chest pain, dyspnea on exertion, leg swelling and orthopnea.   Respiratory: Negative.  Negative for cough,  shortness of breath, sputum production and wheezing.    Gastrointestinal:  Negative for abdominal pain, diarrhea, nausea and vomiting.   Genitourinary: Negative.    Neurological:  Negative for dizziness, focal weakness, headaches and weakness.   Psychiatric/Behavioral:  Negative for altered mental status, hallucinations and hypervigilance.        OBJECTIVE:  Current Weight:    Vitals:@TMAX(24)@Current:     There were no vitals taken for this visit. , There is no height or weight on file to calculate BMI.    [unfilled]    Physical Exam: There were no vitals taken for this visit.  Physical Exam  Constitutional:       General: He is not in acute distress.     Appearance: He is not ill-appearing or toxic-appearing.   HENT:      Head: Normocephalic and atraumatic.      Nose: Nose normal.      Mouth/Throat:      Mouth: Mucous membranes are moist.   Eyes:      General: No scleral icterus.     Extraocular Movements: Extraocular movements intact.   Cardiovascular:      Rate and Rhythm: Normal rate and regular rhythm.      Heart sounds:      No friction rub. No gallop.   Pulmonary:      Effort: Pulmonary effort is normal. No respiratory distress.      Breath sounds: No wheezing, rhonchi or rales.   Abdominal:      General: Bowel sounds are normal. There is no distension.      Palpations: Abdomen is soft.      Tenderness: There is no abdominal tenderness. There is no rebound.   Musculoskeletal:      Cervical back: Normal range of motion and neck supple.   Neurological:      General: No focal deficit present.      Mental Status: He is alert and oriented to person, place, and time. Mental status is at baseline.   Psychiatric:         Mood and Affect: Mood normal.         Behavior: Behavior normal.         Thought Content: Thought content normal.         Medications:    Current Outpatient Medications:     albuterol (ProAir HFA) 90 mcg/act inhaler, Inhale 2 puffs every 6 (six) hours as needed for wheezing, Disp: 8.5 g, Rfl: 0     Alcohol Swabs 70 % PADS, May substitute brand based on insurance coverage. Check glucose ACHS., Disp: 200 each, Rfl: 0    allopurinol (ZYLOPRIM) 300 mg tablet, Take 1 tablet (300 mg total) by mouth daily, Disp: 90 tablet, Rfl: 2    aspirin 81 MG tablet, Take 81 mg by mouth daily, Disp: , Rfl:     Blood Glucose Monitoring Suppl (OneTouch Verio Reflect) w/Device KIT, May substitute brand based on insurance coverage. Check glucose ACHS., Disp: 1 kit, Rfl: 0    Cholecalciferol (VITAMIN D3) 5000 units TABS, Take 1 tablet by mouth daily, Disp: , Rfl:     Continuous Blood Gluc  (FreeStyle Peter 2 [place]) MONISHA, Check blood sugars multiple times per day, Disp: 1 each, Rfl: 0    Continuous Blood Gluc Sensor (FreeStyle Peter 2 Sensor) MISC, Check blood sugars multiple times per day, Disp: 6 each, Rfl: 3    glucose blood (OneTouch Verio) test strip, May substitute brand based on insurance coverage. Check glucose ACHS., Disp: 400 each, Rfl: 1    insulin aspart (NovoLOG FlexPen) 100 UNIT/ML injection pen, Inject 10 Units under the skin 3 (three) times a day with meals, Disp: 30 mL, Rfl: 1    insulin detemir (Levemir FlexPen) 100 Units/mL injection pen, Inject 40 Units under the skin daily, Disp: 45 mL, Rfl: 1    Insulin Pen Needle (BD Pen Needle [place] 2nd Gen) 32G X 4 MM MISC, For use with insulin pen 4xdaily. Pharmacy may dispense brand covered by insurance., Disp: 400 each, Rfl: 1    NIFEdipine (PROCARDIA XL) 60 mg 24 hr tablet, Take 1 tablet (60 mg total) by mouth daily, Disp: 90 tablet, Rfl: 1    Omega-3 Fatty Acids (FISH OIL) 1200 MG CAPS, Take 1 capsule by mouth 3 (three) times a day, Disp: , Rfl:     OneTouch Delica Lancets 33G MISC, May substitute brand based on insurance coverage. Check glucose ACHS., Disp: 400 each, Rfl: 1    Laboratory Results:  Lab Results   Component Value Date    WBC 6.99 01/07/2024    HGB 12.1 01/07/2024    HCT 35.1 (L) 01/07/2024    MCV 98 01/07/2024     (L) 01/07/2024     Lab  "Results   Component Value Date    SODIUM 139 03/12/2024    K 4.4 03/12/2024     03/12/2024    CO2 25 03/12/2024    BUN 21 03/12/2024    CREATININE 1.44 (H) 03/12/2024    GLUC 194 (H) 01/08/2024    CALCIUM 9.9 03/12/2024     Lab Results   Component Value Date    CALCIUM 9.9 03/12/2024    PHOS 2.5 01/04/2024     No results found for: \"LABPROT\"      "

## 2024-03-19 NOTE — PATIENT INSTRUCTIONS
You are here for follow-up am sorry to hear that you were hospitalized a couple times but you have been diagnosed now with diabetes and your blood sugar was very high.  You are doing amazing with your blood sugar control and your current insulin therapy and your doctor is going to work with you regarding reducing the dose or potentially other medications in the future but of course you know to discuss that with them.    With respect to your kidney function when you went into the hospital when your blood sugars were very high the kidneys were dehydrated but your functions returned back to your baseline the creatinine which is the blood test is back to 1.4 so that is back to your baseline is been no persistent worsening.    In the past you did not have a lot of protein in the urine so that is a good thing because more serious kidney diseases cause a lot of protein in the urine so you do not have that.    For now continue blood pressure control cholesterol treatment and now sugar control to help avoid problems in the future.    Please give me a call if there is any questions or problems before next visit.

## 2024-04-05 DIAGNOSIS — E11.65 TYPE 2 DIABETES MELLITUS WITH HYPERGLYCEMIA, UNSPECIFIED WHETHER LONG TERM INSULIN USE (HCC): ICD-10-CM

## 2024-04-05 NOTE — TELEPHONE ENCOUNTER
Patient called the RX Refill Line. Message is being forwarded to the office.     Patient is requesting his Sig updated for his novolog.  Per Patients last appt he was changed from 10 units at meal times to 15 units.  This was not updated and he is running short on his amount of NOVOLOG.      Please update and send script to [facility]

## 2024-04-08 RX ORDER — INSULIN ASPART 100 [IU]/ML
15 INJECTION, SOLUTION INTRAVENOUS; SUBCUTANEOUS
Qty: 30 ML | Refills: 0 | Status: SHIPPED | OUTPATIENT
Start: 2024-04-08

## 2024-07-30 DIAGNOSIS — E11.9 TYPE 2 DIABETES MELLITUS (HCC): ICD-10-CM

## 2024-07-30 RX ORDER — PEN NEEDLE, DIABETIC 32GX 5/32"
NEEDLE, DISPOSABLE MISCELLANEOUS
Qty: 400 EACH | Refills: 1 | Status: SHIPPED | OUTPATIENT
Start: 2024-07-30

## 2024-08-02 DIAGNOSIS — E11.65 TYPE 2 DIABETES MELLITUS WITH HYPERGLYCEMIA, UNSPECIFIED WHETHER LONG TERM INSULIN USE (HCC): ICD-10-CM

## 2024-08-02 DIAGNOSIS — E11.9 TYPE 2 DIABETES MELLITUS (HCC): ICD-10-CM

## 2024-08-02 RX ORDER — BLOOD SUGAR DIAGNOSTIC
STRIP MISCELLANEOUS
Qty: 400 EACH | Refills: 0 | Status: SHIPPED | OUTPATIENT
Start: 2024-08-02

## 2024-08-02 RX ORDER — INSULIN ASPART 100 [IU]/ML
15 INJECTION, SOLUTION INTRAVENOUS; SUBCUTANEOUS
Qty: 30 ML | Refills: 0 | Status: SHIPPED | OUTPATIENT
Start: 2024-08-02

## 2024-08-02 NOTE — TELEPHONE ENCOUNTER
Reason for call:   [x] Refill   [] Prior Auth  [x] Other: Pt states he needs and has no refills    Office:   [x] PCP/Provider -   [] Specialty/Provider -         Does the patient have enough for 3 days?   [x] Yes   [] No - Send as HP to POD

## 2024-08-05 DIAGNOSIS — E11.9 TYPE 2 DIABETES MELLITUS (HCC): ICD-10-CM

## 2024-08-05 RX ORDER — INSULIN DETEMIR 100 [IU]/ML
40 INJECTION, SOLUTION SUBCUTANEOUS DAILY
Qty: 45 ML | Refills: 1 | Status: SHIPPED | OUTPATIENT
Start: 2024-08-05

## 2024-08-31 DIAGNOSIS — E11.9 TYPE 2 DIABETES MELLITUS (HCC): ICD-10-CM

## 2024-09-01 RX ORDER — LANCETS 33 GAUGE
EACH MISCELLANEOUS
Qty: 400 EACH | Refills: 1 | Status: SHIPPED | OUTPATIENT
Start: 2024-09-01

## 2024-09-03 DIAGNOSIS — I10 ESSENTIAL HYPERTENSION: ICD-10-CM

## 2024-09-03 RX ORDER — NIFEDIPINE 60 MG/1
60 TABLET, EXTENDED RELEASE ORAL DAILY
Qty: 90 TABLET | Refills: 1 | Status: SHIPPED | OUTPATIENT
Start: 2024-09-03

## 2024-09-09 DIAGNOSIS — E79.0 HYPERURICEMIA: ICD-10-CM

## 2024-09-09 RX ORDER — ALLOPURINOL 300 MG/1
300 TABLET ORAL DAILY
Qty: 90 TABLET | Refills: 1 | Status: SHIPPED | OUTPATIENT
Start: 2024-09-09

## 2024-09-10 ENCOUNTER — APPOINTMENT (OUTPATIENT)
Dept: LAB | Facility: CLINIC | Age: 79
End: 2024-09-10
Payer: COMMERCIAL

## 2024-09-10 DIAGNOSIS — R80.9 MICROALBUMINURIA: ICD-10-CM

## 2024-09-10 DIAGNOSIS — E11.65 TYPE 2 DIABETES MELLITUS WITH HYPERGLYCEMIA, WITH LONG-TERM CURRENT USE OF INSULIN (HCC): ICD-10-CM

## 2024-09-10 DIAGNOSIS — D69.6 THROMBOCYTOPENIA (HCC): ICD-10-CM

## 2024-09-10 DIAGNOSIS — I10 ESSENTIAL HYPERTENSION: ICD-10-CM

## 2024-09-10 DIAGNOSIS — Z79.4 TYPE 2 DIABETES MELLITUS WITH HYPERGLYCEMIA, WITH LONG-TERM CURRENT USE OF INSULIN (HCC): ICD-10-CM

## 2024-09-10 DIAGNOSIS — N18.9 CHRONIC KIDNEY DISEASE, UNSPECIFIED CKD STAGE: ICD-10-CM

## 2024-09-10 LAB
ALBUMIN SERPL BCG-MCNC: 4 G/DL (ref 3.5–5)
ALP SERPL-CCNC: 88 U/L (ref 34–104)
ALT SERPL W P-5'-P-CCNC: 22 U/L (ref 7–52)
ANION GAP SERPL CALCULATED.3IONS-SCNC: 10 MMOL/L (ref 4–13)
AST SERPL W P-5'-P-CCNC: 22 U/L (ref 13–39)
BASOPHILS # BLD AUTO: 0.05 THOUSANDS/ÂΜL (ref 0–0.1)
BASOPHILS NFR BLD AUTO: 1 % (ref 0–1)
BILIRUB SERPL-MCNC: 0.58 MG/DL (ref 0.2–1)
BUN SERPL-MCNC: 31 MG/DL (ref 5–25)
CALCIUM SERPL-MCNC: 10 MG/DL (ref 8.4–10.2)
CHLORIDE SERPL-SCNC: 101 MMOL/L (ref 96–108)
CO2 SERPL-SCNC: 26 MMOL/L (ref 21–32)
CREAT SERPL-MCNC: 1.59 MG/DL (ref 0.6–1.3)
CREAT UR-MCNC: 44.2 MG/DL
EOSINOPHIL # BLD AUTO: 0.37 THOUSAND/ÂΜL (ref 0–0.61)
EOSINOPHIL NFR BLD AUTO: 5 % (ref 0–6)
ERYTHROCYTE [DISTWIDTH] IN BLOOD BY AUTOMATED COUNT: 13.9 % (ref 11.6–15.1)
GFR SERPL CREATININE-BSD FRML MDRD: 40 ML/MIN/1.73SQ M
GLUCOSE P FAST SERPL-MCNC: 92 MG/DL (ref 65–99)
HCT VFR BLD AUTO: 41.2 % (ref 36.5–49.3)
HGB BLD-MCNC: 13.7 G/DL (ref 12–17)
IMM GRANULOCYTES # BLD AUTO: 0.14 THOUSAND/UL (ref 0–0.2)
IMM GRANULOCYTES NFR BLD AUTO: 2 % (ref 0–2)
LYMPHOCYTES # BLD AUTO: 2.36 THOUSANDS/ÂΜL (ref 0.6–4.47)
LYMPHOCYTES NFR BLD AUTO: 29 % (ref 14–44)
MCH RBC QN AUTO: 34.3 PG (ref 26.8–34.3)
MCHC RBC AUTO-ENTMCNC: 33.3 G/DL (ref 31.4–37.4)
MCV RBC AUTO: 103 FL (ref 82–98)
MONOCYTES # BLD AUTO: 0.97 THOUSAND/ÂΜL (ref 0.17–1.22)
MONOCYTES NFR BLD AUTO: 12 % (ref 4–12)
NEUTROPHILS # BLD AUTO: 4.33 THOUSANDS/ÂΜL (ref 1.85–7.62)
NEUTS SEG NFR BLD AUTO: 51 % (ref 43–75)
NRBC BLD AUTO-RTO: 0 /100 WBCS
PLATELET # BLD AUTO: 138 THOUSANDS/UL (ref 149–390)
PMV BLD AUTO: 12.4 FL (ref 8.9–12.7)
POTASSIUM SERPL-SCNC: 4.5 MMOL/L (ref 3.5–5.3)
PROT SERPL-MCNC: 8 G/DL (ref 6.4–8.4)
PROT UR-MCNC: 12.5 MG/DL
PROT/CREAT UR: 0.3 MG/G{CREAT} (ref 0–0.1)
RBC # BLD AUTO: 4 MILLION/UL (ref 3.88–5.62)
SODIUM SERPL-SCNC: 137 MMOL/L (ref 135–147)
TSH SERPL DL<=0.05 MIU/L-ACNC: 4.11 UIU/ML (ref 0.45–4.5)
WBC # BLD AUTO: 8.22 THOUSAND/UL (ref 4.31–10.16)

## 2024-09-10 PROCEDURE — 83036 HEMOGLOBIN GLYCOSYLATED A1C: CPT

## 2024-09-10 PROCEDURE — 84156 ASSAY OF PROTEIN URINE: CPT

## 2024-09-10 PROCEDURE — 36415 COLL VENOUS BLD VENIPUNCTURE: CPT

## 2024-09-10 PROCEDURE — 82570 ASSAY OF URINE CREATININE: CPT

## 2024-09-10 PROCEDURE — 80053 COMPREHEN METABOLIC PANEL: CPT

## 2024-09-10 PROCEDURE — 85025 COMPLETE CBC W/AUTO DIFF WBC: CPT

## 2024-09-10 PROCEDURE — 84443 ASSAY THYROID STIM HORMONE: CPT

## 2024-09-11 LAB
EST. AVERAGE GLUCOSE BLD GHB EST-MCNC: 105 MG/DL
HBA1C MFR BLD: 5.3 %

## 2024-09-15 ENCOUNTER — RA CDI HCC (OUTPATIENT)
Dept: OTHER | Facility: HOSPITAL | Age: 79
End: 2024-09-15

## 2024-09-15 NOTE — PROGRESS NOTES
HCC coding opportunities          Chart Reviewed number of suggestions sent to Provider: 4  E11.22, N18.30, D69.6  Recommend adding I12.9 - combination code - hypertensive renal disease     Patients Insurance     Medicare Insurance: [employer]

## 2024-09-17 ENCOUNTER — OFFICE VISIT (OUTPATIENT)
Dept: FAMILY MEDICINE CLINIC | Facility: CLINIC | Age: 79
End: 2024-09-17
Payer: COMMERCIAL

## 2024-09-17 VITALS
TEMPERATURE: 98.3 F | WEIGHT: 254.8 LBS | DIASTOLIC BLOOD PRESSURE: 72 MMHG | OXYGEN SATURATION: 97 % | SYSTOLIC BLOOD PRESSURE: 140 MMHG | HEIGHT: 67 IN | BODY MASS INDEX: 39.99 KG/M2 | HEART RATE: 97 BPM

## 2024-09-17 DIAGNOSIS — D50.9 MICROCYTIC ANEMIA: ICD-10-CM

## 2024-09-17 DIAGNOSIS — G47.33 OSA (OBSTRUCTIVE SLEEP APNEA): ICD-10-CM

## 2024-09-17 DIAGNOSIS — Z00.00 MEDICARE ANNUAL WELLNESS VISIT, SUBSEQUENT: Primary | ICD-10-CM

## 2024-09-17 DIAGNOSIS — E78.1 ESSENTIAL HYPERTRIGLYCERIDEMIA: ICD-10-CM

## 2024-09-17 DIAGNOSIS — R39.14 BENIGN PROSTATIC HYPERPLASIA WITH INCOMPLETE BLADDER EMPTYING: ICD-10-CM

## 2024-09-17 DIAGNOSIS — N40.1 BENIGN PROSTATIC HYPERPLASIA WITH INCOMPLETE BLADDER EMPTYING: ICD-10-CM

## 2024-09-17 DIAGNOSIS — I10 ESSENTIAL HYPERTENSION: ICD-10-CM

## 2024-09-17 DIAGNOSIS — N18.31 STAGE 3A CHRONIC KIDNEY DISEASE (HCC): ICD-10-CM

## 2024-09-17 DIAGNOSIS — Z79.4 TYPE 2 DIABETES MELLITUS WITH HYPERGLYCEMIA, WITH LONG-TERM CURRENT USE OF INSULIN (HCC): ICD-10-CM

## 2024-09-17 DIAGNOSIS — D69.6 THROMBOCYTOPENIA (HCC): ICD-10-CM

## 2024-09-17 DIAGNOSIS — E11.65 TYPE 2 DIABETES MELLITUS WITH HYPERGLYCEMIA, WITH LONG-TERM CURRENT USE OF INSULIN (HCC): ICD-10-CM

## 2024-09-17 PROCEDURE — G0444 DEPRESSION SCREEN ANNUAL: HCPCS | Performed by: FAMILY MEDICINE

## 2024-09-17 PROCEDURE — G0439 PPPS, SUBSEQ VISIT: HCPCS | Performed by: FAMILY MEDICINE

## 2024-09-17 PROCEDURE — 99214 OFFICE O/P EST MOD 30 MIN: CPT | Performed by: FAMILY MEDICINE

## 2024-09-17 NOTE — ASSESSMENT & PLAN NOTE
Lab Results   Component Value Date    EGFR 40 09/10/2024    EGFR 45 03/12/2024    EGFR 49 01/08/2024    CREATININE 1.59 (H) 09/10/2024    CREATININE 1.44 (H) 03/12/2024    CREATININE 1.35 (H) 01/08/2024   Creatinine stable over the last 12 months.  Continue follow-up with nephrology.  Avoid NSAIDs, ensure adequate hydration and avoid nephrotoxins

## 2024-09-17 NOTE — PATIENT INSTRUCTIONS
Medicare Preventive Visit Patient Instructions  Thank you for completing your Welcome to Medicare Visit or Medicare Annual Wellness Visit today. Your next wellness visit will be due in one year (9/18/2025).  The screening/preventive services that you may require over the next 5-10 years are detailed below. Some tests may not apply to you based off risk factors and/or age. Screening tests ordered at today's visit but not completed yet may show as past due. Also, please note that scanned in results may not display below.  Preventive Screenings:  Service Recommendations Previous Testing/Comments   Colorectal Cancer Screening  Colonoscopy    Fecal Occult Blood Test (FOBT)/Fecal Immunochemical Test (FIT)  Fecal DNA/Cologuard Test  Flexible Sigmoidoscopy Age: 45-75 years old   Colonoscopy: every 10 years (May be performed more frequently if at higher risk)  OR  FOBT/FIT: every 1 year  OR  Cologuard: every 3 years  OR  Sigmoidoscopy: every 5 years  Screening may be recommended earlier than age 45 if at higher risk for colorectal cancer. Also, an individualized decision between you and your healthcare provider will decide whether screening between the ages of 76-85 would be appropriate. Colonoscopy: 01/06/2017  FOBT/FIT: 02/25/2023  Cologuard: Not on file  Sigmoidoscopy: Not on file          Prostate Cancer Screening Individualized decision between patient and health care provider in men between ages of 55-69   Medicare will cover every 12 months beginning on the day after your 50th birthday PSA: 2.5 ng/mL     Screening Not Indicated     Hepatitis C Screening Once for adults born between 1945 and 1965  More frequently in patients at high risk for Hepatitis C Hep C Antibody: Not on file        Diabetes Screening 1-2 times per year if you're at risk for diabetes or have pre-diabetes Fasting glucose: 92 mg/dL (9/10/2024)  A1C: 5.3 % (9/10/2024)  Screening Not Indicated  History Diabetes   Cholesterol Screening Once every 5  years if you don't have a lipid disorder. May order more often based on risk factors. Lipid panel: 03/12/2024  Screening Not Indicated  History Lipid Disorder      Other Preventive Screenings Covered by Medicare:  Abdominal Aortic Aneurysm (AAA) Screening: covered once if your at risk. You're considered to be at risk if you have a family history of AAA or a male between the age of 65-75 who smoking at least 100 cigarettes in your lifetime.  Lung Cancer Screening: covers low dose CT scan once per year if you meet all of the following conditions: (1) Age 55-77; (2) No signs or symptoms of lung cancer; (3) Current smoker or have quit smoking within the last 15 years; (4) You have a tobacco smoking history of at least 20 pack years (packs per day x number of years you smoked); (5) You get a written order from a healthcare provider.  Glaucoma Screening: covered annually if you're considered high risk: (1) You have diabetes OR (2) Family history of glaucoma OR (3)  aged 50 and older OR (4)  American aged 65 and older  Osteoporosis Screening: covered every 2 years if you meet one of the following conditions: (1) Have a vertebral abnormality; (2) On glucocorticoid therapy for more than 3 months; (3) Have primary hyperparathyroidism; (4) On osteoporosis medications and need to assess response to drug therapy.  HIV Screening: covered annually if you're between the age of 15-65. Also covered annually if you are younger than 15 and older than 65 with risk factors for HIV infection. For pregnant patients, it is covered up to 3 times per pregnancy.    Immunizations:  Immunization Recommendations   Influenza Vaccine Annual influenza vaccination during flu season is recommended for all persons aged >= 6 months who do not have contraindications   Pneumococcal Vaccine   * Pneumococcal conjugate vaccine = PCV13 (Prevnar 13), PCV15 (Vaxneuvance), PCV20 (Prevnar 20)  * Pneumococcal polysaccharide vaccine = PPSV23  (Pneumovax) Adults 19-63 yo with certain risk factors or if 65+ yo  If never received any pneumonia vaccine: recommend Prevnar 20 (PCV20)  Give PCV20 if previously received 1 dose of PCV13 or PPSV23   Hepatitis B Vaccine 3 dose series if at intermediate or high risk (ex: diabetes, end stage renal disease, liver disease)   Respiratory syncytial virus (RSV) Vaccine - COVERED BY MEDICARE PART D  * RSVPreF3 (Arexvy) CDC recommends that adults 60 years of age and older may receive a single dose of RSV vaccine using shared clinical decision-making (SCDM)   Tetanus (Td) Vaccine - COST NOT COVERED BY MEDICARE PART B Following completion of primary series, a booster dose should be given every 10 years to maintain immunity against tetanus. Td may also be given as tetanus wound prophylaxis.   Tdap Vaccine - COST NOT COVERED BY MEDICARE PART B Recommended at least once for all adults. For pregnant patients, recommended with each pregnancy.   Shingles Vaccine (Shingrix) - COST NOT COVERED BY MEDICARE PART B  2 shot series recommended in those 19 years and older who have or will have weakened immune systems or those 50 years and older     Health Maintenance Due:  There are no preventive care reminders to display for this patient.  Immunizations Due:      Topic Date Due   • Influenza Vaccine (1) 09/01/2024     Advance Directives   What are advance directives?  Advance directives are legal documents that state your wishes and plans for medical care. These plans are made ahead of time in case you lose your ability to make decisions for yourself. Advance directives can apply to any medical decision, such as the treatments you want, and if you want to donate organs.   What are the types of advance directives?  There are many types of advance directives, and each state has rules about how to use them. You may choose a combination of any of the following:  Living will:  This is a written record of the treatment you want. You can also  choose which treatments you do not want, which to limit, and which to stop at a certain time. This includes surgery, medicine, IV fluid, and tube feedings.   Durable power of  for healthcare (DPAHC):  This is a written record that states who you want to make healthcare choices for you when you are unable to make them for yourself. This person, called a proxy, is usually a family member or a friend. You may choose more than 1 proxy.  Do not resuscitate (DNR) order:  A DNR order is used in case your heart stops beating or you stop breathing. It is a request not to have certain forms of treatment, such as CPR. A DNR order may be included in other types of advance directives.  Medical directive:  This covers the care that you want if you are in a coma, near death, or unable to make decisions for yourself. You can list the treatments you want for each condition. Treatment may include pain medicine, surgery, blood transfusions, dialysis, IV or tube feedings, and a ventilator (breathing machine).  Values history:  This document has questions about your views, beliefs, and how you feel and think about life. This information can help others choose the care that you would choose.  Why are advance directives important?  An advance directive helps you control your care. Although spoken wishes may be used, it is better to have your wishes written down. Spoken wishes can be misunderstood, or not followed. Treatments may be given even if you do not want them. An advance directive may make it easier for your family to make difficult choices about your care.   Fall Prevention    Fall prevention  includes ways to make your home and other areas safer. It also includes ways you can move more carefully to prevent a fall. Health conditions that cause changes in your blood pressure, vision, or muscle strength and coordination may increase your risk for falls. Medicines may also increase your risk for falls if they make you dizzy,  weak, or sleepy.   Fall prevention tips:   Stand or sit up slowly.    Use assistive devices as directed.    Wear shoes that fit well and have soles that .    Wear a personal alarm.    Stay active.    Manage your medical conditions.    Home Safety Tips:  Add items to prevent falls in the bathroom.    Keep paths clear.    Install bright lights in your home.    Keep items you use often on shelves within reach.    Paint or place reflective tape on the edges of your stairs.    Weight Management   Why it is important to manage your weight:  Being overweight increases your risk of health conditions such as heart disease, high blood pressure, type 2 diabetes, and certain types of cancer. It can also increase your risk for osteoarthritis, sleep apnea, and other respiratory problems. Aim for a slow, steady weight loss. Even a small amount of weight loss can lower your risk of health problems.  How to lose weight safely:  A safe and healthy way to lose weight is to eat fewer calories and get regular exercise. You can lose up about 1 pound a week by decreasing the number of calories you eat by 500 calories each day.   Healthy meal plan for weight management:  A healthy meal plan includes a variety of foods, contains fewer calories, and helps you stay healthy. A healthy meal plan includes the following:  Eat whole-grain foods more often.  A healthy meal plan should contain fiber. Fiber is the part of grains, fruits, and vegetables that is not broken down by your body. Whole-grain foods are healthy and provide extra fiber in your diet. Some examples of whole-grain foods are whole-wheat breads and pastas, oatmeal, brown rice, and bulgur.  Eat a variety of vegetables every day.  Include dark, leafy greens such as spinach, kale, klever greens, and mustard greens. Eat yellow and orange vegetables such as carrots, sweet potatoes, and winter squash.   Eat a variety of fruits every day.  Choose fresh or canned fruit (canned in its  own juice or light syrup) instead of juice. Fruit juice has very little or no fiber.  Eat low-fat dairy foods.  Drink fat-free (skim) milk or 1% milk. Eat fat-free yogurt and low-fat cottage cheese. Try low-fat cheeses such as mozzarella and other reduced-fat cheeses.  Choose meat and other protein foods that are low in fat.  Choose beans or other legumes such as split peas or lentils. Choose fish, skinless poultry (chicken or turkey), or lean cuts of red meat (beef or pork). Before you cook meat or poultry, cut off any visible fat.   Use less fat and oil.  Try baking foods instead of frying them. Add less fat, such as margarine, sour cream, regular salad dressing and mayonnaise to foods. Eat fewer high-fat foods. Some examples of high-fat foods include french fries, doughnuts, ice cream, and cakes.  Eat fewer sweets.  Limit foods and drinks that are high in sugar. This includes candy, cookies, regular soda, and sweetened drinks.  Exercise:  Exercise at least 30 minutes per day on most days of the week. Some examples of exercise include walking, biking, dancing, and swimming. You can also fit in more physical activity by taking the stairs instead of the elevator or parking farther away from stores. Ask your healthcare provider about the best exercise plan for you.      © Copyright [employer] 2018 Information is for End User's use only and may not be sold, redistributed or otherwise used for commercial purposes. All illustrations and images included in CareNotes® are the copyrighted property of A.D.A[employer]M., Inc. or [employer]

## 2024-09-17 NOTE — ASSESSMENT & PLAN NOTE
Lab Results   Component Value Date    HGBA1C 5.3 09/10/2024   Diabetic control excellent with hemoglobin A1c down to 5.3.  Sugars at home range from 90-1 30 most of the time.  Patient is taking Levemir insulin 40 units and NovoLog 15 units before meals.  Will have him decrease his mealtime insulin slightly from 15-10 and continue to monitor.  If his sugars remain well-controlled we will continue to slowly decrease his insulin dosages.  He will continue to monitor his CGM.

## 2024-09-17 NOTE — PROGRESS NOTES
Ambulatory Visit  Name: Irineo Rawls      : 1945      MRN: [id number]  Encounter Provider: Obi Esquivel DO  Encounter Date: 2024   Encounter department: [facility]    Assessment & Plan  Medicare annual wellness visit, subsequent  Questionnaire reviewed.  Immunization health screening history updated.  Advance directive in place.       Type 2 diabetes mellitus with hyperglycemia, with long-term current use of insulin ([facility])    Lab Results   Component Value Date    HGBA1C 5.3 09/10/2024   Diabetic control excellent with hemoglobin A1c down to 5.3.  Sugars at home range from 90-1 30 most of the time.  Patient is taking Levemir insulin 40 units and NovoLog 15 units before meals.  Will have him decrease his mealtime insulin slightly from 15-10 and continue to monitor.  If his sugars remain well-controlled we will continue to slowly decrease his insulin dosages.  He will continue to monitor his CGM.         Essential hypertension  Blood pressure well-controlled on nifedipine XL 60 mg       Essential hypertriglyceridemia  Recheck lipid panel with next labs in 6 months       Benign prostatic hyperplasia with incomplete bladder emptying         Stage 3a chronic kidney disease (HCC)  Lab Results   Component Value Date    EGFR 40 09/10/2024    EGFR 45 2024    EGFR 49 2024    CREATININE 1.59 (H) 09/10/2024    CREATININE 1.44 (H) 2024    CREATININE 1.35 (H) 2024   Creatinine stable over the last 12 months.  Continue follow-up with nephrology.  Avoid NSAIDs, ensure adequate hydration and avoid nephrotoxins         Microcytic anemia  Most recent CBC shows no anemia.       KALIA (obstructive sleep apnea)  Continue CPAP.       Thrombocytopenia (HCC)  Platelet levels slightly below normal range but stable over the last several years.         Depression Screening and Follow-up Plan: Patient was screened for depression during today's encounter. They screened negative  with a PHQ-2 score of 0.    Falls Plan of Care: balance, strength, and gait training instructions were provided. Recommended assistive device to help with gait and balance. Patient assessed for orthostatic hypotension. Medications that increase falls were reviewed.       Preventive health issues were discussed with patient, and age appropriate screening tests were ordered as noted in patient's After Visit Summary. Personalized health advice and appropriate referrals for health education or preventive services given if needed, as noted in patient's After Visit Summary.    History of Present Illness     Patient was seen for annual wellness visit and follow-up of chronic medical problems.  He is being treated for type 2 diabetes, hypertension, hyperlipidemia, chronic kidney disease and BPH.  He has no complaints today.       Patient Care Team:  Obi Esquivel DO as PCP - General  Obi Esquivel DO as PCP - PCP-[facility] ([facility])  Obi Esquivel DO as PCP - PCP-[facility] ([facility])  João Perez MD as Endoscopist  Mitch Garcia MD (Nephrology)    Review of Systems   Constitutional: Negative.    Respiratory: Negative.     Cardiovascular: Negative.    Gastrointestinal: Negative.    Genitourinary: Negative.    Musculoskeletal: Negative.    Psychiatric/Behavioral: Negative.       Medical History Reviewed by provider this encounter:       Annual Wellness Visit Questionnaire   Irineo is here for his Subsequent Wellness visit.     Health Risk Assessment:   Patient rates overall health as good. Patient feels that their physical health rating is much better. Patient is satisfied with their life. Eyesight was rated as same. Hearing was rated as same. Patient feels that their emotional and mental health rating is same. Patients states they are never, rarely angry. Pain experienced in the last 7 days has been none.     Depression Screening:   PHQ-2 Score: 0      Fall Risk Screening:   In the past year, patient has  experienced: history of falling in past year    Number of falls: 1  Injured during fall?: No    Feels unsteady when standing or walking?: No    Worried about falling?: No      Home Safety:  Patient does not have trouble with stairs inside or outside of their home. Patient has working smoke alarms and has working carbon monoxide detector. Home safety hazards include: none.     Nutrition:   Current diet is Regular.     Medications:   Patient is not currently taking any over-the-counter supplements. Patient is able to manage medications.     Activities of Daily Living (ADLs)/Instrumental Activities of Daily Living (IADLs):   Walk and transfer into and out of bed and chair?: Yes  Dress and groom yourself?: Yes    Bathe or shower yourself?: Yes    Feed yourself? Yes  Do your laundry/housekeeping?: Yes  Manage your money, pay your bills and track your expenses?: Yes  Make your own meals?: Yes    Do your own shopping?: Yes    Previous Hospitalizations:   Any hospitalizations or ED visits within the last 12 months?: Yes    How many hospitalizations have you had in the last year?: 1-2    Advance Care Planning:   Living will: Yes    Durable POA for healthcare: Yes    Advanced directive: Yes      Cognitive Screening:   Provider or family/friend/caregiver concerned regarding cognition?: No    PREVENTIVE SCREENINGS      Cardiovascular Screening:    General: Screening Not Indicated and History Lipid Disorder      Diabetes Screening:     General: Screening Not Indicated and History Diabetes      Colorectal Cancer Screening:     General: Screening Not Indicated      Prostate Cancer Screening:    General: Screening Not Indicated      Osteoporosis Screening:    General: Screening Not Indicated      Abdominal Aortic Aneurysm (AAA) Screening:    Risk factors include: tobacco use        General: Screening Not Indicated      Lung Cancer Screening:     General: Screening Not Indicated      Hepatitis C Screening:    General: Screening Not  "Indicated    Screening, Brief Intervention, and Referral to Treatment (SBIRT)    Screening  Typical number of drinks in a day: 0  Typical number of drinks in a week: 0  Interpretation: Low risk drinking behavior.    Single Item Drug Screening:  How often have you used an illegal drug (including marijuana) or a prescription medication for non-medical reasons in the past year? never    Single Item Drug Screen Score: 0  Interpretation: Negative screen for possible drug use disorder    Brief Intervention  Alcohol & drug use screenings were reviewed. No concerns regarding substance use disorder identified.     Annual Depression Screening  Time spent screening and evaluating the patient for depression during today's encounter was 5 minutes.    Social Determinants of Health     Financial Resource Strain: Low Risk  (9/12/2023)    Overall Financial Resource Strain (CARDIA)     Difficulty of Paying Living Expenses: Not hard at all   Food Insecurity: No Food Insecurity (1/4/2024)    Hunger Vital Sign     Worried About Running Out of Food in the Last Year: Never true     Ran Out of Food in the Last Year: Never true   Transportation Needs: No Transportation Needs (1/4/2024)    PRAPARE - Transportation     Lack of Transportation (Medical): No     Lack of Transportation (Non-Medical): No   Housing Stability: Low Risk  (1/4/2024)    Housing Stability Vital Sign     Unable to Pay for Housing in the Last Year: No     Number of Times Moved in the Last Year: 1     Homeless in the Last Year: No   Utilities: Not At Risk (1/4/2024)    [facility] Utilities     Threatened with loss of utilities: No     No results found.    Objective     /72 (BP Location: Left arm, Patient Position: Sitting, Cuff Size: Large)   Pulse 97   Temp 98.3 °F (36.8 °C) (Temporal)   Ht 5' 7\" (1.702 m)   Wt 116 kg (254 lb 12.8 oz)   SpO2 97%   BMI 39.91 kg/m²     Physical Exam  Vitals and nursing note reviewed.   Constitutional:       General: He is not in acute " distress.     Appearance: Normal appearance.   HENT:      Head: Normocephalic and atraumatic.   Eyes:      Pupils: Pupils are equal, round, and reactive to light.   Cardiovascular:      Rate and Rhythm: Normal rate and regular rhythm.      Pulses: Normal pulses.      Heart sounds: Normal heart sounds.   Pulmonary:      Effort: Pulmonary effort is normal.      Breath sounds: Normal breath sounds.   Musculoskeletal:         General: Normal range of motion.      Cervical back: Normal range of motion.   Skin:     General: Skin is warm and dry.   Neurological:      General: No focal deficit present.      Mental Status: He is alert and oriented to person, place, and time. Mental status is at baseline.   Psychiatric:         Mood and Affect: Mood normal.         Behavior: Behavior normal.         Thought Content: Thought content normal.         Judgment: Judgment normal.

## 2024-09-18 ENCOUNTER — OFFICE VISIT (OUTPATIENT)
Dept: NEPHROLOGY | Facility: CLINIC | Age: 79
End: 2024-09-18
Payer: COMMERCIAL

## 2024-09-18 VITALS
DIASTOLIC BLOOD PRESSURE: 76 MMHG | HEART RATE: 70 BPM | WEIGHT: 252 LBS | SYSTOLIC BLOOD PRESSURE: 138 MMHG | HEIGHT: 67 IN | BODY MASS INDEX: 39.55 KG/M2

## 2024-09-18 DIAGNOSIS — N18.9 CHRONIC KIDNEY DISEASE, UNSPECIFIED CKD STAGE: Primary | ICD-10-CM

## 2024-09-18 PROCEDURE — 99214 OFFICE O/P EST MOD 30 MIN: CPT | Performed by: INTERNAL MEDICINE

## 2024-09-18 NOTE — LETTER
September 18, 2024     Obi Esquivel DO  [address]  [address]  [facility] [address]    Patient: Irineo Rawls   YOB: 1945   Date of Visit: 9/18/2024       Dear Dr. Esquivel:    Thank you for referring Irineo Rawls to me for evaluation. Below are my notes for this consultation.    If you have questions, please do not hesitate to call me. I look forward to following your patient along with you.         Sincerely,        Mitch Garcia MD        CC: No Recipients    Mitch Garcia MD  9/18/2024 10:16 AM  Sign when Signing Visit  NEPHROLOGY PROGRESS NOTE    Irineo Rawls 79 y.o. male MRN: [id number]  Unit/Bed#:  Encounter: [id number]  Reason for Consult: Chronic renal insufficiency    Patient is here for follow-up he states he had a good summer just did some gardening of flowers and vegetables really nothing memorable over the summer.  No intercurrent illnesses or changes in medications and he has no complaints today    ASSESSMENT/PLAN:    1.  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  His creatinine stable at 1.5 and even looking back 3 years he has been stable without progression.  Urine protein estimation is still extremely low at 300 mg or below so he does not have significant intrinsic disease and he does not have diabetic nephropathy.  I told him he does not have diabetic kidney disease because he really has not been a clear diabetic for that long and at the present time proteinuria is very low.  He has made an excellent improvement in glycemic control and it is now running normal.  He is watching his diet taking medications.  I told if he continues this it significantly reduces risk of diabetic complications including kidney disease in the future.    Continue with glycemic control  Blood pressure control which is acceptable    Labs and  "follow-up in 6 months I told to call if there is any problems before next visit.    I have spent a total time of 30 minutes in caring for this patient on the day of the visit/encounter including Diagnostic results, Prognosis, Impressions, Reviewing / ordering tests, medicine, procedures  , and Obtaining or reviewing history  .     SUBJECTIVE:  Review of Systems   Constitutional: Negative for chills, diaphoresis and fever.   HENT: Negative.     Eyes: Negative.    Cardiovascular: Negative.  Negative for chest pain, dyspnea on exertion and orthopnea.   Respiratory: Negative.  Negative for cough, shortness of breath and sputum production.    Gastrointestinal:  Negative for abdominal pain, diarrhea, nausea and vomiting.   Genitourinary: Negative.    Neurological:  Negative for dizziness, headaches and weakness.   Psychiatric/Behavioral:  Negative for altered mental status.        OBJECTIVE:  Current Weight: Weight - Scale: 114 kg (252 lb)  Vitals:@TMAX(24)@Current:     Blood pressure 138/76, pulse 70, height 5' 7\" (1.702 m), weight 114 kg (252 lb). , Body mass index is 39.47 kg/m².    [unfilled]    Physical Exam: /76 (BP Location: Left arm, Patient Position: Sitting, Cuff Size: Standard)   Pulse 70   Ht 5' 7\" (1.702 m)   Wt 114 kg (252 lb)   BMI 39.47 kg/m²   Physical Exam  Constitutional:       General: He is not in acute distress.     Appearance: He is not toxic-appearing.   HENT:      Head: Normocephalic and atraumatic.      Nose: Nose normal.      Mouth/Throat:      Mouth: Mucous membranes are moist.   Eyes:      General: No scleral icterus.     Extraocular Movements: Extraocular movements intact.   Cardiovascular:      Rate and Rhythm: Normal rate and regular rhythm.      Heart sounds:      No gallop.   Pulmonary:      Effort: Pulmonary effort is normal. No respiratory distress.      Breath sounds: No wheezing or rales.   Abdominal:      General: Bowel sounds are normal. There is no distension.      " Palpations: Abdomen is soft.      Tenderness: There is no abdominal tenderness.   Musculoskeletal:      Cervical back: Normal range of motion and neck supple.   Neurological:      Mental Status: He is alert and oriented to person, place, and time. Mental status is at baseline.   Psychiatric:         Mood and Affect: Mood normal.         Behavior: Behavior normal.         Medications:    Current Outpatient Medications:   •  albuterol (ProAir HFA) 90 mcg/act inhaler, Inhale 2 puffs every 6 (six) hours as needed for wheezing, Disp: 8.5 g, Rfl: 0  •  Alcohol Swabs 70 % PADS, May substitute brand based on insurance coverage. Check glucose ACHS., Disp: 200 each, Rfl: 0  •  allopurinol (ZYLOPRIM) 300 mg tablet, Take 1 tablet by mouth daily, Disp: 90 tablet, Rfl: 1  •  aspirin 81 MG tablet, Take 81 mg by mouth daily, Disp: , Rfl:   •  Blood Glucose Monitoring Suppl (OneTouch Verio Reflect) w/Device KIT, May substitute brand based on insurance coverage. Check glucose ACHS., Disp: 1 kit, Rfl: 0  •  Cholecalciferol (VITAMIN D3) 5000 units TABS, Take 1 tablet by mouth daily, Disp: , Rfl:   •  Continuous Blood Gluc  (FreeStyle Peter 2 [place]) MONISHA, Check blood sugars multiple times per day, Disp: 1 each, Rfl: 0  •  Continuous Blood Gluc Sensor (FreeStyle Peter 2 Sensor) MISC, Check blood sugars multiple times per day, Disp: 6 each, Rfl: 3  •  glucose blood (OneTouch Verio) test strip, May substitute brand based on insurance coverage. Check glucose ACHS., Disp: 400 each, Rfl: 0  •  insulin aspart (NovoLOG FlexPen) 100 UNIT/ML injection pen, Inject 15 Units under the skin 3 (three) times a day with meals, Disp: 30 mL, Rfl: 0  •  Insulin Pen Needle (Unifine Pentips) 32G X 4 MM MISC, For use with insulin pen 4 times daily., Disp: 400 each, Rfl: 1  •  Lancets (OneTouch Delica Plus [id number]) MISC, Check glucose before meals and at bedtime, Disp: 400 each, Rfl: 1  •  Levemir FlexPen 100 units/mL injection pen, Inject 40 Units  "under the skin daily, Disp: 45 mL, Rfl: 1  •  NIFEdipine (PROCARDIA XL) 60 mg 24 hr tablet, Take 1 tablet (60 mg total) by mouth daily, Disp: 90 tablet, Rfl: 1  •  Omega-3 Fatty Acids (FISH OIL) 1200 MG CAPS, Take 1 capsule by mouth 3 (three) times a day, Disp: , Rfl:     Laboratory Results:  Lab Results   Component Value Date    WBC 8.22 09/10/2024    HGB 13.7 09/10/2024    HCT 41.2 09/10/2024     (H) 09/10/2024     (L) 09/10/2024     Lab Results   Component Value Date    SODIUM 137 09/10/2024    K 4.5 09/10/2024     09/10/2024    CO2 26 09/10/2024    BUN 31 (H) 09/10/2024    CREATININE 1.59 (H) 09/10/2024    GLUC 194 (H) 01/08/2024    CALCIUM 10.0 09/10/2024     Lab Results   Component Value Date    CALCIUM 10.0 09/10/2024    PHOS 2.5 01/04/2024     No results found for: \"LABPROT\"      "

## 2024-09-18 NOTE — PATIENT INSTRUCTIONS
You are here for follow-up I am glad everything is going great and that your health is doing well.  Your blood sugars are excellent so good job if you keep that A1c at 7% or lower it helps reduce diabetic complications so you are doing great.  There is not a lot of protein in the urine and why I say that it tells me it is not diabetic kidney disease and in fact most serious kidney diseases cause a lot of protein so it rules them out.  Your creatinine which is the blood test for the kidney function is stable at 1.5 even going back for years there has not been progression and may be related to little aging or nephrosclerosis in one of your kidneys is a little smaller than the other but overall continue with sugar control blood pressure control.    Call me if there is any problems before next visit.

## 2024-09-18 NOTE — PROGRESS NOTES
NEPHROLOGY PROGRESS NOTE    Irineo Rawls 79 y.o. male MRN: [id number]  Unit/Bed#:  Encounter: [id number]  Reason for Consult: Chronic renal insufficiency    Patient is here for follow-up he states he had a good summer just did some gardening of flowers and vegetables really nothing memorable over the summer.  No intercurrent illnesses or changes in medications and he has no complaints today    ASSESSMENT/PLAN:    1.  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  His creatinine stable at 1.5 and even looking back 3 years he has been stable without progression.  Urine protein estimation is still extremely low at 300 mg or below so he does not have significant intrinsic disease and he does not have diabetic nephropathy.  I told him he does not have diabetic kidney disease because he really has not been a clear diabetic for that long and at the present time proteinuria is very low.  He has made an excellent improvement in glycemic control and it is now running normal.  He is watching his diet taking medications.  I told if he continues this it significantly reduces risk of diabetic complications including kidney disease in the future.    Continue with glycemic control  Blood pressure control which is acceptable    Labs and follow-up in 6 months I told to call if there is any problems before next visit.    I have spent a total time of 30 minutes in caring for this patient on the day of the visit/encounter including Diagnostic results, Prognosis, Impressions, Reviewing / ordering tests, medicine, procedures  , and Obtaining or reviewing history  .     SUBJECTIVE:  Review of Systems   Constitutional: Negative for chills, diaphoresis and fever.   HENT: Negative.     Eyes: Negative.    Cardiovascular: Negative.  Negative for chest pain, dyspnea on exertion and orthopnea.  "  Respiratory: Negative.  Negative for cough, shortness of breath and sputum production.    Gastrointestinal:  Negative for abdominal pain, diarrhea, nausea and vomiting.   Genitourinary: Negative.    Neurological:  Negative for dizziness, headaches and weakness.   Psychiatric/Behavioral:  Negative for altered mental status.        OBJECTIVE:  Current Weight: Weight - Scale: 114 kg (252 lb)  Vitals:@TMAX(24)@Current:     Blood pressure 138/76, pulse 70, height 5' 7\" (1.702 m), weight 114 kg (252 lb). , Body mass index is 39.47 kg/m².    [unfilled]    Physical Exam: /76 (BP Location: Left arm, Patient Position: Sitting, Cuff Size: Standard)   Pulse 70   Ht 5' 7\" (1.702 m)   Wt 114 kg (252 lb)   BMI 39.47 kg/m²   Physical Exam  Constitutional:       General: He is not in acute distress.     Appearance: He is not toxic-appearing.   HENT:      Head: Normocephalic and atraumatic.      Nose: Nose normal.      Mouth/Throat:      Mouth: Mucous membranes are moist.   Eyes:      General: No scleral icterus.     Extraocular Movements: Extraocular movements intact.   Cardiovascular:      Rate and Rhythm: Normal rate and regular rhythm.      Heart sounds:      No gallop.   Pulmonary:      Effort: Pulmonary effort is normal. No respiratory distress.      Breath sounds: No wheezing or rales.   Abdominal:      General: Bowel sounds are normal. There is no distension.      Palpations: Abdomen is soft.      Tenderness: There is no abdominal tenderness.   Musculoskeletal:      Cervical back: Normal range of motion and neck supple.   Neurological:      Mental Status: He is alert and oriented to person, place, and time. Mental status is at baseline.   Psychiatric:         Mood and Affect: Mood normal.         Behavior: Behavior normal.         Medications:    Current Outpatient Medications:     albuterol (ProAir HFA) 90 mcg/act inhaler, Inhale 2 puffs every 6 (six) hours as needed for wheezing, Disp: 8.5 g, Rfl: 0    Alcohol " Swabs 70 % PADS, May substitute brand based on insurance coverage. Check glucose ACHS., Disp: 200 each, Rfl: 0    allopurinol (ZYLOPRIM) 300 mg tablet, Take 1 tablet by mouth daily, Disp: 90 tablet, Rfl: 1    aspirin 81 MG tablet, Take 81 mg by mouth daily, Disp: , Rfl:     Blood Glucose Monitoring Suppl (OneTouch Verio Reflect) w/Device KIT, May substitute brand based on insurance coverage. Check glucose ACHS., Disp: 1 kit, Rfl: 0    Cholecalciferol (VITAMIN D3) 5000 units TABS, Take 1 tablet by mouth daily, Disp: , Rfl:     Continuous Blood Gluc  (FreeStyle Peter 2 [place]) MONISHA, Check blood sugars multiple times per day, Disp: 1 each, Rfl: 0    Continuous Blood Gluc Sensor (FreeStyle Peter 2 Sensor) MISC, Check blood sugars multiple times per day, Disp: 6 each, Rfl: 3    glucose blood (OneTouch Verio) test strip, May substitute brand based on insurance coverage. Check glucose ACHS., Disp: 400 each, Rfl: 0    insulin aspart (NovoLOG FlexPen) 100 UNIT/ML injection pen, Inject 15 Units under the skin 3 (three) times a day with meals, Disp: 30 mL, Rfl: 0    Insulin Pen Needle (Unifine Pentips) 32G X 4 MM MISC, For use with insulin pen 4 times daily., Disp: 400 each, Rfl: 1    Lancets (OneTouch Delica Plus [id number]) MISC, Check glucose before meals and at bedtime, Disp: 400 each, Rfl: 1    Levemir FlexPen 100 units/mL injection pen, Inject 40 Units under the skin daily, Disp: 45 mL, Rfl: 1    NIFEdipine (PROCARDIA XL) 60 mg 24 hr tablet, Take 1 tablet (60 mg total) by mouth daily, Disp: 90 tablet, Rfl: 1    Omega-3 Fatty Acids (FISH OIL) 1200 MG CAPS, Take 1 capsule by mouth 3 (three) times a day, Disp: , Rfl:     Laboratory Results:  Lab Results   Component Value Date    WBC 8.22 09/10/2024    HGB 13.7 09/10/2024    HCT 41.2 09/10/2024     (H) 09/10/2024     (L) 09/10/2024     Lab Results   Component Value Date    SODIUM 137 09/10/2024    K 4.5 09/10/2024     09/10/2024    CO2 26  "09/10/2024    BUN 31 (H) 09/10/2024    CREATININE 1.59 (H) 09/10/2024    GLUC 194 (H) 01/08/2024    CALCIUM 10.0 09/10/2024     Lab Results   Component Value Date    CALCIUM 10.0 09/10/2024    PHOS 2.5 01/04/2024     No results found for: \"LABPROT\"      "

## 2024-10-14 ENCOUNTER — TELEPHONE (OUTPATIENT)
Dept: NEPHROLOGY | Facility: CLINIC | Age: 79
End: 2024-10-14

## 2024-10-14 NOTE — TELEPHONE ENCOUNTER
Called patient and left a voicemail regarding a follow up with Dr. Garcia from [facility].   I scheduled in [place]  Monday 3/17 at 4:30 Pm with Dr. Garcia  Labs are in chart/ mailing an appointment card as well.

## 2024-11-06 DIAGNOSIS — E79.0 HYPERURICEMIA: ICD-10-CM

## 2024-11-06 DIAGNOSIS — I10 ESSENTIAL HYPERTENSION: ICD-10-CM

## 2024-11-06 DIAGNOSIS — E11.65 TYPE 2 DIABETES MELLITUS WITH HYPERGLYCEMIA, UNSPECIFIED WHETHER LONG TERM INSULIN USE (HCC): ICD-10-CM

## 2024-11-06 NOTE — TELEPHONE ENCOUNTER
Needs new prescriptions sent to mail order     Reason for call:   [x] Refill   [] Prior Auth  [] Other:     Office:   [x] PCP/Provider - Obi Esquivel  [] Specialty/Provider -     Medication: Allopurinol   Dose/Frequency: 300 mg Daily   Quantity: 90    Medication: Novolog Flex Pen   Dose/Frequency: 100 units/ml 15 units TID   Quantity: 30 ml    Medication: Nifedipine XL  Dose/Frequency: 60 mg Daily   Quantity: 90        Pharmacy: [facility]     Does the patient have enough for 3 days?   [x] Yes   [] No - Send as HP to POD

## 2024-11-07 RX ORDER — ALLOPURINOL 300 MG/1
300 TABLET ORAL DAILY
Qty: 90 TABLET | Refills: 1 | Status: SHIPPED | OUTPATIENT
Start: 2024-11-07

## 2024-11-07 RX ORDER — NIFEDIPINE 60 MG/1
60 TABLET, EXTENDED RELEASE ORAL DAILY
Qty: 90 TABLET | Refills: 1 | Status: SHIPPED | OUTPATIENT
Start: 2024-11-07

## 2024-11-07 RX ORDER — INSULIN ASPART 100 [IU]/ML
15 INJECTION, SOLUTION INTRAVENOUS; SUBCUTANEOUS
Qty: 30 ML | Refills: 0 | Status: SHIPPED | OUTPATIENT
Start: 2024-11-07 | End: 2024-11-12 | Stop reason: SDUPTHER

## 2024-11-11 DIAGNOSIS — E11.9 TYPE 2 DIABETES MELLITUS (HCC): ICD-10-CM

## 2024-11-12 ENCOUNTER — TELEPHONE (OUTPATIENT)
Age: 79
End: 2024-11-12

## 2024-11-12 DIAGNOSIS — E11.65 TYPE 2 DIABETES MELLITUS WITH HYPERGLYCEMIA, UNSPECIFIED WHETHER LONG TERM INSULIN USE (HCC): Primary | ICD-10-CM

## 2024-11-12 DIAGNOSIS — E11.65 TYPE 2 DIABETES MELLITUS WITH HYPERGLYCEMIA, UNSPECIFIED WHETHER LONG TERM INSULIN USE (HCC): ICD-10-CM

## 2024-11-12 RX ORDER — BLOOD SUGAR DIAGNOSTIC
STRIP MISCELLANEOUS
Qty: 300 EACH | Refills: 3 | Status: SHIPPED | OUTPATIENT
Start: 2024-11-12

## 2024-11-12 RX ORDER — INSULIN ASPART 100 [IU]/ML
15 INJECTION, SOLUTION INTRAVENOUS; SUBCUTANEOUS
Qty: 90 ML | Refills: 0 | Status: SHIPPED | OUTPATIENT
Start: 2024-11-12

## 2024-11-12 RX ORDER — BLOOD SUGAR DIAGNOSTIC
STRIP MISCELLANEOUS
Qty: 400 STRIP | Refills: 1 | Status: SHIPPED | OUTPATIENT
Start: 2024-11-12

## 2024-11-12 NOTE — TELEPHONE ENCOUNTER
Pharmacist calls from mail order pharmacy regarding One Touch Verio glucometer strips. Patient needs a refill. This refill is already pended to PCP . Please have the PCP sign the order and send to mail order pharmacy.

## 2024-11-12 NOTE — TELEPHONE ENCOUNTER
Reason for call:   [x] Refill   [] Prior Auth  [x] Other: Insurance  requires 90 days - not a duplicate     Office:   [x] PCP/Provider -   [] Specialty/Provider -     Medication: insulin aspart (NovoLOG FlexPen) 100 UNIT/ML injection pen 15 units three times daily         Pharmacy: [facility]     Does the patient have enough for 3 days?   [] Yes   [x] No - Send as HP to POD

## 2025-01-06 ENCOUNTER — APPOINTMENT (EMERGENCY)
Dept: RADIOLOGY | Facility: HOSPITAL | Age: 80
DRG: 291 | End: 2025-01-06
Payer: COMMERCIAL

## 2025-01-06 ENCOUNTER — HOSPITAL ENCOUNTER (INPATIENT)
Facility: HOSPITAL | Age: 80
LOS: 3 days | Discharge: HOME | DRG: 291 | End: 2025-01-10
Attending: EMERGENCY MEDICINE | Admitting: STUDENT IN AN ORGANIZED HEALTH CARE EDUCATION/TRAINING PROGRAM
Payer: COMMERCIAL

## 2025-01-06 DIAGNOSIS — R07.9 CHEST PAIN: ICD-10-CM

## 2025-01-06 DIAGNOSIS — I48.91 ATRIAL FIBRILLATION (HCC): ICD-10-CM

## 2025-01-06 DIAGNOSIS — I49.3 PVC'S (PREMATURE VENTRICULAR CONTRACTIONS): ICD-10-CM

## 2025-01-06 DIAGNOSIS — I50.21 ACUTE HEART FAILURE WITH MILDLY REDUCED EJECTION FRACTION (HFMREF, 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): ICD-10-CM

## 2025-01-06 DIAGNOSIS — J98.01 ACUTE BRONCHOSPASM: Primary | ICD-10-CM

## 2025-01-06 PROBLEM — J06.9 URI WITH COUGH AND CONGESTION: Status: ACTIVE | Noted: 2025-01-06

## 2025-01-06 LAB
2HR DELTA HS TROPONIN: 1 NG/L
4HR DELTA HS TROPONIN: 1 NG/L
ANION GAP SERPL CALCULATED.3IONS-SCNC: 9 MMOL/L (ref 4–13)
APTT PPP: 33 SECONDS (ref 23–34)
APTT PPP: 36 SECONDS (ref 23–34)
ATRIAL RATE: 104 BPM
ATRIAL RATE: 92 BPM
ATRIAL RATE: 97 BPM
B PARAP IS1001 DNA NPH QL NAA+NON-PROBE: NOT DETECTED
B PERT.PT PRMT NPH QL NAA+NON-PROBE: NOT DETECTED
BASOPHILS # BLD AUTO: 0.05 THOUSANDS/ΜL (ref 0–0.1)
BASOPHILS NFR BLD AUTO: 0 % (ref 0–1)
BNP SERPL-MCNC: 294 PG/ML (ref 0–100)
BUN SERPL-MCNC: 32 MG/DL (ref 5–25)
C PNEUM DNA NPH QL NAA+NON-PROBE: NOT DETECTED
CALCIUM SERPL-MCNC: 10.2 MG/DL (ref 8.4–10.2)
CARDIAC TROPONIN I PNL SERPL HS: 10 NG/L (ref ?–50)
CARDIAC TROPONIN I PNL SERPL HS: 11 NG/L (ref ?–50)
CARDIAC TROPONIN I PNL SERPL HS: 11 NG/L (ref ?–50)
CHLORIDE SERPL-SCNC: 103 MMOL/L (ref 96–108)
CO2 SERPL-SCNC: 26 MMOL/L (ref 21–32)
CREAT SERPL-MCNC: 1.66 MG/DL (ref 0.6–1.3)
EOSINOPHIL # BLD AUTO: 0.04 THOUSAND/ΜL (ref 0–0.61)
EOSINOPHIL NFR BLD AUTO: 0 % (ref 0–6)
ERYTHROCYTE [DISTWIDTH] IN BLOOD BY AUTOMATED COUNT: 14.6 % (ref 11.6–15.1)
ERYTHROCYTE [DISTWIDTH] IN BLOOD BY AUTOMATED COUNT: 14.6 % (ref 11.6–15.1)
ERYTHROCYTE [DISTWIDTH] IN BLOOD BY AUTOMATED COUNT: 14.7 % (ref 11.6–15.1)
EST. AVERAGE GLUCOSE BLD GHB EST-MCNC: 120 MG/DL
FLUAV AG UPPER RESP QL IA.RAPID: NEGATIVE
FLUAV RNA NPH QL NAA+NON-PROBE: NOT DETECTED
FLUBV AG UPPER RESP QL IA.RAPID: NEGATIVE
FLUBV RNA NPH QL NAA+NON-PROBE: NOT DETECTED
GFR SERPL CREATININE-BSD FRML MDRD: 38 ML/MIN/1.73SQ M
GLUCOSE SERPL-MCNC: 110 MG/DL (ref 65–140)
GLUCOSE SERPL-MCNC: 119 MG/DL (ref 65–140)
GLUCOSE SERPL-MCNC: 175 MG/DL (ref 65–140)
HADV DNA NPH QL NAA+NON-PROBE: NOT DETECTED
HBA1C MFR BLD: 5.8 %
HCOV 229E RNA NPH QL NAA+NON-PROBE: NOT DETECTED
HCOV HKU1 RNA NPH QL NAA+NON-PROBE: NOT DETECTED
HCOV NL63 RNA NPH QL NAA+NON-PROBE: NOT DETECTED
HCOV OC43 RNA NPH QL NAA+NON-PROBE: NOT DETECTED
HCT VFR BLD AUTO: 39.2 % (ref 36.5–49.3)
HCT VFR BLD AUTO: 40.7 % (ref 36.5–49.3)
HCT VFR BLD AUTO: 41 % (ref 36.5–49.3)
HGB BLD-MCNC: 13.1 G/DL (ref 12–17)
HGB BLD-MCNC: 13.8 G/DL (ref 12–17)
HGB BLD-MCNC: 13.8 G/DL (ref 12–17)
HMPV RNA NPH QL NAA+NON-PROBE: NOT DETECTED
HPIV1 RNA NPH QL NAA+NON-PROBE: NOT DETECTED
HPIV2 RNA NPH QL NAA+NON-PROBE: NOT DETECTED
HPIV3 RNA NPH QL NAA+NON-PROBE: NOT DETECTED
HPIV4 RNA NPH QL NAA+NON-PROBE: NOT DETECTED
IMM GRANULOCYTES # BLD AUTO: 0.07 THOUSAND/UL (ref 0–0.2)
IMM GRANULOCYTES NFR BLD AUTO: 1 % (ref 0–2)
INR PPP: 1.08 (ref 0.85–1.19)
INR PPP: 1.1 (ref 0.85–1.19)
INR PPP: 1.12 (ref 0.85–1.19)
LYMPHOCYTES # BLD AUTO: 0.87 THOUSANDS/ΜL (ref 0.6–4.47)
LYMPHOCYTES NFR BLD AUTO: 7 % (ref 14–44)
M PNEUMO DNA NPH QL NAA+NON-PROBE: NOT DETECTED
MCH RBC QN AUTO: 33.7 PG (ref 26.8–34.3)
MCH RBC QN AUTO: 33.7 PG (ref 26.8–34.3)
MCH RBC QN AUTO: 33.9 PG (ref 26.8–34.3)
MCHC RBC AUTO-ENTMCNC: 33.4 G/DL (ref 31.4–37.4)
MCHC RBC AUTO-ENTMCNC: 33.7 G/DL (ref 31.4–37.4)
MCHC RBC AUTO-ENTMCNC: 33.9 G/DL (ref 31.4–37.4)
MCV RBC AUTO: 100 FL (ref 82–98)
MCV RBC AUTO: 100 FL (ref 82–98)
MCV RBC AUTO: 102 FL (ref 82–98)
MONOCYTES # BLD AUTO: 1.78 THOUSAND/ΜL (ref 0.17–1.22)
MONOCYTES NFR BLD AUTO: 15 % (ref 4–12)
NEUTROPHILS # BLD AUTO: 9.33 THOUSANDS/ΜL (ref 1.85–7.62)
NEUTS SEG NFR BLD AUTO: 77 % (ref 43–75)
NRBC BLD AUTO-RTO: 0 /100 WBCS
PLATELET # BLD AUTO: 128 THOUSANDS/UL (ref 149–390)
PLATELET # BLD AUTO: 130 THOUSANDS/UL (ref 149–390)
PLATELET # BLD AUTO: 135 THOUSANDS/UL (ref 149–390)
PMV BLD AUTO: 11.6 FL (ref 8.9–12.7)
PMV BLD AUTO: 11.7 FL (ref 8.9–12.7)
PMV BLD AUTO: 12 FL (ref 8.9–12.7)
POTASSIUM SERPL-SCNC: 4.1 MMOL/L (ref 3.5–5.3)
PROCALCITONIN SERPL-MCNC: 0.07 NG/ML
PROTHROMBIN TIME: 14.2 SECONDS (ref 12.3–15)
PROTHROMBIN TIME: 14.4 SECONDS (ref 12.3–15)
PROTHROMBIN TIME: 14.6 SECONDS (ref 12.3–15)
QRS AXIS: 17 DEGREES
QRS AXIS: 17 DEGREES
QRS AXIS: 18 DEGREES
QRSD INTERVAL: 86 MS
QRSD INTERVAL: 90 MS
QRSD INTERVAL: 90 MS
QT INTERVAL: 326 MS
QT INTERVAL: 340 MS
QT INTERVAL: 348 MS
QTC INTERVAL: 435 MS
QTC INTERVAL: 447 MS
QTC INTERVAL: 449 MS
RBC # BLD AUTO: 3.86 MILLION/UL (ref 3.88–5.62)
RBC # BLD AUTO: 4.09 MILLION/UL (ref 3.88–5.62)
RBC # BLD AUTO: 4.09 MILLION/UL (ref 3.88–5.62)
RSV RNA NPH QL NAA+NON-PROBE: NOT DETECTED
RV+EV RNA NPH QL NAA+NON-PROBE: NOT DETECTED
SARS-COV+SARS-COV-2 AG RESP QL IA.RAPID: NEGATIVE
SARS-COV-2 RNA NPH QL NAA+NON-PROBE: NOT DETECTED
SODIUM SERPL-SCNC: 138 MMOL/L (ref 135–147)
T WAVE AXIS: -29 DEGREES
T WAVE AXIS: 20 DEGREES
T WAVE AXIS: 243 DEGREES
TSH SERPL DL<=0.05 MIU/L-ACNC: 2.58 UIU/ML (ref 0.45–4.5)
VENTRICULAR RATE: 105 BPM
VENTRICULAR RATE: 113 BPM
VENTRICULAR RATE: 94 BPM
WBC # BLD AUTO: 12.14 THOUSAND/UL (ref 4.31–10.16)
WBC # BLD AUTO: 15.48 THOUSAND/UL (ref 4.31–10.16)
WBC # BLD AUTO: 15.78 THOUSAND/UL (ref 4.31–10.16)

## 2025-01-06 PROCEDURE — 0202U NFCT DS 22 TRGT SARS-COV-2: CPT

## 2025-01-06 PROCEDURE — 36415 COLL VENOUS BLD VENIPUNCTURE: CPT | Performed by: PHYSICIAN ASSISTANT

## 2025-01-06 PROCEDURE — 87804 INFLUENZA ASSAY W/OPTIC: CPT | Performed by: PHYSICIAN ASSISTANT

## 2025-01-06 PROCEDURE — 85730 THROMBOPLASTIN TIME PARTIAL: CPT

## 2025-01-06 PROCEDURE — 85610 PROTHROMBIN TIME: CPT

## 2025-01-06 PROCEDURE — 94640 AIRWAY INHALATION TREATMENT: CPT

## 2025-01-06 PROCEDURE — 85610 PROTHROMBIN TIME: CPT | Performed by: PHYSICIAN ASSISTANT

## 2025-01-06 PROCEDURE — 84484 ASSAY OF TROPONIN QUANT: CPT | Performed by: PHYSICIAN ASSISTANT

## 2025-01-06 PROCEDURE — 85027 COMPLETE CBC AUTOMATED: CPT

## 2025-01-06 PROCEDURE — 71045 X-RAY EXAM CHEST 1 VIEW: CPT

## 2025-01-06 PROCEDURE — 85025 COMPLETE CBC W/AUTO DIFF WBC: CPT | Performed by: PHYSICIAN ASSISTANT

## 2025-01-06 PROCEDURE — 84443 ASSAY THYROID STIM HORMONE: CPT

## 2025-01-06 PROCEDURE — 99285 EMERGENCY DEPT VISIT HI MDM: CPT | Performed by: PHYSICIAN ASSISTANT

## 2025-01-06 PROCEDURE — 83036 HEMOGLOBIN GLYCOSYLATED A1C: CPT

## 2025-01-06 PROCEDURE — 99222 1ST HOSP IP/OBS MODERATE 55: CPT

## 2025-01-06 PROCEDURE — 87811 SARS-COV-2 COVID19 W/OPTIC: CPT | Performed by: PHYSICIAN ASSISTANT

## 2025-01-06 PROCEDURE — 85730 THROMBOPLASTIN TIME PARTIAL: CPT | Performed by: PHYSICIAN ASSISTANT

## 2025-01-06 PROCEDURE — 82948 REAGENT STRIP/BLOOD GLUCOSE: CPT

## 2025-01-06 PROCEDURE — 93010 ELECTROCARDIOGRAM REPORT: CPT

## 2025-01-06 PROCEDURE — 84484 ASSAY OF TROPONIN QUANT: CPT

## 2025-01-06 PROCEDURE — 99285 EMERGENCY DEPT VISIT HI MDM: CPT

## 2025-01-06 PROCEDURE — 99205 OFFICE O/P NEW HI 60 MIN: CPT

## 2025-01-06 PROCEDURE — 83880 ASSAY OF NATRIURETIC PEPTIDE: CPT | Performed by: PHYSICIAN ASSISTANT

## 2025-01-06 PROCEDURE — 84145 PROCALCITONIN (PCT): CPT

## 2025-01-06 PROCEDURE — 80048 BASIC METABOLIC PNL TOTAL CA: CPT | Performed by: PHYSICIAN ASSISTANT

## 2025-01-06 PROCEDURE — 93005 ELECTROCARDIOGRAM TRACING: CPT

## 2025-01-06 RX ORDER — HEPARIN SODIUM 10000 [USP'U]/100ML
3-20 INJECTION, SOLUTION INTRAVENOUS
Status: DISPENSED | OUTPATIENT
Start: 2025-01-06 | End: 2025-01-08

## 2025-01-06 RX ORDER — ALBUTEROL SULFATE 90 UG/1
2 INHALANT RESPIRATORY (INHALATION) EVERY 4 HOURS PRN
Status: DISCONTINUED | OUTPATIENT
Start: 2025-01-06 | End: 2025-01-10 | Stop reason: HOSPADM

## 2025-01-06 RX ORDER — FUROSEMIDE 10 MG/ML
40 INJECTION INTRAMUSCULAR; INTRAVENOUS
Status: DISCONTINUED | OUTPATIENT
Start: 2025-01-06 | End: 2025-01-08

## 2025-01-06 RX ORDER — HEPARIN SODIUM 1000 [USP'U]/ML
4000 INJECTION, SOLUTION INTRAVENOUS; SUBCUTANEOUS ONCE
Status: COMPLETED | OUTPATIENT
Start: 2025-01-06 | End: 2025-01-06

## 2025-01-06 RX ORDER — IPRATROPIUM BROMIDE AND ALBUTEROL SULFATE 2.5; .5 MG/3ML; MG/3ML
3 SOLUTION RESPIRATORY (INHALATION) ONCE
Status: COMPLETED | OUTPATIENT
Start: 2025-01-06 | End: 2025-01-06

## 2025-01-06 RX ORDER — HEPARIN SODIUM 1000 [USP'U]/ML
4000 INJECTION, SOLUTION INTRAVENOUS; SUBCUTANEOUS EVERY 6 HOURS PRN
Status: DISCONTINUED | OUTPATIENT
Start: 2025-01-06 | End: 2025-01-10 | Stop reason: HOSPADM

## 2025-01-06 RX ORDER — METOPROLOL TARTRATE 1 MG/ML
5 INJECTION, SOLUTION INTRAVENOUS ONCE
Status: COMPLETED | OUTPATIENT
Start: 2025-01-06 | End: 2025-01-06

## 2025-01-06 RX ORDER — METOPROLOL TARTRATE 1 MG/ML
5 INJECTION, SOLUTION INTRAVENOUS EVERY 4 HOURS PRN
Status: DISCONTINUED | OUTPATIENT
Start: 2025-01-06 | End: 2025-01-10 | Stop reason: HOSPADM

## 2025-01-06 RX ORDER — INSULIN LISPRO 100 [IU]/ML
15 INJECTION, SOLUTION INTRAVENOUS; SUBCUTANEOUS
Status: DISCONTINUED | OUTPATIENT
Start: 2025-01-06 | End: 2025-01-06

## 2025-01-06 RX ORDER — INSULIN LISPRO 100 [IU]/ML
1-5 INJECTION, SOLUTION INTRAVENOUS; SUBCUTANEOUS
Status: DISCONTINUED | OUTPATIENT
Start: 2025-01-06 | End: 2025-01-10 | Stop reason: HOSPADM

## 2025-01-06 RX ORDER — GUAIFENESIN 600 MG/1
600 TABLET, EXTENDED RELEASE ORAL EVERY 12 HOURS SCHEDULED
Status: DISCONTINUED | OUTPATIENT
Start: 2025-01-06 | End: 2025-01-10 | Stop reason: HOSPADM

## 2025-01-06 RX ORDER — INSULIN GLARGINE 100 [IU]/ML
20 INJECTION, SOLUTION SUBCUTANEOUS EVERY MORNING
Status: DISCONTINUED | OUTPATIENT
Start: 2025-01-07 | End: 2025-01-10 | Stop reason: HOSPADM

## 2025-01-06 RX ORDER — INSULIN LISPRO 100 [IU]/ML
10 INJECTION, SOLUTION INTRAVENOUS; SUBCUTANEOUS
Status: DISCONTINUED | OUTPATIENT
Start: 2025-01-07 | End: 2025-01-10 | Stop reason: HOSPADM

## 2025-01-06 RX ORDER — ACETAMINOPHEN 325 MG/1
650 TABLET ORAL EVERY 6 HOURS PRN
Status: DISCONTINUED | OUTPATIENT
Start: 2025-01-06 | End: 2025-01-10 | Stop reason: HOSPADM

## 2025-01-06 RX ORDER — ONDANSETRON 2 MG/ML
4 INJECTION INTRAMUSCULAR; INTRAVENOUS EVERY 6 HOURS PRN
Status: DISCONTINUED | OUTPATIENT
Start: 2025-01-06 | End: 2025-01-10 | Stop reason: HOSPADM

## 2025-01-06 RX ORDER — NITROGLYCERIN 0.4 MG/1
0.4 TABLET SUBLINGUAL ONCE
Status: COMPLETED | OUTPATIENT
Start: 2025-01-06 | End: 2025-01-06

## 2025-01-06 RX ORDER — ASPIRIN 81 MG/1
81 TABLET ORAL DAILY
Status: DISCONTINUED | OUTPATIENT
Start: 2025-01-06 | End: 2025-01-10 | Stop reason: HOSPADM

## 2025-01-06 RX ORDER — BENZONATATE 100 MG/1
100 CAPSULE ORAL 3 TIMES DAILY PRN
Status: DISCONTINUED | OUTPATIENT
Start: 2025-01-06 | End: 2025-01-10 | Stop reason: HOSPADM

## 2025-01-06 RX ORDER — ALLOPURINOL 300 MG/1
300 TABLET ORAL DAILY
Status: DISCONTINUED | OUTPATIENT
Start: 2025-01-06 | End: 2025-01-10 | Stop reason: HOSPADM

## 2025-01-06 RX ORDER — METOPROLOL SUCCINATE 25 MG/1
25 TABLET, EXTENDED RELEASE ORAL 2 TIMES DAILY
Status: DISCONTINUED | OUTPATIENT
Start: 2025-01-06 | End: 2025-01-09

## 2025-01-06 RX ORDER — HEPARIN SODIUM 1000 [USP'U]/ML
2000 INJECTION, SOLUTION INTRAVENOUS; SUBCUTANEOUS EVERY 6 HOURS PRN
Status: DISCONTINUED | OUTPATIENT
Start: 2025-01-06 | End: 2025-01-10 | Stop reason: HOSPADM

## 2025-01-06 RX ORDER — METOPROLOL TARTRATE 25 MG/1
25 TABLET, FILM COATED ORAL EVERY 12 HOURS SCHEDULED
Status: DISCONTINUED | OUTPATIENT
Start: 2025-01-06 | End: 2025-01-06

## 2025-01-06 RX ORDER — FUROSEMIDE 10 MG/ML
40 INJECTION INTRAMUSCULAR; INTRAVENOUS ONCE
Status: DISCONTINUED | OUTPATIENT
Start: 2025-01-06 | End: 2025-01-06

## 2025-01-06 RX ADMIN — FUROSEMIDE 40 MG: 10 INJECTION, SOLUTION INTRAVENOUS at 18:19

## 2025-01-06 RX ADMIN — HEPARIN SODIUM 4000 UNITS: 1000 INJECTION, SOLUTION INTRAVENOUS; SUBCUTANEOUS at 18:19

## 2025-01-06 RX ADMIN — IPRATROPIUM BROMIDE AND ALBUTEROL SULFATE 3 ML: .5; 3 SOLUTION RESPIRATORY (INHALATION) at 14:24

## 2025-01-06 RX ADMIN — HEPARIN SODIUM 11.1 UNITS/KG/HR: 10000 INJECTION, SOLUTION INTRAVENOUS at 18:07

## 2025-01-06 RX ADMIN — GUAIFENESIN 600 MG: 600 TABLET, EXTENDED RELEASE ORAL at 21:22

## 2025-01-06 RX ADMIN — IPRATROPIUM BROMIDE AND ALBUTEROL SULFATE 3 ML: .5; 3 SOLUTION RESPIRATORY (INHALATION) at 13:41

## 2025-01-06 RX ADMIN — METOPROLOL TARTRATE 5 MG: 1 INJECTION, SOLUTION INTRAVENOUS at 15:06

## 2025-01-06 RX ADMIN — ACETAMINOPHEN 650 MG: 325 TABLET, FILM COATED ORAL at 23:02

## 2025-01-06 RX ADMIN — NITROGLYCERIN 0.4 MG: 0.4 TABLET SUBLINGUAL at 14:24

## 2025-01-06 RX ADMIN — NITROGLYCERIN 0.4 MG: 0.4 TABLET SUBLINGUAL at 13:39

## 2025-01-06 RX ADMIN — METOPROLOL SUCCINATE 25 MG: 25 TABLET, EXTENDED RELEASE ORAL at 21:22

## 2025-01-06 NOTE — CONSULTS
Consult - Cardiology   Irineo Rawls 79 y.o. male MRN: [id number]  Unit/Bed#: ED-27 Encounter: [id number]        Reason For Consult:  Atrial Fibrillation                 Assessment:  New atrial fibrillation: Slightly accelerated ventricular rate present on arrival   - Variable subjective recognition of heart rate and rhythm  Essential hypertension   - O/p Rx: nifedipine extended release 60 mg daily  Valvular heart disease   - Exam findings suggestive of TR and MR  Diabetes mellitus  CKD  KALIA: CPAP compliant  Obesity: BMI 41    Discussion / Plan:  # Patient presents with evidence of new atrial fibrillation with somewhat increased ventricular rates present on arrival  # Current findings suggesting an element of heart failure which is likely instigated by his tachyarrhythmia on a background of valvular heart disease and with concern for possible cardiomyopathy              The natural history of atrial fibrillation, likelihood of future recurrence, risk factors including structural heart disease, alcohol, KALIA, and obesity were discussed, as were therapeutic options.  Check echocardiogram and TSH  With [id number] score of at least 3 anticoagulation is indicated ~~> patient initiated on Eliquis with agreement for same though will have to assess affordability with the patient and his wife suspecting this may be cost prohibitive noting his wife takes warfarin  We will initiate the patient on beta-blocker ~~> begin metoprolol succinate 25 mg twice daily  Suggest IV Lasix following exam, symptoms, I/O, BP, and standing weight  Once euvolemic, and if still in atrial fibrillation, could give consideration to DAVID guided cardioversion        History Of Present Illness:  Irineo Rawls is an 79-year-old without prior care by cardiologist.  His medical problems are highlighted assessment above noting a history of sleep apnea for which he reports full CPAP compliance.  He has limited consumption of alcohol and had quit  smoking in 2001.  At his baseline he is somewhat sedentary but suggests that he can walk 50 yards or carry laundry basket up a flight of steps.    Yesterday afternoon the patient developed some vague feelings of dyspnea and some odd sense of chest discomfort which she struggles to characterize describing these as a feeling of congestion.  He had no cough, fever, chills, pharyngitis or rhinitis but thinking he may be developing a cold he applied some Vicks to his chest and went to relax in his recliner.  He later went to bed reporting no obvious orthopnea or PND but that he slept poorly.  This morning still feeling more fatigued than usual with some dyspnea he came to the ED to be evaluated.  Patient presented in atrial fibrillation with a ventricular rate around 100.  This is a new problem for him and for which we are asked to see him      Past Medical History:        Past Medical History:   Diagnosis Date    Acute renal failure superimposed on stage 3 chronic kidney disease (Formerly [facility]) 02/25/2023    Acute renal failure superimposed on stage 3b chronic kidney disease, unspecified acute renal failure type ([facility]ly [facility]) 12/20/2012    MATILDE (acute kidney injury) (Formerly [facility])     Chronic kidney disease     CPAP (continuous positive airway pressure) dependence     Gout     Hearing aid worn     sabrina    Hernia, inguinal     RIH and umbilical hernia    Hypertension     Metabolic acidosis     Morbidly obese ([facility])     Rib fracture 01/05/2024    Sleep apnea     Type 2 diabetes mellitus with hyperglycemia, with long-term current use of insulin ([facility]) 01/29/2016    Wears glasses       Past Surgical History:   Procedure Laterality Date    CARPAL TUNNEL RELEASE Bilateral     COLONOSCOPY N/A 1/6/2017    Procedure: COLONOSCOPY;  Surgeon: João Perez MD;  Location: [facility];  Service:     CYST REMOVAL      FOOT SURGERY      HERNIA REPAIR      JOINT REPLACEMENT      knee left     ME LAPAROSCOPY SURG RPR INITIAL INGUINAL HERNIA Right 3/16/2017    Procedure:  REPAIR HERNIA INGUINAL, LAPAROSCOPIC WITH MESH;  Surgeon: Ortiz Salcedo MD;  Location: [facility];  Service: General    MO RPR UMBILICAL HRNA 5 YRS/> REDUCIBLE N/A 3/16/2017    Procedure: OPEN REPAIR HERNIA UMBILICAL;  Surgeon: Ortiz Salcedo MD;  Location: [facility];  Service: General    TONSILLECTOMY          Allergy:        No Known Allergies    Medications:       Prior to Admission medications    Medication Sig Start Date End Date Taking? Authorizing Provider   albuterol (ProAir HFA) 90 mcg/act inhaler Inhale 2 puffs every 6 (six) hours as needed for wheezing 12/29/23  Yes Ange Murdock PA-C   allopurinol (ZYLOPRIM) 300 mg tablet Take 1 tablet (300 mg total) by mouth daily 11/7/24  Yes Obi Esquivel DO   aspirin 81 MG tablet Take 81 mg by mouth daily   Yes Historical Provider, MD   Cholecalciferol (VITAMIN D3) 5000 units TABS Take 1 tablet by mouth daily   Yes Historical Provider, MD   insulin aspart (NovoLOG FlexPen) 100 UNIT/ML injection pen Inject 15 Units under the skin 3 (three) times a day with meals 11/12/24  Yes Obi Esquivel DO   Levemir FlexPen 100 units/mL injection pen Inject 40 Units under the skin daily 8/5/24  Yes Obi Esquivel DO   NIFEdipine (PROCARDIA XL) 60 mg 24 hr tablet Take 1 tablet (60 mg total) by mouth daily 11/7/24  Yes Oib Esquivel DO   Omega-3 Fatty Acids (FISH OIL) 1200 MG CAPS Take 1 capsule by mouth 3 (three) times a day   Yes Historical Provider, MD   Alcohol Swabs 70 % PADS May substitute brand based on insurance coverage. Check glucose ACHS. 1/8/24   Venancio Martínez MD   Blood Glucose Monitoring Suppl (OneTouch Verio Reflect) w/Device KIT May substitute brand based on insurance coverage. Check glucose ACHS. 1/8/24   Venancio Martínez MD   Continuous Blood Gluc  (FreeStyle Peter 2 [place]) MONISHA Check blood sugars multiple times per day 1/10/24   Obi Esquivel DO   Continuous Blood Gluc Sensor (FreeStyle Peter 2 Sensor) MISC Check blood sugars  multiple times per day 1/10/24   Obi Esquivel DO   glucose blood (OneTouch Verio) test strip Check glucose before meals and at bedtime. 24   Obi Esquivel DO   glucose blood (OneTouch Verio) test strip Check blood sugars three times daily. Please substitute with appropriate alternative as covered by patient's insurance. Dx: E11.65 24   Obi Esquivel DO   Insulin Pen Needle (Unifine Pentips) 32G X 4 MM MISC For use with insulin pen 4 times daily. 24   Obi Esquivel DO   Lancets (OneTouch Delica Plus Isocmq88S) MISC Check glucose before meals and at bedtime 24   Obi Esquivel DO       Family History:     Family History   Problem Relation Age of Onset    No Known Problems Mother     Diabetes Father     Diabetes Sister         Social History:       Social History     Socioeconomic History    Marital status: /Civil Union     Spouse name: None    Number of children: None    Years of education: None    Highest education level: None   Occupational History    None   Tobacco Use    Smoking status: Former     Current packs/day: 0.00     Types: Cigarettes     Quit date: 3/10/2003     Years since quittin.8    Smokeless tobacco: Never   Vaping Use    Vaping status: Never Used   Substance and Sexual Activity    Alcohol use: Never    Drug use: No    Sexual activity: Yes     Partners: Female   Other Topics Concern    None   Social History Narrative    Always uses seat belt    Feels safe at home     Social Drivers of Health     Financial Resource Strain: Low Risk  (2023)    Overall Financial Resource Strain (CARDIA)     Difficulty of Paying Living Expenses: Not hard at all   Food Insecurity: No Food Insecurity (2024)    Nursing - Inadequate Food Risk Classification     Worried About Running Out of Food in the Last Year: Never true     Ran Out of Food in the Last Year: Never true     Ran Out of Food in the Last Year: Not on file   Transportation Needs: No Transportation  Needs (9/17/2024)    PRAPARE - Transportation     Lack of Transportation (Medical): No     Lack of Transportation (Non-Medical): No   Physical Activity: Not on file   Stress: Not on file   Social Connections: Not on file   Intimate Partner Violence: Not on file   Housing Stability: Low Risk  (9/17/2024)    Housing Stability Vital Sign     Unable to Pay for Housing in the Last Year: No     Number of Times Moved in the Last Year: 1     Homeless in the Last Year: No       ROS:  Symptoms per HPI  Patient reports full compliance with CPAP  Occasional social consumption of alcohol  Remote smoker quitting in 2001  Mild chronic lower extremity edema  The remainder of the review of systems is negative    Exam:  General:  Alert, normally conversant, comfortable appearing  Head: Normocephalic, atraumatic.  Eyes:  EOMI. Pupils - equal, round, reactive to accomodation.  No icterus.  Normal Conjunctiva.   Oropharynx: Moist without lesion  Neck:  No gross bruit, thyromegaly, or lymphadenopathy  Heart: Distant heart tones, irregular with increased rate.  WALT at mid left sternal border radiating to the apex  Lungs: Moderate excursion air exchange with bibasilar rales  Abdomen:  Soft and nontender with normal bowel sounds. No organomegaly or mass  Lower Limbs: 1+ edema  Pulses:  RLE - DP:  1+                LLE - DP:  1+  Musculoskeletal: Independent movement of limbs observed, Formal ROM and strength eval not performed  Neurologic:    Oriented to: person, place, situation.     Cranial Nerves: grossly intact - vision, smell, taste, and hearing were not tested.     Motor function: grossly normal, symmetric   Sensation: Was not tested      Vitals:    01/06/25 1345 01/06/25 1425 01/06/25 1430 01/06/25 1445   BP: 156/85  162/72 151/74   BP Location:    Right arm   Pulse: (!) 107  (!) 106 (!) 109   Resp: 22  (!) 24 22   Temp:       SpO2:   94% 95%   Weight:  119 kg (262 lb 9.1 oz)             DATA:      ECG:                      -----------------------------------------------------------------------------------------------------------------------------------------------  Weights:    Wt Readings from Last 20 Encounters:   01/06/25 119 kg (262 lb 9.1 oz)   09/18/24 114 kg (252 lb)   09/17/24 116 kg (254 lb 12.8 oz)   03/19/24 116 kg (256 lb)   03/18/24 116 kg (255 lb 12.8 oz)   01/10/24 108 kg (237 lb 6.4 oz)   01/04/24 107 kg (234 lb 12.6 oz)   11/16/23 122 kg (269 lb)   09/12/23 121 kg (266 lb 6.4 oz)   03/07/23 130 kg (286 lb 9.6 oz)   02/24/23 123 kg (271 lb 2.7 oz)   01/25/23 121 kg (267 lb)   08/26/22 125 kg (275 lb)   02/18/22 126 kg (277 lb)   08/20/21 123 kg (271 lb)   04/14/21 118 kg (260 lb)   02/05/21 120 kg (265 lb)   10/07/20 121 kg (266 lb)   07/31/20 116 kg (256 lb 3.2 oz)   01/24/20 121 kg (266 lb 12.8 oz)   , Body mass index is 41.12 kg/m².         Lab Studies:               Results from last 7 days   Lab Units 01/06/25  1322   WBC Thousand/uL 12.14*   HEMOGLOBIN g/dL 13.8   HEMATOCRIT % 40.7   PLATELETS Thousands/uL 135*   ,   Results from last 7 days   Lab Units 01/06/25  1322   POTASSIUM mmol/L 4.1   CHLORIDE mmol/L 103   CO2 mmol/L 26   BUN mg/dL 32*   CREATININE mg/dL 1.66*   CALCIUM mg/dL 10.2

## 2025-01-06 NOTE — ASSESSMENT & PLAN NOTE
Presenting to the ED with chest discomfort and SOB  SIRS criteria met w/ tachycardia, tachypnea, and leukocytosis  Tachycardia and tachypnea in the setting of new onset A-fib  Leukocytosis likely reactive  Reports cough that started on Saturday that has progressed   COVID/flu antigen negative  CXR in ED: limited by low lung volumes. no definite acute pulmonary process   RP 2 pending   Supportive care   Monitor off abx at this time   Continue to monitor fever curve and CBC for worsening leukocytosis

## 2025-01-06 NOTE — ED PROVIDER NOTES
Time reflects when diagnosis was documented in both MDM as applicable and the Disposition within this note       Time User Action Codes Description Comment    1/6/2025  2:40 PM Mallory Dawn [J98.01] Acute bronchospasm     1/6/2025  2:40 PM Mallory Dawn [R07.9] Chest pain     1/6/2025  2:42 PM Mallory Dawn [I49.3] PVC's (premature ventricular contractions)     1/6/2025  2:42 PM Mallory Dawn [I48.91] Atrial fibrillation (HCC)           ED Disposition       ED Disposition   Admit    Condition   Stable    Date/Time   Mon Jan 6, 2025  2:41 PM    Comment   Case was discussed with zhanna and the patient's admission status was agreed to be Admission Status: observation status to the service of Dr. chao .               Assessment & Plan       Medical Decision Making  Will get EKG and troponin for ACS/arrhythmia patient in A-fib this is new for patient.  Initially rate controlled however gradually creeps up we will start on medicine to help improve patient's heart rate.  Discussed with internal medicine.  Chest x-ray for pneumonia/volume overload.  No significant sign of CHF on chest x-ray no history of CHF.  No sign of pneumonia no bacterial source of infection.  Suspect viral cause of the bronchospasm will treat.  Patient having frequent PVCs discussed with internal medicine high heart score will admit patient symptoms improved after 3 nitro tablets    Amount and/or Complexity of Data Reviewed  Independent Historian: spouse  External Data Reviewed: labs, radiology, ECG and notes.  Labs: ordered. Decision-making details documented in ED Course.  Radiology: ordered and independent interpretation performed.  ECG/medicine tests: ordered and independent interpretation performed.  Discussion of management or test interpretation with external provider(s): Discussed with dr tomlin  Will admit  Discussed with dr chao     Risk  Prescription drug management.  Decision regarding hospitalization.        ED  Course as of 01/06/25 1652   Mon Jan 06, 2025   1407 hs TnI 0hr: 10   1407 Creatinine(!): 1.66  Ckd- stable   1413 hs TnI 0hr: 10  Unchanged    1414 SARS COV Rapid Antigen: Negative  negative   1414 Influenza A Rapid Antigen: Negative  negative       Medications   allopurinol (ZYLOPRIM) tablet 300 mg (has no administration in time range)   aspirin (ECOTRIN LOW STRENGTH) EC tablet 81 mg (has no administration in time range)   insulin lispro (HumALOG/ADMELOG) 100 units/mL subcutaneous injection 15 Units (has no administration in time range)   insulin glargine (LANTUS) subcutaneous injection 20 Units 0.2 mL (has no administration in time range)   acetaminophen (TYLENOL) tablet 650 mg (has no administration in time range)   ondansetron (ZOFRAN) injection 4 mg (has no administration in time range)   apixaban (ELIQUIS) tablet 2.5 mg (has no administration in time range)   insulin lispro (HumALOG/ADMELOG) 100 units/mL subcutaneous injection 1-5 Units (has no administration in time range)   insulin lispro (HumALOG/ADMELOG) 100 units/mL subcutaneous injection 1-5 Units (has no administration in time range)   metoprolol tartrate (LOPRESSOR) tablet 25 mg (has no administration in time range)   benzonatate (TESSALON PERLES) capsule 100 mg (has no administration in time range)   guaiFENesin (MUCINEX) 12 hr tablet 600 mg (has no administration in time range)   albuterol (PROVENTIL HFA,VENTOLIN HFA) inhaler 2 puff (has no administration in time range)   nitroglycerin (NITROSTAT) SL tablet 0.4 mg (0.4 mg Sublingual Given 1/6/25 1339)   ipratropium-albuterol (DUO-NEB) 0.5-2.5 mg/3 mL inhalation solution 3 mL (3 mL Nebulization Given 1/6/25 1341)   ipratropium-albuterol (DUO-NEB) 0.5-2.5 mg/3 mL inhalation solution 3 mL (3 mL Nebulization Given 1/6/25 1424)   nitroglycerin (NITROSTAT) SL tablet 0.4 mg (0.4 mg Sublingual Given 1/6/25 1424)   metoprolol (LOPRESSOR) injection 5 mg (5 mg Intravenous Given 1/6/25 1506)       ED Risk Strat  Scores   HEART Risk Score      Flowsheet Row Most Recent Value   Heart Score Risk Calculator    History 2 Filed at: 01/06/2025 1434   ECG 1 Filed at: 01/06/2025 1434   Age 2 Filed at: 01/06/2025 1434   Risk Factors 2 Filed at: 01/06/2025 1434   Troponin 0 Filed at: 01/06/2025 1434   HEART Score 7 Filed at: 01/06/2025 1434          HEART Risk Score      Flowsheet Row Most Recent Value   Heart Score Risk Calculator    History 2 Filed at: 01/06/2025 1434   ECG 1 Filed at: 01/06/2025 1434   Age 2 Filed at: 01/06/2025 1434   Risk Factors 2 Filed at: 01/06/2025 1434   Troponin 0 Filed at: 01/06/2025 1434   HEART Score 7 Filed at: 01/06/2025 1434                            SBIRT 22yo+      Flowsheet Row Most Recent Value   Initial Alcohol Screen: US AUDIT-C     1. How often do you have a drink containing alcohol? 0 Filed at: 01/06/2025 1400   2. How many drinks containing alcohol do you have on a typical day you are drinking?  0 Filed at: 01/06/2025 1400   3a. Male UNDER 65: How often do you have five or more drinks on one occasion? 0 Filed at: 01/06/2025 1400   3b. FEMALE Any Age, or MALE 65+: How often do you have 4 or more drinks on one occassion? 0 Filed at: 01/06/2025 1400   Audit-C Score 0 Filed at: 01/06/2025 1400   [place]: How many times in the past year have you...    Used an illegal drug or used a prescription medication for non-medical reasons? Never Filed at: 01/06/2025 1400                            History of Present Illness       Chief Complaint   Patient presents with    Chest Pain     Reports CP and worsening SOB since last night, given 324 asa and 1nitro by ems with relief       Past Medical History:   Diagnosis Date    Acute renal failure superimposed on stage 3 chronic kidney disease (HCC) 02/25/2023    Acute renal failure superimposed on stage 3b chronic kidney disease, unspecified acute renal failure type (HCC) 12/20/2012    MATILDE (acute kidney injury) (HCC)     Chronic kidney disease     CPAP  (continuous positive airway pressure) dependence     Gout     Hearing aid worn     sabrina    Hernia, inguinal     RIH and umbilical hernia    Hypertension     Metabolic acidosis     Morbidly obese (HCC)     Rib fracture 2024    Sleep apnea     Type 2 diabetes mellitus with hyperglycemia, with long-term current use of insulin (HCC) 2016    Wears glasses       Past Surgical History:   Procedure Laterality Date    CARPAL TUNNEL RELEASE Bilateral     COLONOSCOPY N/A 2017    Procedure: COLONOSCOPY;  Surgeon: João Perez MD;  Location: [facility];  Service:     CYST REMOVAL      FOOT SURGERY      HERNIA REPAIR      JOINT REPLACEMENT      knee left     NV LAPAROSCOPY SURG RPR INITIAL INGUINAL HERNIA Right 3/16/2017    Procedure: REPAIR HERNIA INGUINAL, LAPAROSCOPIC WITH MESH;  Surgeon: Ortiz Salcedo MD;  Location: [facility];  Service: General    NV RPR UMBILICAL HRNA 5 YRS/> REDUCIBLE N/A 3/16/2017    Procedure: OPEN REPAIR HERNIA UMBILICAL;  Surgeon: Ortiz Salcedo MD;  Location: [facility];  Service: General    TONSILLECTOMY        Family History   Problem Relation Age of Onset    No Known Problems Mother     Diabetes Father     Diabetes Sister       Social History     Tobacco Use    Smoking status: Former     Current packs/day: 0.00     Types: Cigarettes     Quit date: 3/10/2003     Years since quittin.8    Smokeless tobacco: Never   Vaping Use    Vaping status: Never Used   Substance Use Topics    Alcohol use: Never    Drug use: No      E-Cigarette/Vaping    E-Cigarette Use Never User       E-Cigarette/Vaping Substances    Nicotine No     THC No     CBD No     Flavoring No     Other No     Unknown No       I have reviewed and agree with the history as documented.     Patient presents emergency department with chest pain states started last night with a heavy sensation to his chest he thought maybe he was getting a head cold this morning he took NyQuil and took a nap but then he still was having  the chest pressure and called EMS.  Nitro and aspirin gave some relief of the chest pressure but it returned.  Patient also reporting shortness of breath and chest tightness.  Patient is wheezing on exam.  Patient denies history of asthma or COPD however he does have a prescription for albuterol.  Patient's wife states that he has needed the albuterol in the past but nothing recently.  Leg swelling or calf tenderness.        Review of Systems   Constitutional:  Negative for fever.   HENT:  Positive for congestion.    Respiratory:  Positive for cough, shortness of breath and wheezing.    Cardiovascular:  Positive for chest pain. Negative for palpitations and leg swelling.   Gastrointestinal: Negative.    All other systems reviewed and are negative.          Objective       ED Triage Vitals   Temperature Pulse Blood Pressure Respirations SpO2 Patient Position - Orthostatic VS   01/06/25 1245 01/06/25 1244 01/06/25 1300 01/06/25 1244 01/06/25 1244 01/06/25 1445   98.4 °F (36.9 °C) 100 162/77 22 97 % Sitting      Temp src Heart Rate Source BP Location FiO2 (%) Pain Score    -- 01/06/25 1244 01/06/25 1445 -- --     Monitor Right arm        Vitals      Date and Time Temp Pulse SpO2 Resp BP Pain Score FACES Pain Rating User   01/06/25 1445 -- 109 95 % 22 151/74 -- -- KD   01/06/25 1430 -- 106 94 % 24 162/72 -- -- KD   01/06/25 1345 -- 107 -- 22 156/85 -- -- KD   01/06/25 1338 -- -- -- -- 137/72 -- -- SHELLI   01/06/25 1300 -- 94 -- 23 162/77 -- -- KD   01/06/25 1245 98.4 °F (36.9 °C) -- -- -- -- -- -- KD   01/06/25 1244 -- 100 97 % 22 -- -- -- KD            Physical Exam  Vitals and nursing note reviewed.   Constitutional:       Appearance: He is well-developed.   HENT:      Head: Normocephalic and atraumatic.      Right Ear: Tympanic membrane, ear canal and external ear normal.      Left Ear: Tympanic membrane, ear canal and external ear normal.   Eyes:      Conjunctiva/sclera: Conjunctivae normal.   Cardiovascular:      Rate  and Rhythm: Normal rate. Rhythm irregular.      Heart sounds: Normal heart sounds.   Pulmonary:      Effort: Pulmonary effort is normal.      Breath sounds: Wheezing present.      Comments: Reproducible chest tenderness on exam.  Patient is wheezing.  Given breathing treatment which improved breath sounds and symptoms wheezing resolved with second breathing treatment.  Chest:      Chest wall: Tenderness present.   Abdominal:      General: Bowel sounds are normal.      Palpations: Abdomen is soft.   Musculoskeletal:         General: Normal range of motion.      Cervical back: Normal range of motion and neck supple.   Lymphadenopathy:      Cervical: No cervical adenopathy.   Skin:     General: Skin is warm.      Findings: No rash.   Neurological:      Mental Status: He is alert and oriented to person, place, and time.      Motor: No abnormal muscle tone.      Coordination: Coordination normal.   Psychiatric:         Mood and Affect: Mood normal.         Behavior: Behavior normal.         Results Reviewed       Procedure Component Value Units Date/Time    Procalcitonin [[id number]]     Lab Status: No result Specimen: Blood     Hemoglobin A1c w/EAG Estimation (Prechecked if no A1C within 90 days) [[id number]]     Lab Status: No result Specimen: Blood     TSH, 3rd generation [[id number]]     Lab Status: No result Specimen: Blood     CBC [[id number]]     Lab Status: No result Specimen: Blood     Protime-INR [[id number]]     Lab Status: No result Specimen: Blood     HS Troponin I 2hr [[id number]]  (Normal) Collected: 01/06/25 1512    Lab Status: Final result Specimen: Blood from Arm, Right Updated: 01/06/25 1543     hs TnI 2hr 11 ng/L      Delta 2hr hsTnI 1 ng/L     HS Troponin I 4hr [[id number]]     Lab Status: No result Specimen: Blood     Respiratory Panel 2.1(RP2)with COVID19 [[id number]] Collected: 01/06/25 1503    Lab Status: In process Specimen: Nasopharyngeal Swab Updated: 01/06/25 1518    B-Type Natriuretic Peptide(BNP)  [[id number]]  (Abnormal) Collected: 01/06/25 1322    Lab Status: Final result Specimen: Blood from Arm, Right Updated: 01/06/25 1410      pg/mL     Basic metabolic panel [[id number]]  (Abnormal) Collected: 01/06/25 1322    Lab Status: Final result Specimen: Blood from Arm, Right Updated: 01/06/25 1405     Sodium 138 mmol/L      Potassium 4.1 mmol/L      Chloride 103 mmol/L      CO2 26 mmol/L      ANION GAP 9 mmol/L      BUN 32 mg/dL      Creatinine 1.66 mg/dL      Glucose 119 mg/dL      Calcium 10.2 mg/dL      eGFR 38 ml/min/1.73sq m     Narrative:      National Kid[employer] Disease Foundation guidelines for Chronic Kidney Disease (CKD):     Stage 1 with normal or high GFR (GFR > 90 mL/min/1.73 square meters)    Stage 2 Mild CKD (GFR = 60-89 mL/min/1.73 square meters)    Stage 3A Moderate CKD (GFR = 45-59 mL/min/1.73 square meters)    Stage 3B Moderate CKD (GFR = 30-44 mL/min/1.73 square meters)    Stage 4 Severe CKD (GFR = 15-29 mL/min/1.73 square meters)    Stage 5 End Stage CKD (GFR <15 mL/min/1.73 square meters)  Note: GFR calculation is accurate only with a steady state creatinine    FLU/COVID Rapid Antigen (30 min. TAT) - Preferred screening test in ED [[id number]]  (Normal) Collected: 01/06/25 1342    Lab Status: Final result Specimen: Nares from Nose Updated: 01/06/25 1404     SARS COV Rapid Antigen Negative     Influenza A Rapid Antigen Negative     Influenza B Rapid Antigen Negative    Narrative:      This test has been performed using the Quidel Patti 2 FLU+SARS Antigen test under the Emergency Use Authorization (EUA). This test has been validated by the  and verified by the performing laboratory. The Patti uses lateral flow immunofluorescent sandwich assay to detect SARS-COV, Influenza A and Influenza B Antigen.     The Quidel Patti 2 SARS Antigen test does not differentiate between SARS-CoV and SARS-CoV-2.     Negative results are presumptive and may be confirmed with a molecular assay, if  necessary, for patient management. Negative results do not rule out SARS-CoV-2 or influenza infection and should not be used as the sole basis for treatment or patient management decisions. A negative test result may occur if the level of antigen in a sample is below the limit of detection of this test.     Positive results are indicative of the presence of viral antigens, but do not rule out bacterial infection or co-infection with other viruses.     All test results should be used as an adjunct to clinical observations and other information available to the provider.    FOR PEDIATRIC PATIENTS - copy/paste COVID Guidelines URL to browser: [email or web address]    HS Troponin 0hr (reflex protocol) [[id number]]  (Normal) Collected: 01/06/25 1322    Lab Status: Final result Specimen: Blood from Arm, Right Updated: 01/06/25 1404     hs TnI 0hr 10 ng/L     CBC and differential [[id number]]  (Abnormal) Collected: 01/06/25 1322    Lab Status: Final result Specimen: Blood from Arm, Right Updated: 01/06/25 1401     WBC 12.14 Thousand/uL      RBC 4.09 Million/uL      Hemoglobin 13.8 g/dL      Hematocrit 40.7 %       fL      MCH 33.7 pg      MCHC 33.9 g/dL      RDW 14.6 %      MPV 12.0 fL      Platelets 135 Thousands/uL      nRBC 0 /100 WBCs      Segmented % 77 %      Immature Grans % 1 %      Lymphocytes % 7 %      Monocytes % 15 %      Eosinophils Relative 0 %      Basophils Relative 0 %      Absolute Neutrophils 9.33 Thousands/µL      Absolute Immature Grans 0.07 Thousand/uL      Absolute Lymphocytes 0.87 Thousands/µL      Absolute Monocytes 1.78 Thousand/µL      Eosinophils Absolute 0.04 Thousand/µL      Basophils Absolute 0.05 Thousands/µL     Protime-INR [[id number]]  (Normal) Collected: 01/06/25 1322    Lab Status: Final result Specimen: Blood from Arm, Right Updated: 01/06/25 1400     Protime 14.2 seconds      INR 1.08    Narrative:      INR Therapeutic  Range    Indication                                             INR Range      Atrial Fibrillation                                               2.0-3.0  Hypercoagulable State                                    2.0.2.3  Left Ventricular Asist Device                            2.0-3.0  Mechanical Heart Valve                                  -    Aortic(with afib, MI, embolism, HF, LA enlargement,    and/or coagulopathy)                                     2.0-3.0 (2.5-3.5)     Mitral                                                             2.5-3.5  Prosthetic/Bioprosthetic Heart Valve               2.0-3.0  Venous thromboembolism (VTE: VT, PE        2.0-3.0    APTT [[id number]]  (Normal) Collected: 01/06/25 1322    Lab Status: Final result Specimen: Blood from Arm, Right Updated: 01/06/25 1400     PTT 33 seconds             XR chest 1 view portable   ED Interpretation by Mallory Dawn PA-C (01/06 2399)   No consolidation       Final Interpretation by Vicenta Wise MD (01/06 4210)      Limited by low lung volumes. No definite acute pulmonary process.   In the setting of clinically suspected/proven COVID-19, this plain film appearance does not contain findings that raise concern for viral pneumonia such as COVID-19, but does not rule out this diagnosis.         Workstation performed: [id number]             ECG 12 Lead Documentation Only    Date/Time: 1/6/2025 1:49 PM    Performed by: Mallory Dawn PA-C  Authorized by: Mallory Dawn PA-C    Indications / Diagnosis:  Cp  ECG reviewed by me, the ED Provider: yes    Patient location:  ED  Previous ECG:     Previous ECG:  Compared to current    Comparison ECG info:  Jan 4, 2024    Similarity:  Changes noted  Rate:     ECG rate:  105    ECG rate assessment: tachycardic    Rhythm:     Rhythm: atrial fibrillation    Ectopy:     Ectopy: PVCs      PVCs:  Frequent  QRS:     QRS axis:  Normal  Conduction:     Conduction: normal    ST segments:     ST segments:   Normal  T waves:     T waves: normal    ECG 12 Lead Documentation Only    Date/Time: 1/6/2025 4:51 PM    Performed by: Mallory Dawn PA-C  Authorized by: Mallory Dawn PA-C    Indications / Diagnosis:  Frequent pvcs  ECG reviewed by me, the ED Provider: yes    Patient location:  ED  Previous ECG:     Previous ECG:  Compared to current    Comparison ECG info:  Repeat    Similarity:  No change  Rate:     ECG rate:  94    ECG rate assessment: normal    Rhythm:     Rhythm: atrial fibrillation    Ectopy:     Ectopy: PVCs      PVCs:  Infrequent  QRS:     QRS axis:  Normal  Conduction:     Conduction: normal    ST segments:     ST segments:  Normal  T waves:     T waves: normal        ED Medication and Procedure Management   Prior to Admission Medications   Prescriptions Last Dose Informant Patient Reported? Taking?   Alcohol Swabs 70 % PADS   No No   Sig: May substitute brand based on insurance coverage. Check glucose ACHS.   Blood Glucose Monitoring Suppl (OneTouch Verio Reflect) w/Device KIT   No No   Sig: May substitute brand based on insurance coverage. Check glucose ACHS.   Cholecalciferol (VITAMIN D3) 5000 units TABS  Self Yes Yes   Sig: Take 1 tablet by mouth daily   Continuous Blood Gluc  (FreeStyle Peter 2 [place]) MONISHA   No No   Sig: Check blood sugars multiple times per day   Continuous Blood Gluc Sensor (FreeStyle Peter 2 Sensor) MISC   No No   Sig: Check blood sugars multiple times per day   Insulin Pen Needle (Unifine Pentips) 32G X 4 MM MISC   No No   Sig: For use with insulin pen 4 times daily.   Lancets (OneTouch Delica Plus [id number]) MISC   No No   Sig: Check glucose before meals and at bedtime   Levemir FlexPen 100 units/mL injection pen   No Yes   Sig: Inject 40 Units under the skin daily   NIFEdipine (PROCARDIA XL) 60 mg 24 hr tablet   No Yes   Sig: Take 1 tablet (60 mg total) by mouth daily   Omega-3 Fatty Acids (FISH OIL) 1200 MG CAPS  Self Yes Yes   Sig: Take 1 capsule by mouth 3  (three) times a day   albuterol (ProAir HFA) 90 mcg/act inhaler   No Yes   Sig: Inhale 2 puffs every 6 (six) hours as needed for wheezing   allopurinol (ZYLOPRIM) 300 mg tablet   No Yes   Sig: Take 1 tablet (300 mg total) by mouth daily   aspirin 81 MG tablet  Self Yes Yes   Sig: Take 81 mg by mouth daily   glucose blood (OneTouch Verio) test strip   No No   Sig: Check glucose before meals and at bedtime.   glucose blood (OneTouch Verio) test strip   No No   Sig: Check blood sugars three times daily. Please substitute with appropriate alternative as covered by patient's insurance. Dx: E11.65   insulin aspart (NovoLOG FlexPen) 100 UNIT/ML injection pen   No Yes   Sig: Inject 15 Units under the skin 3 (three) times a day with meals      Facility-Administered Medications: None     Current Discharge Medication List        CONTINUE these medications which have NOT CHANGED    Details   albuterol (ProAir HFA) 90 mcg/act inhaler Inhale 2 puffs every 6 (six) hours as needed for wheezing  Qty: 8.5 g, Refills: 0    Comments: Substitution to a formulary equivalent within the same pharmaceutical class is authorized.  Associated Diagnoses: Acute cough; Acute bronchitis, unspecified organism      allopurinol (ZYLOPRIM) 300 mg tablet Take 1 tablet (300 mg total) by mouth daily  Qty: 90 tablet, Refills: 1    Associated Diagnoses: Hyperuricemia      aspirin 81 MG tablet Take 81 mg by mouth daily      Cholecalciferol (VITAMIN D3) 5000 units TABS Take 1 tablet by mouth daily      insulin aspart (NovoLOG FlexPen) 100 UNIT/ML injection pen Inject 15 Units under the skin 3 (three) times a day with meals  Qty: 90 mL, Refills: 0    Associated Diagnoses: Type 2 diabetes mellitus with hyperglycemia, unspecified whether long term insulin use (HCC)      Levemir FlexPen 100 units/mL injection pen Inject 40 Units under the skin daily  Qty: 45 mL, Refills: 1    Associated Diagnoses: Type 2 diabetes mellitus (HCC)      NIFEdipine (PROCARDIA XL) 60  mg 24 hr tablet Take 1 tablet (60 mg total) by mouth daily  Qty: 90 tablet, Refills: 1    Associated Diagnoses: Essential hypertension      Omega-3 Fatty Acids (FISH OIL) 1200 MG CAPS Take 1 capsule by mouth 3 (three) times a day      Alcohol Swabs 70 % PADS May substitute brand based on insurance coverage. Check glucose ACHS.  Qty: 200 each, Refills: 0    Associated Diagnoses: Type 2 diabetes mellitus (HCC)      Blood Glucose Monitoring Suppl (OneTouch Verio Reflect) w/Device KIT May substitute brand based on insurance coverage. Check glucose ACHS.  Qty: 1 kit, Refills: 0    Associated Diagnoses: Type 2 diabetes mellitus (HCC)      Continuous Blood Gluc  (FreeStyle Peter 2 [place]) MONISHA Check blood sugars multiple times per day  Qty: 1 each, Refills: 0    Associated Diagnoses: Type 2 diabetes mellitus with hyperglycemia, unspecified whether long term insulin use (HCC)      Continuous Blood Gluc Sensor (FreeStyle Peter 2 Sensor) MISC Check blood sugars multiple times per day  Qty: 6 each, Refills: 3    Associated Diagnoses: Type 2 diabetes mellitus with hyperglycemia, unspecified whether long term insulin use (HCC)      !! glucose blood (OneTouch Verio) test strip Check glucose before meals and at bedtime.  Qty: 400 strip, Refills: 1    Associated Diagnoses: Type 2 diabetes mellitus (HCC)      !! glucose blood (OneTouch Verio) test strip Check blood sugars three times daily. Please substitute with appropriate alternative as covered by patient's insurance. Dx: E11.65  Qty: 300 each, Refills: 3    Associated Diagnoses: Type 2 diabetes mellitus with hyperglycemia, unspecified whether long term insulin use (HCC)      Insulin Pen Needle (Unifine Pentips) 32G X 4 MM MISC For use with insulin pen 4 times daily.  Qty: 400 each, Refills: 1    Associated Diagnoses: Type 2 diabetes mellitus (HCC)      Lancets (OneTouch Delica Plus [id number]) MISC Check glucose before meals and at bedtime  Qty: 400 each, Refills: 1     Comments: May substitute brand based on insurance coverage.  Associated Diagnoses: Type 2 diabetes mellitus (HCC)       !! - Potential duplicate medications found. Please discuss with provider.        No discharge procedures on file.  ED SEPSIS DOCUMENTATION   Time reflects when diagnosis was documented in both MDM as applicable and the Disposition within this note       Time User Action Codes Description Comment    1/6/2025  2:40 PM Mallory Dawn [J98.01] Acute bronchospasm     1/6/2025  2:40 PM Mallory Dawn [R07.9] Chest pain     1/6/2025  2:42 PM Mallory Dawn [I49.3] PVC's (premature ventricular contractions)     1/6/2025  2:42 PM Mallory Dawn [I48.91] Atrial fibrillation (HCC)            Initial Sepsis Screening       Row Name 01/06/25 1434                Is the patient's history suggestive of a new or worsening infection? No  no pneumonia -  -                  User Key  (r) = Recorded By, (t) = Taken By, (c) = Cosigned By      Initials Name Provider Type     Mallory Dawn PA-C Physician Assistant                       Mallory Dawn PA-C  01/06/25 3975

## 2025-01-06 NOTE — ASSESSMENT & PLAN NOTE
"Lab Results   Component Value Date    HGBA1C 5.3 09/10/2024       No results for input(s): \"POCGLU\" in the last 72 hours.    Blood Sugar Average: Last 72 hrs:    Home regimen: Levemir 40U, humalog 15U TID with meals   Will start lantus 20U qam, and continue humalog 15U TID with meals   Carb controlled diet   Sliding scale insulin ordered   "

## 2025-01-06 NOTE — H&P
H&P - Hospitalist   Name: Irineo Rawls 79 y.o. male I MRN: [id number]  Unit/Bed#: ED-27 I Date of Admission: 1/6/2025   Date of Service: 1/6/2025 I Hospital Day: 0     Assessment & Plan  Atrial fibrillation, new onset (HCC)  Presenting to the ED with chest pressure since last night and shortness of breath. Reports no prior history of atrial fibrillation in the past.  EKG with evidence of afib   Rate controlled, s/p 1 dose lopressor 5mg   Patient without history of atrial fibrillation   Hx of + sleep apnea, recent illness   ECHO ordered, pending   TSH pending   Trop 0hr 10 - 11  Telemetry  [id number]= 4  Anticoagulation - start on heparin drip while price checking eliquis  Rate control: start metoprolol 25mg BID   Reports improvement in chest discomfort since admission   Cardiology consult recommendations appreciated   URI with cough and congestion  Presenting to the ED with chest discomfort and SOB  SIRS criteria met w/ tachycardia, tachypnea, and leukocytosis  Tachycardia and tachypnea in the setting of new onset A-fib  Leukocytosis likely reactive  Reports cough that started on Saturday that has progressed   COVID/flu antigen negative  CXR in ED: limited by low lung volumes. no definite acute pulmonary process   RP 2 pending   Supportive care   Monitor off abx at this time   Continue to monitor fever curve and CBC for worsening leukocytosis   CKD (chronic kidney disease)  Lab Results   Component Value Date    EGFR 38 01/06/2025    EGFR 40 09/10/2024    EGFR 45 03/12/2024    CREATININE 1.66 (H) 01/06/2025    CREATININE 1.59 (H) 09/10/2024    CREATININE 1.44 (H) 03/12/2024     Baseline Cr 1.5-1.7  Currently within baseline  Continue to monitor on am BMP  Essential hypertension  Home regimen: Nifedipine 60mg daily   Currently on hold  Resume when able   KALIA (obstructive sleep apnea)  Continue cpap qhs   Type 2 diabetes mellitus with hyperglycemia, with long-term current use of insulin (HCC)  Lab Results  "  Component Value Date    HGBA1C 5.3 09/10/2024       No results for input(s): \"POCGLU\" in the last 72 hours.    Blood Sugar Average: Last 72 hrs:    Home regimen: Levemir 40U, humalog 15U TID with meals   Will start lantus 20U qam, and continue humalog 15U TID with meals   Carb controlled diet   Sliding scale insulin ordered   Gout  Continue allopurinol 300mg daily   Thrombocytopenia (HCC)  Chronic   Stable     VTE Pharmacologic Prophylaxis: VTE Score: 5 High Risk (Score >/= 5) - Pharmacological DVT Prophylaxis Ordered: apixaban (Eliquis). Sequential Compression Devices Ordered.  Code Status: Level 1 - Full Code   Discussion with family: Updated  (wife and daughter) at bedside.    Anticipated Length of Stay: Patient will be admitted on an inpatient basis with an anticipated length of stay of greater than 2 midnights secondary to new onset afib, cardiology consult, supportive care for URI .    History of Present Illness   Chief Complaint: chest discomfort    Irineo Rawls is a 79 y.o. male with a PMH of KALIA, DM2, hypertension, CKD who presents with chest pain/discomfort.  Patient reports a couple day history of cough and congestion.  Reports that last night he started having chest pressure.  Denies radiation of pain to the back, or extremities.  Patient additionally notes increasing shortness of breath with ambulation. Denies SOB at rest. Patient met SIRS criteria on admission with tachycardia, tachypnea, and leukocytosis.  EKG in the ED revealing new onset atrial fibrillation.  Patient will be admitted to the hospital for further cardiac workup, cardiology evaluation, and supportive care for upper respiratory infection.    Review of Systems   Constitutional:  Negative for chills and fever.   HENT:  Positive for congestion.    Eyes: Negative.    Respiratory:  Positive for cough, chest tightness and shortness of breath.    Cardiovascular:  Positive for chest pain. Negative for leg swelling. "   Gastrointestinal:  Negative for nausea and vomiting.   Endocrine: Negative.    Musculoskeletal: Negative.    Skin: Negative.    Allergic/Immunologic: Negative.    Neurological:  Negative for dizziness and numbness.   Hematological: Negative.    Psychiatric/Behavioral: Negative.         Historical Information   Past Medical History:   Diagnosis Date    Acute renal failure superimposed on stage 3 chronic kidney disease ([facility]) 2023    Acute renal failure superimposed on stage 3b chronic kidney disease, unspecified acute renal failure type ([facility]) 2012    MATILDE (acute kidney injury) ([facility])     Chronic kidney disease     CPAP (continuous positive airway pressure) dependence     Gout     Hearing aid worn     sabrina    Hernia, inguinal     RIH and umbilical hernia    Hypertension     Metabolic acidosis     Morbidly obese ([facility])     Rib fracture 2024    Sleep apnea     Type 2 diabetes mellitus with hyperglycemia, with long-term current use of insulin ([facility]) 2016    Wears glasses      Past Surgical History:   Procedure Laterality Date    CARPAL TUNNEL RELEASE Bilateral     COLONOSCOPY N/A 2017    Procedure: COLONOSCOPY;  Surgeon: João Perez MD;  Location: [facility];  Service:     CYST REMOVAL      FOOT SURGERY      HERNIA REPAIR      JOINT REPLACEMENT      knee left     WA LAPAROSCOPY SURG RPR INITIAL INGUINAL HERNIA Right 3/16/2017    Procedure: REPAIR HERNIA INGUINAL, LAPAROSCOPIC WITH MESH;  Surgeon: Ortiz Salcedo MD;  Location: [facility];  Service: General    WA RPR UMBILICAL HRNA 5 YRS/> REDUCIBLE N/A 3/16/2017    Procedure: OPEN REPAIR HERNIA UMBILICAL;  Surgeon: Ortiz Salcedo MD;  Location: [facility];  Service: General    TONSILLECTOMY       Social History     Tobacco Use    Smoking status: Former     Current packs/day: 0.00     Types: Cigarettes     Quit date: 3/10/2003     Years since quittin.8    Smokeless tobacco: Never   Vaping Use    Vaping status: Never Used   Substance and  Sexual Activity    Alcohol use: Never    Drug use: No    Sexual activity: Yes     Partners: Female     E-Cigarette/Vaping    E-Cigarette Use Never User      E-Cigarette/Vaping Substances    Nicotine No     THC No     CBD No     Flavoring No     Other No     Unknown No      Family history non-contributory  Social History:  Marital Status: /Civil Union   Occupation: N/A  Patient Pre-hospital Living Situation: Home  Patient Pre-hospital Level of Mobility: walks  Patient Pre-hospital Diet Restrictions: None     Meds/Allergies   I have reviewed home medications with patient personally.  Prior to Admission medications    Medication Sig Start Date End Date Taking? Authorizing Provider   albuterol (ProAir HFA) 90 mcg/act inhaler Inhale 2 puffs every 6 (six) hours as needed for wheezing 12/29/23  Yes Ange Murdock PA-C   allopurinol (ZYLOPRIM) 300 mg tablet Take 1 tablet (300 mg total) by mouth daily 11/7/24  Yes Obi Esquivel DO   aspirin 81 MG tablet Take 81 mg by mouth daily   Yes Historical Provider, MD   Cholecalciferol (VITAMIN D3) 5000 units TABS Take 1 tablet by mouth daily   Yes Historical Provider, MD   insulin aspart (NovoLOG FlexPen) 100 UNIT/ML injection pen Inject 15 Units under the skin 3 (three) times a day with meals 11/12/24  Yes Obi Esquivel DO   Levemir FlexPen 100 units/mL injection pen Inject 40 Units under the skin daily 8/5/24  Yes Obi Esquivel DO   NIFEdipine (PROCARDIA XL) 60 mg 24 hr tablet Take 1 tablet (60 mg total) by mouth daily 11/7/24  Yes Obi Esquivel DO   Omega-3 Fatty Acids (FISH OIL) 1200 MG CAPS Take 1 capsule by mouth 3 (three) times a day   Yes Historical Provider, MD   Alcohol Swabs 70 % PADS May substitute brand based on insurance coverage. Check glucose ACHS. 1/8/24   Venancio Martínez MD   Blood Glucose Monitoring Suppl (OneTouch Verio Reflect) w/Device KIT May substitute brand based on insurance coverage. Check glucose ACHS. 1/8/24   Venancio Martínez MD    Continuous Blood Gluc  (FreeStyle Peter 2 [place]) MONISHA Check blood sugars multiple times per day 1/10/24   Obi Esquivel DO   Continuous Blood Gluc Sensor (FreeStyle Peter 2 Sensor) MISC Check blood sugars multiple times per day 1/10/24   Obi Esquivel DO   glucose blood (OneTouch Verio) test strip Check glucose before meals and at bedtime. 11/12/24   Obi Esquivel DO   glucose blood (OneTouch Verio) test strip Check blood sugars three times daily. Please substitute with appropriate alternative as covered by patient's insurance. Dx: E11.65 11/12/24   Obi Esquivel DO   Insulin Pen Needle (Unifine Pentips) 32G X 4 MM MISC For use with insulin pen 4 times daily. 7/30/24   Obi Esquivel DO   Lancets (OneTouch Delica Plus [id number]) MISC Check glucose before meals and at bedtime 9/1/24   Obi Esquivel DO     No Known Allergies    Objective :  Temp:  [98.4 °F (36.9 °C)] 98.4 °F (36.9 °C)  HR:  [] 109  BP: (137-162)/(72-85) 151/74  Resp:  [22-24] 22  SpO2:  [94 %-97 %] 95 %  O2 Device: None (Room air)    Physical Exam  Constitutional:       General: He is not in acute distress.     Appearance: He is not toxic-appearing.   HENT:      Head: Normocephalic and atraumatic.      Nose: Nose normal.      Mouth/Throat:      Mouth: Mucous membranes are moist.   Eyes:      Conjunctiva/sclera: Conjunctivae normal.   Cardiovascular:      Rate and Rhythm: Tachycardia present. Rhythm irregular.      Heart sounds: Normal heart sounds. No murmur heard.     No friction rub. No gallop.   Pulmonary:      Effort: Pulmonary effort is normal.      Breath sounds: No wheezing, rhonchi or rales.   Abdominal:      General: There is no distension.      Palpations: Abdomen is soft.   Musculoskeletal:      Right lower leg: No edema.      Left lower leg: No edema.   Skin:     General: Skin is warm.   Neurological:      Mental Status: Mental status is at baseline.        Lines/Drains:      Lab Results: I have  reviewed the following results:  Results from last 7 days   Lab Units 01/06/25  1322   WBC Thousand/uL 12.14*   HEMOGLOBIN g/dL 13.8   HEMATOCRIT % 40.7   PLATELETS Thousands/uL 135*   SEGS PCT % 77*   LYMPHO PCT % 7*   MONO PCT % 15*   EOS PCT % 0     Results from last 7 days   Lab Units 01/06/25  1322   SODIUM mmol/L 138   POTASSIUM mmol/L 4.1   CHLORIDE mmol/L 103   CO2 mmol/L 26   BUN mg/dL 32*   CREATININE mg/dL 1.66*   ANION GAP mmol/L 9   CALCIUM mg/dL 10.2   GLUCOSE RANDOM mg/dL 119     Results from last 7 days   Lab Units 01/06/25  1322   INR  1.08         Lab Results   Component Value Date    HGBA1C 5.3 09/10/2024    HGBA1C 5.8 (H) 03/12/2024    HGBA1C 6.9 (H) 09/06/2023           Imaging Results Review: I reviewed radiology reports from this admission including: chest xray.  Other Study Results Review: EKG was reviewed.     Administrative Statements   I have spent a total time of 49 minutes in caring for this patient on the day of the visit/encounter including Diagnostic results, Risks and benefits of tx options, Instructions for management, Patient and family education, Importance of tx compliance, Documenting in the medical record, Reviewing / ordering tests, medicine, procedures  , Obtaining or reviewing history  , and Communicating with other healthcare professionals .    ** Please Note: This note has been constructed using a voice recognition system. **

## 2025-01-06 NOTE — ASSESSMENT & PLAN NOTE
Lab Results   Component Value Date    EGFR 38 01/06/2025    EGFR 40 09/10/2024    EGFR 45 03/12/2024    CREATININE 1.66 (H) 01/06/2025    CREATININE 1.59 (H) 09/10/2024    CREATININE 1.44 (H) 03/12/2024     Baseline Cr 1.5-1.7  Currently within baseline  Continue to monitor on am BMP

## 2025-01-06 NOTE — SEPSIS NOTE
Sepsis Note   Irineo Rawls 79 y.o. male MRN: [id number]  Unit/Bed#: ED-27 Encounter: [id number]       Initial Sepsis Screening       Row Name 01/06/25 1434                Is the patient's history suggestive of a new or worsening infection? No  no pneumonia -  -                  User Key  (r) = Recorded By, (t) = Taken By, (c) = Cosigned By      Initials Name Provider Type     RENARD CruzC Physician Assistant                  No pneumonia - just bronchospasm- no abx indicated        Body mass index is 41.12 kg/m².  Wt Readings from Last 1 Encounters:   01/06/25 119 kg (262 lb 9.1 oz)        Ideal body weight: 66.1 kg (145 lb 11.6 oz)  Adjusted ideal body weight: 87.3 kg (192 lb 7.4 oz)

## 2025-01-06 NOTE — ASSESSMENT & PLAN NOTE
Presenting to the ED with chest pressure since last night and shortness of breath. Reports no prior history of atrial fibrillation in the past.  EKG with evidence of afib   Rate controlled, s/p 1 dose lopressor 5mg   Patient without history of atrial fibrillation   Hx of + sleep apnea, recent illness   ECHO ordered, pending   TSH pending   Trop 0hr 10 - 11  Telemetry  [id number]= 4  Anticoagulation - start on heparin drip while price checking eliquis  Rate control: start metoprolol 25mg BID   Reports improvement in chest discomfort since admission   Cardiology consult recommendations appreciated

## 2025-01-07 ENCOUNTER — APPOINTMENT (OUTPATIENT)
Dept: NON INVASIVE DIAGNOSTICS | Facility: HOSPITAL | Age: 80
DRG: 291 | End: 2025-01-07
Payer: COMMERCIAL

## 2025-01-07 ENCOUNTER — RA CDI HCC (OUTPATIENT)
Dept: OTHER | Facility: HOSPITAL | Age: 80
End: 2025-01-07

## 2025-01-07 PROBLEM — N18.32 STAGE 3B CHRONIC KIDNEY DISEASE (HCC): Status: ACTIVE | Noted: 2024-03-19

## 2025-01-07 LAB
AORTIC ROOT: 3.4 CM
AORTIC VALVE MEAN VELOCITY: 12.3 M/S
APTT PPP: 50 SECONDS (ref 23–34)
APTT PPP: 53 SECONDS (ref 23–34)
APTT PPP: 58 SECONDS (ref 23–34)
APTT PPP: 64 SECONDS (ref 23–34)
ASCENDING AORTA: 3.4 CM
AV AREA BY CONTINUOUS VTI: 1.6 CM2
AV AREA PEAK VELOCITY: 1.7 CM2
AV LVOT MEAN GRADIENT: 1 MMHG
AV LVOT PEAK GRADIENT: 3 MMHG
AV MEAN PRESS GRAD SYS DOP V1V2: 7 MMHG
AV ORIFICE AREA US: 1.59 CM2
AV PEAK GRADIENT: 11 MMHG
AV VELOCITY RATIO: 0.48
AV VMAX SYS DOP: 1.66 M/S
BSA FOR ECHO PROCEDURE: 2.27 M2
DOP CALC AO VTI: 32.03 CM
DOP CALC LVOT AREA: 3.46 CM2
DOP CALC LVOT CARDIAC INDEX: 2.1 L/MIN/M2
DOP CALC LVOT CARDIAC OUTPUT: 4.76 L/MIN
DOP CALC LVOT DIAMETER: 2.1 CM
DOP CALC LVOT PEAK VEL VTI: 14.75 CM
DOP CALC LVOT PEAK VEL: 0.8 M/S
DOP CALC LVOT STROKE INDEX: 21.6 ML/M2
DOP CALC LVOT STROKE VOLUME: 51.06
FRACTIONAL SHORTENING: 30 (ref 28–44)
GLUCOSE SERPL-MCNC: 100 MG/DL (ref 65–140)
GLUCOSE SERPL-MCNC: 111 MG/DL (ref 65–140)
GLUCOSE SERPL-MCNC: 120 MG/DL (ref 65–140)
GLUCOSE SERPL-MCNC: 133 MG/DL (ref 65–140)
INTERVENTRICULAR SEPTUM IN DIASTOLE (PARASTERNAL SHORT AXIS VIEW): 1.5 CM
INTERVENTRICULAR SEPTUM: 1.1 CM (ref 0.6–1.1)
IVC: 27 MM
LAAS-AP2: 26.6 CM2
LAAS-AP4: 28.3 CM2
LEFT ATRIUM SIZE: 5 CM
LEFT ATRIUM VOLUME (MOD BIPLANE): 95 ML
LEFT ATRIUM VOLUME INDEX (MOD BIPLANE): 41.9 ML/M2
LEFT INTERNAL DIMENSION IN SYSTOLE: 3.7 CM (ref 2.1–4)
LEFT VENTRICULAR INTERNAL DIMENSION IN DIASTOLE: 5.3 CM (ref 3.5–6)
LEFT VENTRICULAR POSTERIOR WALL IN END DIASTOLE: 1.5 CM
LEFT VENTRICULAR STROKE VOLUME: 81 ML
LV EF US.2D.A4C+ESTIMATED: 59 %
LVSV (TEICH): 81 ML
RA PRESSURE ESTIMATED: 15 MMHG
RIGHT ATRIUM AREA SYSTOLE A4C: 26.1 CM2
RIGHT VENTRICLE ID DIMENSION: 4 CM
RV PSP: 48 MMHG
SL CV LEFT ATRIUM LENGTH A2C: 6.4 CM
SL CV LV EF: 47
SL CV PED ECHO LEFT VENTRICLE DIASTOLIC VOLUME (MOD BIPLANE) 2D: 137 ML
SL CV PED ECHO LEFT VENTRICLE SYSTOLIC VOLUME (MOD BIPLANE) 2D: 56 ML
TR MAX PG: 33 MMHG
TR PEAK VELOCITY: 2.9 M/S
TRICUSPID ANNULAR PLANE SYSTOLIC EXCURSION: 1.7 CM
TRICUSPID VALVE PEAK REGURGITATION VELOCITY: 2.88 M/S

## 2025-01-07 PROCEDURE — 93306 TTE W/DOPPLER COMPLETE: CPT | Performed by: INTERNAL MEDICINE

## 2025-01-07 PROCEDURE — 99232 SBSQ HOSP IP/OBS MODERATE 35: CPT

## 2025-01-07 PROCEDURE — 82948 REAGENT STRIP/BLOOD GLUCOSE: CPT

## 2025-01-07 PROCEDURE — 99232 SBSQ HOSP IP/OBS MODERATE 35: CPT | Performed by: STUDENT IN AN ORGANIZED HEALTH CARE EDUCATION/TRAINING PROGRAM

## 2025-01-07 PROCEDURE — 93306 TTE W/DOPPLER COMPLETE: CPT

## 2025-01-07 PROCEDURE — 85730 THROMBOPLASTIN TIME PARTIAL: CPT | Performed by: INTERNAL MEDICINE

## 2025-01-07 PROCEDURE — 85730 THROMBOPLASTIN TIME PARTIAL: CPT | Performed by: STUDENT IN AN ORGANIZED HEALTH CARE EDUCATION/TRAINING PROGRAM

## 2025-01-07 RX ADMIN — ASPIRIN 81 MG: 81 TABLET, COATED ORAL at 08:04

## 2025-01-07 RX ADMIN — INSULIN LISPRO 10 UNITS: 100 INJECTION, SOLUTION INTRAVENOUS; SUBCUTANEOUS at 17:24

## 2025-01-07 RX ADMIN — HEPARIN SODIUM 2000 UNITS: 1000 INJECTION, SOLUTION INTRAVENOUS; SUBCUTANEOUS at 16:48

## 2025-01-07 RX ADMIN — GUAIFENESIN 600 MG: 600 TABLET, EXTENDED RELEASE ORAL at 08:03

## 2025-01-07 RX ADMIN — FUROSEMIDE 40 MG: 10 INJECTION, SOLUTION INTRAVENOUS at 15:24

## 2025-01-07 RX ADMIN — METOPROLOL SUCCINATE 25 MG: 25 TABLET, EXTENDED RELEASE ORAL at 08:03

## 2025-01-07 RX ADMIN — INSULIN GLARGINE 20 UNITS: 100 INJECTION, SOLUTION SUBCUTANEOUS at 08:03

## 2025-01-07 RX ADMIN — FUROSEMIDE 40 MG: 10 INJECTION, SOLUTION INTRAVENOUS at 08:03

## 2025-01-07 RX ADMIN — METOPROLOL SUCCINATE 25 MG: 25 TABLET, EXTENDED RELEASE ORAL at 22:10

## 2025-01-07 RX ADMIN — HEPARIN SODIUM 15.1 UNITS/KG/HR: 10000 INJECTION, SOLUTION INTRAVENOUS at 15:04

## 2025-01-07 RX ADMIN — ALLOPURINOL 300 MG: 300 TABLET ORAL at 08:03

## 2025-01-07 RX ADMIN — INSULIN LISPRO 10 UNITS: 100 INJECTION, SOLUTION INTRAVENOUS; SUBCUTANEOUS at 12:30

## 2025-01-07 RX ADMIN — INSULIN LISPRO 10 UNITS: 100 INJECTION, SOLUTION INTRAVENOUS; SUBCUTANEOUS at 08:03

## 2025-01-07 RX ADMIN — HEPARIN SODIUM 2000 UNITS: 1000 INJECTION, SOLUTION INTRAVENOUS; SUBCUTANEOUS at 09:33

## 2025-01-07 RX ADMIN — GUAIFENESIN 600 MG: 600 TABLET, EXTENDED RELEASE ORAL at 22:10

## 2025-01-07 RX ADMIN — HEPARIN SODIUM 2000 UNITS: 1000 INJECTION, SOLUTION INTRAVENOUS; SUBCUTANEOUS at 01:30

## 2025-01-07 NOTE — PLAN OF CARE
Problem: PAIN - ADULT  Goal: Verbalizes/displays adequate comfort level or baseline comfort level  Description: Interventions:  - Encourage patient to monitor pain and request assistance  - Assess pain using appropriate pain scale  - Administer analgesics based on type and severity of pain and evaluate response  - Implement non-pharmacological measures as appropriate and evaluate response  - Consider cultural and social influences on pain and pain management  - Notify physician/advanced practitioner if interventions unsuccessful or patient reports new pain  Outcome: Progressing     Problem: INFECTION - ADULT  Goal: Absence or prevention of progression during hospitalization  Description: INTERVENTIONS:  - Assess and monitor for signs and symptoms of infection  - Monitor lab/diagnostic results  - Monitor all insertion sites, i.e. indwelling lines, tubes, and drains  - Monitor endotracheal if appropriate and nasal secretions for changes in amount and color  - [place] appropriate cooling/warming therapies per order  - Administer medications as ordered  - Instruct and encourage patient and family to use good hand hygiene technique  - Identify and instruct in appropriate isolation precautions for identified infection/condition  Outcome: Progressing  Goal: Absence of fever/infection during neutropenic period  Description: INTERVENTIONS:  - Monitor WBC    Outcome: Progressing     Problem: SAFETY ADULT  Goal: Patient will remain free of falls  Description: INTERVENTIONS:  - Educate patient/family on patient safety including physical limitations  - Instruct patient to call for assistance with activity   - Consult OT/PT to assist with strengthening/mobility   - Keep Call bell within reach  - Keep bed low and locked with side rails adjusted as appropriate  - Keep care items and personal belongings within reach  - Initiate and maintain comfort rounds  - Make Fall Risk Sign visible to staff  - Offer Toileting every 2 Hours,  in advance of need  - Initiate/Maintain bed alarm  - Obtain necessary fall risk management equipment: In place   - Apply yellow socks and bracelet for high fall risk patients  - Consider moving patient to room near nurses station  Outcome: Progressing  Goal: Maintain or return to baseline ADL function  Description: INTERVENTIONS:  -  Assess patient's ability to carry out ADLs; assess patient's baseline for ADL function and identify physical deficits which impact ability to perform ADLs (bathing, care of mouth/teeth, toileting, grooming, dressing, etc.)  - Assess/evaluate cause of self-care deficits   - Assess range of motion  - Assess patient's mobility; develop plan if impaired  - Assess patient's need for assistive devices and provide as appropriate  - Encourage maximum independence but intervene and supervise when necessary  - Involve family in performance of ADLs  - Assess for home care needs following discharge   - Consider OT consult to assist with ADL evaluation and planning for discharge  - Provide patient education as appropriate  Outcome: Progressing  Goal: Maintains/Returns to pre admission functional level  Description: INTERVENTIONS:  - Perform AM-PAC 6 Click Basic Mobility/ Daily Activity assessment daily.  - Set and communicate daily mobility goal to care team and patient/family/caregiver.   - Collaborate with rehabilitation services on mobility goals if consulted  - Perform Range of Motion 3 times a day.  - Reposition patient every 2 hours.  - Dangle patient 3 times a day  - Stand patient 3 times a day  - Ambulate patient 3 times a day  - Out of bed to chair 3 times a day   - Out of bed for meals 3 times a day  - Out of bed for toileting  - Record patient progress and toleration of activity level   Outcome: Progressing     Problem: DISCHARGE PLANNING  Goal: Discharge to home or other facility with appropriate resources  Description: INTERVENTIONS:  - Identify barriers to discharge w/patient and  caregiver  - Arrange for needed discharge resources and transportation as appropriate  - Identify discharge learning needs (meds, wound care, etc.)  - Arrange for interpretive services to assist at discharge as needed  - Refer to Case Management Department for coordinating discharge planning if the patient needs post-hospital services based on physician/advanced practitioner order or complex needs related to functional status, cognitive ability, or social support system  Outcome: Progressing     Problem: Knowledge Deficit  Goal: Patient/family/caregiver demonstrates understanding of disease process, treatment plan, medications, and discharge instructions  Description: Complete learning assessment and assess knowledge base.  Interventions:  - Provide teaching at level of understanding  - Provide teaching via preferred learning methods  Outcome: Progressing

## 2025-01-07 NOTE — ASSESSMENT & PLAN NOTE
79-year-old male with history of CAD, hypertension, KALIA, but and type 2 diabetes mellitus presented to our institution due to chest pressure and shortness of breath.  He was found to have atrial fibrillation with rapid ventricular rate.  Appreciate cardiology recommendations, echocardiogram pending. For possible DAVID / cardioversion.   Currently on lasix 40mg IV BID  TSH 2.576   Maintain telemetry  [id number]= 4. Heparin drip initiated  Metoprolol 25mg BID

## 2025-01-07 NOTE — ASSESSMENT & PLAN NOTE
Presenting to the ED with chest discomfort and SOB  SIRS criteria met w/ tachycardia, tachypnea, fever, and leukocytosis. Possible URTI. No abdominal pain, urinary symptoms.  Reports cough that started on Saturday that has progressed   COVID/flu antigen negative  CXR in ED: limited by low lung volumes. no definite acute pulmonary process   RP 2 negative  Continue Supportive care   Monitor off abx at this time

## 2025-01-07 NOTE — ASSESSMENT & PLAN NOTE
Lab Results   Component Value Date    HGBA1C 5.8 (H) 01/06/2025     Recent Labs     01/06/25  1653 01/06/25  2057 01/07/25  0705   POCGLU 110 175* 133       Blood Sugar Average: Last 72 hrs:  (P) [id number]  Home regimen: Levemir 40U, humalog 15U TID with meals   Inpatient regimen: Lantus 20U qam, Humalog 10U TID with meals, sliding scale  Carb controlled diet   Sliding scale insulin ordered

## 2025-01-07 NOTE — UTILIZATION REVIEW
Initial Clinical Review    Admission: Date/Time/Statement:   Admission Orders (From admission, onward)       Ordered        01/07/25 1007  INPATIENT ADMISSION  Once            01/06/25 1441  Place in Observation  Once                          Orders Placed This Encounter   Procedures    Place in Observation     Standing Status:   Standing     Number of Occurrences:   1     Level of Care:   Med Surg [16]    INPATIENT ADMISSION     Standing Status:   Standing     Number of Occurrences:   1     Level of Care:   Med Surg [16]     Estimated length of stay:   More than 2 Midnights     Certification:   I certify that inpatient services are medically necessary for this patient for a duration of greater than two midnights. See H&P and MD Progress Notes for additional information about the patient's course of treatment.              jamal / cardioversion, cards eval     ED Arrival Information       Expected   -    Arrival   1/6/2025 12:36    Acuity   Emergent              Means of arrival   Ambulance    Escorted by   [facility]    Service   Hospitalist    Admission type   Emergency              Arrival complaint   chest pain             Chief Complaint   Patient presents with    Chest Pain     Reports CP and worsening SOB since last night, given 324 asa and 1nitro by ems with relief       Initial Presentation: 79 y.o. male presents to the ED via EMS from home with c/o chest pain/discomfort since evening before admit and cough, congestion, SO w .ambulation  x several days.  PMH:  KALIA, IDDM, HTN, CKD, gout, thrombocytopenia.  In the ED met SIRS criteria - tachycardia, tachypnea, leukocytosis.  Labs - elevated WBC, BNP, BUN/Cr, RP2 negative.  ECG - A fib w/ RVR.  Imaging - negative acute disease.  Treated with NTG SL X 2, DuoNeb x 2, IV Metoprolol.  On exam irreg tachycardia, no other deficits.  Admitted to Observation Status with New onset A fib - now with controlled rate post Lopressor, URI with cough, congestion, KALIA -  PLAN:   Tele, Metoprolol 25 mg BID, Echo, Heparin drip, cardio consult, off antibiotics, hold home Nifedipine, HS CPAP, SSI cover, home Allopurinol.     Anticipated Length of Stay/Certification Statement: Patient will be admitted on an inpatient basis with an anticipated length of stay of greater than 2 midnights secondary to new onset afib, cardiology consult, supportive care for URI .     1/6 Cardio Consult - new onset A fib, volume overload w/ rales on exam, new heart murmur, tele, echo, IV Lasix 40 mg BID, AC, Metoprolol for rate control of A fib, suspect underlying CM, poss DAVID cardioversion.      Date: 1/7 CHANGED TO INPATIENT STATUS TODA  New onset A fib - now with controlled rate post Lopressor, URI with cough, congestion, KALIA, volume overload, new murmur -  tele, continue IV Lasix 40 mg BID, off antibiotics, supportive care for congestion, cough.  Pt notes improvement in chest discomfort today. Continues w/ irreg rhythm A fib w/ control, continue Heparin drip.  On exam still signs of volume overload, breathing more comfortably.  No CP. Improvement in BLE edema but still 1+ pitting on exam and bibasilar rales.  Was febrile last PM, increased WBC today.  Echo pending review.  DAVID and cardioversion ordered.      ED Treatment-Medication Administration from 01/06/2025 1236 to 01/06/2025 1647         Date/Time Order Dose Route Action     01/06/2025 1339 nitroglycerin (NITROSTAT) SL tablet 0.4 mg 0.4 mg Sublingual Given     01/06/2025 1341 ipratropium-albuterol (DUO-NEB) 0.5-2.5 mg/3 mL inhalation solution 3 mL 3 mL Nebulization Given     01/06/2025 1424 ipratropium-albuterol (DUO-NEB) 0.5-2.5 mg/3 mL inhalation solution 3 mL 3 mL Nebulization Given     01/06/2025 1424 nitroglycerin (NITROSTAT) SL tablet 0.4 mg 0.4 mg Sublingual Given     01/06/2025 1506 metoprolol (LOPRESSOR) injection 5 mg 5 mg Intravenous Given            Scheduled Medications:  allopurinol, 300 mg, Oral, Daily  aspirin, 81 mg, Oral,  Daily  furosemide, 40 mg, Intravenous, BID (diuretic)  guaiFENesin, 600 mg, Oral, Q12H RENNY  insulin glargine, 20 Units, Subcutaneous, QAM  insulin lispro, 1-5 Units, Subcutaneous, TID AC  insulin lispro, 1-5 Units, Subcutaneous, HS  insulin lispro, 10 Units, Subcutaneous, TID With Meals  metoprolol succinate, 25 mg, Oral, BID      Continuous IV Infusions:  heparin (porcine), 3-20 Units/kg/hr (Order-Specific), Intravenous, Titrated      PRN Meds:  acetaminophen, 650 mg, Oral, Q6H PRN  albuterol, 2 puff, Inhalation, Q4H PRN  benzonatate, 100 mg, Oral, TID PRN  heparin (porcine), 2,000 Units, Intravenous, Q6H PRN  heparin (porcine), 4,000 Units, Intravenous, Q6H PRN  metoprolol, 5 mg, Intravenous, Q4H PRN  ondansetron, 4 mg, Intravenous, Q6H PRN      ED Triage Vitals   Temperature Pulse Respirations Blood Pressure SpO2 Pain Score   01/06/25 1245 01/06/25 1244 01/06/25 1244 01/06/25 1300 01/06/25 1244 01/06/25 1900   98.4 °F (36.9 °C) 100 22 162/77 97 % No Pain     Weight (last 2 days)       Date/Time Weight    01/07/25 0936 119 (262)    01/06/25 1425 119 (262.57)            Vital Signs (last 3 days)       Date/Time Temp Pulse Resp BP MAP (mmHg) SpO2 Calculated FIO2 (%) - Nasal Cannula Nasal Cannula O2 Flow Rate (L/min) O2 Device Patient Position - Orthostatic VS Pain    01/07/25 1114 98.2 °F (36.8 °C) 98 18 117/75 88 94 % -- -- None (Room air) Sitting --    01/07/25 0936 -- 88 -- 150/92 -- -- -- -- -- -- --    01/07/25 0710 98.6 °F (37 °C) 89 18 150/92 104 96 % 28 2 L/min Nasal cannula Lying --    01/07/25 0321 97.4 °F (36.3 °C) 79 20 149/82 105 92 % 28 2 L/min Nasal cannula Lying --    01/07/25 0135 98.4 °F (36.9 °C) -- -- -- -- -- -- -- -- -- --    01/06/25 2355 101.4 °F (38.6 °C) 100 21 177/79 -- 93 % -- -- None (Room air) Lying --    01/06/25 2302 -- -- -- -- -- -- -- -- -- -- Med Not Given for Pain - for MAR use only    01/06/25 2223 101.2 °F (38.4 °C) -- -- -- -- -- -- -- -- -- --    01/06/25 1956 101.5 °F (38.6  °C) 115 21 167/96 -- 93 % -- -- None (Room air) Lying --    01/06/25 1933 -- -- -- -- -- -- -- -- -- -- No Pain    01/06/25 1925 -- -- -- -- -- -- -- -- -- -- No Pain    01/06/25 1900 -- -- -- -- -- -- -- -- -- -- No Pain    01/06/25 1445 -- 109 22 151/74 101 95 % -- -- None (Room air) Sitting --    01/06/25 1430 -- 106 24 162/72 103 94 % -- -- -- -- --    01/06/25 1345 -- 107 22 156/85 113 -- -- -- -- -- --    01/06/25 1338 -- -- -- 137/72 -- -- -- -- -- -- --    01/06/25 1300 -- 94 23 162/77 110 -- -- -- -- -- --    01/06/25 1245 98.4 °F (36.9 °C) -- -- -- -- -- -- -- -- -- --    01/06/25 1244 -- 100 22 -- -- 97 % -- -- None (Room air) -- --              Pertinent Labs/Diagnostic Test Results:   Radiology:  XR chest 1 view portable   Limited by low lung volumes. No definite acute pulmonary process.   In the setting of clinically suspected/proven COVID-19, this plain film appearance does not contain findings that raise concern for viral pneumonia such as COVID-19, but does not rule out this diagnosis.     Cardiology:  ECG 12 lead   Final Result by Obi Carrion DO (01/06 1647)   Atrial fibrillation with premature ventricular or aberrantly conducted    complexes   Possible Lateral infarct (cited on or before 06-Jan-2025)  vs v1 and V5    lead reversal   Abnormal ECG   When compared with ECG of 06-Jan-2025 15:06, (unconfirmed)   No significant change was found   Confirmed by Obi Carrion ([id number]) on 1/6/2025 4:47:56 PM      ECG 12 lead   Final Result by Obi Carrion DO (01/06 1647)   Atrial fibrillation with rapid ventricular response with premature    ventricular or aberrantly conducted complexes   Possible Lateral infarct (cited on or before 06-Jan-2025)  vs V1 and V5    lead reversal   Abnormal ECG   When compared with ECG of 06-Jan-2025 12:43, (unconfirmed)   No significant change was found   Confirmed by Obi Carrion ([id number]) on 1/6/2025 4:47:30 PM      ECG 12 lead   Final Result by Obi FERRER  DO Sheyla (01/06 1646)   Atrial fibrillation with rapid ventricular response with premature    ventricular or aberrantly conducted complexes   Possible Lateral infarct , age undetermined vs precordial lead reversal   Abnormal ECG   Confirmed by Obi Carrion ([id number]) on 1/6/2025 4:46:52 PM        GI:  No orders to display       Results from last 7 days   Lab Units 01/06/25  1503   SARS-COV-2  Not Detected     Results from last 7 days   Lab Units 01/06/25 1957 01/06/25 1739 01/06/25  1322   WBC Thousand/uL 15.48* 15.78* 12.14*   HEMOGLOBIN g/dL 13.1 13.8 13.8   HEMATOCRIT % 39.2 41.0 40.7   PLATELETS Thousands/uL 128* 130* 135*   TOTAL NEUT ABS Thousands/µL  --   --  9.33*         Results from last 7 days   Lab Units 01/06/25  1322   SODIUM mmol/L 138   POTASSIUM mmol/L 4.1   CHLORIDE mmol/L 103   CO2 mmol/L 26   ANION GAP mmol/L 9   BUN mg/dL 32*   CREATININE mg/dL 1.66*   EGFR ml/min/1.73sq m 38   CALCIUM mg/dL 10.2         Results from last 7 days   Lab Units 01/07/25  1118 01/07/25  0705 01/06/25 2057 01/06/25  1653   POC GLUCOSE mg/dl 100 133 175* 110     Results from last 7 days   Lab Units 01/06/25  1322   GLUCOSE RANDOM mg/dL 119         Results from last 7 days   Lab Units 01/06/25  1322   HEMOGLOBIN A1C % 5.8*   EAG mg/dl 120     BETA-HYDROXYBUTYRATE   Date Value Ref Range Status   01/03/2024 0.9 (H) <0.6 mmol/L Final            Results from last 7 days   Lab Units 01/06/25  1739 01/06/25  1512 01/06/25  1322   HS TNI 0HR ng/L  --   --  10   HS TNI 2HR ng/L  --  11  --    HSTNI D2 ng/L  --  1  --    HS TNI 4HR ng/L 11  --   --    HSTNI D4 ng/L 1  --   --          Results from last 7 days   Lab Units 01/07/25  0802 01/07/25  0026 01/06/25  1838 01/06/25  1739 01/06/25  1322   PROTIME seconds  --   --  14.6 14.4 14.2   INR   --   --  1.12 1.10 1.08   PTT seconds 53* 50* 36*  --  33     Results from last 7 days   Lab Units 01/06/25  1512   TSH 3RD GENERATON uIU/mL 2.576     Results from last 7 days    Lab Units 01/06/25  1512   PROCALCITONIN ng/ml 0.07       Results from last 7 days   Lab Units 01/06/25  1322   BNP pg/mL 294*         Results from last 7 days   Lab Units 01/06/25  1503   INFLUENZA B  Not Detected   RESPIRATORY SYNCYTIAL VIRUS  Not Detected     Results from last 7 days   Lab Units 01/06/25  1503   ADENOVIRUS  Not Detected   BORDETELLA PARAPERTUSSIS  Not Detected   BORDETELLA PERTUSSIS  Not Detected   CHLAMYDIA PNEUMONIAE  Not Detected   CORONAVIRUS 229E  Not Detected   CORONAVIRUS HKU1  Not Detected   CORONAVIRUS NL63  Not Detected   CORONAVIRUS OC43  Not Detected   METAPNEUMOVIRUS  Not Detected   RHINOVIRUS  Not Detected   MYCOPLASMA PNEUMONIAE  Not Detected   PARAINFLUENZA 1  Not Detected   PARAINFLUENZA 2  Not Detected   PARAINFLUENZA 3  Not Detected   PARAINFLUENZA 4  Not Detected     Past Medical History:   Diagnosis Date    Acute renal failure superimposed on stage 3 chronic kidney disease ([facility]) 02/25/2023    Acute renal failure superimposed on stage 3b chronic kidney disease, unspecified acute renal failure type ([facility]) 12/20/2012    MATILDE (acute kidney injury) ([facility])     Chronic kidney disease     CPAP (continuous positive airway pressure) dependence     Gout     Hearing aid worn     sabrina    Hernia, inguinal     RIH and umbilical hernia    Hypertension     Metabolic acidosis     Morbidly obese ([facility])     Rib fracture 01/05/2024    Sleep apnea     Type 2 diabetes mellitus with hyperglycemia, with long-term current use of insulin ([facility]) 01/29/2016    Wears glasses      Present on Admission:   Stage 3b chronic kidney disease ([facility])   Essential hypertension   KALIA (obstructive sleep apnea)   Gout   Thrombocytopenia ([facility])      Admitting Diagnosis: Atrial fibrillation ([facility]) [I48.91]  PVC's (premature ventricular contractions) [I49.3]  Chest pain [R07.9]  Acute bronchospasm [J98.01]  Age/Sex: 79 y.o. male    Network Utilization Review Department  ATTENTION: Please call with any questions or concerns to  [phone] and carefully listen to the prompts so that you are directed to the right person. All voicemails are confidential.   For Discharge needs, contact Care Management DC Support Team at [phone] opt. [phone]  Send all requests for admission clinical reviews, approved or denied determinations and any other requests to dedicated fax number below belonging to the campus where the patient is receiving treatment. List of dedicated fax numbers for the Facilities:  FACILITY NAME UR FAX NUMBER   ADMISSION DENIALS (Administrative/Medical Necessity) [phone]   DISCHARGE SUPPORT TEAM (NETWORK) [phone]   PARENT CHILD HEALTH (Maternity/NICU/Pediatrics) [phone]   [facility] [phone]   [facility] [phone]   Counts include 234 beds at the [facility] [phone]   [facility] [phone]   Critical access hospital [phone]   [facility] [phone]   [facility] [phone]   [facility] [phone]   [facility] [phone]   Critical access hospital [phone]   [facility] [phone]   [facility] [phone]

## 2025-01-07 NOTE — ASSESSMENT & PLAN NOTE
Lab Results   Component Value Date    EGFR 38 01/06/2025    EGFR 40 09/10/2024    EGFR 45 03/12/2024    CREATININE 1.66 (H) 01/06/2025    CREATININE 1.59 (H) 09/10/2024    CREATININE 1.44 (H) 03/12/2024     Baseline Cr 1.5-1.7  Continue monitoring

## 2025-01-07 NOTE — PROGRESS NOTES
HCC coding opportunities          Chart Reviewed number of suggestions sent to Provider: 1  E11.22     Patients Insurance     Medicare Insurance: [employer] Ad[employer]age

## 2025-01-07 NOTE — PROGRESS NOTES
Progress Note - Hospitalist   Name: Irineo Rawls 79 y.o. male I MRN: [id number]  Unit/Bed#: E4 -01 I Date of Admission: 1/6/2025   Date of Service: 1/7/2025 I Hospital Day: 0    Assessment & Plan  Atrial fibrillation, new onset (HCC)  79-year-old male with history of CAD, hypertension, KALIA, but and type 2 diabetes mellitus presented to our institution due to chest pressure and shortness of breath.  He was found to have atrial fibrillation with rapid ventricular rate.  Appreciate cardiology recommendations, echocardiogram pending. For possible DAVID / cardioversion.   Currently on lasix 40mg IV BID  TSH 2.576   Maintain telemetry  [id number]= 4. Heparin drip initiated  Metoprolol 25mg BID   URI with cough and congestion  Presenting to the ED with chest discomfort and SOB  SIRS criteria met w/ tachycardia, tachypnea, fever, and leukocytosis. Possible URTI. No abdominal pain, urinary symptoms.  Reports cough that started on Saturday that has progressed   COVID/flu antigen negative  CXR in ED: limited by low lung volumes. no definite acute pulmonary process   RP 2 negative  Continue Supportive care   Monitor off abx at this time   Stage 3b chronic kidney disease (HCC)  Lab Results   Component Value Date    EGFR 38 01/06/2025    EGFR 40 09/10/2024    EGFR 45 03/12/2024    CREATININE 1.66 (H) 01/06/2025    CREATININE 1.59 (H) 09/10/2024    CREATININE 1.44 (H) 03/12/2024     Baseline Cr 1.5-1.7  Continue monitoring  Essential hypertension  Home regimen: Nifedipine 60mg daily   Inpatient regimen: Metoprolol 25mg BID  KALIA (obstructive sleep apnea)  Continue cpap qhs   Type 2 diabetes mellitus with hyperglycemia, with long-term current use of insulin (HCC)  Lab Results   Component Value Date    HGBA1C 5.8 (H) 01/06/2025     Recent Labs     01/06/25  1653 01/06/25 2057 01/07/25  0705   POCGLU 110 175* 133       Blood Sugar Average: Last 72 hrs:  (P) [id number]  Home regimen: Levemir 40U, humalog 15U TID  with meals   Inpatient regimen: Lantus 20U qam, Humalog 10U TID with meals, sliding scale  Carb controlled diet   Sliding scale insulin ordered   Gout  Continue allopurinol 300mg daily   Thrombocytopenia (HCC)  Chronic     Recent Labs     25  1322 25  1739 25  1957   * 130* 128*         VTE Pharmacologic Prophylaxis: VTE Score: 5 Moderate Risk (Score 3-4) - Pharmacological DVT Prophylaxis Ordered: heparin drip.    Mobility:   Basic Mobility Inpatient Raw Score: 23  JH-HLM Goal: 7: Walk 25 feet or more  JH-HLM Achieved: 3: Sit at edge of bed    Discussions with Specialists or Other Care Team Provider: cardiology    Education and Discussions with Family / Patient: patient    Current Length of Stay: 0 day(s)  Current Patient Status: Observation   Certification Statement: The patient will continue to require additional inpatient hospital stay due to heparin drip, cards reeval, echo  Discharge Plan: 24 to 48 hours    Code Status: Level 1 - Full Code    Subjective   Patient seen and examined. Reports that his chest discomfort improved dramatically since his admission.     Objective   Vitals:   Temp (24hrs), Av.6 °F (37.6 °C), Min:97.4 °F (36.3 °C), Max:101.5 °F (38.6 °C)    Temp:  [97.4 °F (36.3 °C)-101.5 °F (38.6 °C)] 98.6 °F (37 °C)  HR:  [] 89  Resp:  [18-24] 18  BP: (137-177)/(72-96) 150/92  SpO2:  [92 %-97 %] 96 %  Body mass index is 41.12 kg/m².     Input and Output Summary (last 24 hours):     Intake/Output Summary (Last 24 hours) at 2025 0932  Last data filed at 2025 0159  Gross per 24 hour   Intake 240 ml   Output 850 ml   Net -610 ml       Physical Exam  Vitals reviewed.   Constitutional:       General: He is not in acute distress.  HENT:      Head: Normocephalic.      Nose: Nose normal.      Mouth/Throat:      Mouth: Mucous membranes are moist.   Eyes:      General: No scleral icterus.  Cardiovascular:      Rate and Rhythm: Normal rate. Rhythm irregular.   Pulmonary:       Effort: Pulmonary effort is normal. No respiratory distress.      Breath sounds: No wheezing or rales.   Abdominal:      General: There is no distension.      Palpations: Abdomen is soft.      Tenderness: There is no abdominal tenderness.   Skin:     General: Skin is warm.   Neurological:      Mental Status: He is alert and oriented to person, place, and time.   Psychiatric:         Mood and Affect: Mood normal.         Behavior: Behavior normal.       Lines/Drains:        Telemetry:  Telemetry Orders (From admission, onward)               24 Hour Telemetry Monitoring  (ED Bridging Orders Panel)  Continuous x 24 Hours (Telem)        Expiring   Question:  Reason for 24 Hour Telemetry  Answer:  PCI/EP study (including pacer and ICD implementation), Cardiac surgery, MI, abnormal cardiac cath, and chest pain- rule out MI                     Telemetry Reviewed: afib  Indication for Continued Telemetry Use: Arrthymias requiring medical therapy               Lab Results: I have reviewed the following results:   Results from last 7 days   Lab Units 01/06/25  1957 01/06/25  1838 01/06/25  1739 01/06/25  1322   WBC Thousand/uL 15.48*  --  15.78* 12.14*   HEMOGLOBIN g/dL 13.1  --  13.8 13.8   PLATELETS Thousands/uL 128*  --  130* 135*   MCV fL 102*  --  100* 100*   INR   --  1.12 1.10 1.08     Results from last 7 days   Lab Units 01/06/25  1322   SODIUM mmol/L 138   POTASSIUM mmol/L 4.1   CHLORIDE mmol/L 103   CO2 mmol/L 26   ANION GAP mmol/L 9   BUN mg/dL 32*   CREATININE mg/dL 1.66*   CALCIUM mg/dL 10.2   EGFR ml/min/1.73sq m 38   GLUCOSE RANDOM mg/dL 119             Results from last 7 days   Lab Units 01/06/25  1739 01/06/25  1512 01/06/25  1322   HS TNI 0HR ng/L  --   --  10   HS TNI 2HR ng/L  --  11  --    HS TNI 4HR ng/L 11  --   --           Results from last 7 days   Lab Units 01/06/25  1512   PROCALCITONIN ng/ml 0.07     Results from last 7 days   Lab Units 01/07/25  0705 01/06/25  2057 01/06/25  1653   POC  GLUCOSE mg/dl 133 175* 110     Results from last 7 days   Lab Units 01/06/25  1322   HEMOGLOBIN A1C % 5.8*     Results from last 7 days   Lab Units 01/06/25  1512   TSH 3RD GENERATON uIU/mL 2.576       Recent Cultures (last 7 days):         Imaging:  Reviewed radiology reports from this admission: xr chest    Last 24 Hours Medication List:     Current Facility-Administered Medications:     acetaminophen (TYLENOL) tablet 650 mg, Q6H PRN    albuterol (PROVENTIL HFA,VENTOLIN HFA) inhaler 2 puff, Q4H PRN    allopurinol (ZYLOPRIM) tablet 300 mg, Daily    aspirin (ECOTRIN LOW STRENGTH) EC tablet 81 mg, Daily    benzonatate (TESSALON PERLES) capsule 100 mg, TID PRN    furosemide (LASIX) injection 40 mg, BID (diuretic)    guaiFENesin (MUCINEX) 12 hr tablet 600 mg, Q12H RENNY    heparin (porcine) 25,000 units in 0.45% NaCl 250 mL infusion (premix), Titrated, Last Rate: 13.1 Units/kg/hr (01/07/25 0131)    heparin (porcine) injection 2,000 Units, Q6H PRN    heparin (porcine) injection 4,000 Units, Q6H PRN    insulin glargine (LANTUS) subcutaneous injection 20 Units 0.2 mL, QAM    insulin lispro (HumALOG/ADMELOG) 100 units/mL subcutaneous injection 1-5 Units, TID AC **AND** Fingerstick Glucose (POCT), TID AC    insulin lispro (HumALOG/ADMELOG) 100 units/mL subcutaneous injection 1-5 Units, HS    insulin lispro (HumALOG/ADMELOG) 100 units/mL subcutaneous injection 10 Units, TID With Meals    metoprolol (LOPRESSOR) injection 5 mg, Q4H PRN    metoprolol succinate (TOPROL-XL) 24 hr tablet 25 mg, BID    ondansetron (ZOFRAN) injection 4 mg, Q6H PRN      **Please Note: This note may have been constructed using a voice recognition system.**

## 2025-01-07 NOTE — PROGRESS NOTES
"  Progress Note - Cardiology   Irineo Rawls 79 y.o. male MRN: [id number]  Unit/Bed#: E4 -01 Encounter: [id number]          Assessment / Plan  New atrial fibrillation: Slightly accelerated ventricular rate present on arrival             - Variable subjective recognition of heart rate and rhythm   - Initiated on AV blocking Rx and anticoagulation this hospitalization  Essential hypertension             - O/p Rx: nifedipine extended release 60 mg daily  Valvular heart disease             - Exam findings suggestive of TR and MR  Diabetes mellitus  CKD  KALIA: CPAP compliant  Obesity: BMI 41    Echocardiogram completed with review forthcoming.  Cursory review suggests probable reduced EF with some regional wall motion abnormalities ~~> recommendations regarding any need/timing for ischemic testing to follow formal review  Currently anticoagulated with heparin ~~> continue same until decision made regarding any need for cardiac catheterization.  If unnecessary or not this hospitalization would then transition to oral anticoagulant  Now rate controlled on Toprol XL ~~> continue same  Improved but still with signs of volume overload we will continue IV Lasix today hopeful to assume oral therapy tomorrow      Subjective:  No new complaint.  Reports he is breathing more comfortably.  No chest pain.  Unaware of his heart rate and rhythm    Vitals:  Vitals:    01/06/25 1425 01/07/25 0936   Weight: 119 kg (262 lb 9.1 oz) 119 kg (262 lb)   ,  Vitals:    01/07/25 0135 01/07/25 0321 01/07/25 0710 01/07/25 0936   BP:  149/82 150/92 150/92   BP Location:  Right arm Right arm    Pulse:  79 89 88   Resp:  20 18    Temp: 98.4 °F (36.9 °C) (!) 97.4 °F (36.3 °C) 98.6 °F (37 °C)    TempSrc: Temporal Temporal Temporal    SpO2:  92% 96%    Weight:    119 kg (262 lb)   Height:    5' 7\" (1.702 m)       Exam:  General:  Alert normally conversant and comfortable-appearing  Heart: Irregular with controlled rate.  Distant heart tones " with unchanged systolic murmur near the left sternal border to apex   lungs: Improved from yesterday but still with some bibasilar Rales  Lower Limbs: Improved from yesterday but still with 1+ pitting edema           Telemetry:       Atrial fibrillation ventricular rate 70-90    Medications:    Current Facility-Administered Medications:     acetaminophen (TYLENOL) tablet 650 mg, 650 mg, Oral, Q6H PRN, Shobha Tirado PA-C, 650 mg at 01/06/25 2302    albuterol (PROVENTIL HFA,VENTOLIN HFA) inhaler 2 puff, 2 puff, Inhalation, Q4H PRN, Shobha Tirado PA-C    allopurinol (ZYLOPRIM) tablet 300 mg, 300 mg, Oral, Daily, Shobha Tirado PA-C, 300 mg at 01/07/25 0803    aspirin (ECOTRIN LOW STRENGTH) EC tablet 81 mg, 81 mg, Oral, Daily, Shobha Tirado PA-C, 81 mg at 01/07/25 0804    benzonatate (TESSALON PERLES) capsule 100 mg, 100 mg, Oral, TID PRN, Shobha Tirado PA-C    furosemide (LASIX) injection 40 mg, 40 mg, Intravenous, BID (diuretic), Deacon Manzanares PA-C, 40 mg at 01/07/25 0803    guaiFENesin (MUCINEX) 12 hr tablet 600 mg, 600 mg, Oral, Q12H RENNY, Shobha Tirado PA-C, 600 mg at 01/07/25 0803    heparin (porcine) 25,000 units in 0.45% NaCl 250 mL infusion (premix), 3-20 Units/kg/hr (Order-Specific), Intravenous, Titrated, Susan CarverronDO, Last Rate: 15.1 mL/hr at 01/07/25 0933, 16.778 Units/kg/hr at 01/07/25 0933    heparin (porcine) injection 2,000 Units, 2,000 Units, Intravenous, Q6H PRN, Susan Ambron, DO, 2,000 Units at 01/07/25 0933    heparin (porcine) injection 4,000 Units, 4,000 Units, Intravenous, Q6H PRN, Susan Ambron, DO    insulin glargine (LANTUS) subcutaneous injection 20 Units 0.2 mL, 20 Units, Subcutaneous, QAM, Shobha Tirado PA-C, 20 Units at 01/07/25 0803    insulin lispro (HumALOG/ADMELOG) 100 units/mL subcutaneous injection 1-5 Units, 1-5 Units, Subcutaneous, TID AC **AND** Fingerstick Glucose (POCT), , , TID AC, Shobha Tirado PA-C    insulin lispro  (HumALOG/ADMELOG) 100 units/mL subcutaneous injection 1-5 Units, 1-5 Units, Subcutaneous, HS, Shobha Tirado PA-C    insulin lispro (HumALOG/ADMELOG) 100 units/mL subcutaneous injection 10 Units, 10 Units, Subcutaneous, TID With Meals, Susan Giron DO, 10 Units at 01/07/25 0803    metoprolol (LOPRESSOR) injection 5 mg, 5 mg, Intravenous, Q4H PRN, Deacon Manzanares PA-C    metoprolol succinate (TOPROL-XL) 24 hr tablet 25 mg, 25 mg, Oral, BID, Deacon Manzanares PA-C, 25 mg at 01/07/25 0803    ondansetron (ZOFRAN) injection 4 mg, 4 mg, Intravenous, Q6H PRN, Shobha Tirado PA-C      Labs/Data:        Results from last 7 days   Lab Units 01/06/25  1957 01/06/25  1739 01/06/25  1322   WBC Thousand/uL 15.48* 15.78* 12.14*   HEMOGLOBIN g/dL 13.1 13.8 13.8   HEMATOCRIT % 39.2 41.0 40.7   PLATELETS Thousands/uL 128* 130* 135*     Results from last 7 days   Lab Units 01/06/25  1322   POTASSIUM mmol/L 4.1   CHLORIDE mmol/L 103   CO2 mmol/L 26   BUN mg/dL 32*   CREATININE mg/dL 1.66*

## 2025-01-08 ENCOUNTER — ANESTHESIA (INPATIENT)
Dept: NON INVASIVE DIAGNOSTICS | Facility: HOSPITAL | Age: 80
DRG: 291 | End: 2025-01-08
Payer: COMMERCIAL

## 2025-01-08 LAB
ALBUMIN SERPL BCG-MCNC: 3.7 G/DL (ref 3.5–5)
ALP SERPL-CCNC: 70 U/L (ref 34–104)
ALT SERPL W P-5'-P-CCNC: 11 U/L (ref 7–52)
ANION GAP SERPL CALCULATED.3IONS-SCNC: 13 MMOL/L (ref 4–13)
APTT PPP: 55 SECONDS (ref 23–34)
AST SERPL W P-5'-P-CCNC: 16 U/L (ref 13–39)
BASOPHILS # BLD MANUAL: 0 THOUSAND/UL (ref 0–0.1)
BASOPHILS NFR MAR MANUAL: 0 % (ref 0–1)
BILIRUB SERPL-MCNC: 0.91 MG/DL (ref 0.2–1)
BUN SERPL-MCNC: 46 MG/DL (ref 5–25)
CALCIUM SERPL-MCNC: 9.6 MG/DL (ref 8.4–10.2)
CHLORIDE SERPL-SCNC: 99 MMOL/L (ref 96–108)
CO2 SERPL-SCNC: 22 MMOL/L (ref 21–32)
CREAT SERPL-MCNC: 1.94 MG/DL (ref 0.6–1.3)
EOSINOPHIL # BLD MANUAL: 0.23 THOUSAND/UL (ref 0–0.4)
EOSINOPHIL NFR BLD MANUAL: 2 % (ref 0–6)
ERYTHROCYTE [DISTWIDTH] IN BLOOD BY AUTOMATED COUNT: 14.8 % (ref 11.6–15.1)
GFR SERPL CREATININE-BSD FRML MDRD: 31 ML/MIN/1.73SQ M
GLUCOSE SERPL-MCNC: 112 MG/DL (ref 65–140)
GLUCOSE SERPL-MCNC: 82 MG/DL (ref 65–140)
GLUCOSE SERPL-MCNC: 84 MG/DL (ref 65–140)
GLUCOSE SERPL-MCNC: 93 MG/DL (ref 65–140)
GLUCOSE SERPL-MCNC: 96 MG/DL (ref 65–140)
GLUCOSE SERPL-MCNC: 97 MG/DL (ref 65–140)
HCT VFR BLD AUTO: 38.2 % (ref 36.5–49.3)
HGB BLD-MCNC: 12.9 G/DL (ref 12–17)
LG PLATELETS BLD QL SMEAR: PRESENT
LYMPHOCYTES # BLD AUTO: 1.95 THOUSAND/UL (ref 0.6–4.47)
LYMPHOCYTES # BLD AUTO: 16 % (ref 14–44)
MACROCYTES BLD QL AUTO: PRESENT
MAGNESIUM SERPL-MCNC: 1.9 MG/DL (ref 1.9–2.7)
MCH RBC QN AUTO: 34 PG (ref 26.8–34.3)
MCHC RBC AUTO-ENTMCNC: 33.8 G/DL (ref 31.4–37.4)
MCV RBC AUTO: 101 FL (ref 82–98)
MONOCYTES # BLD AUTO: 1.37 THOUSAND/UL (ref 0–1.22)
MONOCYTES NFR BLD: 12 % (ref 4–12)
NEUTROPHILS # BLD MANUAL: 7.9 THOUSAND/UL (ref 1.85–7.62)
NEUTS BAND NFR BLD MANUAL: 3 % (ref 0–8)
NEUTS SEG NFR BLD AUTO: 66 % (ref 43–75)
PHOSPHATE SERPL-MCNC: 4 MG/DL (ref 2.3–4.1)
PLATELET # BLD AUTO: 109 THOUSANDS/UL (ref 149–390)
PLATELET BLD QL SMEAR: ABNORMAL
PMV BLD AUTO: 12.7 FL (ref 8.9–12.7)
POTASSIUM SERPL-SCNC: 3.8 MMOL/L (ref 3.5–5.3)
PROCALCITONIN SERPL-MCNC: 0.49 NG/ML
PROT SERPL-MCNC: 7.4 G/DL (ref 6.4–8.4)
QRS AXIS: 6 DEGREES
QRSD INTERVAL: 106 MS
QT INTERVAL: 402 MS
QTC INTERVAL: 443 MS
RBC # BLD AUTO: 3.79 MILLION/UL (ref 3.88–5.62)
RBC MORPH BLD: PRESENT
SODIUM SERPL-SCNC: 134 MMOL/L (ref 135–147)
T WAVE AXIS: -8 DEGREES
VARIANT LYMPHS # BLD AUTO: 1 %
VENTRICULAR RATE: 73 BPM
WBC # BLD AUTO: 11.45 THOUSAND/UL (ref 4.31–10.16)

## 2025-01-08 PROCEDURE — 93005 ELECTROCARDIOGRAM TRACING: CPT

## 2025-01-08 PROCEDURE — 85027 COMPLETE CBC AUTOMATED: CPT | Performed by: STUDENT IN AN ORGANIZED HEALTH CARE EDUCATION/TRAINING PROGRAM

## 2025-01-08 PROCEDURE — 83735 ASSAY OF MAGNESIUM: CPT | Performed by: STUDENT IN AN ORGANIZED HEALTH CARE EDUCATION/TRAINING PROGRAM

## 2025-01-08 PROCEDURE — 92960 CARDIOVERSION ELECTRIC EXT: CPT

## 2025-01-08 PROCEDURE — 93312 ECHO TRANSESOPHAGEAL: CPT

## 2025-01-08 PROCEDURE — 85730 THROMBOPLASTIN TIME PARTIAL: CPT | Performed by: STUDENT IN AN ORGANIZED HEALTH CARE EDUCATION/TRAINING PROGRAM

## 2025-01-08 PROCEDURE — 99232 SBSQ HOSP IP/OBS MODERATE 35: CPT | Performed by: INTERNAL MEDICINE

## 2025-01-08 PROCEDURE — 84100 ASSAY OF PHOSPHORUS: CPT | Performed by: STUDENT IN AN ORGANIZED HEALTH CARE EDUCATION/TRAINING PROGRAM

## 2025-01-08 PROCEDURE — B246ZZ4 ULTRASONOGRAPHY OF RIGHT AND LEFT HEART, TRANSESOPHAGEAL: ICD-10-PCS | Performed by: INTERNAL MEDICINE

## 2025-01-08 PROCEDURE — 82948 REAGENT STRIP/BLOOD GLUCOSE: CPT

## 2025-01-08 PROCEDURE — 5A2204Z RESTORATION OF CARDIAC RHYTHM, SINGLE: ICD-10-PCS | Performed by: INTERNAL MEDICINE

## 2025-01-08 PROCEDURE — 99232 SBSQ HOSP IP/OBS MODERATE 35: CPT | Performed by: STUDENT IN AN ORGANIZED HEALTH CARE EDUCATION/TRAINING PROGRAM

## 2025-01-08 PROCEDURE — 93010 ELECTROCARDIOGRAM REPORT: CPT | Performed by: INTERNAL MEDICINE

## 2025-01-08 PROCEDURE — 80053 COMPREHEN METABOLIC PANEL: CPT | Performed by: STUDENT IN AN ORGANIZED HEALTH CARE EDUCATION/TRAINING PROGRAM

## 2025-01-08 PROCEDURE — 84145 PROCALCITONIN (PCT): CPT | Performed by: STUDENT IN AN ORGANIZED HEALTH CARE EDUCATION/TRAINING PROGRAM

## 2025-01-08 PROCEDURE — 92960 CARDIOVERSION ELECTRIC EXT: CPT | Performed by: INTERNAL MEDICINE

## 2025-01-08 PROCEDURE — 85007 BL SMEAR W/DIFF WBC COUNT: CPT | Performed by: STUDENT IN AN ORGANIZED HEALTH CARE EDUCATION/TRAINING PROGRAM

## 2025-01-08 RX ORDER — ONDANSETRON 2 MG/ML
4 INJECTION INTRAMUSCULAR; INTRAVENOUS ONCE AS NEEDED
Status: DISCONTINUED | OUTPATIENT
Start: 2025-01-08 | End: 2025-01-09 | Stop reason: SDUPTHER

## 2025-01-08 RX ORDER — FENTANYL CITRATE/PF 50 MCG/ML
25 SYRINGE (ML) INJECTION
Status: DISCONTINUED | OUTPATIENT
Start: 2025-01-08 | End: 2025-01-10 | Stop reason: HOSPADM

## 2025-01-08 RX ORDER — LIDOCAINE HYDROCHLORIDE 20 MG/ML
INJECTION, SOLUTION EPIDURAL; INFILTRATION; INTRACAUDAL; PERINEURAL AS NEEDED
Status: DISCONTINUED | OUTPATIENT
Start: 2025-01-08 | End: 2025-01-08

## 2025-01-08 RX ORDER — SODIUM CHLORIDE 9 MG/ML
INJECTION, SOLUTION INTRAVENOUS CONTINUOUS PRN
Status: DISCONTINUED | OUTPATIENT
Start: 2025-01-08 | End: 2025-01-08

## 2025-01-08 RX ORDER — HYDROMORPHONE HCL/PF 1 MG/ML
0.5 SYRINGE (ML) INJECTION
Status: DISCONTINUED | OUTPATIENT
Start: 2025-01-08 | End: 2025-01-10 | Stop reason: HOSPADM

## 2025-01-08 RX ORDER — PROPOFOL 10 MG/ML
INJECTION, EMULSION INTRAVENOUS AS NEEDED
Status: DISCONTINUED | OUTPATIENT
Start: 2025-01-08 | End: 2025-01-08

## 2025-01-08 RX ADMIN — METOPROLOL SUCCINATE 25 MG: 25 TABLET, EXTENDED RELEASE ORAL at 08:31

## 2025-01-08 RX ADMIN — PROPOFOL 30 MG: 10 INJECTION, EMULSION INTRAVENOUS at 09:53

## 2025-01-08 RX ADMIN — PROPOFOL 80 MG: 10 INJECTION, EMULSION INTRAVENOUS at 09:51

## 2025-01-08 RX ADMIN — GUAIFENESIN 600 MG: 600 TABLET, EXTENDED RELEASE ORAL at 21:10

## 2025-01-08 RX ADMIN — INSULIN LISPRO 10 UNITS: 100 INJECTION, SOLUTION INTRAVENOUS; SUBCUTANEOUS at 17:35

## 2025-01-08 RX ADMIN — SODIUM CHLORIDE: 0.9 INJECTION, SOLUTION INTRAVENOUS at 09:41

## 2025-01-08 RX ADMIN — FUROSEMIDE 40 MG: 10 INJECTION, SOLUTION INTRAVENOUS at 08:31

## 2025-01-08 RX ADMIN — PROPOFOL 90 MCG/KG/MIN: 10 INJECTION, EMULSION INTRAVENOUS at 09:52

## 2025-01-08 RX ADMIN — HEPARIN SODIUM 17.1 UNITS/KG/HR: 10000 INJECTION, SOLUTION INTRAVENOUS at 02:53

## 2025-01-08 RX ADMIN — METOPROLOL SUCCINATE 25 MG: 25 TABLET, EXTENDED RELEASE ORAL at 21:10

## 2025-01-08 RX ADMIN — APIXABAN 2.5 MG: 2.5 TABLET, FILM COATED ORAL at 17:35

## 2025-01-08 RX ADMIN — INSULIN GLARGINE 20 UNITS: 100 INJECTION, SOLUTION SUBCUTANEOUS at 08:31

## 2025-01-08 RX ADMIN — LIDOCAINE HYDROCHLORIDE 100 MG: 20 INJECTION, SOLUTION EPIDURAL; INFILTRATION; INTRACAUDAL at 09:51

## 2025-01-08 NOTE — ANESTHESIA POSTPROCEDURE EVALUATION
Post-Op Assessment Note    CV Status:  Stable    Pain management: adequate       Mental Status:  Sleepy and arousable   Hydration Status:  Euvolemic   PONV Controlled:  Controlled   Airway Patency:  Patent     Post Op Vitals Reviewed: Yes    No anethesia notable event occurred.    Staff: CRNA           Last Filed PACU Vitals:  Vitals Value Taken Time   Temp 97.1 °F (36.2 °C) 01/08/25 1023   Pulse 76 01/08/25 1023   /65 01/08/25 1023   Resp 15 01/08/25 1023   SpO2 97 % 01/08/25 1023

## 2025-01-08 NOTE — PLAN OF CARE
Problem: PAIN - ADULT  Goal: Verbalizes/displays adequate comfort level or baseline comfort level  Description: Interventions:  - Encourage patient to monitor pain and request assistance  - Assess pain using appropriate pain scale  - Administer analgesics based on type and severity of pain and evaluate response  - Implement non-pharmacological measures as appropriate and evaluate response  - Consider cultural and social influences on pain and pain management  - Notify physician/advanced practitioner if interventions unsuccessful or patient reports new pain  Outcome: Progressing     Problem: INFECTION - ADULT  Goal: Absence or prevention of progression during hospitalization  Description: INTERVENTIONS:  - Assess and monitor for signs and symptoms of infection  - Monitor lab/diagnostic results  - Monitor all insertion sites, i.e. indwelling lines, tubes, and drains  - Monitor endotracheal if appropriate and nasal secretions for changes in amount and color  - Lyman appropriate cooling/warming therapies per order  - Administer medications as ordered  - Instruct and encourage patient and family to use good hand hygiene technique  - Identify and instruct in appropriate isolation precautions for identified infection/condition  Outcome: Progressing  Goal: Absence of fever/infection during neutropenic period  Description: INTERVENTIONS:  - Monitor WBC    Outcome: Progressing     Problem: SAFETY ADULT  Goal: Patient will remain free of falls  Description: INTERVENTIONS:  - Educate patient/family on patient safety including physical limitations  - Instruct patient to call for assistance with activity   - Consult OT/PT to assist with strengthening/mobility   - Keep Call bell within reach  - Keep bed low and locked with side rails adjusted as appropriate  - Keep care items and personal belongings within reach  - Initiate and maintain comfort rounds  - Make Fall Risk Sign visible to staff  - Offer Toileting every 3 Hours,  in advance of need  - Initiate/Maintain bed alarm  - Obtain necessary fall risk management equipment: alarm   - Apply yellow socks and bracelet for high fall risk patients  - Consider moving patient to room near nurses station  Outcome: Progressing  Goal: Maintain or return to baseline ADL function  Description: INTERVENTIONS:  -  Assess patient's ability to carry out ADLs; assess patient's baseline for ADL function and identify physical deficits which impact ability to perform ADLs (bathing, care of mouth/teeth, toileting, grooming, dressing, etc.)  - Assess/evaluate cause of self-care deficits   - Assess range of motion  - Assess patient's mobility; develop plan if impaired  - Assess patient's need for assistive devices and provide as appropriate  - Encourage maximum independence but intervene and supervise when necessary  - Involve family in performance of ADLs  - Assess for home care needs following discharge   - Consider OT consult to assist with ADL evaluation and planning for discharge  - Provide patient education as appropriate  Outcome: Progressing  Goal: Maintains/Returns to pre admission functional level  Description: INTERVENTIONS:  - Perform AM-PAC 6 Click Basic Mobility/ Daily Activity assessment daily.  - Set and communicate daily mobility goal to care team and patient/family/caregiver.   - Collaborate with rehabilitation services on mobility goals if consulted  - Perform Range of Motion 3 times a day.  - Reposition patient every 3 hours.  - Dangle patient 3 times a day  - Stand patient 3 times a day  - Ambulate patient 3 times a day  - Out of bed to chair 3 times a day   - Out of bed for meals 3 times a day  - Out of bed for toileting  - Record patient progress and toleration of activity level   Outcome: Progressing     Problem: DISCHARGE PLANNING  Goal: Discharge to home or other facility with appropriate resources  Description: INTERVENTIONS:  - Identify barriers to discharge w/patient and  caregiver  - Arrange for needed discharge resources and transportation as appropriate  - Identify discharge learning needs (meds, wound care, etc.)  - Arrange for interpretive services to assist at discharge as needed  - Refer to Case Management Department for coordinating discharge planning if the patient needs post-hospital services based on physician/advanced practitioner order or complex needs related to functional status, cognitive ability, or social support system  Outcome: Progressing     Problem: Knowledge Deficit  Goal: Patient/family/caregiver demonstrates understanding of disease process, treatment plan, medications, and discharge instructions  Description: Complete learning assessment and assess knowledge base.  Interventions:  - Provide teaching at level of understanding  - Provide teaching via preferred learning methods  Outcome: Progressing

## 2025-01-08 NOTE — ANESTHESIA PREPROCEDURE EVALUATION
Procedure:  DAVID    Relevant Problems   ANESTHESIA (within normal limits)      CARDIO   (+) Atrial fibrillation, new onset (HCC)   (+) Essential hypertension   (+) Essential hypertriglyceridemia      ENDO   (+) Type 2 diabetes mellitus with hyperglycemia, with long-term current use of insulin (HCC)      GI/HEPATIC   (+) Calcification of liver      /RENAL   (+) Benign prostatic hyperplasia with incomplete bladder emptying   (+) Hypertensive kidney disease with chronic kidney disease   (+) Renal cyst   (+) Stage 3b chronic kidney disease (HCC)      HEMATOLOGY   (+) Microcytic anemia   (+) Thrombocytopenia (HCC)      MUSCULOSKELETAL   (+) Gout   (+) Localized, primary osteoarthritis   (+) Osteoarthritis of knee   (+) Rectus diastasis      PULMONARY   (+) KALIA (obstructive sleep apnea)   (+) URI with cough and congestion      Other   (+) Obesity, morbid (HCC)        Physical Exam    Airway    Mallampati score: II  TM Distance: >3 FB  Neck ROM: full     Dental   No notable dental hx     Cardiovascular  Rhythm: irregular, Rate: normal    Pulmonary   Decreased breath sounds    Other Findings        Anesthesia Plan  ASA Score- 3     Anesthesia Type- general with ASA Monitors.         Additional Monitors:     Airway Plan:     Comment: ·  Left Ventricle: Left ventricular cavity size is normal. Wall thickness is moderately increased. There is moderate concentric hypertrophy. There is concentric remodeling. The left ventricular ejection fraction is 47% by biplane measurement. Systolic function is mildly reduced. There is mild global hypokinesis.  ·  Right Ventricle: Right ventricular cavity size is mildly dilated. Systolic function is mildly reduced. Abnormal tricuspid annular plane systolic excursion (TAPSE) = 1.7 cm.  ·  Left Atrium: The atrium is moderately dilated (42-48 mL/m2).  ·  Right Atrium: The atrium is mildly dilated.  ·  Aortic Valve: The aortic valve is trileaflet.  The noncoronary cusp is heavily calcified but  has well-preserved mobility. There is mild stenosis. The aortic valve peak velocity is 1.66 m/s. The aortic valve mean gradient is 7 mmHg. The dimensionless velocity index is 0.48. The aortic valve area is 1.59 cm2. The stroke volume index is 21.60 ml/m2.  ·  Mitral Valve: There is moderate annular calcification. There is mild regurgitation.  ·  Tricuspid Valve: There is mild regurgitation. The right ventricular systolic pressure is mildly elevated. The estimated right ventricular systolic pressure is 48.00 mmHg.  .       Plan Factors-    Chart reviewed. EKG reviewed. Imaging results reviewed. Existing labs reviewed. Patient summary reviewed.    Patient is not a current smoker.              Induction- intravenous.    Postoperative Plan-     Perioperative Resuscitation Plan - Level 1 - Full Code.       Informed Consent- Anesthetic plan and risks discussed with patient.  I personally reviewed this patient with the CRNA. Discussed and agreed on the Anesthesia Plan with the CRNA..

## 2025-01-08 NOTE — ASSESSMENT & PLAN NOTE
79-year-old male with history of CAD, hypertension, KALIA, but and type 2 diabetes mellitus presented to our institution due to chest pressure and shortness of breath.  He was found to have atrial fibrillation with rapid ventricular rate.  Appreciate cardiology recommendations. Echo showing EF of 47% and mild global hypokinesis  S/p DAVID and unsuccessful Cardioversion  Continue Metoprolol and Eliquis  Further management per cardiology

## 2025-01-08 NOTE — ASSESSMENT & PLAN NOTE
Lab Results   Component Value Date    HGBA1C 5.8 (H) 01/06/2025     Recent Labs     01/08/25  0732 01/08/25  1033 01/08/25  1129 01/08/25  1552   POCGLU 96 84 97 112       Blood Sugar Average: Last 72 hrs:  (P) 113.8  Home regimen: Levemir 40U, humalog 15U TID with meals   Inpatient regimen: Lantus 20U qam, Humalog 10U TID with meals, sliding scale  Carb controlled diet   Sliding scale insulin ordered

## 2025-01-08 NOTE — UTILIZATION REVIEW
See initial review by Danielle Holland RN below:      Continued Stay Review    Date: 1/8/24                          Current Patient Class: Inpatient  Current Level of Care: MED-SURG    HPI:79 y.o. male initially admitted OBS on 1/6 UPGRADED TO INPT 1/7.    Assessment/Plan: Sleepy and arousable. EKG done.  Date: 1/8  Day 3: Has surpassed a 2nd midnight with active treatments and services.PTT 55.WBC 11.45. . BUN 46, CREAT 1.94.PROCALCITONIN 0.49. DAVID:Left Ventricle: Left ventricular cavity size is normal. Wall thickness is moderately increased. There is moderate concentric hypertrophy. There is concentric remodeling. The left ventricular ejection fraction is 47% by biplane measurement. Systolic function is mildly reduced. There is mild global hypokinesis.  ·  Right Ventricle: Right ventricular cavity size is mildly dilated. Systolic function is mildly reduced. Abnormal tricuspid annular plane systolic excursion (TAPSE) = 1.7 cm.  ·  Left Atrium: The atrium is moderately dilated (42-48 mL/m2).  ·  Right Atrium: The atrium is mildly dilated.  ·  Aortic Valve: The aortic valve is trileaflet.  The noncoronary cusp is heavily calcified but has well-preserved mobility. There is mild stenosis. The aortic valve peak velocity is 1.66 m/s. The aortic valve mean gradient is 7 mmHg. The dimensionless velocity index is 0.48. The aortic valve area is 1.59 cm2. The stroke volume index is 21.60 ml/m2.  ·  Mitral Valve: There is moderate annular calcification. There is mild regurgitation.  ·  Tricuspid Valve: There is mild regurgitation. The right ventricular systolic pressure is mildly elevated. The estimated right ventricular systolic pressure is 48.00 mmHg.  .     Medications:   Scheduled Medications:  allopurinol, 300 mg, Oral, Daily  aspirin, 81 mg, Oral, Daily  furosemide, 40 mg, Intravenous, BID (diuretic)  guaiFENesin, 600 mg, Oral, Q12H RENNY  insulin glargine, 20 Units, Subcutaneous, QAM  insulin lispro, 1-5 Units,  Subcutaneous, TID AC  insulin lispro, 1-5 Units, Subcutaneous, HS  insulin lispro, 10 Units, Subcutaneous, TID With Meals  metoprolol succinate, 25 mg, Oral, BID      Continuous IV Infusions:  heparin (porcine), 3-20 Units/kg/hr (Order-Specific), Intravenous, Titrated      PRN Meds:  acetaminophen, 650 mg, Oral, Q6H PRN  albuterol, 2 puff, Inhalation, Q4H PRN  benzonatate, 100 mg, Oral, TID PRN  heparin (porcine), 2,000 Units, Intravenous, Q6H PRN 1/7 X 3  heparin (porcine), 4,000 Units, Intravenous, Q6H PRN  metoprolol, 5 mg, Intravenous, Q4H PRN  ondansetron, 4 mg, Intravenous, Q6H PRN      Discharge Plan: TBD    Vital Signs (last 3 days)       Date/Time Temp Pulse Resp BP MAP (mmHg) SpO2 Calculated FIO2 (%) - Nasal Cannula Nasal Cannula O2 Flow Rate (L/min) O2 Device Patient Position - Orthostatic VS Pain    01/08/25 0729 98 °F (36.7 °C) 70 20 146/97 -- 97 % -- -- Nasal cannula Lying --    01/08/25 0255 99 °F (37.2 °C) 81 23 149/82 110 94 % -- -- -- Lying --    01/07/25 2359 99.2 °F (37.3 °C) 61 21 151/85 111 94 % -- -- -- Lying --    01/07/25 2210 -- 129 -- 164/95 -- -- -- -- -- -- --    01/07/25 1959 97.2 °F (36.2 °C) 92 24 129/82 91 96 % 28 2 L/min Nasal cannula Lying --    01/07/25 1925 -- -- -- -- -- -- -- -- -- -- No Pain    01/07/25 1530 98.3 °F (36.8 °C) 82 20 150/83 120 92 % 28 2 L/min Nasal cannula Sitting --    01/07/25 1114 98.2 °F (36.8 °C) 98 18 117/75 88 94 % -- -- None (Room air) Sitting No Pain    01/07/25 0936 -- 88 -- 150/92 -- -- -- -- -- -- --    01/07/25 0710 98.6 °F (37 °C) 89 18 150/92 104 96 % 28 2 L/min Nasal cannula Lying --    01/07/25 0321 97.4 °F (36.3 °C) 79 20 149/82 105 92 % 28 2 L/min Nasal cannula Lying --    01/07/25 0135 98.4 °F (36.9 °C) -- -- -- -- -- -- -- -- -- --    01/06/25 2355 101.4 °F (38.6 °C) 100 21 177/79 -- 93 % -- -- None (Room air) Lying --    01/06/25 2302 -- -- -- -- -- -- -- -- -- -- Med Not Given for Pain - for MAR use only    01/06/25 2223 101.2 °F (38.4  °C) -- -- -- -- -- -- -- -- -- --    01/06/25 1956 101.5 °F (38.6 °C) 115 21 167/96 -- 93 % -- -- None (Room air) Lying --    01/06/25 1933 -- -- -- -- -- -- -- -- -- -- No Pain    01/06/25 1925 -- -- -- -- -- -- -- -- -- -- No Pain    01/06/25 1900 -- -- -- -- -- -- -- -- -- -- No Pain    01/06/25 1445 -- 109 22 151/74 101 95 % -- -- None (Room air) Sitting --    01/06/25 1430 -- 106 24 162/72 103 94 % -- -- -- -- --    01/06/25 1345 -- 107 22 156/85 113 -- -- -- -- -- --    01/06/25 1338 -- -- -- 137/72 -- -- -- -- -- -- --    01/06/25 1300 -- 94 23 162/77 110 -- -- -- -- -- --    01/06/25 1245 98.4 °F (36.9 °C) -- -- -- -- -- -- -- -- -- --    01/06/25 1244 -- 100 22 -- -- 97 % -- -- None (Room air) -- --          Weight (last 2 days)       Date/Time Weight    01/07/25 0936 119 (262)    01/06/25 1425 119 (262.57)            Pertinent Labs/Diagnostic Results:   Radiology:  XR chest 1 view portable   ED Interpretation by Mallory Dawn PA-C (01/06 1429)   No consolidation       Final Interpretation by Vicenta Wise MD (01/06 1518)      Limited by low lung volumes. No definite acute pulmonary process.   In the setting of clinically suspected/proven COVID-19, this plain film appearance does not contain findings that raise concern for viral pneumonia such as COVID-19, but does not rule out this diagnosis.         Workstation performed: [id number]           Cardiology:  Echo complete w/ contrast if indicated   Final Result by João Pineda MD (01/07 5651)        Left Ventricle: Left ventricular cavity size is normal. Wall thickness    is moderately increased. There is moderate concentric hypertrophy. There    is concentric remodeling. The left ventricular ejection fraction is 47% by    biplane measurement. Systolic function is mildly reduced. There is mild    global hypokinesis.     Right Ventricle: Right ventricular cavity size is mildly dilated.    Systolic function is mildly reduced. Abnormal tricuspid  annular plane    systolic excursion (TAPSE) = 1.7 cm.     Left Atrium: The atrium is moderately dilated (42-48 mL/m2).     Right Atrium: The atrium is mildly dilated.     Aortic Valve: The aortic valve is trileaflet.  The noncoronary cusp is    heavily calcified but has well-preserved mobility. There is mild stenosis.    The aortic valve peak velocity is 1.66 m/s. The aortic valve mean gradient    is 7 mmHg. The dimensionless velocity index is 0.48. The aortic valve area    is 1.59 cm2. The stroke volume index is 21.60 ml/m2.     Mitral Valve: There is moderate annular calcification. There is mild    regurgitation.     Tricuspid Valve: There is mild regurgitation. The right ventricular    systolic pressure is mildly elevated. The estimated right ventricular    systolic pressure is 48.00 mmHg.         ECG 12 lead   Final Result by Obi Carrion DO (01/06 1647)   Atrial fibrillation with premature ventricular or aberrantly conducted    complexes   Possible Lateral infarct (cited on or before 06-Jan-2025)  vs v1 and V5    lead reversal   Abnormal ECG   When compared with ECG of 06-Jan-2025 15:06, (unconfirmed)   No significant change was found   Confirmed by Obi Carrion ([id number]) on 1/6/2025 4:47:56 PM      ECG 12 lead   Final Result by Obi Carrion DO (01/06 1647)   Atrial fibrillation with rapid ventricular response with premature    ventricular or aberrantly conducted complexes   Possible Lateral infarct (cited on or before 06-Jan-2025)  vs V1 and V5    lead reversal   Abnormal ECG   When compared with ECG of 06-Jan-2025 12:43, (unconfirmed)   No significant change was found   Confirmed by Obi Carrion ([id number]) on 1/6/2025 4:47:30 PM      ECG 12 lead   Final Result by Obi Carrion DO (01/06 1646)   Atrial fibrillation with rapid ventricular response with premature    ventricular or aberrantly conducted complexes   Possible Lateral infarct , age undetermined vs precordial lead reversal    Abnormal ECG   Confirmed by Obi Carrion ([id number]) on 1/6/2025 4:46:52 PM              Results from last 7 days   Lab Units 01/06/25  1503   SARS-COV-2  Not Detected     Results from last 7 days   Lab Units 01/08/25  0444 01/06/25 1957 01/06/25 1739 01/06/25  1322   WBC Thousand/uL 11.45* 15.48* 15.78* 12.14*   HEMOGLOBIN g/dL 12.9 13.1 13.8 13.8   HEMATOCRIT % 38.2 39.2 41.0 40.7   PLATELETS Thousands/uL 109* 128* 130* 135*   TOTAL NEUT ABS Thousands/µL  --   --   --  9.33*   BANDS PCT % 3  --   --   --          Results from last 7 days   Lab Units 01/08/25  0444 01/06/25  1322   SODIUM mmol/L 134* 138   POTASSIUM mmol/L 3.8 4.1   CHLORIDE mmol/L 99 103   CO2 mmol/L 22 26   ANION GAP mmol/L 13 9   BUN mg/dL 46* 32*   CREATININE mg/dL 1.94* 1.66*   EGFR ml/min/1.73sq m 31 38   CALCIUM mg/dL 9.6 10.2   MAGNESIUM mg/dL 1.9  --    PHOSPHORUS mg/dL 4.0  --      Results from last 7 days   Lab Units 01/08/25  0444   AST U/L 16   ALT U/L 11   ALK PHOS U/L 70   TOTAL PROTEIN g/dL 7.4   ALBUMIN g/dL 3.7   TOTAL BILIRUBIN mg/dL 0.91     Results from last 7 days   Lab Units 01/08/25  0732 01/07/25 2022 01/07/25  1543 01/07/25  1118 01/07/25  0705 01/06/25 2057 01/06/25  1653   POC GLUCOSE mg/dl 96 120 111 100 133 175* 110     Results from last 7 days   Lab Units 01/08/25  0444 01/06/25  1322   GLUCOSE RANDOM mg/dL 82 119         Results from last 7 days   Lab Units 01/06/25  1322   HEMOGLOBIN A1C % 5.8*   EAG mg/dl 120       Results from last 7 days   Lab Units 01/06/25  1739 01/06/25  1512 01/06/25  1322   HS TNI 0HR ng/L  --   --  10   HS TNI 2HR ng/L  --  11  --    HSTNI D2 ng/L  --  1  --    HS TNI 4HR ng/L 11  --   --    HSTNI D4 ng/L 1  --   --          Results from last 7 days   Lab Units 01/08/25  0444 01/07/25  2233 01/07/25  1525 01/07/25  0026 01/06/25  1838 01/06/25  1739 01/06/25  1322   PROTIME seconds  --   --   --   --  14.6 14.4 14.2   INR   --   --   --   --  1.12 1.10 1.08   PTT seconds 55* 64* 58*    < > 36*  --  33    < > = values in this interval not displayed.     Results from last 7 days   Lab Units 01/06/25  1512   TSH 3RD GENERATON uIU/mL 2.576     Results from last 7 days   Lab Units 01/08/25  0444 01/06/25  1512   PROCALCITONIN ng/ml 0.49* 0.07       Results from last 7 days   Lab Units 01/06/25  1322   BNP pg/mL 294*       Results from last 7 days   Lab Units 01/06/25  1503   INFLUENZA B  Not Detected   RESPIRATORY SYNCYTIAL VIRUS  Not Detected     Results from last 7 days   Lab Units 01/06/25  1503   ADENOVIRUS  Not Detected   BORDETELLA PARAPERTUSSIS  Not Detected   BORDETELLA PERTUSSIS  Not Detected   CHLAMYDIA PNEUMONIAE  Not Detected   CORONAVIRUS 229E  Not Detected   CORONAVIRUS HKU1  Not Detected   CORONAVIRUS NL63  Not Detected   CORONAVIRUS OC43  Not Detected   METAPNEUMOVIRUS  Not Detected   RHINOVIRUS  Not Detected   MYCOPLASMA PNEUMONIAE  Not Detected   PARAINFLUENZA 1  Not Detected   PARAINFLUENZA 2  Not Detected   PARAINFLUENZA 3  Not Detected   PARAINFLUENZA 4  Not Detected       Network Utilization Review Department  ATTENTION: Please call with any questions or concerns to [phone] and carefully listen to the prompts so that you are directed to the right person. All voicemails are confidential.   For Discharge needs, contact Care Management DC Support Team at [phone] opt. 2  Send all requests for admission clinical reviews, approved or denied determinations and any other requests to dedicated fax number below belonging to the [place] where the patient is receiving treatment. List of dedicated fax numbers for the Facilities:  FACILITY NAME UR FAX NUMBER   ADMISSION DENIALS (Administrative/Medical Necessity) [phone]   DISCHARGE SUPPORT TEAM (NETWORK) [phone]   PARENT CHILD HEALTH (Maternity/NICU/Pediatrics) [phone]   [facility] [phone]   [facility] [phone]   [facility]  [phone]   [facility] [phone]   [facility] [phone]   [facility] [phone]   [facility] [phone]   [facility] [phone]   [facility] [phone]   [facility] [phone]   [facility] [phone]   [facility] [phone]

## 2025-01-08 NOTE — PROGRESS NOTES
Cardiology Progress Note   MD Perez Roque MD, [facility]  Geoff Odell DO, [facility]  MD Jann Ewdards DO, [facility]  Obi Carrion DO, [facility]  ----------------------------------------------------------------  [address] [address]    Irineo Rawls 79 y.o. male MRN: [id number]  Unit/Bed#: E4 -01 Encounter: [id number]      ASSESSMENT:   Persistent atrial fibrillation with new onset  s/p unsuccessful DAVID DCCV, January 8, 2025  Hypertension  Volume overload  Chronic HFmrEF  LVEF 47%, moderate LVH, mild global hypokinesis, mild RV dilatation with mildly reduced function, moderate LA and mild RA dilatation, mild AS, moderate MAC, mild MR/TR with PASP 48 mmHg, January 2025  Mild aortic stenosis   w/ Vmax 1.66 m/s, DVI 0.48, ERWIN 1.59 cm2, SVi 21.6, January 2025  Hypertension  Dyslipidemia  Type 2 diabetes mellitus  CKD stage III  KALIA  Thrombocytopenia    PLAN:  Will transition to Eliquis 2.5 mg twice daily starting tonight  Hold on further IV diuretic for now  Strict I's/O's and daily weights  Keep potassium greater than 4 and magnesium greater than 2  He is status post DAVID guided cardioversion which failed to restore sinus rhythm  Will discuss case with electrophysiology regarding initiation of amiodarone for antiarrhythmic drug versus rate control and outpatient follow-up for alternate antiarrhythmic drug or ablation  Continue Toprol-XL for rate control  Patient understands he should continue his anticoagulation for minimum of 30 days post cardioversion without cessation  Hopeful discharge from CV perspective in the next 48 hours    Signed: Geoff Odell DO, [facility] FASNC, FACP      History of Present Illness:  Patient seen and examined. Denies chest pain, pressure, tightness or squeezing.  Denies lightheadedness, dizziness or palpitations.  Denies lower extremity swelling, orthopnea or paroxysmal nocturnal dyspnea.      Review of  Systems:  Review of Systems   Constitutional: Negative for decreased appetite, fever, weight gain and weight loss.   HENT:  Negative for congestion and sore throat.    Eyes:  Negative for visual disturbance.   Cardiovascular:  Negative for chest pain, dyspnea on exertion, leg swelling, near-syncope and palpitations.   Respiratory:  Negative for cough and shortness of breath.    Hematologic/Lymphatic: Negative for bleeding problem.   Skin:  Negative for rash.   Musculoskeletal:  Negative for myalgias and neck pain.   Gastrointestinal:  Negative for abdominal pain and nausea.   Neurological:  Negative for light-headedness and weakness.   Psychiatric/Behavioral:  Negative for depression.        No Known Allergies    No current facility-administered medications on file prior to encounter.     Current Outpatient Medications on File Prior to Encounter   Medication Sig    albuterol (ProAir HFA) 90 mcg/act inhaler Inhale 2 puffs every 6 (six) hours as needed for wheezing    allopurinol (ZYLOPRIM) 300 mg tablet Take 1 tablet (300 mg total) by mouth daily    aspirin 81 MG tablet Take 81 mg by mouth daily    Cholecalciferol (VITAMIN D3) 5000 units TABS Take 1 tablet by mouth daily    insulin aspart (NovoLOG FlexPen) 100 UNIT/ML injection pen Inject 15 Units under the skin 3 (three) times a day with meals    Levemir FlexPen 100 units/mL injection pen Inject 40 Units under the skin daily    NIFEdipine (PROCARDIA XL) 60 mg 24 hr tablet Take 1 tablet (60 mg total) by mouth daily    Omega-3 Fatty Acids (FISH OIL) 1200 MG CAPS Take 1 capsule by mouth 3 (three) times a day    Alcohol Swabs 70 % PADS May substitute brand based on insurance coverage. Check glucose ACHS.    Blood Glucose Monitoring Suppl (OneTouch Verio Reflect) w/Device KIT May substitute brand based on insurance coverage. Check glucose ACHS.    Continuous Blood Gluc  (FreeStyle Peter 2 [place]) MONISHA Check blood sugars multiple times per day    Continuous Blood  Gluc Sensor (FreeStyle Peter 2 Sensor) MISC Check blood sugars multiple times per day    glucose blood (OneTouch Verio) test strip Check glucose before meals and at bedtime.    glucose blood (OneTouch Verio) test strip Check blood sugars three times daily. Please substitute with appropriate alternative as covered by patient's insurance. Dx: E11.65    Insulin Pen Needle (Unifine Pentips) 32G X 4 MM MISC For use with insulin pen 4 times daily.    Lancets (OneTouch Delica Plus [id number]) MISC Check glucose before meals and at bedtime        Current Facility-Administered Medications   Medication Dose Route Frequency Provider Last Rate    acetaminophen  650 mg Oral Q6H PRN Shobha Tirado PA-C      albuterol  2 puff Inhalation Q4H PRN Shobha Tirado PA-C      allopurinol  300 mg Oral Daily Shobha Tirado PA-C      aspirin  81 mg Oral Daily Shobha Tirado PA-C      benzonatate  100 mg Oral TID PRN Shobha Tirado PA-C      fentaNYL  25 mcg Intravenous Q5 Min PRN Kiley Twin, DO      furosemide  40 mg Intravenous BID (diuretic) Deacon Manzanares PA-C      guaiFENesin  600 mg Oral Q12H RENNY Shobha Tirado PA-C      heparin (porcine)  3-20 Units/kg/hr (Order-Specific) Intravenous Titrated Susan Ambron, DO 17.1 Units/kg/hr (01/08/25 1036)    heparin (porcine)  2,000 Units Intravenous Q6H PRN Susan Ambron, DO      heparin (porcine)  4,000 Units Intravenous Q6H PRN Susan Ambron, DO      HYDROmorphone  0.5 mg Intravenous Q5 Min PRN Kiley Twin, DO      insulin glargine  20 Units Subcutaneous QAM Shobha Tirado PA-C      insulin lispro  1-5 Units Subcutaneous TID AC Shobha Tirado PA-C      insulin lispro  1-5 Units Subcutaneous HS Shobha Tirado PA-C      insulin lispro  10 Units Subcutaneous TID With Meals Susan Ambron, DO      metoprolol  5 mg Intravenous Q4H PRN Deacon Manzanares PA-C      metoprolol succinate  25 mg Oral BID Deacon Manzanares PA-C      ondansetron  4 mg Intravenous  "Q6H PRN Shobha Tirado PA-C      ondansetron  4 mg Intravenous Once PRN Kiley Murcia DO         heparin (porcine), 3-20 Units/kg/hr (Order-Specific), Last Rate: 17.1 Units/kg/hr (01/08/25 1036)        Vitals:    01/08/25 1016 01/08/25 1023 01/08/25 1036 01/08/25 1050   BP: 140/78 131/65 139/69 140/78   BP Location:       Pulse: 74 76 72 72   Resp:  15 18 21   Temp:  (!) 97.1 °F (36.2 °C)  (!) 97.3 °F (36.3 °C)   TempSrc:  Axillary     SpO2:  97% 95% 94%   Weight: 119 kg (262 lb)      Height: 5' 7\" (1.702 m)        Body mass index is 41.04 kg/m².      Intake/Output Summary (Last 24 hours) at 1/8/2025 1517  Last data filed at 1/8/2025 0729  Gross per 24 hour   Intake --   Output 750 ml   Net -750 ml       Weight change: -0.258 kg (-9.1 oz)    PHYSICAL EXAMINATION:  Gen: Awake, Alert, NAD  Head/eyes: AT/NC, pupils equal and round, Anicteric  ENT: mmm  Neck: Supple, No elevated JVP, trachea midline  Resp: CTA bilaterally no w/r/r  CV: irreg irreg +S1, S2, No m/r/g  Abd: Soft, obese, NT/ND + BS  Ext: no LE edema bilaterally  Neuro: Follows commands, moves all extermities  Psych: Appropriate affect, normal mood, pleasant attitude, non-combative  Skin: warm; no rash, erythema or venous stasis changes on exposed skin    Lab Results:  Results from last 7 days   Lab Units 01/08/25  0444   WBC Thousand/uL 11.45*   HEMOGLOBIN g/dL 12.9   HEMATOCRIT % 38.2   PLATELETS Thousands/uL 109*     Results from last 7 days   Lab Units 01/08/25  0444   POTASSIUM mmol/L 3.8   CHLORIDE mmol/L 99   CO2 mmol/L 22   BUN mg/dL 46*   CREATININE mg/dL 1.94*   CALCIUM mg/dL 9.6   ALK PHOS U/L 70   ALT U/L 11   AST U/L 16     No results found for: \"TROPONINT\"      Results from last 7 days   Lab Units 01/06/25  1739 01/06/25  1512 01/06/25  1322   HS TNI 0HR ng/L  --   --  10   HS TNI 2HR ng/L  --  11  --    HS TNI 4HR ng/L 11  --   --      Results from last 7 days   Lab Units 01/06/25  1838   INR  1.12       Tele: AF w/ CVR    This note was " completed in part utilizing M-Modal Fluency Direct Software.  Grammatical errors, random word insertions, spelling mistakes, and incomplete sentences may be an occasional consequence of this system secondary to software limitations, ambient noise, and hardware issues.  If you have any questions or concerns about the content, text, or information contained within the body of this dictation, please contact the provider for clarification.

## 2025-01-08 NOTE — PROGRESS NOTES
Progress Note - Hospitalist   Name: Irineo Rawls 79 y.o. male I MRN: [id number]  Unit/Bed#: E4 -01 I Date of Admission: 1/6/2025   Date of Service: 1/8/2025 I Hospital Day: 1    Assessment & Plan  Atrial fibrillation, new onset (HCC)  79-year-old male with history of CAD, hypertension, KALIA, but and type 2 diabetes mellitus presented to our institution due to chest pressure and shortness of breath.  He was found to have atrial fibrillation with rapid ventricular rate.  Appreciate cardiology recommendations. Echo showing EF of 47% and mild global hypokinesis  S/p DAVID and unsuccessful Cardioversion  Continue Metoprolol and Eliquis  Further management per cardiology  URI with cough and congestion  Presenting to the ED with chest discomfort and SOB  SIRS criteria met w/ tachycardia, tachypnea, fever, and leukocytosis. Possible URTI. No abdominal pain, urinary symptoms.  COVID/flu antigen negative  CXR in ED: limited by low lung volumes. no definite acute pulmonary process   RP 2 negative  Continue Supportive care   Monitor off abx at this time   Stage 3b chronic kidney disease (HCC)  Lab Results   Component Value Date    EGFR 31 01/08/2025    EGFR 38 01/06/2025    EGFR 40 09/10/2024    CREATININE 1.94 (H) 01/08/2025    CREATININE 1.66 (H) 01/06/2025    CREATININE 1.59 (H) 09/10/2024     Baseline Cr 1.5-1.7  Uptrending, monitor for MATILDE  Essential hypertension  Home regimen: Nifedipine 60mg daily   Inpatient regimen: Metoprolol 25mg BID  KALIA (obstructive sleep apnea)  Continue cpap qhs   Type 2 diabetes mellitus with hyperglycemia, with long-term current use of insulin (HCC)  Lab Results   Component Value Date    HGBA1C 5.8 (H) 01/06/2025     Recent Labs     01/08/25  0732 01/08/25  1033 01/08/25  1129 01/08/25  1552   POCGLU 96 84 97 112       Blood Sugar Average: Last 72 hrs:  (P) 113.8  Home regimen: Levemir 40U, humalog 15U TID with meals   Inpatient regimen: Lantus 20U qam, Humalog 10U TID with meals,  sliding scale  Carb controlled diet   Sliding scale insulin ordered   Gout  Continue allopurinol 300mg daily   Thrombocytopenia (HCC)  Chronic     Recent Labs     25  1739 25  1957 25  0444   * 128* 109*         VTE Pharmacologic Prophylaxis: VTE Score: 5 Moderate Risk (Score 3-4) - Pharmacological DVT Prophylaxis Ordered: apixaban (Eliquis).    Mobility:   Basic Mobility Inpatient Raw Score: 23  JH-HLM Goal: 7: Walk 25 feet or more  JH-HLM Achieved: 7: Walk 25 feet or more    Discussions with Specialists or Other Care Team Provider: cardiology    Education and Discussions with Family / Patient: patient    Current Length of Stay: 1 day(s)  Current Patient Status: Inpatient   Certification Statement: The patient will continue to require additional inpatient hospital stay due to afib management  Discharge Plan: Anticipate discharge in 24-48 hrs to home.    Code Status: Level 1 - Full Code    Subjective   Patient seen and examined. Denies any chest pain or shortness of breath.    Objective   Vitals:   Temp (24hrs), Av.1 °F (36.7 °C), Min:97.1 °F (36.2 °C), Max:99.2 °F (37.3 °C)    Temp:  [97.1 °F (36.2 °C)-99.2 °F (37.3 °C)] 98.8 °F (37.1 °C)  HR:  [] 76  Resp:  [15-24] 24  BP: (129-164)/(65-97) 164/78  SpO2:  [92 %-97 %] 92 %  Body mass index is 41.04 kg/m².     Input and Output Summary (last 24 hours):     Intake/Output Summary (Last 24 hours) at 2025 1751  Last data filed at 2025 0729  Gross per 24 hour   Intake --   Output 750 ml   Net -750 ml       Physical Exam  Vitals reviewed.   Constitutional:       General: He is not in acute distress.  HENT:      Head: Normocephalic.      Nose: Nose normal.      Mouth/Throat:      Mouth: Mucous membranes are moist.   Eyes:      General: No scleral icterus.  Cardiovascular:      Rate and Rhythm: Normal rate. Rhythm irregular.   Pulmonary:      Effort: Pulmonary effort is normal. No respiratory distress.      Breath sounds: No  wheezing.   Abdominal:      General: There is no distension.      Palpations: Abdomen is soft.      Tenderness: There is no abdominal tenderness.   Skin:     General: Skin is warm.   Neurological:      Mental Status: He is alert.   Psychiatric:         Mood and Affect: Mood normal.         Behavior: Behavior normal.       Lines/Drains:              Lab Results: I have reviewed the following results:   Results from last 7 days   Lab Units 01/08/25  0444 01/06/25  1957 01/06/25  1838 01/06/25  1739 01/06/25  1322   WBC Thousand/uL 11.45* 15.48*  --  15.78* 12.14*   HEMOGLOBIN g/dL 12.9 13.1  --  13.8 13.8   PLATELETS Thousands/uL 109* 128*  --  130* 135*   MCV fL 101* 102*  --  100* 100*   TOTAL NEUT ABS Thousand/uL 7.90*  --   --   --   --    BANDS PCT % 3  --   --   --   --    INR   --   --  1.12 1.10 1.08     Results from last 7 days   Lab Units 01/08/25  0444 01/06/25  1322   SODIUM mmol/L 134* 138   POTASSIUM mmol/L 3.8 4.1   CHLORIDE mmol/L 99 103   CO2 mmol/L 22 26   ANION GAP mmol/L 13 9   BUN mg/dL 46* 32*   CREATININE mg/dL 1.94* 1.66*   CALCIUM mg/dL 9.6 10.2   ALBUMIN g/dL 3.7  --    TOTAL BILIRUBIN mg/dL 0.91  --    ALK PHOS U/L 70  --    ALT U/L 11  --    AST U/L 16  --    EGFR ml/min/1.73sq m 31 38   GLUCOSE RANDOM mg/dL 82 119     Results from last 7 days   Lab Units 01/08/25  0444   MAGNESIUM mg/dL 1.9   PHOSPHORUS mg/dL 4.0         Results from last 7 days   Lab Units 01/06/25  1739 01/06/25  1512 01/06/25  1322   HS TNI 0HR ng/L  --   --  10   HS TNI 2HR ng/L  --  11  --    HS TNI 4HR ng/L 11  --   --           Results from last 7 days   Lab Units 01/08/25  0444   PROCALCITONIN ng/ml 0.49*     Results from last 7 days   Lab Units 01/08/25  1552 01/08/25  1129 01/08/25  1033 01/08/25  0732 01/07/25  2022 01/07/25  1543 01/07/25  1118 01/07/25  0705 01/06/25 2057 01/06/25  1653   POC GLUCOSE mg/dl 112 97 84 96 120 111 100 133 175* 110     Results from last 7 days   Lab Units 01/06/25  1322    HEMOGLOBIN A1C % 5.8*     Results from last 7 days   Lab Units 01/06/25  1512   TSH 3RD GENERATON uIU/mL 2.576       Recent Cultures (last 7 days):         Imaging:  Reviewed radiology reports from this admission: xr chest    Last 24 Hours Medication List:     Current Facility-Administered Medications:     acetaminophen (TYLENOL) tablet 650 mg, Q6H PRN    albuterol (PROVENTIL HFA,VENTOLIN HFA) inhaler 2 puff, Q4H PRN    allopurinol (ZYLOPRIM) tablet 300 mg, Daily    apixaban (ELIQUIS) tablet 2.5 mg, BID    aspirin (ECOTRIN LOW STRENGTH) EC tablet 81 mg, Daily    benzonatate (TESSALON PERLES) capsule 100 mg, TID PRN    fentaNYL (SUBLIMAZE) injection 25 mcg, Q5 Min PRN    guaiFENesin (MUCINEX) 12 hr tablet 600 mg, Q12H RENNY    heparin (porcine) injection 2,000 Units, Q6H PRN    heparin (porcine) injection 4,000 Units, Q6H PRN    HYDROmorphone (DILAUDID) injection 0.5 mg, Q5 Min PRN    insulin glargine (LANTUS) subcutaneous injection 20 Units 0.2 mL, QAM    insulin lispro (HumALOG/ADMELOG) 100 units/mL subcutaneous injection 1-5 Units, TID AC **AND** Fingerstick Glucose (POCT), TID AC    insulin lispro (HumALOG/ADMELOG) 100 units/mL subcutaneous injection 1-5 Units, HS    insulin lispro (HumALOG/ADMELOG) 100 units/mL subcutaneous injection 10 Units, TID With Meals    metoprolol (LOPRESSOR) injection 5 mg, Q4H PRN    metoprolol succinate (TOPROL-XL) 24 hr tablet 25 mg, BID    ondansetron (ZOFRAN) injection 4 mg, Q6H PRN    ondansetron (ZOFRAN) injection 4 mg, Once PRN      **Please Note: This note may have been constructed using a voice recognition system.**

## 2025-01-08 NOTE — ASSESSMENT & PLAN NOTE
Lab Results   Component Value Date    EGFR 31 01/08/2025    EGFR 38 01/06/2025    EGFR 40 09/10/2024    CREATININE 1.94 (H) 01/08/2025    CREATININE 1.66 (H) 01/06/2025    CREATININE 1.59 (H) 09/10/2024     Baseline Cr 1.5-1.7  Uptrending, monitor for MATILDE

## 2025-01-08 NOTE — PLAN OF CARE
Problem: PAIN - ADULT  Goal: Verbalizes/displays adequate comfort level or baseline comfort level  Description: Interventions:  - Encourage patient to monitor pain and request assistance  - Assess pain using appropriate pain scale  - Administer analgesics based on type and severity of pain and evaluate response  - Implement non-pharmacological measures as appropriate and evaluate response  - Consider cultural and social influences on pain and pain management  - Notify physician/advanced practitioner if interventions unsuccessful or patient reports new pain  Outcome: Progressing     Problem: INFECTION - ADULT  Goal: Absence or prevention of progression during hospitalization  Description: INTERVENTIONS:  - Assess and monitor for signs and symptoms of infection  - Monitor lab/diagnostic results  - Monitor all insertion sites, i.e. indwelling lines, tubes, and drains  - Monitor endotracheal if appropriate and nasal secretions for changes in amount and color  - [place] appropriate cooling/warming therapies per order  - Administer medications as ordered  - Instruct and encourage patient and family to use good hand hygiene technique  - Identify and instruct in appropriate isolation precautions for identified infection/condition  Outcome: Progressing  Goal: Absence of fever/infection during neutropenic period  Description: INTERVENTIONS:  - Monitor WBC    Outcome: Progressing     Problem: SAFETY ADULT  Goal: Patient will remain free of falls  Description: INTERVENTIONS:  - Educate patient/family on patient safety including physical limitations  - Instruct patient to call for assistance with activity   - Consult OT/PT to assist with strengthening/mobility   - Keep Call bell within reach  - Keep bed low and locked with side rails adjusted as appropriate  - Keep care items and personal belongings within reach  - Initiate and maintain comfort rounds  - Make Fall Risk Sign visible to staff  - Offer Toileting every 2 Hours,  in advance of need  - Initiate/Maintain bed alarm  - Obtain necessary fall risk management equipment: In place   - Apply yellow socks and bracelet for high fall risk patients  - Consider moving patient to room near nurses station  Outcome: Progressing  Goal: Maintain or return to baseline ADL function  Description: INTERVENTIONS:  -  Assess patient's ability to carry out ADLs; assess patient's baseline for ADL function and identify physical deficits which impact ability to perform ADLs (bathing, care of mouth/teeth, toileting, grooming, dressing, etc.)  - Assess/evaluate cause of self-care deficits   - Assess range of motion  - Assess patient's mobility; develop plan if impaired  - Assess patient's need for assistive devices and provide as appropriate  - Encourage maximum independence but intervene and supervise when necessary  - Involve family in performance of ADLs  - Assess for home care needs following discharge   - Consider OT consult to assist with ADL evaluation and planning for discharge  - Provide patient education as appropriate  Outcome: Progressing  Goal: Maintains/Returns to pre admission functional level  Description: INTERVENTIONS:  - Perform AM-PAC 6 Click Basic Mobility/ Daily Activity assessment daily.  - Set and communicate daily mobility goal to care team and patient/family/caregiver.   - Collaborate with rehabilitation services on mobility goals if consulted  - Perform Range of Motion 3 times a day.  - Reposition patient every 2 hours.  - Dangle patient 3 times a day  - Stand patient 3 times a day  - Ambulate patient 3 times a day  - Out of bed to chair 3 times a day   - Out of bed for meals 3 times a day  - Out of bed for toileting  - Record patient progress and toleration of activity level   Outcome: Progressing     Problem: DISCHARGE PLANNING  Goal: Discharge to home or other facility with appropriate resources  Description: INTERVENTIONS:  - Identify barriers to discharge w/patient and  caregiver  - Arrange for needed discharge resources and transportation as appropriate  - Identify discharge learning needs (meds, wound care, etc.)  - Arrange for interpretive services to assist at discharge as needed  - Refer to Case Management Department for coordinating discharge planning if the patient needs post-hospital services based on physician/advanced practitioner order or complex needs related to functional status, cognitive ability, or social support system  Outcome: Progressing     Problem: Knowledge Deficit  Goal: Patient/family/caregiver demonstrates understanding of disease process, treatment plan, medications, and discharge instructions  Description: Complete learning assessment and assess knowledge base.  Interventions:  - Provide teaching at level of understanding  - Provide teaching via preferred learning methods  Outcome: Progressing

## 2025-01-08 NOTE — ANESTHESIA POSTPROCEDURE EVALUATION
Post-Op Assessment Note    CV Status:  Stable    Pain management: adequate       Mental Status:  Alert and awake   Hydration Status:  Euvolemic   PONV Controlled:  Controlled   Airway Patency:  Patent  Two or more mitigation strategies used for obstructive sleep apnea   Post Op Vitals Reviewed: Yes    No anethesia notable event occurred.    Staff: Anesthesiologist           Last Filed PACU Vitals:  Vitals Value Taken Time   Temp 97.3 °F (36.3 °C) 01/08/25 1050   Pulse 70 01/08/25 1057   /78 01/08/25 1051   Resp 17 01/08/25 1056   SpO2 93 % 01/08/25 1057   Vitals shown include unfiled device data.    Modified [place]:     Vitals Value Taken Time   Activity 2 01/08/25 1050   Respiration 2 01/08/25 1050   Circulation 2 01/08/25 1050   Consciousness 1 01/08/25 1050   Oxygen Saturation 1 01/08/25 1050     Modified [place] Score: 8

## 2025-01-08 NOTE — ASSESSMENT & PLAN NOTE
Presenting to the ED with chest discomfort and SOB  SIRS criteria met w/ tachycardia, tachypnea, fever, and leukocytosis. Possible URTI. No abdominal pain, urinary symptoms.  COVID/flu antigen negative  CXR in ED: limited by low lung volumes. no definite acute pulmonary process   RP 2 negative  Continue Supportive care   Monitor off abx at this time

## 2025-01-09 PROBLEM — I50.21 ACUTE SYSTOLIC HEART FAILURE (HCC): Status: ACTIVE | Noted: 2025-01-09

## 2025-01-09 PROBLEM — I50.21 ACUTE HEART FAILURE WITH MILDLY REDUCED EJECTION FRACTION (HFMREF, 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): Status: ACTIVE | Noted: 2025-01-09

## 2025-01-09 LAB
ANION GAP SERPL CALCULATED.3IONS-SCNC: 10 MMOL/L (ref 4–13)
BUN SERPL-MCNC: 49 MG/DL (ref 5–25)
CALCIUM SERPL-MCNC: 9.5 MG/DL (ref 8.4–10.2)
CHLORIDE SERPL-SCNC: 99 MMOL/L (ref 96–108)
CO2 SERPL-SCNC: 25 MMOL/L (ref 21–32)
CREAT SERPL-MCNC: 1.73 MG/DL (ref 0.6–1.3)
ERYTHROCYTE [DISTWIDTH] IN BLOOD BY AUTOMATED COUNT: 14.7 % (ref 11.6–15.1)
GFR SERPL CREATININE-BSD FRML MDRD: 36 ML/MIN/1.73SQ M
GLUCOSE SERPL-MCNC: 107 MG/DL (ref 65–140)
GLUCOSE SERPL-MCNC: 128 MG/DL (ref 65–140)
GLUCOSE SERPL-MCNC: 85 MG/DL (ref 65–140)
GLUCOSE SERPL-MCNC: 90 MG/DL (ref 65–140)
GLUCOSE SERPL-MCNC: 94 MG/DL (ref 65–140)
HCT VFR BLD AUTO: 37.8 % (ref 36.5–49.3)
HGB BLD-MCNC: 13 G/DL (ref 12–17)
MAGNESIUM SERPL-MCNC: 1.9 MG/DL (ref 1.9–2.7)
MCH RBC QN AUTO: 33.6 PG (ref 26.8–34.3)
MCHC RBC AUTO-ENTMCNC: 34.4 G/DL (ref 31.4–37.4)
MCV RBC AUTO: 98 FL (ref 82–98)
PLATELET # BLD AUTO: 121 THOUSANDS/UL (ref 149–390)
PMV BLD AUTO: 12.3 FL (ref 8.9–12.7)
POTASSIUM SERPL-SCNC: 3.6 MMOL/L (ref 3.5–5.3)
RBC # BLD AUTO: 3.87 MILLION/UL (ref 3.88–5.62)
SL CV LV EF: 50
SODIUM SERPL-SCNC: 134 MMOL/L (ref 135–147)
WBC # BLD AUTO: 8.63 THOUSAND/UL (ref 4.31–10.16)

## 2025-01-09 PROCEDURE — 93325 DOPPLER ECHO COLOR FLOW MAPG: CPT | Performed by: INTERNAL MEDICINE

## 2025-01-09 PROCEDURE — 93320 DOPPLER ECHO COMPLETE: CPT | Performed by: INTERNAL MEDICINE

## 2025-01-09 PROCEDURE — 80048 BASIC METABOLIC PNL TOTAL CA: CPT | Performed by: STUDENT IN AN ORGANIZED HEALTH CARE EDUCATION/TRAINING PROGRAM

## 2025-01-09 PROCEDURE — 99232 SBSQ HOSP IP/OBS MODERATE 35: CPT | Performed by: STUDENT IN AN ORGANIZED HEALTH CARE EDUCATION/TRAINING PROGRAM

## 2025-01-09 PROCEDURE — 93312 ECHO TRANSESOPHAGEAL: CPT | Performed by: INTERNAL MEDICINE

## 2025-01-09 PROCEDURE — 85027 COMPLETE CBC AUTOMATED: CPT | Performed by: STUDENT IN AN ORGANIZED HEALTH CARE EDUCATION/TRAINING PROGRAM

## 2025-01-09 PROCEDURE — 82948 REAGENT STRIP/BLOOD GLUCOSE: CPT

## 2025-01-09 PROCEDURE — 99232 SBSQ HOSP IP/OBS MODERATE 35: CPT | Performed by: INTERNAL MEDICINE

## 2025-01-09 PROCEDURE — 83735 ASSAY OF MAGNESIUM: CPT | Performed by: STUDENT IN AN ORGANIZED HEALTH CARE EDUCATION/TRAINING PROGRAM

## 2025-01-09 RX ORDER — AMIODARONE HYDROCHLORIDE 200 MG/1
200 TABLET ORAL
Status: DISCONTINUED | OUTPATIENT
Start: 2025-01-09 | End: 2025-01-10 | Stop reason: HOSPADM

## 2025-01-09 RX ORDER — METOPROLOL SUCCINATE 25 MG/1
25 TABLET, EXTENDED RELEASE ORAL DAILY
Status: DISCONTINUED | OUTPATIENT
Start: 2025-01-10 | End: 2025-01-10 | Stop reason: HOSPADM

## 2025-01-09 RX ORDER — FUROSEMIDE 20 MG/1
20 TABLET ORAL DAILY
Status: DISCONTINUED | OUTPATIENT
Start: 2025-01-09 | End: 2025-01-10 | Stop reason: HOSPADM

## 2025-01-09 RX ADMIN — APIXABAN 2.5 MG: 2.5 TABLET, FILM COATED ORAL at 16:55

## 2025-01-09 RX ADMIN — AMIODARONE HYDROCHLORIDE 200 MG: 200 TABLET ORAL at 16:55

## 2025-01-09 RX ADMIN — INSULIN LISPRO 10 UNITS: 100 INJECTION, SOLUTION INTRAVENOUS; SUBCUTANEOUS at 08:19

## 2025-01-09 RX ADMIN — AMIODARONE HYDROCHLORIDE 200 MG: 200 TABLET ORAL at 09:25

## 2025-01-09 RX ADMIN — APIXABAN 2.5 MG: 2.5 TABLET, FILM COATED ORAL at 08:19

## 2025-01-09 RX ADMIN — ASPIRIN 81 MG: 81 TABLET, COATED ORAL at 08:19

## 2025-01-09 RX ADMIN — INSULIN LISPRO 10 UNITS: 100 INJECTION, SOLUTION INTRAVENOUS; SUBCUTANEOUS at 12:35

## 2025-01-09 RX ADMIN — AMIODARONE HYDROCHLORIDE 200 MG: 200 TABLET ORAL at 12:35

## 2025-01-09 RX ADMIN — METOPROLOL SUCCINATE 25 MG: 25 TABLET, EXTENDED RELEASE ORAL at 08:19

## 2025-01-09 RX ADMIN — FUROSEMIDE 20 MG: 20 TABLET ORAL at 16:55

## 2025-01-09 RX ADMIN — GUAIFENESIN 600 MG: 600 TABLET, EXTENDED RELEASE ORAL at 08:19

## 2025-01-09 NOTE — ASSESSMENT & PLAN NOTE
Lab Results   Component Value Date    HGBA1C 5.8 (H) 01/06/2025     Recent Labs     01/08/25  1552 01/08/25  2103 01/09/25  0728 01/09/25  1120   POCGLU 112 93 94 128       Blood Sugar Average: Last 72 hrs:  (P) 111.8206931212798152  Home regimen: Levemir 40U, humalog 15U TID with meals   Inpatient regimen: Lantus 20U qam, Humalog 10U TID with meals, sliding scale  Carb controlled diet   Sliding scale insulin ordered

## 2025-01-09 NOTE — ASSESSMENT & PLAN NOTE
Lab Results   Component Value Date    EGFR 36 01/09/2025    EGFR 31 01/08/2025    EGFR 38 01/06/2025    CREATININE 1.73 (H) 01/09/2025    CREATININE 1.94 (H) 01/08/2025    CREATININE 1.66 (H) 01/06/2025     Baseline Cr 1.5-1.7  Uptrending, monitor for MATILDE

## 2025-01-09 NOTE — PROGRESS NOTES
Progress Note - Cardiology   Name: Irineo Rawls 79 y.o. male I MRN: [id number]  Unit/Bed#: E4 -01 I Date of Admission: 1/6/2025   Date of Service: 1/9/2025 I Hospital Day: 2   Assessment & Plan  Atrial fibrillation, new onset (HCC)    Currently asymptomatic and hemodynamically stable  Echo EF 47% and DAVID EF 50%  S/p failed cardioversion on 01/08/25 to restore sinus rhythm   EKG on 01/08/25 showing atrial fibrillation with premature ventricular or aberrantly conducted complexes.   [id number] score 4 base on age, sex, htn, and dm hx.   Potassium 3.6 and MG 1.9 today( not at goal)     PLAN:  Continue Eliquis 2.5 mg twice daily for at least 30 days postcardioversion  Conntinue Amiodarone 200 mg q8 daily   Continue Toprol-XL 25 mg for rate control   Keep potassium greater than 4 and magnesium greater than 2   Start furosemide P.O  Discussed with patient the importance of medication adherence after discharge as well as low sodium diet and fluids restrictions 2 liters daily.   Daily I&O and daily weight   Recheck electrolytes tomorrow am and renal function          Acute systolic heart failure (HCC)  Wt Readings from Last 3 Encounters:   01/08/25 119 kg (262 lb)   09/18/24 114 kg (252 lb)   09/17/24 116 kg (254 lb 12.8 oz)   See a/p above for atrial fib     Gout  Currently on allopurinol  F/U for primary team    KALIA (obstructive sleep apnea)  Continue cpap  Essential hypertension  Home regimen: Nifedipine 60 mg daily     Type 2 diabetes mellitus with hyperglycemia, with long-term current use of insulin ([facility])  Lab Results   Component Value Date    HGBA1C 5.8 (H) 01/06/2025       Recent Labs     01/08/25  1552 01/08/25  2103 01/09/25  0728 01/09/25  1120   POCGLU 112 93 94 128       Blood Sugar Average: Last 72 hrs:  (P) 111.8070493904597054    Stage 3b chronic kidney disease (HCC)  Lab Results   Component Value Date    EGFR 36 01/09/2025    EGFR 31 01/08/2025    EGFR 38 01/06/2025    CREATININE 1.73 (H)  01/09/2025    CREATININE 1.94 (H) 01/08/2025    CREATININE 1.66 (H) 01/06/2025     URI with cough and congestion  Follow up for primary team     Subjective   The patient was seen and examined at bedside. He reports felling well. Patient denies chest pain, chest pressure, tightness or squeezing  sob, lightheadedness, dizziness or palpitations. Patient also denies orthopnea or paroxysmal nocturnal dyspnea.     Objective   Temp:  [96.6 °F (35.9 °C)-98.8 °F (37.1 °C)] 97.3 °F (36.3 °C)  HR:  [57-80] 68  BP: (141-164)/(78-97) 148/84  Resp:  [18-24] 18  SpO2:  [92 %-95 %] 92 %  O2 Device: None (Room air)  Nasal Cannula O2 Flow Rate (L/min):  [2 L/min] 2 L/min  Orthostatic Blood Pressures      Flowsheet Row Most Recent Value   Blood Pressure 148/84 filed at 01/09/2025 1117   Patient Position - Orthostatic VS Lying filed at 01/09/2025 1117          First Weight: Weight - Scale: 119 kg (262 lb 9.1 oz) (01/06/25 1425)  Vitals:    01/07/25 0936 01/08/25 1016   Weight: 119 kg (262 lb) 119 kg (262 lb)     Physical Exam  Vitals reviewed.   Constitutional:       General: He is not in acute distress.     Appearance: Normal appearance. He is obese. He is not toxic-appearing or diaphoretic.   HENT:      Head: Normocephalic and atraumatic.      Mouth/Throat:      Mouth: Mucous membranes are moist.      Pharynx: Oropharynx is clear.   Eyes:      Extraocular Movements: Extraocular movements intact.      Conjunctiva/sclera: Conjunctivae normal.      Pupils: Pupils are equal, round, and reactive to light.   Neck:      Vascular: No hepatojugular reflux or JVD.   Cardiovascular:      Rate and Rhythm: Normal rate.      Pulses: Normal pulses.      Heart sounds: Normal heart sounds. No murmur heard.     No friction rub. No gallop.   Pulmonary:      Effort: Pulmonary effort is normal. No respiratory distress.      Breath sounds: No stridor. No rhonchi or rales.   Abdominal:      General: Abdomen is flat. Bowel sounds are normal. There is no  "distension.      Palpations: Abdomen is soft.   Musculoskeletal:      Right lower leg: Edema present.      Left lower leg: Edema present.   Skin:     General: Skin is warm and dry.      Capillary Refill: Capillary refill takes less than 2 seconds.   Neurological:      General: No focal deficit present.      Mental Status: He is alert and oriented to person, place, and time.   Psychiatric:         Mood and Affect: Mood normal.         Behavior: Behavior normal.         Lab Results: I have reviewed the following results:CBC/BMP:   .     01/09/25  0453   WBC 8.63   HGB 13.0   HCT 37.8   *   SODIUM 134*   K 3.6   CL 99   CO2 25   BUN 49*   CREATININE 1.73*   GLUC 85   MG 1.9      Results from last 7 days   Lab Units 01/09/25  0453 01/08/25  0444 01/06/25  1957   WBC Thousand/uL 8.63 11.45* 15.48*   HEMOGLOBIN g/dL 13.0 12.9 13.1   HEMATOCRIT % 37.8 38.2 39.2   PLATELETS Thousands/uL 121* 109* 128*     Results from last 7 days   Lab Units 01/09/25  0453 01/08/25  0444 01/06/25  1322   POTASSIUM mmol/L 3.6 3.8 4.1   CHLORIDE mmol/L 99 99 103   CO2 mmol/L 25 22 26   BUN mg/dL 49* 46* 32*   CREATININE mg/dL 1.73* 1.94* 1.66*   CALCIUM mg/dL 9.5 9.6 10.2     Results from last 7 days   Lab Units 01/08/25  0444 01/07/25  2233 01/07/25  1525 01/07/25  0026 01/06/25  1838 01/06/25  1739 01/06/25  1322   INR   --   --   --   --  1.12 1.10 1.08   PTT seconds 55* 64* 58*   < > 36*  --  33    < > = values in this interval not displayed.     Lab Results   Component Value Date    HGBA1C 5.8 (H) 01/06/2025     No results found for: \"CKTOTAL\", \"CKMB\", \"CKMBINDEX\", \"TROPONINI\"    Imaging Results Review: No pertinent imaging studies reviewed.  Other Study Results Review: EKG was reviewed.     VTE Pharmacologic Prophylaxis: VTE covered by:  apixaban, Oral, 2.5 mg at 01/09/25 0819  heparin (porcine), Intravenous, 2,000 Units at 01/07/25 1648  heparin (porcine), Intravenous          Administrative Statements   I have spent a total " time of 30 minutes in caring for this patient on the day of the visit/encounter including Diagnostic results, Prognosis, Risks and benefits of tx options, Importance of tx compliance, Impressions, and Reviewing / ordering tests, medicine, procedures  .

## 2025-01-09 NOTE — ASSESSMENT & PLAN NOTE
Wt Readings from Last 3 Encounters:   01/08/25 119 kg (262 lb)   09/18/24 114 kg (252 lb)   09/17/24 116 kg (254 lb 12.8 oz)     Management per cardiology  Echo showing: EF of 47%, mild global hypokinesis  Continue lasix  Daily weights, I/os

## 2025-01-09 NOTE — CASE MANAGEMENT
Case Management Assessment & Discharge Planning Note    Patient name Irineo Rawls  Location [facility] /E4 -* MRN [id number]  : 1945 Date 2025       Current Admission Date: 2025  Current Admission Diagnosis:Atrial fibrillation, new onset (HCC)   Patient Active Problem List    Diagnosis Date Noted Date Diagnosed    Acute systolic heart failure (HCC) 2025     Atrial fibrillation, new onset (HCC) 2025     URI with cough and congestion 2025     Stage 3b chronic kidney disease (HCC) 2024     Microalbuminuria 2024     Hypertensive kidney disease with chronic kidney disease 2024     Renal cyst 2024     Fall at home, initial encounter 2024     Elevated lipase 2024     Hyponatremia 2024     Thrombocytopenia ([facility]) 2023     Benign prostatic hyperplasia with incomplete bladder emptying 2023     Acute hyperkalemia 2023     Microcytic anemia 2023     Hyperkalemia      Calcification of liver 2021     Obesity, morbid ([facility]) 2021     Rectus diastasis 10/07/2020     Hyperuricemia 07/15/2019     Knee pain 2018     Localized, primary osteoarthritis 2018     Osteoarthritis of knee 2018     Essential hypertriglyceridemia 2016     Type 2 diabetes mellitus with hyperglycemia, with long-term current use of insulin ([facility]) 2016     KALIA (obstructive sleep apnea) 2015     Gout 2015     Vitamin D deficiency 2012     Essential hypertension 2009       LOS (days): 2  Geometric Mean LOS (GMLOS) (days): 3  Days to GMLOS:0.9     OBJECTIVE:    Risk of Unplanned Readmission Score: 17.71         Current admission status: Inpatient       Preferred Pharmacy:   [facility] - [place] - [address]  [address] [facility]  [place] [address]  Phone: [phone] Fax: [phone]    Primary Care Provider: Obi Esquivel DO    Primary Insurance:  JACQUE WING REP  Secondary Insurance:     ASSESSMENT:  Active Health Care Proxies       Shireen Rawls Health Care Representative - Spouse   Primary Phone: [phone] (Home)                 Advance Directives  Does patient have a Health Care POA?: No  Was patient offered paperwork?: Yes (Declined)  Does patient currently have a Health Care decision maker?: Yes, please see Health Care Proxy section  Does patient have Advance Directives?: No  Was patient offered paperwork?: Yes (Declined)         Readmission Root Cause  30 Day Readmission: No    Patient Information  Admitted from:: Home  Mental Status: Alert  During Assessment patient was accompanied by: Not accompanied during assessment  Assessment information provided by:: Patient  Primary Caregiver: Self  Support Systems: Self, Spouse/significant other  County of Residence: [place]  What city do you live in?: [place]  Home entry access options. Select all that apply.: Stairs  Number of steps to enter home.: 2  Do the steps have railings?: Yes  Type of Current Residence: [facility]  Living Arrangements: Lives w/ Spouse/significant other  Is patient a ?: Yes  Is patient active with [facility] [facility])?: No    Activities of Daily Living Prior to Admission  Functional Status: Independent  Completes ADLs independently?: Yes  Ambulates independently?: Yes  Does patient use assisted devices?: No  Does patient currently own DME?: Yes  What DME does the patient currently own?: [employer]  Does patient have a history of Outpatient Therapy (PT/OT)?: Yes  Does the patient have a history of Short-Term Rehab?: No  Does patient have a history of HHC?: No  Does patient currently have HHC?: No         Patient Information Continued  Income Source: Pension/long-term  Does patient have prescription coverage?: Yes  Does patient receive dialysis treatments?: No  Does patient have a history of substance abuse?: No  Does patient have a history of Mental Health  Diagnosis?: No         Means of Transportation  Means of Transport to Appts:: Drives Self      Social Determinants of Health (SDOH)      Flowsheet Row Most Recent Value   Housing Stability    In the last 12 months, was there a time when you were not able to pay the mortgage or rent on time? N   In the past 12 months, how many times have you moved where you were living? 0   At any time in the past 12 months, were you homeless or living in a shelter (including now)? N   Transportation Needs    In the past 12 months, has lack of transportation kept you from medical appointments or from getting medications? no   In the past 12 months, has lack of transportation kept you from meetings, work, or from getting things needed for daily living? No   Food Insecurity    Within the past 12 months, you worried that your food would run out before you got the money to buy more. Never true   Within the past 12 months, the food you bought just didn't last and you didn't have money to get more. Never true   Utilities    In the past 12 months has the electric, gas, oil, or water company threatened to shut off services in your home? No            DISCHARGE DETAILS:    Discharge planning discussed with:: Patient  Freedom of Choice: Yes  Comments - Freedom of Choice: DC home, no identified needs  CM contacted family/caregiver?: Yes  Were Treatment Team discharge recommendations reviewed with patient/caregiver?: Yes  Did patient/caregiver verbalize understanding of patient care needs?: Yes  Were patient/caregiver advised of the risks associated with not following Treatment Team discharge recommendations?: Yes         Requested Home Health Care         Is the patient interested in HHC at discharge?: No    DME Referral Provided  Referral made for DME?: No         Would you like to participate in our [facility] service program?  : No - Declined    Treatment Team Recommendation: Home  Discharge Destination Plan:: Home  Transport at  Discharge : Family             IMM Given (Date):: 01/09/25  IMM Given to:: Patient        IMM reviewed with patient, patient agrees with discharge determination.    CM met with patient at bedside to introduce self and role with DC planning.  Patient resides with his wife in a ranch home with 5 steps to enter.  Patient was independent PTA, he does not utilize any DME but owns a SPC and RW.  Patient reports family will transport him home at time of DC. Patient does not anticipate any CM needs at this time, CM department remains available.

## 2025-01-09 NOTE — ASSESSMENT & PLAN NOTE
>>ASSESSMENT AND PLAN FOR ACUTE SYSTOLIC HEART FAILURE (HCC) WRITTEN ON 1/9/2025 12:38 PM BY MONIQUE AMLDONADO MD    Wt Readings from Last 3 Encounters:   01/08/25 119 kg (262 lb)   09/18/24 114 kg (252 lb)   09/17/24 116 kg (254 lb 12.8 oz)   See a/p above for atrial fib

## 2025-01-09 NOTE — ASSESSMENT & PLAN NOTE
Wt Readings from Last 3 Encounters:   01/08/25 119 kg (262 lb)   09/18/24 114 kg (252 lb)   09/17/24 116 kg (254 lb 12.8 oz)

## 2025-01-09 NOTE — PLAN OF CARE
Problem: PAIN - ADULT  Goal: Verbalizes/displays adequate comfort level or baseline comfort level  Description: Interventions:  - Encourage patient to monitor pain and request assistance  - Assess pain using appropriate pain scale  - Administer analgesics based on type and severity of pain and evaluate response  - Implement non-pharmacological measures as appropriate and evaluate response  - Consider cultural and social influences on pain and pain management  - Notify physician/advanced practitioner if interventions unsuccessful or patient reports new pain  Outcome: Progressing     Problem: INFECTION - ADULT  Goal: Absence or prevention of progression during hospitalization  Description: INTERVENTIONS:  - Assess and monitor for signs and symptoms of infection  - Monitor lab/diagnostic results  - Monitor all insertion sites, i.e. indwelling lines, tubes, and drains  - Monitor endotracheal if appropriate and nasal secretions for changes in amount and color  - [place] appropriate cooling/warming therapies per order  - Administer medications as ordered  - Instruct and encourage patient and family to use good hand hygiene technique  - Identify and instruct in appropriate isolation precautions for identified infection/condition  Outcome: Progressing  Goal: Absence of fever/infection during neutropenic period  Description: INTERVENTIONS:  - Monitor WBC    Outcome: Progressing     Problem: SAFETY ADULT  Goal: Patient will remain free of falls  Description: INTERVENTIONS:  - Educate patient/family on patient safety including physical limitations  - Instruct patient to call for assistance with activity   - Consult OT/PT to assist with strengthening/mobility   - Keep Call bell within reach  - Keep bed low and locked with side rails adjusted as appropriate  - Keep care items and personal belongings within reach  - Initiate and maintain comfort rounds  - Make Fall Risk Sign visible to staff  - Offer Toileting every 2 Hours,  in advance of need  - Initiate/Maintain bed alarm  - Obtain necessary fall risk management equipment:   - Apply yellow socks and bracelet for high fall risk patients  - Consider moving patient to room near nurses station  Outcome: Progressing  Goal: Maintain or return to baseline ADL function  Description: INTERVENTIONS:  -  Assess patient's ability to carry out ADLs; assess patient's baseline for ADL function and identify physical deficits which impact ability to perform ADLs (bathing, care of mouth/teeth, toileting, grooming, dressing, etc.)  - Assess/evaluate cause of self-care deficits   - Assess range of motion  - Assess patient's mobility; develop plan if impaired  - Assess patient's need for assistive devices and provide as appropriate  - Encourage maximum independence but intervene and supervise when necessary  - Involve family in performance of ADLs  - Assess for home care needs following discharge   - Consider OT consult to assist with ADL evaluation and planning for discharge  - Provide patient education as appropriate  Outcome: Progressing  Goal: Maintains/Returns to pre admission functional level  Description: INTERVENTIONS:  - Perform AM-PAC 6 Click Basic Mobility/ Daily Activity assessment daily.  - Set and communicate daily mobility goal to care team and patient/family/caregiver.   - Collaborate with rehabilitation services on mobility goals if consulted  - Perform Range of Motion 3 times a day.  - Reposition patient every 2 hours.  - Dangle patient 3 times a day  - Stand patient 3 times a day  - Ambulate patient 3 times a day  - Out of bed to chair 3 times a day   - Out of bed for meals 3 times a day  - Out of bed for toileting  - Record patient progress and toleration of activity level   Outcome: Progressing     Problem: DISCHARGE PLANNING  Goal: Discharge to home or other facility with appropriate resources  Description: INTERVENTIONS:  - Identify barriers to discharge w/patient and caregiver  -  Arrange for needed discharge resources and transportation as appropriate  - Identify discharge learning needs (meds, wound care, etc.)  - Arrange for interpretive services to assist at discharge as needed  - Refer to Case Management Department for coordinating discharge planning if the patient needs post-hospital services based on physician/advanced practitioner order or complex needs related to functional status, cognitive ability, or social support system  Outcome: Progressing     Problem: Knowledge Deficit  Goal: Patient/family/caregiver demonstrates understanding of disease process, treatment plan, medications, and discharge instructions  Description: Complete learning assessment and assess knowledge base.  Interventions:  - Provide teaching at level of understanding  - Provide teaching via preferred learning methods  Outcome: Progressing     Problem: CARDIOVASCULAR - ADULT  Goal: Maintains optimal cardiac output and hemodynamic stability  Description: INTERVENTIONS:  - Monitor I/O, vital signs and rhythm  - Monitor for S/S and trends of decreased cardiac output  - Administer and titrate ordered vasoactive medications to optimize hemodynamic stability  - Assess quality of pulses, skin color and temperature  - Assess for signs of decreased coronary artery perfusion  - Instruct patient to report change in severity of symptoms  Outcome: Progressing  Goal: Absence of cardiac dysrhythmias or at baseline rhythm  Description: INTERVENTIONS:  - Continuous cardiac monitoring, vital signs, obtain 12 lead EKG if ordered  - Administer antiarrhythmic and heart rate control medications as ordered  - Monitor electrolytes and administer replacement therapy as ordered  Outcome: Progressing

## 2025-01-09 NOTE — ASSESSMENT & PLAN NOTE
Wt Readings from Last 3 Encounters:   01/08/25 119 kg (262 lb)   09/18/24 114 kg (252 lb)   09/17/24 116 kg (254 lb 12.8 oz)   See a/p above for atrial fib

## 2025-01-09 NOTE — PROGRESS NOTES
Progress Note - Hospitalist   Name: Irineo Rawls 79 y.o. male I MRN: [id number]  Unit/Bed#: E4 -01 I Date of Admission: 1/6/2025   Date of Service: 1/9/2025 I Hospital Day: 2    Assessment & Plan  Atrial fibrillation, new onset (HCC)  79-year-old male with history of CAD, hypertension, KALIA, but and type 2 diabetes mellitus presented to our institution due to chest pressure and shortness of breath.  He was found to have atrial fibrillation with rapid ventricular rate.  Appreciate cardiology recommendations. Echo showing EF of 47% and mild global hypokinesis  S/p DAVID and unsuccessful Cardioversion  Continue Metoprolol and Eliquis  Further management per cardiology  URI with cough and congestion  Presenting to the ED with chest discomfort and SOB  SIRS criteria met w/ tachycardia, tachypnea, fever, and leukocytosis. Possible URTI. No abdominal pain, urinary symptoms.  COVID/flu antigen negative  CXR in ED: limited by low lung volumes. no definite acute pulmonary process   RP 2 negative  Continue Supportive care   Monitor off abx at this time   Stage 3b chronic kidney disease (HCC)  Lab Results   Component Value Date    EGFR 36 01/09/2025    EGFR 31 01/08/2025    EGFR 38 01/06/2025    CREATININE 1.73 (H) 01/09/2025    CREATININE 1.94 (H) 01/08/2025    CREATININE 1.66 (H) 01/06/2025     Baseline Cr 1.5-1.7  Uptrending, monitor for MATILDE  Essential hypertension  Home regimen: Nifedipine 60mg daily   Inpatient regimen: Metoprolol 25mg BID  KALIA (obstructive sleep apnea)  Continue cpap qhs   Type 2 diabetes mellitus with hyperglycemia, with long-term current use of insulin (HCC)  Lab Results   Component Value Date    HGBA1C 5.8 (H) 01/06/2025     Recent Labs     01/08/25  1552 01/08/25  2103 01/09/25  0728 01/09/25  1120   POCGLU 112 93 94 128       Blood Sugar Average: Last 72 hrs:  (P) 111.9257159233328173  Home regimen: Levemir 40U, humalog 15U TID with meals   Inpatient regimen: Lantus 20U qam, Humalog 10U TID  with meals, sliding scale  Carb controlled diet   Sliding scale insulin ordered   Gout  Continue allopurinol 300mg daily   Thrombocytopenia (HCC)  Chronic     Recent Labs     25  1957 25  0444 25  0453   * 109* 121*       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    Mobility:   Basic Mobility Inpatient Raw Score: 23  JH-HLM Goal: 7: Walk 25 feet or more  JH-HLM Achieved: 7: Walk 25 feet or more    Discussions with Specialists or Other Care Team Provider: cardiology    Education and Discussions with Family / Patient: patient    Current Length of Stay: 2 day(s)  Current Patient Status: Inpatient   Certification Statement: The patient will continue to require additional inpatient hospital stay due to afib med management  Discharge Plan: Anticipate discharge tomorrow to home.    Code Status: Level 1 - Full Code    Subjective   Patient seen and examined. No chest pain or shortness of breath.     Objective   Vitals:   Temp (24hrs), Av.3 °F (36.3 °C), Min:96.6 °F (35.9 °C), Max:98.5 °F (36.9 °C)    Temp:  [96.6 °F (35.9 °C)-98.5 °F (36.9 °C)] 97 °F (36.1 °C)  HR:  [57-80] 66  Resp:  [18-21] 18  BP: (141-168)/(82-97) 168/89  SpO2:  [92 %-95 %] 95 %  Body mass index is 41.04 kg/m².     Input and Output Summary (last 24 hours):     Intake/Output Summary (Last 24 hours) at 2025 1551  Last data filed at 2025 1930  Gross per 24 hour   Intake 240 ml   Output --   Net 240 ml       Physical Exam  Vitals reviewed.   Constitutional:       General: He is not in acute distress.  HENT:       Head: Normocephalic.      Nose: Nose normal.      Mouth/Throat:      Mouth: Mucous membranes are moist.   Eyes:      General: No scleral icterus.  Cardiovascular:      Rate and Rhythm: Normal rate. Rhythm irregular.   Pulmonary:      Effort: Pulmonary effort is normal. No respiratory distress.   Abdominal:      General: There is no distension.      Palpations: Abdomen is soft.      Tenderness: There is no abdominal tenderness.   Skin:     General: Skin is warm.   Neurological:      Mental Status: He is alert.   Psychiatric:         Mood and Affect: Mood normal.         Behavior: Behavior normal.       Lines/Drains:        Telemetry:  Telemetry Orders (From admission, onward)               24 Hour Telemetry Monitoring  Continuous x 24 Hours (Telem)        Expiring   Question:  Reason for 24 Hour Telemetry  Answer:  PCI/EP study (including pacer and ICD implementation), Cardiac surgery, MI, abnormal cardiac cath, and chest pain- rule out MI                     Indication for Continued Telemetry Use: Arrthymias requiring medical therapy               Lab Results: I have reviewed the following results:   Results from last 7 days   Lab Units 01/09/25 0453 01/08/25 0444 01/06/25  1957 01/06/25  1838 01/06/25  1739 01/06/25  1322   WBC Thousand/uL 8.63 11.45* 15.48*  --  15.78* 12.14*   HEMOGLOBIN g/dL 13.0 12.9 13.1  --  13.8 13.8   PLATELETS Thousands/uL 121* 109* 128*  --  130* 135*   MCV fL 98 101* 102*  --  100* 100*   TOTAL NEUT ABS Thousand/uL  --  7.90*  --   --   --   --    BANDS PCT %  --  3  --   --   --   --    INR   --   --   --  1.12 1.10 1.08     Results from last 7 days   Lab Units 01/09/25  0453 01/08/25  0444 01/06/25  1322   SODIUM mmol/L 134* 134* 138   POTASSIUM mmol/L 3.6 3.8 4.1   CHLORIDE mmol/L 99 99 103   CO2 mmol/L 25 22 26   ANION GAP mmol/L 10 13 9   BUN mg/dL 49* 46* 32*   CREATININE mg/dL 1.73* 1.94* 1.66*   CALCIUM mg/dL 9.5 9.6 10.2   ALBUMIN g/dL  --  3.7  --    TOTAL BILIRUBIN  mg/dL  --  0.91  --    ALK PHOS U/L  --  70  --    ALT U/L  --  11  --    AST U/L  --  16  --    EGFR ml/min/1.73sq m 36 31 38   GLUCOSE RANDOM mg/dL 85 82 119     Results from last 7 days   Lab Units 01/09/25  0453 01/08/25  0444   MAGNESIUM mg/dL 1.9 1.9   PHOSPHORUS mg/dL  --  4.0         Results from last 7 days   Lab Units 01/06/25  1739 01/06/25  1512 01/06/25  1322   HS TNI 0HR ng/L  --   --  10   HS TNI 2HR ng/L  --  11  --    HS TNI 4HR ng/L 11  --   --           Results from last 7 days   Lab Units 01/08/25  0444   PROCALCITONIN ng/ml 0.49*     Results from last 7 days   Lab Units 01/09/25  1120 01/09/25  0728 01/08/25  2103 01/08/25  1552 01/08/25  1129 01/08/25  1033 01/08/25  0732 01/07/25  2022 01/07/25  1543 01/07/25  1118 01/07/25  0705 01/06/25  2057   POC GLUCOSE mg/dl 128 94 93 112 97 84 96 120 111 100 133 175*     Results from last 7 days   Lab Units 01/06/25  1322   HEMOGLOBIN A1C % 5.8*     Results from last 7 days   Lab Units 01/06/25  1512   TSH 3RD GENERATON uIU/mL 2.576       Recent Cultures (last 7 days):         Imaging:  Reviewed radiology reports from this admission: xr chest    Last 24 Hours Medication List:     Current Facility-Administered Medications:     acetaminophen (TYLENOL) tablet 650 mg, Q6H PRN    albuterol (PROVENTIL HFA,VENTOLIN HFA) inhaler 2 puff, Q4H PRN    allopurinol (ZYLOPRIM) tablet 300 mg, Daily    amiodarone tablet 200 mg, TID With Meals    apixaban (ELIQUIS) tablet 2.5 mg, BID    aspirin (ECOTRIN LOW STRENGTH) EC tablet 81 mg, Daily    benzonatate (TESSALON PERLES) capsule 100 mg, TID PRN    fentaNYL (SUBLIMAZE) injection 25 mcg, Q5 Min PRN    furosemide (LASIX) tablet 20 mg, Daily    guaiFENesin (MUCINEX) 12 hr tablet 600 mg, Q12H RENNY    heparin (porcine) injection 2,000 Units, Q6H PRN    heparin (porcine) injection 4,000 Units, Q6H PRN    HYDROmorphone (DILAUDID) injection 0.5 mg, Q5 Min PRN    insulin glargine (LANTUS) subcutaneous injection 20 Units 0.2 mL,  QAM    insulin lispro (HumALOG/ADMELOG) 100 units/mL subcutaneous injection 1-5 Units, TID AC **AND** Fingerstick Glucose (POCT), TID AC    insulin lispro (HumALOG/ADMELOG) 100 units/mL subcutaneous injection 1-5 Units, HS    insulin lispro (HumALOG/ADMELOG) 100 units/mL subcutaneous injection 10 Units, TID With Meals    metoprolol (LOPRESSOR) injection 5 mg, Q4H PRN    [START ON 1/10/2025] metoprolol succinate (TOPROL-XL) 24 hr tablet 25 mg, Daily    ondansetron (ZOFRAN) injection 4 mg, Q6H PRN      **Please Note: This note may have been constructed using a voice recognition system.**

## 2025-01-09 NOTE — ASSESSMENT & PLAN NOTE
Currently asymptomatic and hemodynamically stable  Echo EF 47% and DAVID EF 50%  S/p failed cardioversion on 01/08/25 to restore sinus rhythm   EKG on 01/08/25 showing atrial fibrillation with premature ventricular or aberrantly conducted complexes.   [id number] score 4 base on age, sex, htn, and dm hx.   Potassium 3.6 and MG 1.9 today( not at goal)     PLAN:  Continue Eliquis 2.5 mg twice daily for at least 30 days postcardioversion  Conntinue Amiodarone 200 mg q8 daily   Continue Toprol-XL 25 mg for rate control   Keep potassium greater than 4 and magnesium greater than 2   Start furosemide P.O  Discussed with patient the importance of medication adherence after discharge as well as low sodium diet and fluids restrictions 2 liters daily.   Daily I&O and daily weight   Recheck electrolytes tomorrow am and renal function

## 2025-01-09 NOTE — ASSESSMENT & PLAN NOTE
Lab Results   Component Value Date    HGBA1C 5.8 (H) 01/06/2025       Recent Labs     01/08/25  1552 01/08/25  2103 01/09/25  0728 01/09/25  1120   POCGLU 112 93 94 128       Blood Sugar Average: Last 72 hrs:  (P) [id number]

## 2025-01-09 NOTE — ASSESSMENT & PLAN NOTE
Lab Results   Component Value Date    EGFR 36 01/09/2025    EGFR 31 01/08/2025    EGFR 38 01/06/2025    CREATININE 1.73 (H) 01/09/2025    CREATININE 1.94 (H) 01/08/2025    CREATININE 1.66 (H) 01/06/2025

## 2025-01-09 NOTE — PLAN OF CARE
Problem: PAIN - ADULT  Goal: Verbalizes/displays adequate comfort level or baseline comfort level  Description: Interventions:  - Encourage patient to monitor pain and request assistance  - Assess pain using appropriate pain scale  - Administer analgesics based on type and severity of pain and evaluate response  - Implement non-pharmacological measures as appropriate and evaluate response  - Consider cultural and social influences on pain and pain management  - Notify physician/advanced practitioner if interventions unsuccessful or patient reports new pain  Outcome: Progressing     Problem: INFECTION - ADULT  Goal: Absence or prevention of progression during hospitalization  Description: INTERVENTIONS:  - Assess and monitor for signs and symptoms of infection  - Monitor lab/diagnostic results  - Monitor all insertion sites, i.e. indwelling lines, tubes, and drains  - Monitor endotracheal if appropriate and nasal secretions for changes in amount and color  - [place] appropriate cooling/warming therapies per order  - Administer medications as ordered  - Instruct and encourage patient and family to use good hand hygiene technique  - Identify and instruct in appropriate isolation precautions for identified infection/condition  Outcome: Progressing  Goal: Absence of fever/infection during neutropenic period  Description: INTERVENTIONS:  - Monitor WBC    Outcome: Progressing     Problem: SAFETY ADULT  Goal: Patient will remain free of falls  Description: INTERVENTIONS:  - Educate patient/family on patient safety including physical limitations  - Instruct patient to call for assistance with activity   - Consult OT/PT to assist with strengthening/mobility   - Keep Call bell within reach  - Keep bed low and locked with side rails adjusted as appropriate  - Keep care items and personal belongings within reach  - Initiate and maintain comfort rounds  - Make Fall Risk Sign visible to staff  - Offer Toileting every 2 Hours,  in advance of need  - Initiate/Maintain bed alarm  - Obtain necessary fall risk management equipment:   - Apply yellow socks and bracelet for high fall risk patients  - Consider moving patient to room near nurses station  Outcome: Progressing  Goal: Maintain or return to baseline ADL function  Description: INTERVENTIONS:  -  Assess patient's ability to carry out ADLs; assess patient's baseline for ADL function and identify physical deficits which impact ability to perform ADLs (bathing, care of mouth/teeth, toileting, grooming, dressing, etc.)  - Assess/evaluate cause of self-care deficits   - Assess range of motion  - Assess patient's mobility; develop plan if impaired  - Assess patient's need for assistive devices and provide as appropriate  - Encourage maximum independence but intervene and supervise when necessary  - Involve family in performance of ADLs  - Assess for home care needs following discharge   - Consider OT consult to assist with ADL evaluation and planning for discharge  - Provide patient education as appropriate  Outcome: Progressing  Goal: Maintains/Returns to pre admission functional level  Description: INTERVENTIONS:  - Perform AM-PAC 6 Click Basic Mobility/ Daily Activity assessment daily.  - Set and communicate daily mobility goal to care team and patient/family/caregiver.   - Collaborate with rehabilitation services on mobility goals if consulted  - Perform Range of Motion 3 times a day.  - Reposition patient every 2 hours.  - Dangle patient 3 times a day  - Stand patient 3 times a day  - Ambulate patient 3 times a day  - Out of bed to chair 3 times a day   - Out of bed for meals 3 times a day  - Out of bed for toileting  - Record patient progress and toleration of activity level   Outcome: Progressing     Problem: DISCHARGE PLANNING  Goal: Discharge to home or other facility with appropriate resources  Description: INTERVENTIONS:  - Identify barriers to discharge w/patient and caregiver  -  Arrange for needed discharge resources and transportation as appropriate  - Identify discharge learning needs (meds, wound care, etc.)  - Arrange for interpretive services to assist at discharge as needed  - Refer to Case Management Department for coordinating discharge planning if the patient needs post-hospital services based on physician/advanced practitioner order or complex needs related to functional status, cognitive ability, or social support system  Outcome: Progressing     Problem: Knowledge Deficit  Goal: Patient/family/caregiver demonstrates understanding of disease process, treatment plan, medications, and discharge instructions  Description: Complete learning assessment and assess knowledge base.  Interventions:  - Provide teaching at level of understanding  - Provide teaching via preferred learning methods  Outcome: Progressing     Problem: CARDIOVASCULAR - ADULT  Goal: Maintains optimal cardiac output and hemodynamic stability  Description: INTERVENTIONS:  - Monitor I/O, vital signs and rhythm  - Monitor for S/S and trends of decreased cardiac output  - Administer and titrate ordered vasoactive medications to optimize hemodynamic stability  - Assess quality of pulses, skin color and temperature  - Assess for signs of decreased coronary artery perfusion  - Instruct patient to report change in severity of symptoms  Outcome: Progressing  Goal: Absence of cardiac dysrhythmias or at baseline rhythm  Description: INTERVENTIONS:  - Continuous cardiac monitoring, vital signs, obtain 12 lead EKG if ordered  - Administer antiarrhythmic and heart rate control medications as ordered  - Monitor electrolytes and administer replacement therapy as ordered  Outcome: Progressing

## 2025-01-10 ENCOUNTER — TRANSITIONAL CARE MANAGEMENT (OUTPATIENT)
Dept: FAMILY MEDICINE CLINIC | Facility: CLINIC | Age: 80
End: 2025-01-10

## 2025-01-10 VITALS
WEIGHT: 241.4 LBS | RESPIRATION RATE: 18 BRPM | DIASTOLIC BLOOD PRESSURE: 92 MMHG | HEIGHT: 67 IN | TEMPERATURE: 96.9 F | OXYGEN SATURATION: 96 % | SYSTOLIC BLOOD PRESSURE: 150 MMHG | HEART RATE: 71 BPM | BODY MASS INDEX: 37.89 KG/M2

## 2025-01-10 LAB
ANION GAP SERPL CALCULATED.3IONS-SCNC: 9 MMOL/L (ref 4–13)
BASOPHILS # BLD AUTO: 0.08 THOUSANDS/ΜL (ref 0–0.1)
BASOPHILS NFR BLD AUTO: 1 % (ref 0–1)
BUN SERPL-MCNC: 47 MG/DL (ref 5–25)
CALCIUM SERPL-MCNC: 9.7 MG/DL (ref 8.4–10.2)
CHLORIDE SERPL-SCNC: 102 MMOL/L (ref 96–108)
CO2 SERPL-SCNC: 25 MMOL/L (ref 21–32)
CREAT SERPL-MCNC: 1.72 MG/DL (ref 0.6–1.3)
EOSINOPHIL # BLD AUTO: 0.29 THOUSAND/ΜL (ref 0–0.61)
EOSINOPHIL NFR BLD AUTO: 3 % (ref 0–6)
ERYTHROCYTE [DISTWIDTH] IN BLOOD BY AUTOMATED COUNT: 14.5 % (ref 11.6–15.1)
GFR SERPL CREATININE-BSD FRML MDRD: 36 ML/MIN/1.73SQ M
GLUCOSE SERPL-MCNC: 104 MG/DL (ref 65–140)
GLUCOSE SERPL-MCNC: 109 MG/DL (ref 65–140)
GLUCOSE SERPL-MCNC: 134 MG/DL (ref 65–140)
HCT VFR BLD AUTO: 43.1 % (ref 36.5–49.3)
HGB BLD-MCNC: 14.3 G/DL (ref 12–17)
IMM GRANULOCYTES # BLD AUTO: 0.09 THOUSAND/UL (ref 0–0.2)
IMM GRANULOCYTES NFR BLD AUTO: 1 % (ref 0–2)
LYMPHOCYTES # BLD AUTO: 1.79 THOUSANDS/ΜL (ref 0.6–4.47)
LYMPHOCYTES NFR BLD AUTO: 20 % (ref 14–44)
MAGNESIUM SERPL-MCNC: 1.9 MG/DL (ref 1.9–2.7)
MCH RBC QN AUTO: 33.1 PG (ref 26.8–34.3)
MCHC RBC AUTO-ENTMCNC: 33.2 G/DL (ref 31.4–37.4)
MCV RBC AUTO: 100 FL (ref 82–98)
MONOCYTES # BLD AUTO: 1.22 THOUSAND/ΜL (ref 0.17–1.22)
MONOCYTES NFR BLD AUTO: 14 % (ref 4–12)
NEUTROPHILS # BLD AUTO: 5.57 THOUSANDS/ΜL (ref 1.85–7.62)
NEUTS SEG NFR BLD AUTO: 61 % (ref 43–75)
NRBC BLD AUTO-RTO: 0 /100 WBCS
PLATELET # BLD AUTO: 166 THOUSANDS/UL (ref 149–390)
PMV BLD AUTO: 12.3 FL (ref 8.9–12.7)
POTASSIUM SERPL-SCNC: 3.8 MMOL/L (ref 3.5–5.3)
RBC # BLD AUTO: 4.32 MILLION/UL (ref 3.88–5.62)
SODIUM SERPL-SCNC: 136 MMOL/L (ref 135–147)
WBC # BLD AUTO: 9.04 THOUSAND/UL (ref 4.31–10.16)

## 2025-01-10 PROCEDURE — 80048 BASIC METABOLIC PNL TOTAL CA: CPT | Performed by: STUDENT IN AN ORGANIZED HEALTH CARE EDUCATION/TRAINING PROGRAM

## 2025-01-10 PROCEDURE — 83735 ASSAY OF MAGNESIUM: CPT | Performed by: STUDENT IN AN ORGANIZED HEALTH CARE EDUCATION/TRAINING PROGRAM

## 2025-01-10 PROCEDURE — 85025 COMPLETE CBC W/AUTO DIFF WBC: CPT | Performed by: STUDENT IN AN ORGANIZED HEALTH CARE EDUCATION/TRAINING PROGRAM

## 2025-01-10 PROCEDURE — 82948 REAGENT STRIP/BLOOD GLUCOSE: CPT

## 2025-01-10 PROCEDURE — 99232 SBSQ HOSP IP/OBS MODERATE 35: CPT | Performed by: INTERNAL MEDICINE

## 2025-01-10 PROCEDURE — 99239 HOSP IP/OBS DSCHRG MGMT >30: CPT | Performed by: STUDENT IN AN ORGANIZED HEALTH CARE EDUCATION/TRAINING PROGRAM

## 2025-01-10 RX ORDER — AMIODARONE HYDROCHLORIDE 200 MG/1
TABLET ORAL
Qty: 40 TABLET | Refills: 0 | Status: SHIPPED | OUTPATIENT
Start: 2025-01-10 | End: 2025-01-15

## 2025-01-10 RX ORDER — FUROSEMIDE 20 MG/1
20 TABLET ORAL DAILY
Qty: 30 TABLET | Refills: 0 | Status: SHIPPED | OUTPATIENT
Start: 2025-01-11 | End: 2025-01-15 | Stop reason: SDUPTHER

## 2025-01-10 RX ORDER — RIVAROXABAN 15 MG-20MG
KIT ORAL
Qty: 1 EACH | Refills: 0 | Status: SHIPPED | OUTPATIENT
Start: 2025-01-10 | End: 2025-01-10

## 2025-01-10 RX ORDER — METOPROLOL SUCCINATE 25 MG/1
25 TABLET, EXTENDED RELEASE ORAL DAILY
Qty: 30 TABLET | Refills: 0 | Status: SHIPPED | OUTPATIENT
Start: 2025-01-11 | End: 2025-01-15 | Stop reason: SDUPTHER

## 2025-01-10 RX ADMIN — AMIODARONE HYDROCHLORIDE 200 MG: 200 TABLET ORAL at 09:43

## 2025-01-10 RX ADMIN — AMIODARONE HYDROCHLORIDE 200 MG: 200 TABLET ORAL at 12:01

## 2025-01-10 RX ADMIN — METOPROLOL SUCCINATE 25 MG: 25 TABLET, EXTENDED RELEASE ORAL at 09:44

## 2025-01-10 RX ADMIN — APIXABAN 2.5 MG: 2.5 TABLET, FILM COATED ORAL at 09:43

## 2025-01-10 RX ADMIN — FUROSEMIDE 20 MG: 20 TABLET ORAL at 09:44

## 2025-01-10 RX ADMIN — GUAIFENESIN 600 MG: 600 TABLET, EXTENDED RELEASE ORAL at 09:44

## 2025-01-10 RX ADMIN — ALLOPURINOL 300 MG: 300 TABLET ORAL at 09:43

## 2025-01-10 RX ADMIN — ASPIRIN 81 MG: 81 TABLET, COATED ORAL at 09:43

## 2025-01-10 NOTE — PLAN OF CARE
Problem: PAIN - ADULT  Goal: Verbalizes/displays adequate comfort level or baseline comfort level  Description: Interventions:  - Encourage patient to monitor pain and request assistance  - Assess pain using appropriate pain scale  - Administer analgesics based on type and severity of pain and evaluate response  - Implement non-pharmacological measures as appropriate and evaluate response  - Consider cultural and social influences on pain and pain management  - Notify physician/advanced practitioner if interventions unsuccessful or patient reports new pain  Outcome: Progressing     Problem: INFECTION - ADULT  Goal: Absence or prevention of progression during hospitalization  Description: INTERVENTIONS:  - Assess and monitor for signs and symptoms of infection  - Monitor lab/diagnostic results  - Monitor all insertion sites, i.e. indwelling lines, tubes, and drains  - Monitor endotracheal if appropriate and nasal secretions for changes in amount and color  - [place] appropriate cooling/warming therapies per order  - Administer medications as ordered  - Instruct and encourage patient and family to use good hand hygiene technique  - Identify and instruct in appropriate isolation precautions for identified infection/condition  Outcome: Progressing  Goal: Absence of fever/infection during neutropenic period  Description: INTERVENTIONS:  - Monitor WBC    Outcome: Progressing     Problem: SAFETY ADULT  Goal: Patient will remain free of falls  Description: INTERVENTIONS:  - Educate patient/family on patient safety including physical limitations  - Instruct patient to call for assistance with activity   - Consult OT/PT to assist with strengthening/mobility   - Keep Call bell within reach  - Keep bed low and locked with side rails adjusted as appropriate  - Keep care items and personal belongings within reach  - Initiate and maintain comfort rounds  - Make Fall Risk Sign visible to staff  - Offer Toileting every 2 Hours,  in advance of need  - Initiate/Maintain bed alarm  - Obtain necessary fall risk management equipment:   - Apply yellow socks and bracelet for high fall risk patients  - Consider moving patient to room near nurses station  Outcome: Progressing  Goal: Maintain or return to baseline ADL function  Description: INTERVENTIONS:  -  Assess patient's ability to carry out ADLs; assess patient's baseline for ADL function and identify physical deficits which impact ability to perform ADLs (bathing, care of mouth/teeth, toileting, grooming, dressing, etc.)  - Assess/evaluate cause of self-care deficits   - Assess range of motion  - Assess patient's mobility; develop plan if impaired  - Assess patient's need for assistive devices and provide as appropriate  - Encourage maximum independence but intervene and supervise when necessary  - Involve family in performance of ADLs  - Assess for home care needs following discharge   - Consider OT consult to assist with ADL evaluation and planning for discharge  - Provide patient education as appropriate  Outcome: Progressing  Goal: Maintains/Returns to pre admission functional level  Description: INTERVENTIONS:  - Perform AM-PAC 6 Click Basic Mobility/ Daily Activity assessment daily.  - Set and communicate daily mobility goal to care team and patient/family/caregiver.   - Collaborate with rehabilitation services on mobility goals if consulted  - Perform Range of Motion 3 times a day.  - Reposition patient every 2 hours.  - Dangle patient 3 times a day  - Stand patient 3 times a day  - Ambulate patient 3 times a day  - Out of bed to chair 3 times a day   - Out of bed for meals 3 times a day  - Out of bed for toileting  - Record patient progress and toleration of activity level   Outcome: Progressing     Problem: DISCHARGE PLANNING  Goal: Discharge to home or other facility with appropriate resources  Description: INTERVENTIONS:  - Identify barriers to discharge w/patient and caregiver  -  Arrange for needed discharge resources and transportation as appropriate  - Identify discharge learning needs (meds, wound care, etc.)  - Arrange for interpretive services to assist at discharge as needed  - Refer to Case Management Department for coordinating discharge planning if the patient needs post-hospital services based on physician/advanced practitioner order or complex needs related to functional status, cognitive ability, or social support system  Outcome: Progressing     Problem: Knowledge Deficit  Goal: Patient/family/caregiver demonstrates understanding of disease process, treatment plan, medications, and discharge instructions  Description: Complete learning assessment and assess knowledge base.  Interventions:  - Provide teaching at level of understanding  - Provide teaching via preferred learning methods  Outcome: Progressing     Problem: CARDIOVASCULAR - ADULT  Goal: Maintains optimal cardiac output and hemodynamic stability  Description: INTERVENTIONS:  - Monitor I/O, vital signs and rhythm  - Monitor for S/S and trends of decreased cardiac output  - Administer and titrate ordered vasoactive medications to optimize hemodynamic stability  - Assess quality of pulses, skin color and temperature  - Assess for signs of decreased coronary artery perfusion  - Instruct patient to report change in severity of symptoms  Outcome: Progressing  Goal: Absence of cardiac dysrhythmias or at baseline rhythm  Description: INTERVENTIONS:  - Continuous cardiac monitoring, vital signs, obtain 12 lead EKG if ordered  - Administer antiarrhythmic and heart rate control medications as ordered  - Monitor electrolytes and administer replacement therapy as ordered  Outcome: Progressing

## 2025-01-10 NOTE — ASSESSMENT & PLAN NOTE
Lab Results   Component Value Date    EGFR 36 01/10/2025    EGFR 36 01/09/2025    EGFR 31 01/08/2025    CREATININE 1.72 (H) 01/10/2025    CREATININE 1.73 (H) 01/09/2025    CREATININE 1.94 (H) 01/08/2025     Baseline Cr 1.5-1.7  Stable, did not meet MATILDE criteria

## 2025-01-10 NOTE — DISCHARGE SUMMARY
Discharge Summary - Hospitalist   Name: Irineo Rawls 79 y.o. male I MRN: [id number]  Unit/Bed#: E4 -01 I Date of Admission: 1/6/2025   Date of Service: 1/10/2025 I Hospital Day: 3     Assessment & Plan  Atrial fibrillation, new onset (HCC)  79-year-old male with history of CAD, hypertension, KALIA, but and type 2 diabetes mellitus presented to our institution due to chest pressure and shortness of breath.  He was found to have atrial fibrillation with rapid ventricular rate.  Appreciate cardiology recommendations. Echo showing EF of 47% and mild global hypokinesis  S/p DAVID and unsuccessful Cardioversion  Continue Metoprolol and Eliquis. 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 Possible URTI. No abdominal pain, urinary symptoms.  COVID/flu antigen negative  CXR in ED: limited by low lung volumes. no definite acute pulmonary process   RP 2 negative  Continue Supportive care   Stage 3b chronic kidney disease (HCC)  Lab Results   Component Value Date    EGFR 36 01/10/2025    EGFR 36 01/09/2025    EGFR 31 01/08/2025    CREATININE 1.72 (H) 01/10/2025    CREATININE 1.73 (H) 01/09/2025    CREATININE 1.94 (H) 01/08/2025     Baseline Cr 1.5-1.7  Stable, did not meet MATILDE criteria  Essential hypertension  Home regimen: Nifedipine 60mg daily   Inpatient regimen: Metoprolol 25mg BID  KALIA (obstructive sleep apnea)  Continue cpap qhs   Type 2 diabetes mellitus with hyperglycemia, with  long-term current use of insulin (HCC)  Lab Results   Component Value Date    HGBA1C 5.8 (H) 01/06/2025     Recent Labs     01/09/25  1120 01/09/25  1605 01/09/25  2129 01/10/25  0717   POCGLU 128 90 107 109       Blood Sugar Average: Last 72 hrs:  (P) 105.9800244952189273  Home regimen: Levemir 40U, humalog 15U TID with meals   Resume home medications on discharge  Gout  Continue allopurinol 300mg daily   Thrombocytopenia (HCC)  Chronic     Recent Labs     01/08/25  0444 01/09/25  0453 01/10/25  0506   * 121* 166          Discharging Physician / Practitioner: João Seymour MD  PCP: Obi Esquivel DO  Admission Date:   Admission Orders (From admission, onward)       Ordered        01/07/25 1007  INPATIENT ADMISSION  Once            01/06/25 1441  Place in Observation  Once                          Discharge Date: 01/10/25    Medical Problems       Resolved Problems  Date Reviewed: 1/11/2024   None         Consultations During Hospital Stay:  cardiology    Procedures Performed:   DAVID / Cardioversion    Left Ventricle: Left ventricular cavity size is normal. Wall thickness is normal. The left ventricular ejection fraction is 50%. Systolic function is low normal. Wall motion is normal.    Right Ventricle: Right ventricular cavity size is normal. Systolic function is normal.    Left Atrium: The atrium is dilated.    Right Atrium: The atrium is dilated.    Atrial Septum: The interatrial septum appears to be grossly normal without evidence of shunting by color-flow Doppler.    Left Atrial Appendage: Left atrial appendage is normal in size. There is reduced function. There is no thrombus.    Mitral Valve: There is mild regurgitation with an eccentrically anterior directed jet.    Tricuspid Valve: There is mild regurgitation.          Significant Findings / Test Results:   Imaging  XR Chest:      Limited by low lung volumes. No definite acute pulmonary process.  In the setting of clinically suspected/proven  COVID-19, this plain film appearance does not contain findings that raise concern for viral pneumonia such as COVID-19, but does not rule out this diagnosis.    Echo:        Left Ventricle: Left ventricular cavity size is normal. Wall thickness is moderately increased. There is moderate concentric hypertrophy. There is concentric remodeling. The left ventricular ejection fraction is 47% by biplane measurement. Systolic function is mildly reduced. There is mild global hypokinesis.    Right Ventricle: Right ventricular cavity size is mildly dilated. Systolic function is mildly reduced. Abnormal tricuspid annular plane systolic excursion (TAPSE) = 1.7 cm.    Left Atrium: The atrium is moderately dilated (42-48 mL/m2).    Right Atrium: The atrium is mildly dilated.    Aortic Valve: The aortic valve is trileaflet.  The noncoronary cusp is heavily calcified but has well-preserved mobility. There is mild stenosis. The aortic valve peak velocity is 1.66 m/s. The aortic valve mean gradient is 7 mmHg. The dimensionless velocity index is 0.48. The aortic valve area is 1.59 cm2. The stroke volume index is 21.60 ml/m2.    Mitral Valve: There is moderate annular calcification. There is mild regurgitation.    Tricuspid Valve: There is mild regurgitation. The right ventricular systolic pressure is mildly elevated. The estimated right ventricular systolic pressure is 48.00 mmHg.    Incidental Findings:   As written above     Test Results Pending at Discharge (will require follow up):   none     Outpatient Tests Requested:  Pcp, cardiology  CBC, CMP, TSH    Complications:  none    Reason for Admission: atrial fibrillation    Hospital Course:     Irineo Rawls is a 79 y.o. male patient who originally presented to the hospital on 1/6/2025 due to chest discomfort.    Patient 79-year-old male with history of KALIA, type 2 diabetes mellitus, hypertension, and CKD who presented to institution due to chest pain and discomfort.   "Initially, there was concern for possible upper respiratory tract infection.  Flu/COVID/RP 2 panel all came back normal.  He was found to have atrial fibrillation with rapid ventricular rate.  Cardiology was consulted.  An echocardiogram was performed which showed a moderately reduced ejection fraction of 47%.  A DAVID and cardioversion was attempted after he was started on anticoagulation and rate control agents which unfortunately was not successful.  Patient cardiology decided that the best approach at this point was an amiodarone load and transition to amiodarone daily with close outpatient cardiology follow-up for further management.  At the time of discharge patient currently is without any new complaints and is feeling much better.    Patient agrees to follow-up with his providers as scheduled and to take his medications as prescribed. All questions were addressed.    he understood the need to seek immediate medical attention should he develop any chest pain, shortness of breath, severe pain, fever, chills, or any other concerning symptoms.    Please see above list of diagnoses and related plan for additional information.     Condition at Discharge: fair     Discharge Day Visit / Exam:     Subjective:  patient seen and examined at bedside. Comfortable, NO new issues overnight.   Vitals: Blood Pressure: 139/98 (01/10/25 0709)  Pulse: 70 (01/10/25 0709)  Temperature: (!) 96.9 °F (36.1 °C) (01/10/25 0709)  Temp Source: Temporal (01/10/25 0709)  Respirations: 18 (01/10/25 0709)  Height: 5' 7\" (170.2 cm) (01/08/25 1016)  Weight - Scale: 110 kg (241 lb 6.5 oz) (01/10/25 0600)  SpO2: 97 % (01/10/25 0709)  Exam:   Physical Exam  Vitals reviewed.   Constitutional:       General: He is not in acute distress.  HENT:      Head: Normocephalic.      Nose: Nose normal.      Mouth/Throat:      Mouth: Mucous membranes are moist.   Eyes:      General: No scleral icterus.  Cardiovascular:      Rate and Rhythm: Normal rate. Rhythm " irregular.   Pulmonary:      Effort: Pulmonary effort is normal. No respiratory distress.      Breath sounds: No wheezing.   Abdominal:      General: There is no distension.      Palpations: Abdomen is soft.      Tenderness: There is no abdominal tenderness.   Skin:     General: Skin is warm.   Neurological:      Mental Status: He is alert.   Psychiatric:         Mood and Affect: Mood normal.         Behavior: Behavior normal.       Discharge instructions/Information to patient and family:   See after visit summary for information provided to patient and family.      Provisions for Follow-Up Care:  See after visit summary for information related to follow-up care and any pertinent home health orders.      Disposition:     Home    Planned Readmission: No     Discharge Statement:  I spent 40 minutes discharging the patient. This time was spent on the day of discharge. I had direct contact with the patient on the day of discharge. Greater than 50% of the total time was spent examining patient, answering all patient questions, arranging and discussing plan of care with patient as well as directly providing post-discharge instructions.  Additional time then spent on discharge activities.    Discharge Medications:  See after visit summary for reconciled discharge medications provided to patient and family.      ** Please Note: This note has been constructed using a voice recognition system **

## 2025-01-10 NOTE — ASSESSMENT & PLAN NOTE
Lab Results   Component Value Date    HGBA1C 5.8 (H) 01/06/2025     Recent Labs     01/09/25  1120 01/09/25  1605 01/09/25  2129 01/10/25  0717   POCGLU 128 90 107 109       Blood Sugar Average: Last 72 hrs:  (P) 105.1371201479246731  Home regimen: Levemir 40U, humalog 15U TID with meals   Resume home medications on discharge

## 2025-01-10 NOTE — ASSESSMENT & PLAN NOTE
Continue cpap qhs    Good afternoon       Patient has an appointment with Dr kamilah Luong on 05/21/24 for pyoderma gangrenosum she only has 2 weeks of   Prednisone 10mg left she would like a sooner  appointment. Because she will break out if she runs out of her medication Dr cole Garcia referred her please adive patient .

## 2025-01-10 NOTE — CASE MANAGEMENT
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%) ([facility]) 2025     Atrial fibrillation, new onset ([facility]) 2025     URI with cough and congestion 2025     Stage 3b chronic kidney disease ([facility]) 2024     Microalbuminuria 2024     Hypertensive kidney disease with chronic kidney disease 2024     Renal cyst 2024     Fall at home, initial encounter 2024     Elevated lipase 2024     Hyponatremia 2024     Thrombocytopenia ([facility]) 2023     Benign prostatic hyperplasia with incomplete bladder emptying 2023     Acute hyperkalemia 2023     Microcytic anemia 2023     Hyperkalemia      Calcification of liver 2021     Obesity, morbid ([facility]) 2021     Rectus diastasis 10/07/2020     Hyperuricemia 07/15/2019     Knee pain 2018     Localized, primary osteoarthritis 2018     Osteoarthritis of knee 2018     Essential hypertriglyceridemia 2016     Type 2 diabetes mellitus with hyperglycemia, with long-term current use of insulin ([facility]) 2016     KALIA (obstructive sleep apnea) 2015     Gout 2015     Vitamin D deficiency 2012     Essential hypertension 2009       LOS (days): 3  Geometric Mean LOS (GMLOS) (days): 2.3  Days to GMLOS:-0.8     OBJECTIVE:  Risk of Unplanned Readmission Score: 19.17         Current admission status: Inpatient   Preferred Pharmacy:   [facility] [place] - [address]  [address]  [place] [address]  Phone: [phone] Fax:  [phone]    Primary Care Provider: Obi Esquivel DO    Primary Insurance: [facility] REP  Secondary Insurance:     DISCHARGE DETAILS:    Discharge planning discussed with:: Patient     Comments - [place] of Choice: Home, no identified needs                   CM price checked Eliquis $143.00 and Zarelto $229.45.  CM spoke with SLIM and patient regarding options.  Patient agreeable to 30 day free Eliquis and following up with OP provider regarding continued medications/ options.

## 2025-01-10 NOTE — ASSESSMENT & PLAN NOTE
79-year-old male with history of CAD, hypertension, KALIA, but and type 2 diabetes mellitus presented to our institution due to chest pressure and shortness of breath.  He was found to have atrial fibrillation with rapid ventricular rate.  Appreciate cardiology recommendations. Echo showing EF of 47% and mild global hypokinesis  S/p DAVID and unsuccessful Cardioversion  Continue Metoprolol and Eliquis. Cleared by cardiology for discharge, they will assist in coordinating his outpatient follow-up

## 2025-01-10 NOTE — ASSESSMENT & PLAN NOTE
Presenting to the ED with chest discomfort and SOB  SIRS criteria met w/ tachycardia, tachypnea, fever, and leukocytosis. Possible URTI. No abdominal pain, urinary symptoms.  COVID/flu antigen negative  CXR in ED: limited by low lung volumes. no definite acute pulmonary process   RP 2 negative  Continue Supportive care

## 2025-01-10 NOTE — ASSESSMENT & PLAN NOTE
Wt Readings from Last 3 Encounters:   01/10/25 110 kg (241 lb 6.5 oz)   09/18/24 114 kg (252 lb)   09/17/24 116 kg (254 lb 12.8 oz)     Management per cardiology  Echo showing: EF of 47%, mild global hypokinesis  Continue lasix  Daily weights, I/os

## 2025-01-10 NOTE — PROGRESS NOTES
Cardiology Progress Note   MD Perez Roque MD, FACC  Geoff Odell DO, [facility] Jann Britton DO, [facility] HospitalGEMMA Carrion DO, FACC  ----------------------------------------------------------------  [address] [address]    Irineo Rawls 79 y.o. male MRN: [id number]  Unit/Bed#: E4 -01 Encounter: [id number]      ASSESSMENT:   Persistent atrial fibrillation with new onset  s/p unsuccessful DAVID DCCV, January 8, 2025  Hypertension  Volume overload  Chronic HFmrEF  LVEF 47%, moderate LVH, mild global hypokinesis, mild RV dilatation with mildly reduced function, moderate LA and mild RA dilatation, mild AS, moderate MAC, mild MR/TR with PASP 48 mmHg, January 2025  Mild aortic stenosis   w/ Vmax 1.66 m/s, DVI 0.48, ERWIN 1.59 cm2, SVi 21.6, January 2025  Hypertension  Dyslipidemia  Type 2 diabetes mellitus  CKD stage III  KALIA  Thrombocytopenia    PLAN:  Patient feeling back to his baseline  Remains in rate controlled atrial fibrillation  Amiodarone load with 200 mg 3 times daily for 7 days and then 200 mg daily thereafter  Continue Toprol-XL at reduced dose of 25 mg daily  Eliquis for thromboembolic prophylaxis with 2.5 mg twice daily unless creatinine drops below 1.5 at which time I will increase dosing to 5 mg twice daily  Ideally would like to start ACE inhibitor/ARB, but patient's renal function prohibiting at this time with bump in creatinine; will consider in the outpatient setting  Patient requires repeat metabolic panel in 1 week  Patient understands that he should not discontinue anticoagulation for 30 days post cardioversion due to higher thromboembolic risk  If patient's weights and renal function are stable on oral diuretic, reasonable for discharge later today from CV perspective  Outpatient follow-up with cardiology within 72 hours post discharge    Signed: Geoff Odell DO, PAYTON, MARION, ROHAN, FACP      History of  Present Illness:  Patient seen and examined. Denies chest pain, pressure, tightness or squeezing.  Denies lightheadedness, dizziness or palpitations.  Denies lower extremity swelling, orthopnea or paroxysmal nocturnal dyspnea.      Review of Systems:  Review of Systems   Constitutional: Negative for decreased appetite, fever, weight gain and weight loss.   HENT:  Negative for congestion and sore throat.    Eyes:  Negative for visual disturbance.   Cardiovascular:  Negative for chest pain, dyspnea on exertion, leg swelling, near-syncope and palpitations.   Respiratory:  Negative for cough and shortness of breath.    Hematologic/Lymphatic: Negative for bleeding problem.   Skin:  Negative for rash.   Musculoskeletal:  Negative for myalgias and neck pain.   Gastrointestinal:  Negative for abdominal pain and nausea.   Neurological:  Negative for light-headedness and weakness.   Psychiatric/Behavioral:  Negative for depression.        No Known Allergies    No current facility-administered medications on file prior to encounter.     Current Outpatient Medications on File Prior to Encounter   Medication Sig    albuterol (ProAir HFA) 90 mcg/act inhaler Inhale 2 puffs every 6 (six) hours as needed for wheezing    allopurinol (ZYLOPRIM) 300 mg tablet Take 1 tablet (300 mg total) by mouth daily    aspirin 81 MG tablet Take 81 mg by mouth daily    Cholecalciferol (VITAMIN D3) 5000 units TABS Take 1 tablet by mouth daily    insulin aspart (NovoLOG FlexPen) 100 UNIT/ML injection pen Inject 15 Units under the skin 3 (three) times a day with meals    Levemir FlexPen 100 units/mL injection pen Inject 40 Units under the skin daily    NIFEdipine (PROCARDIA XL) 60 mg 24 hr tablet Take 1 tablet (60 mg total) by mouth daily    Omega-3 Fatty Acids (FISH OIL) 1200 MG CAPS Take 1 capsule by mouth 3 (three) times a day    Alcohol Swabs 70 % PADS May substitute brand based on insurance coverage. Check glucose ACHS.    Blood Glucose Monitoring  Suppl (OneTouch Verio Reflect) w/Device KIT May substitute brand based on insurance coverage. Check glucose ACHS.    Continuous Blood Gluc  (FreeStyle Peter 2 [place]) MONISHA Check blood sugars multiple times per day    Continuous Blood Gluc Sensor (FreeStyle Peter 2 Sensor) MISC Check blood sugars multiple times per day    glucose blood (OneTouch Verio) test strip Check glucose before meals and at bedtime.    glucose blood (OneTouch Verio) test strip Check blood sugars three times daily. Please substitute with appropriate alternative as covered by patient's insurance. Dx: E11.65    Insulin Pen Needle (Unifine Pentips) 32G X 4 MM MISC For use with insulin pen 4 times daily.    Lancets (OneTouch Delica Plus Bleujl12G) MISC Check glucose before meals and at bedtime        Current Facility-Administered Medications   Medication Dose Route Frequency Provider Last Rate    acetaminophen  650 mg Oral Q6H PRN Shobha Tirado PA-C      albuterol  2 puff Inhalation Q4H PRN Shobha Tirado PA-C      allopurinol  300 mg Oral Daily Shobha Tirado PA-C      amiodarone  200 mg Oral TID With Meals Geoff Odell, DO      apixaban  2.5 mg Oral BID Geoff Odell, DO      aspirin  81 mg Oral Daily Shobha Tirado PA-C      benzonatate  100 mg Oral TID PRN Shobha Tirado PA-C      fentaNYL  25 mcg Intravenous Q5 Min PRN Kiley Twin, DO      furosemide  20 mg Oral Daily Geoff Odell, DO      guaiFENesin  600 mg Oral Q12H [facility] Shobha Tirado PA-C      heparin (porcine)  2,000 Units Intravenous Q6H PRN Susan Ambron, DO      heparin (porcine)  4,000 Units Intravenous Q6H PRN Susan Ambron, DO      HYDROmorphone  0.5 mg Intravenous Q5 Min PRN Kiley Twin, DO      insulin glargine  20 Units Subcutaneous QAM Shobha Tirado PA-C      insulin lispro  1-5 Units Subcutaneous TID AC Shobha Tirado PA-C      insulin lispro  1-5 Units Subcutaneous HS Shobha Tirado PA-C      insulin  "lispro  10 Units Subcutaneous TID With Meals Susan Giron, DO      metoprolol  5 mg Intravenous Q4H PRN Deacon Manzanares PA-C      metoprolol succinate  25 mg Oral Daily Geoff Casas Cass, DO      ondansetron  4 mg Intravenous Q6H PRN Shobha Tirado PA-C                Vitals:    01/09/25 1651 01/09/25 1856 01/09/25 2334 01/10/25 0316   BP:  157/82 166/94 157/100   BP Location:  Right arm Right arm Left arm   Pulse:  68 62 66   Resp:  18 18 18   Temp:  (!) 97.4 °F (36.3 °C) (!) 96.8 °F (36 °C) (!) 97.3 °F (36.3 °C)   TempSrc:  Temporal Temporal Temporal   SpO2:  92% 94% 94%   Weight: 111 kg (244 lb 4.3 oz)      Height:         Body mass index is 38.26 kg/m².    No intake or output data in the 24 hours ending 01/10/25 0344      Weight change: -8.043 kg (-17 lb 11.7 oz)    PHYSICAL EXAMINATION:  Gen: Awake, Alert, NAD  Head/eyes: AT/NC, pupils equal and round, Anicteric  ENT: mmm  Neck: Supple, No elevated JVP, trachea midline  Resp: CTA bilaterally no w/r/r  CV: irreg irreg +S1, S2, No m/r/g  Abd: Soft, obese, NT/ND + BS  Ext: no LE edema bilaterally  Neuro: Follows commands, moves all extermities  Psych: Appropriate affect, pleasant mood, pleasant attitude, non-combative  Skin: warm; no rash, erythema or venous stasis changes on exposed skin    Lab Results:  Results from last 7 days   Lab Units 01/09/25  0453   WBC Thousand/uL 8.63   HEMOGLOBIN g/dL 13.0   HEMATOCRIT % 37.8   PLATELETS Thousands/uL 121*     Results from last 7 days   Lab Units 01/09/25  0453 01/08/25  0444   POTASSIUM mmol/L 3.6 3.8   CHLORIDE mmol/L 99 99   CO2 mmol/L 25 22   BUN mg/dL 49* 46*   CREATININE mg/dL 1.73* 1.94*   CALCIUM mg/dL 9.5 9.6   ALK PHOS U/L  --  70   ALT U/L  --  11   AST U/L  --  16     No results found for: \"TROPONINT\"      Results from last 7 days   Lab Units 01/06/25  1739 01/06/25  1512 01/06/25  1322   HS TNI 0HR ng/L  --   --  10   HS TNI 2HR ng/L  --  11  --    HS TNI 4HR ng/L 11  --   --      Results from last " 7 days   Lab Units 01/06/25  1838   INR  1.12       Tele: KARIS w/ CVR     This note was completed in part utilizing M-Modal Fluency Direct Software.  Grammatical errors, random word insertions, spelling mistakes, and incomplete sentences may be an occasional consequence of this system secondary to software limitations, ambient noise, and hardware issues.  If you have any questions or concerns about the content, text, or information contained within the body of this dictation, please contact the provider for clarification.

## 2025-01-13 NOTE — UTILIZATION REVIEW
NOTIFICATION OF ADMISSION DISCHARGE   This is a Notification of Discharge from [facility]. Please be advised that this patient has been discharge from our facility. Below you will find the admission and discharge date and time including the patient’s disposition.   UTILIZATION REVIEW CONTACT:  Carmella Agee  Utilization   Network Utilization Review Department  Phone: [phone] x carefully listen to the prompts. All voicemails are confidential.  Email: [email or web address]     ADMISSION INFORMATION  PRESENTATION DATE: 1/6/2025 12:36 PM  OBERVATION ADMISSION DATE: 01/06/2025 1441  INPATIENT ADMISSION DATE: 1/7/25 10:07 AM   DISCHARGE DATE: 1/10/2025  1:25 PM   DISPOSITION:Home/Self Care    Network Utilization Review Department  ATTENTION: Please call with any questions or concerns to [phone] and carefully listen to the prompts so that you are directed to the right person. All voicemails are confidential.   For Discharge needs, contact Care Management DC Support Team at [phone] opt. 2  Send all requests for admission clinical reviews, approved or denied determinations and any other requests to dedicated fax number below belonging to the campus where the patient is receiving treatment. List of dedicated fax numbers for the Facilities:  FACILITY NAME UR FAX NUMBER   ADMISSION DENIALS (Administrative/Medical Necessity) [phone]   DISCHARGE SUPPORT TEAM ([facility]) [phone]   PARENT CHILD HEALTH (Maternity/NICU/Pediatrics) [phone]   [facility] [phone]   [facility] [phone]   [facility] [phone]   [facility] [phone]   [facility] [phone]   [facility] [phone]   [facility] [phone]   [facility]  [phone]   [facility] [phone]   [facility] [phone]   General acute hospital [phone]   [facility] [phone]

## 2025-01-15 ENCOUNTER — OFFICE VISIT (OUTPATIENT)
Dept: CARDIOLOGY CLINIC | Facility: CLINIC | Age: 80
End: 2025-01-15
Payer: COMMERCIAL

## 2025-01-15 VITALS
WEIGHT: 246 LBS | BODY MASS INDEX: 37.28 KG/M2 | HEIGHT: 68 IN | DIASTOLIC BLOOD PRESSURE: 78 MMHG | SYSTOLIC BLOOD PRESSURE: 176 MMHG | HEART RATE: 63 BPM

## 2025-01-15 DIAGNOSIS — I48.91 ATRIAL FIBRILLATION, UNSPECIFIED TYPE (HCC): Primary | ICD-10-CM

## 2025-01-15 DIAGNOSIS — I48.91 ATRIAL FIBRILLATION (HCC): ICD-10-CM

## 2025-01-15 DIAGNOSIS — I50.21 ACUTE SYSTOLIC HEART FAILURE (HCC): ICD-10-CM

## 2025-01-15 DIAGNOSIS — I50.21 ACUTE HEART FAILURE WITH MILDLY REDUCED EJECTION FRACTION (HFMREF, 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): ICD-10-CM

## 2025-01-15 DIAGNOSIS — I10 ESSENTIAL HYPERTENSION: ICD-10-CM

## 2025-01-15 DIAGNOSIS — E66.01 OBESITY, MORBID (HCC): ICD-10-CM

## 2025-01-15 DIAGNOSIS — I48.91 ATRIAL FIBRILLATION, NEW ONSET (HCC): ICD-10-CM

## 2025-01-15 PROCEDURE — 99214 OFFICE O/P EST MOD 30 MIN: CPT | Performed by: PHYSICIAN ASSISTANT

## 2025-01-15 PROCEDURE — 93000 ELECTROCARDIOGRAM COMPLETE: CPT | Performed by: PHYSICIAN ASSISTANT

## 2025-01-15 RX ORDER — FUROSEMIDE 20 MG/1
20 TABLET ORAL DAILY
Qty: 90 TABLET | Refills: 3 | Status: SHIPPED | OUTPATIENT
Start: 2025-01-15

## 2025-01-15 RX ORDER — AMIODARONE HYDROCHLORIDE 200 MG/1
200 TABLET ORAL DAILY
Qty: 30 TABLET | Refills: 6 | Status: SHIPPED | OUTPATIENT
Start: 2025-01-15 | End: 2025-01-20 | Stop reason: SDUPTHER

## 2025-01-15 RX ORDER — METOPROLOL SUCCINATE 25 MG/1
25 TABLET, EXTENDED RELEASE ORAL DAILY
Qty: 90 TABLET | Refills: 3 | Status: SHIPPED | OUTPATIENT
Start: 2025-01-15

## 2025-01-15 NOTE — PATIENT INSTRUCTIONS
Please resume nifedipine 60 mg once a day for your blood pressure.    Please get the blood test done in the next 1 week to check your kidney function.    If your kidney function is looking good I will switch your nifedipine to a different blood pressure medication to help your heart.    If your kidney function is still not back to normal, we will just continue with nifedipine.

## 2025-01-15 NOTE — PROGRESS NOTES
Cardiology Follow Up    [facility]  [address] [place] [address]  PHONE: [phone]  FAX: [phone]    Irineo Rawls  1945  [id number]    Assessment/Plan:  Persistent atrial fibrillation with new onset  s/p unsuccessful DAVID DCCV, January 8, 2025  Hypertension  Chronic HFmrEF, LVEF 47%, LVIDd 5.3 cm, NYHA Class II, AHA Stage C  Mild aortic stenosis   w/ Vmax 1.66 m/s, DVI 0.48, ERWIN 1.59 cm2, SVi 21.6, January 2025  Hypertension  Dyslipidemia  Type 2 diabetes mellitus  CKD stage III  KALIA  Thrombocytopenia    Patient remains in rate controlled atrial fibrillation.  He is taking amiodarone, Toprol and Eliquis.  Check EKG in 1 month if still A-fib, schedule outpatient cardioversion.  We need to price check Eliquis.    He is euvolemic today. At home weights staying between 237-242#.  Continue home Lasix 20 mg daily.      BP uncontrolled today. BMP ordered, if serum creatinine is back to baseline or stable will add losartan 25 mg daily for morbidity and mortality benefit in CHF.      Consider switching Lasix to Jardiance in the future.    RTO in 3-4 months with Dr. Geoff Odell.    Interval History:   Irineo Rawls is a 79 y.o. male with past medical history as below who presents to the office for follow-up after hospitalization at [facility] from 1/6 - 1/10/2025 for shortness of breath.  Found to be in new onset atrial fibrillation as well as decompensated heart failure.  Underwent IV diuresis and DAVID cardioversion as an inpatient.  The cardioversion failed he was still in rate controlled A-fib on day of discharge.    Since his discharge back home patient reports he has been feeling great.  He denies peripheral edema, tingling in the feet, abdominal distention, shortness of breath, orthopnea or coughing.  Patient denies palpitations, racing heartbeat in the chest or fluttering in the chest.  Patient denies abnormal bleeding on Eliquis.    Past  Medical History:   Diagnosis Date    Acute renal failure superimposed on stage 3 chronic kidney disease ([facility]) 02/25/2023    Acute renal failure superimposed on stage 3b chronic kidney disease, unspecified acute renal failure type ([facility]) 12/20/2012    MATILDE (acute kidney injury) ([facility])     Chronic kidney disease     CPAP (continuous positive airway pressure) dependence     Gout     Hearing aid worn     sabrina    Hernia, inguinal     RIH and umbilical hernia    Hypertension     Metabolic acidosis     Morbidly obese ([facility])     Rib fracture 01/05/2024    Sleep apnea     Type 2 diabetes mellitus with hyperglycemia, with long-term current use of insulin ([facility]) 01/29/2016    Wears glasses       Past Surgical History:   Procedure Laterality Date    CARPAL TUNNEL RELEASE Bilateral     COLONOSCOPY N/A 1/6/2017    Procedure: COLONOSCOPY;  Surgeon: João Perez MD;  Location: [facility];  Service:     CYST REMOVAL      FOOT SURGERY      HERNIA REPAIR      JOINT REPLACEMENT      knee left     DC LAPAROSCOPY SURG RPR INITIAL INGUINAL HERNIA Right 3/16/2017    Procedure: REPAIR HERNIA INGUINAL, LAPAROSCOPIC WITH MESH;  Surgeon: Ortiz Salcedo MD;  Location: [facility];  Service: General    DC RPR UMBILICAL HRNA 5 YRS/> REDUCIBLE N/A 3/16/2017    Procedure: OPEN REPAIR HERNIA UMBILICAL;  Surgeon: Ortiz Salcedo MD;  Location: [facility];  Service: General    TONSILLECTOMY        Family History   Problem Relation Age of Onset    No Known Problems Mother     Diabetes Father     Diabetes Sister       Current Outpatient Medications:     albuterol (ProAir HFA) 90 mcg/act inhaler, Inhale 2 puffs every 6 (six) hours as needed for wheezing, Disp: 8.5 g, Rfl: 0    Alcohol Swabs 70 % PADS, May substitute brand based on insurance coverage. Check glucose ACHS., Disp: 200 each, Rfl: 0    allopurinol (ZYLOPRIM) 300 mg tablet, Take 1 tablet (300 mg total) by mouth daily, Disp: 90 tablet, Rfl: 1    amiodarone 200 mg tablet, Take 1 tablet (200 mg total) by  mouth 3 (three) times a day with meals for 5 days, THEN 1 tablet (200 mg total) daily for 25 days., Disp: 40 tablet, Rfl: 0    apixaban (Eliquis) 2.5 mg, Take 1 tablet (2.5 mg total) by mouth 2 (two) times a day, Disp: 60 tablet, Rfl: 0    aspirin 81 MG tablet, Take 81 mg by mouth daily, Disp: , Rfl:     Blood Glucose Monitoring Suppl (OneTouch Verio Reflect) w/Device KIT, May substitute brand based on insurance coverage. Check glucose ACHS., Disp: 1 kit, Rfl: 0    Cholecalciferol (VITAMIN D3) 5000 units TABS, Take 1 tablet by mouth daily, Disp: , Rfl:     Continuous Blood Gluc  (FreeStyle Peter 2 [place]) MONISHA, Check blood sugars multiple times per day, Disp: 1 each, Rfl: 0    Continuous Blood Gluc Sensor (FreeStyle Peter 2 Sensor) MISC, Check blood sugars multiple times per day, Disp: 6 each, Rfl: 3    furosemide (LASIX) 20 mg tablet, Take 1 tablet (20 mg total) by mouth daily, Disp: 30 tablet, Rfl: 0    glucose blood (OneTouch Verio) test strip, Check glucose before meals and at bedtime., Disp: 400 strip, Rfl: 1    glucose blood (OneTouch Verio) test strip, Check blood sugars three times daily. Please substitute with appropriate alternative as covered by patient's insurance. Dx: E11.65, Disp: 300 each, Rfl: 3    insulin aspart (NovoLOG FlexPen) 100 UNIT/ML injection pen, Inject 15 Units under the skin 3 (three) times a day with meals, Disp: 90 mL, Rfl: 0    Insulin Pen Needle (Unifine Pentips) 32G X 4 MM MISC, For use with insulin pen 4 times daily., Disp: 400 each, Rfl: 1    Lancets (OneTouch Delica Plus [id number]) MISC, Check glucose before meals and at bedtime, Disp: 400 each, Rfl: 1    Levemir FlexPen 100 units/mL injection pen, Inject 40 Units under the skin daily, Disp: 45 mL, Rfl: 1    metoprolol succinate (TOPROL-XL) 25 mg 24 hr tablet, Take 1 tablet (25 mg total) by mouth daily, Disp: 30 tablet, Rfl: 0    Omega-3 Fatty Acids (FISH OIL) 1200 MG CAPS, Take 1 capsule by mouth 3 (three) times a day,  Disp: , Rfl:      No Known Allergies    Physical Exam:  There were no vitals filed for this visit.  There were no vitals filed for this visit.      There is no height or weight on file to calculate BMI.    Physical Exam  Vitals and nursing note reviewed.   Constitutional:       General: He is not in acute distress.  HENT:      Head: Normocephalic and atraumatic.      Nose: No congestion.      Mouth/Throat:      Mouth: Mucous membranes are moist.   Eyes:      Conjunctiva/sclera: Conjunctivae normal.   Neck:      Comments: - JVD or HJR  Cardiovascular:      Rate and Rhythm: Normal rate. Rhythm irregularly irregular.      Heart sounds: S1 normal and S2 normal. No murmur heard.  Pulmonary:      Breath sounds: No wheezing, rhonchi or rales.   Abdominal:      General: Abdomen is flat.   Musculoskeletal:         General: No swelling.      Right lower leg: No edema.      Left lower leg: No edema.   Skin:     General: Skin is warm and dry.   Neurological:      Mental Status: He is alert and oriented to person, place, and time.   Psychiatric:         Behavior: Behavior normal.     Data:  Echo: 1/7/2025 moderate LVH, LVEF 47%, mild global hypokinesis.  Mildly dilated RV with mildly reduced systolic function.  Moderately dilated left atrium.  Mild AAS.  Moderate MAC with mild MR, mild TR, PASP 48 mmHg    ECG: Atrial fibrillation with controlled ventricular response, heart rate 63 bpm.  Normal axis, no infarct or ischemia.    Erin Gutierrez PA-C  [facility]

## 2025-01-16 ENCOUNTER — TELEPHONE (OUTPATIENT)
Dept: CARDIOLOGY CLINIC | Facility: CLINIC | Age: 80
End: 2025-01-16

## 2025-01-16 ENCOUNTER — APPOINTMENT (OUTPATIENT)
Dept: LAB | Facility: CLINIC | Age: 80
End: 2025-01-16
Payer: COMMERCIAL

## 2025-01-16 DIAGNOSIS — I10 ESSENTIAL HYPERTENSION: ICD-10-CM

## 2025-01-16 PROCEDURE — 80048 BASIC METABOLIC PNL TOTAL CA: CPT

## 2025-01-16 PROCEDURE — 36415 COLL VENOUS BLD VENIPUNCTURE: CPT

## 2025-01-16 NOTE — TELEPHONE ENCOUNTER
Called patient and [facility] mail order price for Eliquis 2.5 mgs bid is $166.75 for a three month supply.  Called patient and he cannot afford it.      [facility] [phone]

## 2025-01-17 LAB
ANION GAP SERPL CALCULATED.3IONS-SCNC: 12 MMOL/L (ref 4–13)
BUN SERPL-MCNC: 38 MG/DL (ref 5–25)
CALCIUM SERPL-MCNC: 9.2 MG/DL (ref 8.4–10.2)
CHLORIDE SERPL-SCNC: 100 MMOL/L (ref 96–108)
CO2 SERPL-SCNC: 25 MMOL/L (ref 21–32)
CREAT SERPL-MCNC: 1.8 MG/DL (ref 0.6–1.3)
GFR SERPL CREATININE-BSD FRML MDRD: 34 ML/MIN/1.73SQ M
GLUCOSE P FAST SERPL-MCNC: 149 MG/DL (ref 65–99)
POTASSIUM SERPL-SCNC: 3.6 MMOL/L (ref 3.5–5.3)
SODIUM SERPL-SCNC: 137 MMOL/L (ref 135–147)

## 2025-01-17 NOTE — TELEPHONE ENCOUNTER
Patient called, states he spoke with [facility] mail order and authorized them to send Eliquis, just wanted Brooke to know. She can call with any questions.

## 2025-01-17 NOTE — TELEPHONE ENCOUNTER
"[facility] and they are filling the Eliquis prescription as we speak.  They did not need a new prescription even though it said \"price check\".    "

## 2025-01-20 ENCOUNTER — RESULTS FOLLOW-UP (OUTPATIENT)
Dept: NON INVASIVE DIAGNOSTICS | Facility: HOSPITAL | Age: 80
End: 2025-01-20

## 2025-01-20 DIAGNOSIS — I48.91 ATRIAL FIBRILLATION (HCC): ICD-10-CM

## 2025-01-20 DIAGNOSIS — I10 ESSENTIAL HYPERTENSION: Primary | ICD-10-CM

## 2025-01-20 RX ORDER — LOSARTAN POTASSIUM 25 MG/1
25 TABLET ORAL DAILY
Qty: 90 TABLET | Refills: 3 | Status: SHIPPED | OUTPATIENT
Start: 2025-01-20

## 2025-01-20 NOTE — TELEPHONE ENCOUNTER
**WANTS 90 QTY INSTEAD OF 30**    [facility] that the needs a 90 qty refill of the amiodarone. The patient was last seen by Erin Gutierrez PA-C.

## 2025-01-20 NOTE — TELEPHONE ENCOUNTER
[facility] Mail order received the refill but it was for 30 day, they are requesting a 90 day supply to be sent.

## 2025-01-21 RX ORDER — AMIODARONE HYDROCHLORIDE 200 MG/1
200 TABLET ORAL DAILY
Qty: 90 TABLET | Refills: 3 | Status: SHIPPED | OUTPATIENT
Start: 2025-01-21

## 2025-01-21 NOTE — TELEPHONE ENCOUNTER
Spoke with patient, advised above, per Erin.  Patient verbalizes understanding.   Advised to call with questions or concerns

## 2025-01-21 NOTE — TELEPHONE ENCOUNTER
----- Message from Erin Gutierrez PA-C sent at 1/20/2025  9:42 AM EST -----  Hi, can you please call Irineo. His blood test was showing he is at baseline kidney function where he has been for years. In this case, it is safe to proceed with antihypertensive Losartan instead of prior nifedipine. I sent losartan to [facility] mail order pharmacy. When Irineo receives the losartan in the mail can he please use the losartan instead of the nifedipine for his BP. Thank you      Erin

## 2025-01-22 ENCOUNTER — OFFICE VISIT (OUTPATIENT)
Dept: FAMILY MEDICINE CLINIC | Facility: CLINIC | Age: 80
End: 2025-01-22
Payer: COMMERCIAL

## 2025-01-22 VITALS
BODY MASS INDEX: 39.52 KG/M2 | OXYGEN SATURATION: 96 % | TEMPERATURE: 97.3 F | HEIGHT: 67 IN | HEART RATE: 62 BPM | SYSTOLIC BLOOD PRESSURE: 136 MMHG | DIASTOLIC BLOOD PRESSURE: 64 MMHG | WEIGHT: 251.8 LBS

## 2025-01-22 DIAGNOSIS — I48.19 PERSISTENT ATRIAL FIBRILLATION (HCC): Primary | ICD-10-CM

## 2025-01-22 DIAGNOSIS — Z79.4 TYPE 2 DIABETES MELLITUS WITH HYPERGLYCEMIA, WITH LONG-TERM CURRENT USE OF INSULIN (HCC): ICD-10-CM

## 2025-01-22 DIAGNOSIS — E11.65 TYPE 2 DIABETES MELLITUS WITH HYPERGLYCEMIA, WITH LONG-TERM CURRENT USE OF INSULIN (HCC): ICD-10-CM

## 2025-01-22 DIAGNOSIS — I10 ESSENTIAL HYPERTENSION: ICD-10-CM

## 2025-01-22 DIAGNOSIS — I50.21 ACUTE HEART FAILURE WITH MILDLY REDUCED EJECTION FRACTION (HFMREF, 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): ICD-10-CM

## 2025-01-22 PROCEDURE — 99495 TRANSJ CARE MGMT MOD F2F 14D: CPT | Performed by: FAMILY MEDICINE

## 2025-01-22 NOTE — ASSESSMENT & PLAN NOTE
Patient with recent onset atrial fibrillation.  Unresponsive to cardioversion.  Currently remains in fibrillation on exam.  Continue amiodarone, Eliquis and metoprolol.  Follow-up with cardiology in February.

## 2025-01-22 NOTE — ASSESSMENT & PLAN NOTE
Blood pressure stable on metoprolol.  Nifedipine just discontinued by cardiology.  Patient to start losartan

## 2025-01-22 NOTE — PROGRESS NOTES
Name: Irineo Rawls      : 1945      MRN: [id number]  Encounter Provider: Obi Esquivel DO  Encounter Date: 2025   Encounter department: [facility]    Assessment & Plan  Persistent atrial fibrillation (HCC)  Patient with recent onset atrial fibrillation.  Unresponsive to cardioversion.  Currently remains in fibrillation on exam.  Continue amiodarone, Eliquis and metoprolol.  Follow-up with cardiology in February.       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  Continue to monitor.               Essential hypertension  Blood pressure stable on metoprolol.  Nifedipine just discontinued by cardiology.  Patient to start losartan       Type 2 diabetes mellitus with hyperglycemia, with long-term current use of insulin ([facility])    Lab Results   Component Value Date    HGBA1C 5.8 (H) 2025   Blood sugars at home range from 100-1 50 on no insulin or any other diabetic medication.  Will maintain off of all diabetic medications for the time being and monitor sugars at home.  Next lab work scheduled for March.  Patient will call if home readings rise.              History of Present Illness     TCM Call     Date and time call was made  1/10/2025  3:36 PM    Hospital care reviewed  Records reviewed    Patient was hospitialized at  [facility]    Date of Admission  25    Date of discharge  01/10/25    Diagnosis  Afib    Disposition  Home    Were the patients medications reviewed and updated  No    Current Symptoms  None      TCM Call     Post hospital issues  Reduced activity    Should patient be enrolled in anticoag monitoring?  No    Scheduled for follow up?  Yes    Patients specialists  Nephrologist    Referrals needed  none    Did you obtain your prescribed  medications  Yes    Why were you unable to obtain your medications  none prescribed    Do you need help managing your prescriptions or medications  No    Is transportation to your appointment needed  No    I have advised the patient to call PCP with any new or worsening symptoms    Olga Dozier MA    Living Arrangements  Spouse or Significiant other    Support System  Spouse    The type of support provided  Emotional; Financial; Physical    Do you have social support  Yes, as much as I need    Are you recieving any outpatient services  No    Are you recieving home care services  No    Are you using any community resources  No    Current waiver services  No    Have you fallen in the last 12 months  No     Patient was seen for follow-up from recent hospitalization.  Patient was admitted to [facility] from 1/6 through 1/10 for new diagnosis of atrial fibrillation.  He underwent an unsuccessful cardioversion and was discharged on amiodarone, Eliquis and metoprolol.  At this time he feels well and has no complaints.  Of note is that his insulin was discontinued while hospitalized and he has not restarted it since coming home and his blood sugars have generally been excellent.           Review of Systems   Constitutional: Negative.    Respiratory: Negative.     Cardiovascular: Negative.    Gastrointestinal: Negative.    Genitourinary: Negative.    Musculoskeletal: Negative.    Psychiatric/Behavioral: Negative.       Past Medical History:   Diagnosis Date   • Acute renal failure superimposed on stage 3 chronic kidney disease ([facility]) 02/25/2023   • Acute renal failure superimposed on stage 3b chronic kidney disease, unspecified acute renal failure type (HCC) 12/20/2012   • MATILDE (acute kidney injury) ([facility])    • Chronic kidney disease    • CPAP (continuous positive airway pressure) dependence    • Gout    • Hearing aid worn     sabrina   • Hernia, inguinal     RIH and umbilical hernia   • Hypertension    • Metabolic  acidosis    • Morbidly obese (HCC)    • Rib fracture 2024   • Sleep apnea    • Type 2 diabetes mellitus with hyperglycemia, with long-term current use of insulin ([facility]) 2016   • Wears glasses      Past Surgical History:   Procedure Laterality Date   • CARPAL TUNNEL RELEASE Bilateral    • COLONOSCOPY N/A 2017    Procedure: COLONOSCOPY;  Surgeon: João Perez MD;  Location: [facility];  Service:    • CYST REMOVAL     • FOOT SURGERY     • HERNIA REPAIR     • JOINT REPLACEMENT      knee left    • FL LAPAROSCOPY SURG RPR INITIAL INGUINAL HERNIA Right 3/16/2017    Procedure: REPAIR HERNIA INGUINAL, LAPAROSCOPIC WITH MESH;  Surgeon: Ortiz Salcedo MD;  Location: [facility];  Service: General   • FL RPR UMBILICAL HRNA 5 YRS/> REDUCIBLE N/A 3/16/2017    Procedure: OPEN REPAIR HERNIA UMBILICAL;  Surgeon: Ortiz Salcedo MD;  Location: [facility];  Service: General   • TONSILLECTOMY       Family History   Problem Relation Age of Onset   • No Known Problems Mother    • Diabetes Father    • Diabetes Sister      Social History     Tobacco Use   • Smoking status: Former     Current packs/day: 0.00     Types: Cigarettes     Quit date: 3/10/2003     Years since quittin.8   • Smokeless tobacco: Never   Vaping Use   • Vaping status: Never Used   Substance and Sexual Activity   • Alcohol use: Never   • Drug use: No   • Sexual activity: Yes     Partners: Female     Current Outpatient Medications on File Prior to Visit   Medication Sig   • allopurinol (ZYLOPRIM) 300 mg tablet Take 1 tablet (300 mg total) by mouth daily   • amiodarone 200 mg tablet Take 1 tablet (200 mg total) by mouth daily   • apixaban (Eliquis) 2.5 mg Take 1 tablet (2.5 mg total) by mouth 2 (two) times a day THIS IS A PRICE  CHECK   • aspirin 81 MG tablet Take 81 mg by mouth daily   • Blood Glucose Monitoring Suppl (OneTouch Verio Reflect) w/Device KIT May substitute brand based on insurance coverage. Check glucose ACHS.   • Cholecalciferol (VITAMIN  D3) 5000 units TABS Take 1 tablet by mouth daily   • furosemide (LASIX) 20 mg tablet Take 1 tablet (20 mg total) by mouth daily   • glucose blood (OneTouch Verio) test strip Check glucose before meals and at bedtime.   • glucose blood (OneTouch Verio) test strip Check blood sugars three times daily. Please substitute with appropriate alternative as covered by patient's insurance. Dx: E11.65   • losartan (COZAAR) 25 mg tablet Take 1 tablet (25 mg total) by mouth daily   • metoprolol succinate (TOPROL-XL) 25 mg 24 hr tablet Take 1 tablet (25 mg total) by mouth daily   • Omega-3 Fatty Acids (FISH OIL) 1200 MG CAPS Take 1 capsule by mouth 3 (three) times a day   • Insulin Pen Needle (Unifine Pentips) 32G X 4 MM MISC For use with insulin pen 4 times daily.   • Lancets (OneTouch Delica Plus Kapsgh21P) MISC Check glucose before meals and at bedtime   • [DISCONTINUED] insulin aspart (NovoLOG FlexPen) 100 UNIT/ML injection pen Inject 15 Units under the skin 3 (three) times a day with meals (Patient not taking: Reported on 1/15/2025)   • [DISCONTINUED] Levemir FlexPen 100 units/mL injection pen Inject 40 Units under the skin daily (Patient not taking: Reported on 1/15/2025)     No Known Allergies  Immunization History   Administered Date(s) Administered   • COVID-19 MODERNA VACC 0.5 ML IM 01/19/2021, 02/16/2021, 11/07/2021, 04/07/2022   • COVID-19 Moderna Vac BIVALENT 12 Yr+ IM 0.5 ML 12/01/2022   • COVID-19 Moderna mRNA Vaccine 12 Yr+ 50 mcg/0.5 mL (Spikevax) 10/17/2024   • COVID-19 Pfizer mRNA vacc PF scarlett-sucrose 12 yr and older (Comirnaty) 10/05/2023   • INFLUENZA 01/19/2005, 09/27/2007, 10/17/2008, 11/05/2011, 12/21/2012, 11/15/2013, 11/01/2015, 11/01/2016, 11/16/2018, 10/31/2021, 10/10/2022, 10/05/2023   • Influenza Quadrivalent, 6-35 Months IM 11/01/2015   • Influenza Split High Dose Preservative Free IM 08/14/2017, 11/02/2019, 10/17/2024   • Influenza, Seasonal Vaccine, Quadrivalent, Adjuvanted, .5e 10/05/2023   •  "Influenza, high dose seasonal 0.7 mL 10/07/2020   • Influenza, seasonal, injectable 11/01/2016   • Pneumococcal Conjugate 13-Valent 07/29/2016   • Pneumococcal Polysaccharide PPV23 11/06/2009, 08/14/2017   • Td (adult), Unspecified 01/01/1993   • Zoster 02/08/2012, 07/29/2013     Objective   /64 (BP Location: Left arm, Patient Position: Sitting, Cuff Size: Large)   Pulse 62   Temp (!) 97.3 °F (36.3 °C) (Temporal)   Ht 5' 7\" (1.702 m)   Wt 114 kg (251 lb 12.8 oz)   SpO2 96%   BMI 39.44 kg/m²     Physical Exam  Vitals and nursing note reviewed.   Constitutional:       General: He is not in acute distress.     Appearance: Normal appearance.   HENT:      Head: Normocephalic and atraumatic.   Eyes:      Pupils: Pupils are equal, round, and reactive to light.   Cardiovascular:      Rate and Rhythm: Normal rate and regular rhythm.      Pulses: Normal pulses.      Heart sounds: Normal heart sounds.   Pulmonary:      Effort: Pulmonary effort is normal.      Breath sounds: Normal breath sounds.   Musculoskeletal:         General: Normal range of motion.      Cervical back: Normal range of motion.   Skin:     General: Skin is warm and dry.   Neurological:      General: No focal deficit present.      Mental Status: He is alert and oriented to person, place, and time. Mental status is at baseline.   Psychiatric:         Mood and Affect: Mood normal.         Behavior: Behavior normal.         Thought Content: Thought content normal.         Judgment: Judgment normal.         "

## 2025-01-22 NOTE — ASSESSMENT & PLAN NOTE
Wt Readings from Last 3 Encounters:   01/22/25 114 kg (251 lb 12.8 oz)   01/15/25 112 kg (246 lb)   01/10/25 110 kg (241 lb 6.5 oz)     Mildly reduced ejection fraction.  Continue to monitor.

## 2025-01-22 NOTE — ASSESSMENT & PLAN NOTE
Lab Results   Component Value Date    HGBA1C 5.8 (H) 01/06/2025   Blood sugars at home range from 100-1 50 on no insulin or any other diabetic medication.  Will maintain off of all diabetic medications for the time being and monitor sugars at home.  Next lab work scheduled for March.  Patient will call if home readings rise.

## 2025-01-30 DIAGNOSIS — I10 ESSENTIAL HYPERTENSION: ICD-10-CM

## 2025-01-30 RX ORDER — LOSARTAN POTASSIUM 25 MG/1
25 TABLET ORAL DAILY
Qty: 90 TABLET | Refills: 0 | Status: SHIPPED | OUTPATIENT
Start: 2025-01-30

## 2025-01-30 NOTE — TELEPHONE ENCOUNTER
"Patient called and "[employer] mail order sent his Losartan out ans the post office \"lost it and it cannot be found\". He is requesting Losartan to go to [facility] in [place] for immediate  so he can get started on it.  Sent in script for #90 and patient did not want refills at this time.   "

## 2025-02-05 PROBLEM — J06.9 URI WITH COUGH AND CONGESTION: Status: RESOLVED | Noted: 2025-01-06 | Resolved: 2025-02-05

## 2025-02-14 LAB
LEFT EYE DIABETIC RETINOPATHY: POSITIVE
RIGHT EYE DIABETIC RETINOPATHY: POSITIVE

## 2025-02-17 ENCOUNTER — CLINICAL SUPPORT (OUTPATIENT)
Dept: CARDIOLOGY CLINIC | Facility: CLINIC | Age: 80
End: 2025-02-17
Payer: COMMERCIAL

## 2025-02-17 VITALS
WEIGHT: 249 LBS | HEIGHT: 67 IN | DIASTOLIC BLOOD PRESSURE: 76 MMHG | BODY MASS INDEX: 39.08 KG/M2 | HEART RATE: 78 BPM | SYSTOLIC BLOOD PRESSURE: 174 MMHG

## 2025-02-17 DIAGNOSIS — I48.91 ATRIAL FIBRILLATION (HCC): ICD-10-CM

## 2025-02-17 DIAGNOSIS — I50.21 ACUTE HEART FAILURE WITH MILDLY REDUCED EJECTION FRACTION (HFMREF, 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): ICD-10-CM

## 2025-02-17 DIAGNOSIS — I48.19 PERSISTENT ATRIAL FIBRILLATION (HCC): Primary | ICD-10-CM

## 2025-02-17 DIAGNOSIS — I48.91 ATRIAL FIBRILLATION, UNSPECIFIED TYPE (HCC): Primary | ICD-10-CM

## 2025-02-17 PROCEDURE — 99211 OFF/OP EST MAY X REQ PHY/QHP: CPT

## 2025-02-17 PROCEDURE — 93000 ELECTROCARDIOGRAM COMPLETE: CPT

## 2025-02-17 RX ORDER — FUROSEMIDE 20 MG/1
20 TABLET ORAL DAILY
Qty: 90 TABLET | Refills: 2 | Status: SHIPPED | OUTPATIENT
Start: 2025-02-17

## 2025-02-17 RX ORDER — METOPROLOL SUCCINATE 25 MG/1
25 TABLET, EXTENDED RELEASE ORAL DAILY
Qty: 30 TABLET | Refills: 0 | Status: SHIPPED | OUTPATIENT
Start: 2025-02-17 | End: 2025-02-17 | Stop reason: SDUPTHER

## 2025-02-17 RX ORDER — FUROSEMIDE 20 MG/1
20 TABLET ORAL DAILY
Qty: 30 TABLET | Refills: 0 | Status: SHIPPED | OUTPATIENT
Start: 2025-02-17 | End: 2025-02-17 | Stop reason: SDUPTHER

## 2025-02-17 RX ORDER — METOPROLOL SUCCINATE 25 MG/1
25 TABLET, EXTENDED RELEASE ORAL DAILY
Qty: 90 TABLET | Refills: 2 | Status: SHIPPED | OUTPATIENT
Start: 2025-02-17

## 2025-02-17 NOTE — PROGRESS NOTES
Irineo presents for EKG/nurse visit. He was previously cardioverted when hospitalized and is now back in AF again. EKG read by Dr Odell his cardiologist. He is referring him again for cardioversion and EP consultation. He has a follow up with Dr Odell on 3/101/25. Today HR is 78 and BP elevated at 174/76. Patient states he ran out of Metoprolol and Furosemide this Saturday and it had no refills. He thought he was to stop them due to the no refills.     Refills sent to [facility] in [place] and to mail order [facility].

## 2025-02-24 ENCOUNTER — OFFICE VISIT (OUTPATIENT)
Dept: CARDIOLOGY CLINIC | Facility: CLINIC | Age: 80
End: 2025-02-24
Payer: COMMERCIAL

## 2025-02-24 VITALS
HEIGHT: 67 IN | HEART RATE: 76 BPM | WEIGHT: 248.4 LBS | DIASTOLIC BLOOD PRESSURE: 66 MMHG | BODY MASS INDEX: 38.99 KG/M2 | SYSTOLIC BLOOD PRESSURE: 160 MMHG

## 2025-02-24 DIAGNOSIS — G47.33 OSA (OBSTRUCTIVE SLEEP APNEA): ICD-10-CM

## 2025-02-24 DIAGNOSIS — I10 ESSENTIAL HYPERTENSION: ICD-10-CM

## 2025-02-24 DIAGNOSIS — I48.19 PERSISTENT ATRIAL FIBRILLATION (HCC): Primary | ICD-10-CM

## 2025-02-24 DIAGNOSIS — E66.01 OBESITY, MORBID (HCC): ICD-10-CM

## 2025-02-24 DIAGNOSIS — I50.21 ACUTE HEART FAILURE WITH MILDLY REDUCED EJECTION FRACTION (HFMREF, 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): ICD-10-CM

## 2025-02-24 PROCEDURE — 93000 ELECTROCARDIOGRAM COMPLETE: CPT | Performed by: STUDENT IN AN ORGANIZED HEALTH CARE EDUCATION/TRAINING PROGRAM

## 2025-02-24 PROCEDURE — 99214 OFFICE O/P EST MOD 30 MIN: CPT | Performed by: STUDENT IN AN ORGANIZED HEALTH CARE EDUCATION/TRAINING PROGRAM

## 2025-02-24 RX ORDER — LOSARTAN POTASSIUM 25 MG/1
50 TABLET ORAL DAILY
Qty: 60 TABLET | Refills: 3 | Status: SHIPPED | OUTPATIENT
Start: 2025-02-24

## 2025-02-24 RX ORDER — FENTANYL CITRATE/PF 50 MCG/ML
25 SYRINGE (ML) INJECTION
Refills: 0 | Status: CANCELLED | OUTPATIENT
Start: 2025-02-24

## 2025-02-24 RX ORDER — SODIUM CHLORIDE, SODIUM LACTATE, POTASSIUM CHLORIDE, CALCIUM CHLORIDE 600; 310; 30; 20 MG/100ML; MG/100ML; MG/100ML; MG/100ML
125 INJECTION, SOLUTION INTRAVENOUS CONTINUOUS
Status: CANCELLED | OUTPATIENT
Start: 2025-02-24

## 2025-02-24 RX ORDER — ONDANSETRON 2 MG/ML
4 INJECTION INTRAMUSCULAR; INTRAVENOUS ONCE AS NEEDED
Status: CANCELLED | OUTPATIENT
Start: 2025-02-24

## 2025-02-24 NOTE — PROGRESS NOTES
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, 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)    Relevant Medications    losartan (COZAAR) 25 mg tablet             PLAN:  Persistent atrial fibrillation  -Continue amiodarone 200 mg daily  -Continue apixaban 2.5 mg twice daily (age greater than 80, CR 1.8)  -DCCV planned for Wednesday, 2/26/25  -Further planning based on results/how patient feels post DCCV    Heart failure with mild reduced ejection fraction, EF 47%  -Continue metoprolol succinate 25 mg daily  -Increased losartan to 50 mg daily    Hypertension  -Blood pressure today's visit 160/66 mmHg  -Increased losartan to 50 mg daily    KALIA  -Continue CPAP use    Obesity  -encouraged weight loss/exercise    Follow up in: 2 months    Orders Placed This Encounter   Procedures    POCT ECG     Medications Discontinued During This Encounter   Medication Reason    losartan (COZAAR) 25 mg tablet          .............................................................................................    HPI/Subjective:     Mr. Irineo Rawls is an 79-year-old gentleman with a past medical history of persistent atrial fibrillation on Eliquis and metoprolol, heart failure with mild reduced ejection fraction EF 47%, hypertension, hyperlipidemia, KALIA, and CKD stage III.  His last EKG performed on February 17, 2025 revealed rate controlled  atrial fibrillation.  Per EMR he is scheduled for cardioversion on February 17, 2025.    Today he presents for evaluation of his persistent atrial fibrillation. With regards to his atrial fibrillation, he  appears to be completely asymptomatic when in A-fib.  He did appear to be symptomatic when hospitalized and his heart rates were in the low 100s, but patient states when his heart rates are better controlled he has no symptoms.  His wife noted that his insulin was discontinued in hospital due to low blood sugars, and since discontinuation he has seemed to feel much better.  The patient echo at this notion stating he feels great currently.  He is able to be more active, has more energy, and denies dyspnea on exertion, chest pain, syncope, near syncope.  He does note occasional feelings of palpitations, but states these are not bothersome.  He has a cardioversion scheduled for Wednesday, February 26, 2025, and has been loaded on amiodarone.    We discussed management options of atrial fibrillation.  To begin we discussed that management of A-fib is treatment not cure.  We discussed atrial fibrillation including the risk involved and ablation which include but are not limited to bleeding, bruising, hematomas, fistulas, bleeding into the abdomen, bleeding into the pelvis, cardiac perforation requiring a drain or surgical repair, stroke, heart attack, death.  We discussed antiarrhythmic management including continuing amiodarone, transition to dofetilide, and continued rate control without attempts at rhythm control.    We discussed modifiable risk factors including weight management, obstructive sleep apnea management, and avoidance of alcohol.  He notes CPAP for sleep apnea and avoiding alcohol. HE is working to lose weight.     In the end, given that it is difficult to ascertain if the patient is symptomatic when in A-fib, it is difficult to decide on if rate control versus rhythm control is more appropriate.  Since  he has a cardioversion scheduled for Wednesday, 2/26/2025, I have asked that he continue forward with this procedure.  If when cardioverted he feels even better than he currently does, it would make sense to be more aggressive with rhythm control therapy.  If however he does not feel any different in normal sinus rhythm, it warrants the question of if rhythm control is necessary.    While the patient does have a slightly reduced ejection fraction, given the LV moderate concentric hypertrophy, as well as blood pressures documented in the 180s, it suggests that his reduced ejection fraction could be secondary to hypertensive disease.  The patient and his wife note that his blood pressure was well-controlled on lisinopril hydrochlorothiazide in the past, but due to hyperkalemia, these drugs were discontinued.  He was recently restarted on losartan 25 mg daily with reduction of systolic blood pressure from the 180s to the 160s.    I will increase his losartan once more at today's visit to losartan 50 mg daily.  He has a follow-up scheduled with his PCP and nephrologist in the coming weeks.  I will defer to them for further titration of this medication.  Regardless of the final decision with regards to rate versus rhythm control for his atrial fibrillation, he would benefit from tighter blood pressure control.    After his cardioversion, the patient will contact the office to let us know if he feels better, worse, or the same.  If he is unable to obtain normal sinus rhythm via cardioversion and amiodarone, he will also let us know so that we can have a discussion over the phone of where to go from here.      Complete 12 point ROS reviewed and otherwise non pertinent or negative except as per HPI pertinent positives in Cardiovascular and Respiratory emphasized. Please see paper chart for outpatient clinic patients where the patient completed the 12 point ROS survey.           Past Medical History:   Diagnosis Date     Acute renal failure superimposed on stage 3 chronic kidney disease ([facility]) 02/25/2023    Acute renal failure superimposed on stage 3b chronic kidney disease, unspecified acute renal failure type ([facility]) 12/20/2012    MATILDE (acute kidney injury) ([facility])     Chronic kidney disease     CPAP (continuous positive airway pressure) dependence     Gout     Hearing aid worn     sabrina    Hernia, inguinal     RIH and umbilical hernia    Hypertension     Metabolic acidosis     Morbidly obese ([facility])     Rib fracture 01/05/2024    Sleep apnea     Type 2 diabetes mellitus with hyperglycemia, with long-term current use of insulin ([facility]) 01/29/2016    Wears glasses        No Known Allergies  I reviewed the Home Medication list and Allergies in the chart.   Scheduled Meds:  Current Outpatient Medications   Medication Sig Dispense Refill    allopurinol (ZYLOPRIM) 300 mg tablet Take 1 tablet (300 mg total) by mouth daily 90 tablet 1    amiodarone 200 mg tablet Take 1 tablet (200 mg total) by mouth daily 90 tablet 3    apixaban (Eliquis) 2.5 mg Take 1 tablet (2.5 mg total) by mouth 2 (two) times a day THIS IS A PRICE  CHECK 180 tablet 0    aspirin 81 MG tablet Take 81 mg by mouth daily      Blood Glucose Monitoring Suppl (OneTouch Verio Reflect) w/Device KIT May substitute brand based on insurance coverage. Check glucose ACHS. 1 kit 0    Cholecalciferol (VITAMIN D3) 5000 units TABS Take 1 tablet by mouth daily      furosemide (LASIX) 20 mg tablet Take 1 tablet (20 mg total) by mouth daily 90 tablet 2    Lancets (OneTouch Delica Plus [id number]) MISC Check glucose before meals and at bedtime 400 each 1    losartan (COZAAR) 25 mg tablet Take 2 tablets (50 mg total) by mouth daily 60 tablet 3    metoprolol succinate (TOPROL-XL) 25 mg 24 hr tablet Take 1 tablet (25 mg total) by mouth daily 90 tablet 2    Omega-3 Fatty Acids (FISH OIL) 1200 MG CAPS Take 1 capsule by mouth 3 (three) times a day      Ergocalciferol 50 MCG (2000 UT) TABS Take 2,000 Units by  mouth daily      glucose blood (OneTouch Verio) test strip Check glucose before meals and at bedtime. (Patient not taking: Reported on 2025) 400 strip 1    glucose blood (OneTouch Verio) test strip Check blood sugars three times daily. Please substitute with appropriate alternative as covered by patient's insurance. Dx: E11.65 300 each 3    Insulin Pen Needle (Unifine Pentips) 32G X 4 MM MISC For use with insulin pen 4 times daily. 400 each 1     No current facility-administered medications for this visit.     PRN Meds:.        Family History   Problem Relation Age of Onset    No Known Problems Mother     Diabetes Father     Diabetes Sister        Social History     Socioeconomic History    Marital status: /Civil Union     Spouse name: Not on file    Number of children: Not on file    Years of education: Not on file    Highest education level: Not on file   Occupational History    Not on file   Tobacco Use    Smoking status: Former     Current packs/day: 0.00     Types: Cigarettes     Quit date: 3/10/2003     Years since quittin.9    Smokeless tobacco: Never   Vaping Use    Vaping status: Never Used   Substance and Sexual Activity    Alcohol use: Never    Drug use: No    Sexual activity: Yes     Partners: Female   Other Topics Concern    Not on file   Social History Narrative    Always uses seat belt    Feels safe at home     Social Drivers of Health     Financial Resource Strain: Low Risk  (2025)    Received from [employer]    Financial Resource Strain     Do you have any trouble paying for your medications, or do you think you might in the future?: No     Does your family have trouble paying for medicine? (Household - for ages 0-17 years): Not on file   Food Insecurity: No Food Insecurity (2025)    Received from [employer]    Hunger Vital Sign     Worried About Running Out of Food in the Last Year: Never true     Ran Out of Food in the Last Year: Never true   Transportation Needs: No  "Transportation Needs (1/13/2025)    Received from [employer]    Transportation Needs     Do you have trouble getting a ride to medical visits or work? (Adult - for ages 18 years and over): Not on file     Does your family have a hard time getting a ride to doctors’ visits? (Household - for ages 0-17 years): Not on file     Has lack of transportation kept you from medical appointments, meetings, work, or from getting things needed for daily living? Check all that apply.: No     Do you (or your family) have trouble finding or paying for a ride (transportation)? (Household - for ages 0-17 years): Not on file   Physical Activity: Not on file   Stress: Not on file   Social Connections: Socially Integrated (1/13/2025)    Received from [employer]    Social Connections     How often do you feel lonely or isolated from those around you?: Never   Intimate Partner Violence: Unknown (1/6/2025)    Nursing IPS     Feels Physically and Emotionally Safe: Not on file     Physically Hurt by Someone: Not on file     Humiliated or Emotionally Abused by Someone: Not on file     Physically Hurt by Someone: No     Hurt or Threatened by Someone: No   Housing Stability: Low Risk  (1/13/2025)    Received from [employer]    Housing Stability     Do you currently live in a shelter or have no steady place to sleep at night?: No     Do you think you are at risk of becoming homeless? (Adult - for ages 18 years and over): Not on file     Does your family worry about paying for your home or becoming homeless? (Household - for ages 0-17 years): Not on file     Are you homeless or worried that you might be in the future?: No     Are you (or your family) homeless or worried that you might be in the future? (Household - for ages 0-17 years): Not on file         OBJECTIVE:    /66 (BP Location: Left arm, Patient Position: Sitting, Cuff Size: Large)   Pulse 76   Ht 5' 7\" (1.702 m)   Wt 113 kg (248 lb 6.4 oz)   BMI 38.90 kg/m²   Vitals:    02/24/25 " "1050   Weight: 113 kg (248 lb 6.4 oz)     GEN: No acute distress, Alert and oriented, well appearing  HEENT: Normocephalic, atraumatic  CARDIOVASCULAR: Irregular regular, No murmur, rub, gallops S1,S2  LUNGS: Clear To auscultation bilaterally, normal effort, no rales, rhonchi, crackles   ABDOMEN: Protuberant, soft, nontender  EXTREMITIES/VASCULAR:  No edema. warm an well perfused.  PSYCH: Normal Affect,  linear speech pattern without evidence of psychosis.   NEURO: Grossly intact, moving all extremiteis equal, face symmetric, alert and responsive, no obvious focal defecits   HEME: No bleeding, bruising, petechia, purpura   SKIN: No significant rashes on visibile skin, warm, no diaphoresis or pallor.     Lab Results:       LABS:      Chemistry        Component Value Date/Time    K 3.6 01/16/2025 1101    K 5.2 08/14/2021 0750     01/16/2025 1101     (H) 08/14/2021 0750    CO2 25 01/16/2025 1101    CO2 23 08/14/2021 0750    BUN 38 (H) 01/16/2025 1101    BUN 45 (H) 08/14/2021 0750    CREATININE 1.80 (H) 01/16/2025 1101    CREATININE 1.78 (H) 08/14/2021 0750        Component Value Date/Time    CALCIUM 9.2 01/16/2025 1101    CALCIUM 9.2 08/14/2021 0750    ALKPHOS 70 01/08/2025 0444    ALKPHOS 90 08/14/2021 0750    AST 16 01/08/2025 0444    AST 19 08/14/2021 0750    ALT 11 01/08/2025 0444    ALT 42 08/14/2021 0750            No results found for: \"CHOL\"  Lab Results   Component Value Date    HDL 38 (L) 03/12/2024    HDL 27 (L) 02/24/2023    HDL 30 (L) 02/12/2022     Lab Results   Component Value Date    LDLCALC 91 03/12/2024    LDLCALC 42 02/24/2023    LDLCALC 63 02/12/2022     Lab Results   Component Value Date    TRIG 177 (H) 03/12/2024    TRIG 283 (H) 02/24/2023    TRIG 248 (H) 02/12/2022     No results found for: \"CHOLHDL\"    IMAGING: No results found.     Cardiac testing:         I reviewed and interpreted the following LABS/EKG/TELE/IMAGING and below is summary of my interpretation (if data " available):    LABS:    Current EKG performed at today's visit read by me personally reveals atrial fibrillation, rate controlled.  Left ventricular hypertrophy also noted.    ECHO 1/7/25      Left Ventricle: Left ventricular cavity size is normal. Wall thickness is moderately increased. There is moderate concentric hypertrophy. There is concentric remodeling. The left ventricular ejection fraction is 47% by biplane measurement. Systolic function is mildly reduced. There is mild global hypokinesis.    Right Ventricle: Right ventricular cavity size is mildly dilated. Systolic function is mildly reduced. Abnormal tricuspid annular plane systolic excursion (TAPSE) = 1.7 cm.    Left Atrium: The atrium is moderately dilated (42-48 mL/m2).    Right Atrium: The atrium is mildly dilated.    Aortic Valve: The aortic valve is trileaflet.  The noncoronary cusp is heavily calcified but has well-preserved mobility. There is mild stenosis. The aortic valve peak velocity is 1.66 m/s. The aortic valve mean gradient is 7 mmHg. The dimensionless velocity index is 0.48. The aortic valve area is 1.59 cm2. The stroke volume index is 21.60 ml/m2.    Mitral Valve: There is moderate annular calcification. There is mild regurgitation.    Tricuspid Valve: There is mild regurgitation. The right ventricular systolic pressure is mildly elevated. The estimated right ventricular systolic pressure is 48.00 mmHg.

## 2025-02-24 NOTE — H&P (VIEW-ONLY)
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, 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)    Relevant Medications    losartan (COZAAR) 25 mg tablet             PLAN:  Persistent atrial fibrillation  -Continue amiodarone 200 mg daily  -Continue apixaban 2.5 mg twice daily (age greater than 80, CR 1.8)  -DCCV planned for Wednesday, 2/26/25  -Further planning based on results/how patient feels post DCCV    Heart failure with mild reduced ejection fraction, EF 47%  -Continue metoprolol succinate 25 mg daily  -Increased losartan to 50 mg daily    Hypertension  -Blood pressure today's visit 160/66 mmHg  -Increased losartan to 50 mg daily    KALIA  -Continue CPAP use    Obesity  -encouraged weight loss/exercise    Follow up in: 2 months    Orders Placed This Encounter   Procedures    POCT ECG     Medications Discontinued During This Encounter   Medication Reason    losartan (COZAAR) 25 mg tablet          .............................................................................................    HPI/Subjective:     Mr. Irineo Rawls is an 79-year-old gentleman with a past medical history of persistent atrial fibrillation on Eliquis and metoprolol, heart failure with mild reduced ejection fraction EF 47%, hypertension, hyperlipidemia, KALIA, and CKD stage III.  His last EKG performed on February 17, 2025 revealed rate controlled  atrial fibrillation.  Per EMR he is scheduled for cardioversion on February 17, 2025.    Today he presents for evaluation of his persistent atrial fibrillation. With regards to his atrial fibrillation, he  appears to be completely asymptomatic when in A-fib.  He did appear to be symptomatic when hospitalized and his heart rates were in the low 100s, but patient states when his heart rates are better controlled he has no symptoms.  His wife noted that his insulin was discontinued in hospital due to low blood sugars, and since discontinuation he has seemed to feel much better.  The patient echo at this notion stating he feels great currently.  He is able to be more active, has more energy, and denies dyspnea on exertion, chest pain, syncope, near syncope.  He does note occasional feelings of palpitations, but states these are not bothersome.  He has a cardioversion scheduled for Wednesday, February 26, 2025, and has been loaded on amiodarone.    We discussed management options of atrial fibrillation.  To begin we discussed that management of A-fib is treatment not cure.  We discussed atrial fibrillation including the risk involved and ablation which include but are not limited to bleeding, bruising, hematomas, fistulas, bleeding into the abdomen, bleeding into the pelvis, cardiac perforation requiring a drain or surgical repair, stroke, heart attack, death.  We discussed antiarrhythmic management including continuing amiodarone, transition to dofetilide, and continued rate control without attempts at rhythm control.    We discussed modifiable risk factors including weight management, obstructive sleep apnea management, and avoidance of alcohol.  He notes CPAP for sleep apnea and avoiding alcohol. HE is working to lose weight.     In the end, given that it is difficult to ascertain if the patient is symptomatic when in A-fib, it is difficult to decide on if rate control versus rhythm control is more appropriate.  Since  he has a cardioversion scheduled for Wednesday, 2/26/2025, I have asked that he continue forward with this procedure.  If when cardioverted he feels even better than he currently does, it would make sense to be more aggressive with rhythm control therapy.  If however he does not feel any different in normal sinus rhythm, it warrants the question of if rhythm control is necessary.    While the patient does have a slightly reduced ejection fraction, given the LV moderate concentric hypertrophy, as well as blood pressures documented in the 180s, it suggests that his reduced ejection fraction could be secondary to hypertensive disease.  The patient and his wife note that his blood pressure was well-controlled on lisinopril hydrochlorothiazide in the past, but due to hyperkalemia, these drugs were discontinued.  He was recently restarted on losartan 25 mg daily with reduction of systolic blood pressure from the 180s to the 160s.    I will increase his losartan once more at today's visit to losartan 50 mg daily.  He has a follow-up scheduled with his PCP and nephrologist in the coming weeks.  I will defer to them for further titration of this medication.  Regardless of the final decision with regards to rate versus rhythm control for his atrial fibrillation, he would benefit from tighter blood pressure control.    After his cardioversion, the patient will contact the office to let us know if he feels better, worse, or the same.  If he is unable to obtain normal sinus rhythm via cardioversion and amiodarone, he will also let us know so that we can have a discussion over the phone of where to go from here.      Complete 12 point ROS reviewed and otherwise non pertinent or negative except as per HPI pertinent positives in Cardiovascular and Respiratory emphasized. Please see paper chart for outpatient clinic patients where the patient completed the 12 point ROS survey.           Past Medical History:   Diagnosis Date     Acute renal failure superimposed on stage 3 chronic kidney disease ([facility]) 02/25/2023    Acute renal failure superimposed on stage 3b chronic kidney disease, unspecified acute renal failure type ([facility]) 12/20/2012    MATILDE (acute kidney injury) ([facility])     Chronic kidney disease     CPAP (continuous positive airway pressure) dependence     Gout     Hearing aid worn     sabrina    Hernia, inguinal     RIH and umbilical hernia    Hypertension     Metabolic acidosis     Morbidly obese ([facility])     Rib fracture 01/05/2024    Sleep apnea     Type 2 diabetes mellitus with hyperglycemia, with long-term current use of insulin ([facility]) 01/29/2016    Wears glasses        No Known Allergies  I reviewed the Home Medication list and Allergies in the chart.   Scheduled Meds:  Current Outpatient Medications   Medication Sig Dispense Refill    allopurinol (ZYLOPRIM) 300 mg tablet Take 1 tablet (300 mg total) by mouth daily 90 tablet 1    amiodarone 200 mg tablet Take 1 tablet (200 mg total) by mouth daily 90 tablet 3    apixaban (Eliquis) 2.5 mg Take 1 tablet (2.5 mg total) by mouth 2 (two) times a day THIS IS A PRICE  CHECK 180 tablet 0    aspirin 81 MG tablet Take 81 mg by mouth daily      Blood Glucose Monitoring Suppl (OneTouch Verio Reflect) w/Device KIT May substitute brand based on insurance coverage. Check glucose ACHS. 1 kit 0    Cholecalciferol (VITAMIN D3) 5000 units TABS Take 1 tablet by mouth daily      furosemide (LASIX) 20 mg tablet Take 1 tablet (20 mg total) by mouth daily 90 tablet 2    Lancets (OneTouch Delica Plus Mihrmy99P) MISC Check glucose before meals and at bedtime 400 each 1    losartan (COZAAR) 25 mg tablet Take 2 tablets (50 mg total) by mouth daily 60 tablet 3    metoprolol succinate (TOPROL-XL) 25 mg 24 hr tablet Take 1 tablet (25 mg total) by mouth daily 90 tablet 2    Omega-3 Fatty Acids (FISH OIL) 1200 MG CAPS Take 1 capsule by mouth 3 (three) times a day      Ergocalciferol 50 MCG (2000 UT) TABS Take 2,000 Units by  mouth daily      glucose blood (OneTouch Verio) test strip Check glucose before meals and at bedtime. (Patient not taking: Reported on 2025) 400 strip 1    glucose blood (OneTouch Verio) test strip Check blood sugars three times daily. Please substitute with appropriate alternative as covered by patient's insurance. Dx: E11.65 300 each 3    Insulin Pen Needle (Unifine Pentips) 32G X 4 MM MISC For use with insulin pen 4 times daily. 400 each 1     No current facility-administered medications for this visit.     PRN Meds:.        Family History   Problem Relation Age of Onset    No Known Problems Mother     Diabetes Father     Diabetes Sister        Social History     Socioeconomic History    Marital status: /Civil Union     Spouse name: Not on file    Number of children: Not on file    Years of education: Not on file    Highest education level: Not on file   Occupational History    Not on file   Tobacco Use    Smoking status: Former     Current packs/day: 0.00     Types: Cigarettes     Quit date: 3/10/2003     Years since quittin.9    Smokeless tobacco: Never   Vaping Use    Vaping status: Never Used   Substance and Sexual Activity    Alcohol use: Never    Drug use: No    Sexual activity: Yes     Partners: Female   Other Topics Concern    Not on file   Social History Narrative    Always uses seat belt    Feels safe at home     Social Drivers of Health     Financial Resource Strain: Low Risk  (2025)    Received from [facility]    Financial Resource Strain     Do you have any trouble paying for your medications, or do you think you might in the future?: No     Does your family have trouble paying for medicine? (Household - for ages 0-17 years): Not on file   Food Insecurity: No Food Insecurity (2025)    Received from [facility]    Hunger Vital Sign     Worried About Running Out of Food in the Last Year: Never true     Ran Out of Food in the Last Year: Never true   Transportation Needs: No  "Transportation Needs (1/13/2025)    Received from [facility]    Transportation Needs     Do you have trouble getting a ride to medical visits or work? (Adult - for ages 18 years and over): Not on file     Does your family have a hard time getting a ride to doctors’ visits? (Household - for ages 0-17 years): Not on file     Has lack of transportation kept you from medical appointments, meetings, work, or from getting things needed for daily living? Check all that apply.: No     Do you (or your family) have trouble finding or paying for a ride (transportation)? (Household - for ages 0-17 years): Not on file   Physical Activity: Not on file   Stress: Not on file   Social Connections: Socially Integrated (1/13/2025)    Received from [facility]    Social Connections     How often do you feel lonely or isolated from those around you?: Never   Intimate Partner Violence: Unknown (1/6/2025)    Nursing IPS     Feels Physically and Emotionally Safe: Not on file     Physically Hurt by Someone: Not on file     Humiliated or Emotionally Abused by Someone: Not on file     Physically Hurt by Someone: No     Hurt or Threatened by Someone: No   Housing Stability: Low Risk  (1/13/2025)    Received from [facility]    Housing Stability     Do you currently live in a shelter or have no steady place to sleep at night?: No     Do you think you are at risk of becoming homeless? (Adult - for ages 18 years and over): Not on file     Does your family worry about paying for your home or becoming homeless? (Household - for ages 0-17 years): Not on file     Are you homeless or worried that you might be in the future?: No     Are you (or your family) homeless or worried that you might be in the future? (Household - for ages 0-17 years): Not on file         OBJECTIVE:    /66 (BP Location: Left arm, Patient Position: Sitting, Cuff Size: Large)   Pulse 76   Ht 5' 7\" (1.702 m)   Wt 113 kg (248 lb 6.4 oz)   BMI 38.90 kg/m²   Vitals:    02/24/25 " "1050   Weight: 113 kg (248 lb 6.4 oz)     GEN: No acute distress, Alert and oriented, well appearing  HEENT: Normocephalic, atraumatic  CARDIOVASCULAR: Irregular regular, No murmur, rub, gallops S1,S2  LUNGS: Clear To auscultation bilaterally, normal effort, no rales, rhonchi, crackles   ABDOMEN: Protuberant, soft, nontender  EXTREMITIES/VASCULAR:  No edema. warm an well perfused.  PSYCH: Normal Affect,  linear speech pattern without evidence of psychosis.   NEURO: Grossly intact, moving all extremiteis equal, face symmetric, alert and responsive, no obvious focal defecits   HEME: No bleeding, bruising, petechia, purpura   SKIN: No significant rashes on visibile skin, warm, no diaphoresis or pallor.     Lab Results:       LABS:      Chemistry        Component Value Date/Time    K 3.6 01/16/2025 1101    K 5.2 08/14/2021 0750     01/16/2025 1101     (H) 08/14/2021 0750    CO2 25 01/16/2025 1101    CO2 23 08/14/2021 0750    BUN 38 (H) 01/16/2025 1101    BUN 45 (H) 08/14/2021 0750    CREATININE 1.80 (H) 01/16/2025 1101    CREATININE 1.78 (H) 08/14/2021 0750        Component Value Date/Time    CALCIUM 9.2 01/16/2025 1101    CALCIUM 9.2 08/14/2021 0750    ALKPHOS 70 01/08/2025 0444    ALKPHOS 90 08/14/2021 0750    AST 16 01/08/2025 0444    AST 19 08/14/2021 0750    ALT 11 01/08/2025 0444    ALT 42 08/14/2021 0750            No results found for: \"CHOL\"  Lab Results   Component Value Date    HDL 38 (L) 03/12/2024    HDL 27 (L) 02/24/2023    HDL 30 (L) 02/12/2022     Lab Results   Component Value Date    LDLCALC 91 03/12/2024    LDLCALC 42 02/24/2023    LDLCALC 63 02/12/2022     Lab Results   Component Value Date    TRIG 177 (H) 03/12/2024    TRIG 283 (H) 02/24/2023    TRIG 248 (H) 02/12/2022     No results found for: \"CHOLHDL\"    IMAGING: No results found.     Cardiac testing:         I reviewed and interpreted the following LABS/EKG/TELE/IMAGING and below is summary of my interpretation (if data " available):    LABS:    Current EKG performed at today's visit read by me personally reveals atrial fibrillation, rate controlled.  Left ventricular hypertrophy also noted.    ECHO 1/7/25      Left Ventricle: Left ventricular cavity size is normal. Wall thickness is moderately increased. There is moderate concentric hypertrophy. There is concentric remodeling. The left ventricular ejection fraction is 47% by biplane measurement. Systolic function is mildly reduced. There is mild global hypokinesis.    Right Ventricle: Right ventricular cavity size is mildly dilated. Systolic function is mildly reduced. Abnormal tricuspid annular plane systolic excursion (TAPSE) = 1.7 cm.    Left Atrium: The atrium is moderately dilated (42-48 mL/m2).    Right Atrium: The atrium is mildly dilated.    Aortic Valve: The aortic valve is trileaflet.  The noncoronary cusp is heavily calcified but has well-preserved mobility. There is mild stenosis. The aortic valve peak velocity is 1.66 m/s. The aortic valve mean gradient is 7 mmHg. The dimensionless velocity index is 0.48. The aortic valve area is 1.59 cm2. The stroke volume index is 21.60 ml/m2.    Mitral Valve: There is moderate annular calcification. There is mild regurgitation.    Tricuspid Valve: There is mild regurgitation. The right ventricular systolic pressure is mildly elevated. The estimated right ventricular systolic pressure is 48.00 mmHg.

## 2025-02-25 RX ORDER — SODIUM CHLORIDE 9 MG/ML
50 INJECTION, SOLUTION INTRAVENOUS CONTINUOUS
Status: CANCELLED | OUTPATIENT
Start: 2025-02-25

## 2025-02-26 ENCOUNTER — TELEPHONE (OUTPATIENT)
Dept: NON INVASIVE DIAGNOSTICS | Facility: CLINIC | Age: 80
End: 2025-02-26

## 2025-02-26 ENCOUNTER — ANESTHESIA (OUTPATIENT)
Dept: NON INVASIVE DIAGNOSTICS | Facility: HOSPITAL | Age: 80
End: 2025-02-26
Payer: COMMERCIAL

## 2025-02-26 ENCOUNTER — HOSPITAL ENCOUNTER (OUTPATIENT)
Dept: NON INVASIVE DIAGNOSTICS | Facility: HOSPITAL | Age: 80
Discharge: HOME | End: 2025-02-26
Payer: COMMERCIAL

## 2025-02-26 ENCOUNTER — ANESTHESIA EVENT (OUTPATIENT)
Dept: NON INVASIVE DIAGNOSTICS | Facility: HOSPITAL | Age: 80
End: 2025-02-26
Payer: COMMERCIAL

## 2025-02-26 VITALS
SYSTOLIC BLOOD PRESSURE: 147 MMHG | BODY MASS INDEX: 38.82 KG/M2 | OXYGEN SATURATION: 95 % | WEIGHT: 247.36 LBS | RESPIRATION RATE: 16 BRPM | TEMPERATURE: 97.5 F | DIASTOLIC BLOOD PRESSURE: 65 MMHG | HEART RATE: 57 BPM | HEIGHT: 67 IN

## 2025-02-26 DIAGNOSIS — I48.19 PERSISTENT ATRIAL FIBRILLATION (HCC): ICD-10-CM

## 2025-02-26 LAB
ANION GAP SERPL CALCULATED.3IONS-SCNC: 8 MMOL/L (ref 4–13)
ATRIAL RATE: 58 BPM
BUN SERPL-MCNC: 47 MG/DL (ref 5–25)
CALCIUM SERPL-MCNC: 9.8 MG/DL (ref 8.4–10.2)
CHLORIDE SERPL-SCNC: 103 MMOL/L (ref 96–108)
CO2 SERPL-SCNC: 24 MMOL/L (ref 21–32)
CREAT SERPL-MCNC: 2.02 MG/DL (ref 0.6–1.3)
ERYTHROCYTE [DISTWIDTH] IN BLOOD BY AUTOMATED COUNT: 15.7 % (ref 11.6–15.1)
GFR SERPL CREATININE-BSD FRML MDRD: 30 ML/MIN/1.73SQ M
GLUCOSE P FAST SERPL-MCNC: 135 MG/DL (ref 65–99)
GLUCOSE SERPL-MCNC: 135 MG/DL (ref 65–140)
GLUCOSE SERPL-MCNC: 135 MG/DL (ref 65–140)
HCT VFR BLD AUTO: 43.7 % (ref 36.5–49.3)
HGB BLD-MCNC: 14.8 G/DL (ref 12–17)
MAGNESIUM SERPL-MCNC: 1.9 MG/DL (ref 1.9–2.7)
MCH RBC QN AUTO: 34.1 PG (ref 26.8–34.3)
MCHC RBC AUTO-ENTMCNC: 33.9 G/DL (ref 31.4–37.4)
MCV RBC AUTO: 101 FL (ref 82–98)
P AXIS: 79 DEGREES
PLATELET # BLD AUTO: 126 THOUSANDS/UL (ref 149–390)
PMV BLD AUTO: 12 FL (ref 8.9–12.7)
POTASSIUM SERPL-SCNC: 4.6 MMOL/L (ref 3.5–5.3)
PR INTERVAL: 240 MS
QRS AXIS: -4 DEGREES
QRS AXIS: 0 DEGREES
QRS AXIS: 0 DEGREES
QRSD INTERVAL: 90 MS
QRSD INTERVAL: 92 MS
QRSD INTERVAL: 96 MS
QT INTERVAL: 408 MS
QT INTERVAL: 418 MS
QT INTERVAL: 422 MS
QTC INTERVAL: 411 MS
QTC INTERVAL: 436 MS
QTC INTERVAL: 441 MS
RBC # BLD AUTO: 4.34 MILLION/UL (ref 3.88–5.62)
SODIUM SERPL-SCNC: 135 MMOL/L (ref 135–147)
T WAVE AXIS: -1 DEGREES
T WAVE AXIS: -3 DEGREES
T WAVE AXIS: 4 DEGREES
VENTRICULAR RATE: 58 BPM
VENTRICULAR RATE: 64 BPM
VENTRICULAR RATE: 70 BPM
WBC # BLD AUTO: 5.82 THOUSAND/UL (ref 4.31–10.16)

## 2025-02-26 PROCEDURE — 85027 COMPLETE CBC AUTOMATED: CPT

## 2025-02-26 PROCEDURE — 82948 REAGENT STRIP/BLOOD GLUCOSE: CPT

## 2025-02-26 PROCEDURE — 93005 ELECTROCARDIOGRAM TRACING: CPT

## 2025-02-26 PROCEDURE — 92960 CARDIOVERSION ELECTRIC EXT: CPT | Performed by: STUDENT IN AN ORGANIZED HEALTH CARE EDUCATION/TRAINING PROGRAM

## 2025-02-26 PROCEDURE — 93010 ELECTROCARDIOGRAM REPORT: CPT | Performed by: STUDENT IN AN ORGANIZED HEALTH CARE EDUCATION/TRAINING PROGRAM

## 2025-02-26 PROCEDURE — 80048 BASIC METABOLIC PNL TOTAL CA: CPT

## 2025-02-26 PROCEDURE — 83735 ASSAY OF MAGNESIUM: CPT

## 2025-02-26 PROCEDURE — 92960 CARDIOVERSION ELECTRIC EXT: CPT

## 2025-02-26 RX ORDER — ONDANSETRON 2 MG/ML
4 INJECTION INTRAMUSCULAR; INTRAVENOUS EVERY 6 HOURS PRN
Status: DISCONTINUED | OUTPATIENT
Start: 2025-02-26 | End: 2025-02-26 | Stop reason: HOSPADM

## 2025-02-26 RX ORDER — LIDOCAINE HYDROCHLORIDE 10 MG/ML
INJECTION, SOLUTION EPIDURAL; INFILTRATION; INTRACAUDAL; PERINEURAL AS NEEDED
Status: DISCONTINUED | OUTPATIENT
Start: 2025-02-26 | End: 2025-02-26

## 2025-02-26 RX ORDER — PROPOFOL 10 MG/ML
INJECTION, EMULSION INTRAVENOUS AS NEEDED
Status: DISCONTINUED | OUTPATIENT
Start: 2025-02-26 | End: 2025-02-26

## 2025-02-26 RX ORDER — SODIUM CHLORIDE 9 MG/ML
50 INJECTION, SOLUTION INTRAVENOUS CONTINUOUS
Status: DISCONTINUED | OUTPATIENT
Start: 2025-02-26 | End: 2025-02-27 | Stop reason: HOSPADM

## 2025-02-26 RX ORDER — FENTANYL CITRATE/PF 50 MCG/ML
25 SYRINGE (ML) INJECTION
Refills: 0 | Status: DISCONTINUED | OUTPATIENT
Start: 2025-02-26 | End: 2025-02-26 | Stop reason: HOSPADM

## 2025-02-26 RX ORDER — ONDANSETRON 2 MG/ML
4 INJECTION INTRAMUSCULAR; INTRAVENOUS ONCE AS NEEDED
Status: DISCONTINUED | OUTPATIENT
Start: 2025-02-26 | End: 2025-02-26 | Stop reason: HOSPADM

## 2025-02-26 RX ORDER — SODIUM CHLORIDE, SODIUM LACTATE, POTASSIUM CHLORIDE, CALCIUM CHLORIDE 600; 310; 30; 20 MG/100ML; MG/100ML; MG/100ML; MG/100ML
125 INJECTION, SOLUTION INTRAVENOUS CONTINUOUS
Status: DISCONTINUED | OUTPATIENT
Start: 2025-02-26 | End: 2025-02-27 | Stop reason: HOSPADM

## 2025-02-26 RX ADMIN — LIDOCAINE HYDROCHLORIDE 100 MG: 10 INJECTION, SOLUTION EPIDURAL; INFILTRATION; INTRACAUDAL; PERINEURAL at 08:29

## 2025-02-26 RX ADMIN — PROPOFOL 30 MG: 10 INJECTION, EMULSION INTRAVENOUS at 08:35

## 2025-02-26 RX ADMIN — PROPOFOL 50 MG: 10 INJECTION, EMULSION INTRAVENOUS at 08:29

## 2025-02-26 RX ADMIN — PROPOFOL 40 MG: 10 INJECTION, EMULSION INTRAVENOUS at 08:34

## 2025-02-26 RX ADMIN — SODIUM CHLORIDE 50 ML/HR: 0.9 INJECTION, SOLUTION INTRAVENOUS at 07:03

## 2025-02-26 RX ADMIN — PROPOFOL 10 MG: 10 INJECTION, EMULSION INTRAVENOUS at 08:31

## 2025-02-26 NOTE — INTERVAL H&P NOTE
Update: (This section must be completed if the H&P was completed greater than 24 hrs to procedure or admission)    H&P reviewed. After examining the patient, I find no changed to the H&P since it had been written.    Patient re-evaluated. Accept as history and physical.    Perez Delaney MD/February 26, 2025/8:37 AM

## 2025-02-26 NOTE — ANESTHESIA PREPROCEDURE EVALUATION
Procedure:  CARDIOVERSION    Relevant Problems   ANESTHESIA (within normal limits)      CARDIO   (+) Essential hypertension   (+) Essential hypertriglyceridemia   (+) Persistent atrial fibrillation (HCC)      ENDO   (+) Type 2 diabetes mellitus with hyperglycemia, with long-term current use of insulin (HCC)      GI/HEPATIC   (+) Calcification of liver      /RENAL   (+) Benign prostatic hyperplasia with incomplete bladder emptying   (+) Hypertensive kidney disease with chronic kidney disease   (+) Renal cyst   (+) Stage 3b chronic kidney disease (HCC)      HEMATOLOGY   (+) Microcytic anemia   (+) Thrombocytopenia (HCC)      MUSCULOSKELETAL   (+) Gout   (+) Localized, primary osteoarthritis   (+) Osteoarthritis of knee   (+) Rectus diastasis      PULMONARY   (+) KALIA (obstructive sleep apnea)        Physical Exam    Airway    Mallampati score: II  TM Distance: >3 FB  Neck ROM: full     Dental   No notable dental hx     Cardiovascular  Rhythm: irregular, Rate: normal    Pulmonary   Decreased breath sounds    Other Findings        Anesthesia Plan  ASA Score- 3     Anesthesia Type- general with ASA Monitors.         Additional Monitors:     Airway Plan:     Comment: ·  Left Ventricle: Left ventricular cavity size is normal. Wall thickness is moderately increased. There is moderate concentric hypertrophy. There is concentric remodeling. The left ventricular ejection fraction is 47% by biplane measurement. Systolic function is mildly reduced. There is mild global hypokinesis.  ·  Right Ventricle: Right ventricular cavity size is mildly dilated. Systolic function is mildly reduced. Abnormal tricuspid annular plane systolic excursion (TAPSE) = 1.7 cm.  ·  Left Atrium: The atrium is moderately dilated (42-48 mL/m2).  ·  Right Atrium: The atrium is mildly dilated.  ·  Aortic Valve: The aortic valve is trileaflet.  The noncoronary cusp is heavily calcified but has well-preserved mobility. There is mild stenosis. The aortic  valve peak velocity is 1.66 m/s. The aortic valve mean gradient is 7 mmHg. The dimensionless velocity index is 0.48. The aortic valve area is 1.59 cm2. The stroke volume index is 21.60 ml/m2.  ·  Mitral Valve: There is moderate annular calcification. There is mild regurgitation.  ·  Tricuspid Valve: There is mild regurgitation. The right ventricular systolic pressure is mildly elevated. The estimated right ventricular systolic pressure is 48.00 mmHg.  .       Plan Factors-    Chart reviewed. EKG reviewed. Imaging results reviewed. Existing labs reviewed. Patient summary reviewed.    Patient is not a current smoker.              Induction- intravenous.    Postoperative Plan-     Perioperative Resuscitation Plan - Level 1 - Full Code.       Informed Consent- Anesthetic plan and risks discussed with patient.  I personally reviewed this patient with the CRNA. Discussed and agreed on the Anesthesia Plan with the CRNA..

## 2025-02-26 NOTE — ANESTHESIA POSTPROCEDURE EVALUATION
Post-Op Assessment Note    CV Status:  Stable    Pain management: adequate       Mental Status:  Alert and awake   Hydration Status:  Euvolemic   PONV Controlled:  Controlled   Airway Patency:  Patent     Post Op Vitals Reviewed: Yes    No anethesia notable event occurred.    Staff: Anesthesiologist           Last Filed PACU Vitals:  Vitals Value Taken Time   Temp 97.3 °F (36.3 °C) 02/26/25 0845   Pulse 50 02/26/25 0910   /65 02/26/25 0903   Resp 16 02/26/25 0900   SpO2 95 % 02/26/25 0910   Vitals shown include unfiled device data.    Modified Clive:     Vitals Value Taken Time   Activity 2 02/26/25 0900   Respiration 2 02/26/25 0900   Circulation 2 02/26/25 0900   Consciousness 2 02/26/25 0900   Oxygen Saturation 2 02/26/25 0900     Modified Clive Score: 10

## 2025-02-26 NOTE — TELEPHONE ENCOUNTER
Patient called stated he was told by Dr. Wu to give a call to let us know how he is feeling.  Patient reports he is feeling great and believes he is in a normal rhythm.    Patient did mention that after his procedure that no one provided him an update on how things went so he really is unsure if the CV was successful.

## 2025-02-26 NOTE — DISCHARGE INSTR - AVS FIRST PAGE
Discharge Instructions for Cardioversion  Your healthcare provider did a procedure called cardioversion. It uses a controlled electric shock or a medicine to briefly stop all electrical activity in your heart. This helped restore your heart’s normal rhythm. Here are some instructions to follow while you recover.    Home care  Before cardioversion, you will typically be given sedation. So you won't be able to drive home. You will need a ride. Wait at least 24 hours before driving a car or operating heavy machinery after getting sedating medicines.    The skin on your chest may be irritated or feel like it's sunburned. Your healthcare provider may prescribe a soothing lotion or medicine to ease this discomfort.  These minor symptoms will go away in a few days.    Cardioversion requires that you take a blood-thinner medicine for at least 4 weeks after the procedure. This is to prevent a delayed risk for stroke when treating atrial fibrillation or atrial flutter. Be sure you discuss which medicine you are taking to prevent stroke. Ask if you need to have your medicine levels checked and when. Also ask if you can stop taking the medicine in the future or if you need to take it for life. Some blood-thinning medicines such as warfarin need to have the dose adjusted. They can interact with other medicines or foods. Your healthcare team will give you full instructions on what to watch out for. Report bleeding or symptoms of stroke right away to your healthcare team and seek emergency medical attention.    Learn to take your own pulse. Keep a record of your results. Ask your healthcare provider when you should seek emergency medical attention. They will tell you which pulse rate reading is dangerous.     If you were prescribed heart rhythm medicine, take it as instructed by your healthcare provider. These medicines may help prevent your abnormal heart rhythm happening again. You may need another cardioversion if the abnormal  heart rhythm returns. After the procedure, your healthcare provider will tell you if the treatment worked or if you will need further treatments or medicine.    Follow-up care  Make a follow-up appointment, or as advised.    When to call your healthcare provider  Call your healthcare provider right away if you:  Feel faint, dizzy, or lightheaded  Have chest pain with increased activity  Have irregular heartbeat or fast pulse  Have bleeding issues from blood-thinning medicines    Call 911  Call 911 right away if you have:  Chest pain  Shortness of breath  Loss of vision, speech, or strength or coordination in any body part

## 2025-03-10 ENCOUNTER — OFFICE VISIT (OUTPATIENT)
Dept: CARDIOLOGY CLINIC | Facility: CLINIC | Age: 80
End: 2025-03-10
Payer: COMMERCIAL

## 2025-03-10 VITALS
HEART RATE: 57 BPM | DIASTOLIC BLOOD PRESSURE: 84 MMHG | BODY MASS INDEX: 39.08 KG/M2 | SYSTOLIC BLOOD PRESSURE: 170 MMHG | WEIGHT: 249 LBS | HEIGHT: 67 IN

## 2025-03-10 DIAGNOSIS — I10 ESSENTIAL HYPERTENSION: ICD-10-CM

## 2025-03-10 DIAGNOSIS — Z79.4 TYPE 2 DIABETES MELLITUS WITH HYPERGLYCEMIA, WITH LONG-TERM CURRENT USE OF INSULIN (HCC): ICD-10-CM

## 2025-03-10 DIAGNOSIS — E78.5 DYSLIPIDEMIA: ICD-10-CM

## 2025-03-10 DIAGNOSIS — I48.0 PAROXYSMAL ATRIAL FIBRILLATION (HCC): Primary | ICD-10-CM

## 2025-03-10 DIAGNOSIS — E11.65 TYPE 2 DIABETES MELLITUS WITH HYPERGLYCEMIA, WITH LONG-TERM CURRENT USE OF INSULIN (HCC): ICD-10-CM

## 2025-03-10 DIAGNOSIS — I50.22 CHRONIC HEART FAILURE WITH MILDLY REDUCED EJECTION FRACTION (HFMREF, 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): ICD-10-CM

## 2025-03-10 PROCEDURE — 93000 ELECTROCARDIOGRAM COMPLETE: CPT | Performed by: INTERNAL MEDICINE

## 2025-03-10 PROCEDURE — 99214 OFFICE O/P EST MOD 30 MIN: CPT | Performed by: INTERNAL MEDICINE

## 2025-03-10 RX ORDER — NIFEDIPINE 30 MG/1
30 TABLET, EXTENDED RELEASE ORAL DAILY
Qty: 90 TABLET | Refills: 3 | Status: SHIPPED | OUTPATIENT
Start: 2025-03-10 | End: 2025-06-08

## 2025-03-10 RX ORDER — FUROSEMIDE 20 MG/1
20 TABLET ORAL DAILY
Qty: 90 TABLET | Refills: 3 | Status: SHIPPED | OUTPATIENT
Start: 2025-03-10

## 2025-03-10 RX ORDER — LOSARTAN POTASSIUM 50 MG/1
50 TABLET ORAL DAILY
Qty: 90 TABLET | Refills: 3 | Status: SHIPPED | OUTPATIENT
Start: 2025-03-10 | End: 2025-06-08

## 2025-03-10 RX ORDER — METOPROLOL SUCCINATE 25 MG/1
25 TABLET, EXTENDED RELEASE ORAL DAILY
Qty: 90 TABLET | Refills: 3 | Status: SHIPPED | OUTPATIENT
Start: 2025-03-10

## 2025-03-10 NOTE — PROGRESS NOTES
Cardiology Office Note  MD Perez Roque MD, [facility]  Geoff Odell DO, [facility]  MD Jann Edwards DO, [facility]  Obi Carrion DO, [facility]  ----------------------------------------------------------------  [place] [address]    Irineo Rawls 79 y.o. male MRN: [id number]  Unit/Bed#:  Encounter: [id number]      History of Present Illness:  It was a pleasure to see Irineo Rawls in the office today for follow-up CV evaluation. He has a past medical history of atrial fibrillation, hypertension, chronic HFmrEF, dyslipidemia, type 2 diabetes mellitus, CKD and KALIA.  He establish care with us in January 2025.  In January 2025, patient presented with shortness of breath with chest discomfort and chest congestion.  Feeling like this may be some illness, the patient presented to the emergency department and was found to be in atrial fibrillation with mildly accelerated response.  LVEF was found to be 47%.  The patient was sent for DAVID guided cardioversion which was unsuccessful.  He was subsequently loaded on amiodarone and sent for repeat cardioversion.  Patient was also seen by electrophysiology with Dr. Wu and was recommended for follow-up post cardioversion.    Following cardioversion, the patient felt as though he could sleep again and was having better rest that he had in the past.  Currently he is in his usual state of health.  And, pressure, tightness or squeezing.  Denies lightheadedness, dizziness or palpitations.  Denies lower extreme swelling orthopnea or paroxysmal nocturnal dyspnea.    Review of Systems:  Review of Systems   Constitutional: Negative for decreased appetite, fever, weight gain and weight loss.   HENT:  Negative for congestion and sore throat.    Eyes:  Negative for visual disturbance.   Cardiovascular:  Negative for chest pain, dyspnea on exertion, leg swelling, near-syncope and palpitations.   Respiratory:   Negative for cough and shortness of breath.    Hematologic/Lymphatic: Negative for bleeding problem.   Skin:  Negative for rash.   Musculoskeletal:  Negative for myalgias and neck pain.   Gastrointestinal:  Negative for abdominal pain and nausea.   Neurological:  Negative for light-headedness and weakness.   Psychiatric/Behavioral:  Negative for depression.        Past Medical History:   Diagnosis Date    Acute renal failure superimposed on stage 3 chronic kidney disease (Formerly [facility]) 02/25/2023    Acute renal failure superimposed on stage 3b chronic kidney disease, unspecified acute renal failure type ([facility]) 12/20/2012    MATILDE (acute kidney injury) (Formerly [facility])     Chronic kidney disease     CPAP (continuous positive airway pressure) dependence     Gout     Hearing aid worn     sabrina    Hernia, inguinal     RIH and umbilical hernia    Hypertension     Metabolic acidosis     Morbidly obese ([facility]ly [facility])     Rib fracture 01/05/2024    Sleep apnea     Type 2 diabetes mellitus with hyperglycemia, with long-term current use of insulin ([facility]) 01/29/2016    Wears glasses        Past Surgical History:   Procedure Laterality Date    CARPAL TUNNEL RELEASE Bilateral     COLONOSCOPY N/A 1/6/2017    Procedure: COLONOSCOPY;  Surgeon: João Perez MD;  Location: [facility];  Service:     CYST REMOVAL      FOOT SURGERY      HERNIA REPAIR      JOINT REPLACEMENT      knee left     ME LAPAROSCOPY SURG RPR INITIAL INGUINAL HERNIA Right 3/16/2017    Procedure: REPAIR HERNIA INGUINAL, LAPAROSCOPIC WITH MESH;  Surgeon: Ortiz Salcedo MD;  Location: [facility];  Service: General    ME RPR UMBILICAL HRNA 5 YRS/> REDUCIBLE N/A 3/16/2017    Procedure: OPEN REPAIR HERNIA UMBILICAL;  Surgeon: Ortiz Salcedo MD;  Location: [facility];  Service: General    TONSILLECTOMY         Social History     Socioeconomic History    Marital status: /Civil Union     Spouse name: Not on file    Number of children: Not on file    Years of education: Not on file    Highest  education level: Not on file   Occupational History    Not on file   Tobacco Use    Smoking status: Former     Current packs/day: 0.00     Types: Cigarettes     Quit date: 3/10/2003     Years since quittin.0    Smokeless tobacco: Never   Vaping Use    Vaping status: Never Used   Substance and Sexual Activity    Alcohol use: Never    Drug use: No    Sexual activity: Yes     Partners: Female   Other Topics Concern    Not on file   Social History Narrative    Always uses seat belt    Feels safe at home     Social Drivers of Health     Financial Resource Strain: Low Risk  (2025)    Received from [facility]    Financial Resource Strain     Do you have any trouble paying for your medications, or do you think you might in the future?: No     Does your family have trouble paying for medicine? (Household - for ages 0-17 years): Not on file   Food Insecurity: No Food Insecurity (2025)    Received from [facility]    Hunger Vital Sign     Worried About Running Out of Food in the Last Year: Never true     Ran Out of Food in the Last Year: Never true   Transportation Needs: No Transportation Needs (2025)    Received from [facility]    Transportation Needs     Do you have trouble getting a ride to medical visits or work? (Adult - for ages 18 years and over): Not on file     Does your family have a hard time getting a ride to doctors’ visits? (Household - for ages 0-17 years): Not on file     Has lack of transportation kept you from medical appointments, meetings, work, or from getting things needed for daily living? Check all that apply.: No     Do you (or your family) have trouble finding or paying for a ride (transportation)? (Household - for ages 0-17 years): Not on file   Physical Activity: Not on file   Stress: Not on file   Social Connections: Socially Integrated (2025)    Received from [facility]    Social Connections     How often do you feel lonely or isolated from those around you?: Never   Intimate  Partner Violence: Unknown (1/6/2025)    Nursing IPS     Feels Physically and Emotionally Safe: Not on file     Physically Hurt by Someone: Not on file     Humiliated or Emotionally Abused by Someone: Not on file     Physically Hurt by Someone: No     Hurt or Threatened by Someone: No   Housing Stability: Low Risk  (1/13/2025)    Received from [facility]     Do you currently live in a shelter or have no steady place to sleep at night?: No     Do you think you are at risk of becoming homeless? (Adult - for ages 18 years and over): Not on file     Does your family worry about paying for your home or becoming homeless? (Household - for ages 0-17 years): Not on file     Are you homeless or worried that you might be in the future?: No     Are you (or your family) homeless or worried that you might be in the future? (Household - for ages 0-17 years): Not on file       Family History   Problem Relation Age of Onset    No Known Problems Mother     Diabetes Father     Diabetes Sister        No Known Allergies      Current Outpatient Medications:     allopurinol (ZYLOPRIM) 300 mg tablet, Take 1 tablet (300 mg total) by mouth daily, Disp: 90 tablet, Rfl: 1    amiodarone 200 mg tablet, Take 1 tablet (200 mg total) by mouth daily, Disp: 90 tablet, Rfl: 3    apixaban (Eliquis) 2.5 mg, Take 1 tablet (2.5 mg total) by mouth 2 (two) times a day THIS IS A PRICE  CHECK, Disp: 180 tablet, Rfl: 0    aspirin 81 MG tablet, Take 81 mg by mouth daily, Disp: , Rfl:     Cholecalciferol (VITAMIN D3) 5000 units TABS, Take 1 tablet by mouth daily, Disp: , Rfl:     Ergocalciferol 50 MCG (2000 UT) TABS, Take 2,000 Units by mouth daily, Disp: , Rfl:     furosemide (LASIX) 20 mg tablet, Take 1 tablet (20 mg total) by mouth daily, Disp: 90 tablet, Rfl: 3    losartan (COZAAR) 25 mg tablet, Take 2 tablets (50 mg total) by mouth daily, Disp: 60 tablet, Rfl: 3    metoprolol succinate (TOPROL-XL) 25 mg 24 hr tablet, Take 1 tablet (25 mg  "total) by mouth daily, Disp: 90 tablet, Rfl: 3    NIFEdipine (PROCARDIA XL) 30 mg 24 hr tablet, Take 1 tablet (30 mg total) by mouth daily, Disp: 90 tablet, Rfl: 3    Omega-3 Fatty Acids (FISH OIL) 1200 MG CAPS, Take 1 capsule by mouth 3 (three) times a day, Disp: , Rfl:     Blood Glucose Monitoring Suppl (OneTouch Verio Reflect) w/Device KIT, May substitute brand based on insurance coverage. Check glucose ACHS., Disp: 1 kit, Rfl: 0    glucose blood (OneTouch Verio) test strip, Check glucose before meals and at bedtime., Disp: 400 strip, Rfl: 1    glucose blood (OneTouch Verio) test strip, Check blood sugars three times daily. Please substitute with appropriate alternative as covered by patient's insurance. Dx: E11.65, Disp: 300 each, Rfl: 3    Insulin Pen Needle (Unifine Pentips) 32G X 4 MM MISC, For use with insulin pen 4 times daily., Disp: 400 each, Rfl: 1    Lancets (OneTouch Delica Plus [id number]) MISC, Check glucose before meals and at bedtime, Disp: 400 each, Rfl: 1    Vitals:    03/10/25 1313   BP: 170/84   BP Location: Left arm   Patient Position: Sitting   Cuff Size: Large   Pulse: 57   Weight: 113 kg (249 lb)   Height: 5' 7\" (1.702 m)     Body mass index is 39 kg/m².    PHYSICAL EXAMINATION:  Gen: Awake, Alert, NAD   Head/eyes: AT/NC, pupils equal and round, Anicteric  ENT: mmm  Neck: Supple, No elevated JVP, trachea midline  Resp: CTA bilaterally no w/r/r  CV: RRR +S1, S2, No m/r/g  Abd: Soft, NT/ND + BS  Ext: no LE edema bilaterally  Neuro: Follows commands, moves all extermities  Psych: Appropriate affect, normal mood, pleasant attitude, non-combative  Skin: warm; no rash, erythema or venous stasis changes on exposed skin    --------------------------------------------------------------------------------  TREADMILL STRESS  No results found for this or any previous visit.     --------------------------------------------------------------------------------  NUCLEAR STRESS TEST: No results found for this " "or any previous visit.    No results found for this or any previous visit.      --------------------------------------------------------------------------------  CATH:  No results found for this or any previous visit.    --------------------------------------------------------------------------------  ECHO:   No results found for this or any previous visit.    No results found for this or any previous visit.    --------------------------------------------------------------------------------  HOLTER  No results found for this or any previous visit.    No results found for this or any previous visit.    --------------------------------------------------------------------------------  CAROTIDS  No results found for this or any previous visit.     --------------------------------------------------------------------------------  ECGs:  Results for orders placed or performed in visit on 03/10/25   POCT ECG    Impression    Sinus bradycardia first-degree AV block 57 bpm, otherwise normal ECG        Lab Results   Component Value Date    WBC 5.82 02/26/2025    HGB 14.8 02/26/2025    HCT 43.7 02/26/2025     (H) 02/26/2025     (L) 02/26/2025      Lab Results   Component Value Date    SODIUM 135 02/26/2025    K 4.6 02/26/2025     02/26/2025    CO2 24 02/26/2025    BUN 47 (H) 02/26/2025    CREATININE 2.02 (H) 02/26/2025    GLUC 135 02/26/2025    CALCIUM 9.8 02/26/2025      Lab Results   Component Value Date    HGBA1C 5.8 (H) 01/06/2025      No results found for: \"CHOL\"  Lab Results   Component Value Date    HDL 38 (L) 03/12/2024    HDL 27 (L) 02/24/2023    HDL 30 (L) 02/12/2022     Lab Results   Component Value Date    LDLCALC 91 03/12/2024    LDLCALC 42 02/24/2023    LDLCALC 63 02/12/2022     Lab Results   Component Value Date    TRIG 177 (H) 03/12/2024    TRIG 283 (H) 02/24/2023    TRIG 248 (H) 02/12/2022     No results found for: \"CHOLHDL\"   Lab Results   Component Value Date    INR 1.12 01/06/2025    INR " 1.10 01/06/2025    INR 1.08 01/06/2025    PROTIME 14.6 01/06/2025    PROTIME 14.4 01/06/2025    PROTIME 14.2 01/06/2025        1. Paroxysmal atrial fibrillation (HCC)  -     POCT ECG  -     metoprolol succinate (TOPROL-XL) 25 mg 24 hr tablet; Take 1 tablet (25 mg total) by mouth daily  2. Essential hypertension  -     NIFEdipine (PROCARDIA XL) 30 mg 24 hr tablet; Take 1 tablet (30 mg total) by mouth daily  3. 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 Type 2 diabetes mellitus with hyperglycemia, with long-term current use of insulin ([facility])  5. Dyslipidemia      IMPRESSION:    Paroxysmal atrial fibrillation on Eliquis and amiodarone  s/p unsuccessful DAVID DCCV, January 2025  s/p successful cardioversion, February 2025  Hypertension  Chronic HFmrEF  LVEF 47%, moderate LVH, mild global hypokinesis, mild RV dilatation with mildly reduced function, moderate LA and mild RA dilatation, mild AS, moderate MAC, mild MR/TR with PASP 48 mmHg, January 2025  Mild aortic stenosis   w/ Vmax 1.66 m/s, DVI 0.48, ERWIN 1.59 cm2, SVi 21.6, January 2025  Hypertension  Dyslipidemia  Type 2 diabetes mellitus  Severe obesity  CKD stage III  KALIA  Thrombocytopenia    PLAN:  It was a pleasure to see Irineo in the office today for follow-up CV evaluation.  He is here today for routine CV follow-up following his cardioversion in February 2025.  The cardioversion was successful now on amiodarone.  He has had no chest pain concerning for angina and no signs or symptoms of heart failure.  Clinically he examines to be euvolemic.  Blood pressure is elevated and heart rate is stable.  He is tolerating his current medications without any  "reported adverse effects.  The patient can perform greater than 4 METS on a daily basis without significant exertional symptoms.  ECG shows sinus rhythm with first-degree AV block and no ischemic changes.  Based on his clinical presentation, I have the following recommendations:    1.  Recommend initiating nifedipine extended release 30 mg once daily for improved antihypertensive control.  At this time, his blood pressure is significantly elevated and this medication seem to work extremely well in the past that controlling his blood pressure.  We may titrate off of the nifedipine in the future by increasing his losartan, but for now, I would like to get his blood pressures aggressively under control.  Continue losartan 50 mg daily and Toprol-XL 25 mg daily.  2.  Would encourage 30 minutes a day, 5 days a week of moderate intensity activity to build cardiovascular endurance.  3.  Recommend heart healthy diet low in sodium and carbohydrate.  4.  Avoid alcohol.  5.  Continue Lasix.  Should he gain greater than 3 pounds in a day or 5 pounds in 1 to 2 weeks, recommend calling the office for further titration of diuretic medication.  6.  Reasonable to discontinue aspirin as he is on full anticoagulation with Eliquis.  7.  Eliquis for thromboembolic prophylaxis  8.  Amiodarone for antiarrhythmic control.  9.  We will have him follow-up with electrophysiology to discuss ablation versus antiarrhythmic drug management.  While it is high recommendation for ablation, given the patient's age, may consider continuing with antiarrhythmic drug.  Will defer to electrophysiology.  10.  Repeat echocardiogram to reassess his function 3 months post cardioversion in May 2025.  11.  We will follow-up with him after testing to review the results.    As always, please do not hesitate to call if any questions.    Portions of the record may have been created with voice recognition software. Occasional wrong word or \"sound a like\" " substitutions may have occurred due to the inherent limitations of voice recognition software. Read the chart carefully and recognize, using context, where substitutions have occurred.      Signed: Geoff Odell DO, FACC, FASE, FASNC, FACP

## 2025-03-11 ENCOUNTER — APPOINTMENT (OUTPATIENT)
Dept: LAB | Facility: CLINIC | Age: 80
End: 2025-03-11
Payer: COMMERCIAL

## 2025-03-11 DIAGNOSIS — D50.9 MICROCYTIC ANEMIA: ICD-10-CM

## 2025-03-11 DIAGNOSIS — Z79.4 TYPE 2 DIABETES MELLITUS WITH HYPERGLYCEMIA, WITH LONG-TERM CURRENT USE OF INSULIN (HCC): ICD-10-CM

## 2025-03-11 DIAGNOSIS — E11.65 TYPE 2 DIABETES MELLITUS WITH HYPERGLYCEMIA, WITH LONG-TERM CURRENT USE OF INSULIN (HCC): ICD-10-CM

## 2025-03-11 DIAGNOSIS — N18.9 CHRONIC KIDNEY DISEASE, UNSPECIFIED CKD STAGE: ICD-10-CM

## 2025-03-11 DIAGNOSIS — E78.1 ESSENTIAL HYPERTRIGLYCERIDEMIA: ICD-10-CM

## 2025-03-11 LAB
ALBUMIN SERPL BCG-MCNC: 4 G/DL (ref 3.5–5)
ALP SERPL-CCNC: 87 U/L (ref 34–104)
ALT SERPL W P-5'-P-CCNC: 22 U/L (ref 7–52)
ANION GAP SERPL CALCULATED.3IONS-SCNC: 6 MMOL/L (ref 4–13)
AST SERPL W P-5'-P-CCNC: 25 U/L (ref 13–39)
BASOPHILS # BLD AUTO: 0.08 THOUSANDS/ÂΜL (ref 0–0.1)
BASOPHILS NFR BLD AUTO: 1 % (ref 0–1)
BILIRUB SERPL-MCNC: 0.56 MG/DL (ref 0.2–1)
BUN SERPL-MCNC: 51 MG/DL (ref 5–25)
CALCIUM SERPL-MCNC: 10.3 MG/DL (ref 8.4–10.2)
CHLORIDE SERPL-SCNC: 102 MMOL/L (ref 96–108)
CHOLEST SERPL-MCNC: 148 MG/DL (ref ?–200)
CO2 SERPL-SCNC: 31 MMOL/L (ref 21–32)
CREAT SERPL-MCNC: 2.09 MG/DL (ref 0.6–1.3)
EOSINOPHIL # BLD AUTO: 0.44 THOUSAND/ÂΜL (ref 0–0.61)
EOSINOPHIL NFR BLD AUTO: 6 % (ref 0–6)
ERYTHROCYTE [DISTWIDTH] IN BLOOD BY AUTOMATED COUNT: 15.4 % (ref 11.6–15.1)
EST. AVERAGE GLUCOSE BLD GHB EST-MCNC: 128 MG/DL
GFR SERPL CREATININE-BSD FRML MDRD: 29 ML/MIN/1.73SQ M
GLUCOSE P FAST SERPL-MCNC: 130 MG/DL (ref 65–99)
HBA1C MFR BLD: 6.1 %
HCT VFR BLD AUTO: 45.5 % (ref 36.5–49.3)
HDLC SERPL-MCNC: 31 MG/DL
HGB BLD-MCNC: 15.2 G/DL (ref 12–17)
IMM GRANULOCYTES # BLD AUTO: 0.06 THOUSAND/UL (ref 0–0.2)
IMM GRANULOCYTES NFR BLD AUTO: 1 % (ref 0–2)
LDLC SERPL CALC-MCNC: 56 MG/DL (ref 0–100)
LYMPHOCYTES # BLD AUTO: 2.61 THOUSANDS/ÂΜL (ref 0.6–4.47)
LYMPHOCYTES NFR BLD AUTO: 38 % (ref 14–44)
MCH RBC QN AUTO: 34.9 PG (ref 26.8–34.3)
MCHC RBC AUTO-ENTMCNC: 33.4 G/DL (ref 31.4–37.4)
MCV RBC AUTO: 104 FL (ref 82–98)
MONOCYTES # BLD AUTO: 1.04 THOUSAND/ÂΜL (ref 0.17–1.22)
MONOCYTES NFR BLD AUTO: 15 % (ref 4–12)
NEUTROPHILS # BLD AUTO: 2.65 THOUSANDS/ÂΜL (ref 1.85–7.62)
NEUTS SEG NFR BLD AUTO: 39 % (ref 43–75)
NRBC BLD AUTO-RTO: 0 /100 WBCS
PLATELET # BLD AUTO: 122 THOUSANDS/UL (ref 149–390)
PMV BLD AUTO: 11.3 FL (ref 8.9–12.7)
POTASSIUM SERPL-SCNC: 5.3 MMOL/L (ref 3.5–5.3)
PROT SERPL-MCNC: 7.6 G/DL (ref 6.4–8.4)
RBC # BLD AUTO: 4.36 MILLION/UL (ref 3.88–5.62)
SODIUM SERPL-SCNC: 139 MMOL/L (ref 135–147)
TRIGL SERPL-MCNC: 305 MG/DL (ref ?–150)
TSH SERPL DL<=0.05 MIU/L-ACNC: 7.76 UIU/ML (ref 0.45–4.5)
WBC # BLD AUTO: 6.88 THOUSAND/UL (ref 4.31–10.16)

## 2025-03-11 PROCEDURE — 84443 ASSAY THYROID STIM HORMONE: CPT

## 2025-03-11 PROCEDURE — 80053 COMPREHEN METABOLIC PANEL: CPT

## 2025-03-11 PROCEDURE — 36415 COLL VENOUS BLD VENIPUNCTURE: CPT

## 2025-03-11 PROCEDURE — 80061 LIPID PANEL: CPT

## 2025-03-11 PROCEDURE — 83036 HEMOGLOBIN GLYCOSYLATED A1C: CPT

## 2025-03-11 PROCEDURE — 85025 COMPLETE CBC W/AUTO DIFF WBC: CPT

## 2025-03-13 ENCOUNTER — RA CDI HCC (OUTPATIENT)
Dept: OTHER | Facility: HOSPITAL | Age: 80
End: 2025-03-13

## 2025-03-13 NOTE — PROGRESS NOTES
HCC coding opportunities    I13.0  GR     Chart Reviewed number of suggestions sent to Provider: 1     Patients Insurance     Medicare Insurance: [facility] [employer] Advantage

## 2025-03-18 ENCOUNTER — OFFICE VISIT (OUTPATIENT)
Dept: FAMILY MEDICINE CLINIC | Facility: CLINIC | Age: 80
End: 2025-03-18
Payer: COMMERCIAL

## 2025-03-18 ENCOUNTER — TELEPHONE (OUTPATIENT)
Dept: ADMINISTRATIVE | Facility: OTHER | Age: 80
End: 2025-03-18

## 2025-03-18 VITALS
DIASTOLIC BLOOD PRESSURE: 90 MMHG | HEIGHT: 68 IN | HEART RATE: 96 BPM | WEIGHT: 250 LBS | SYSTOLIC BLOOD PRESSURE: 162 MMHG | BODY MASS INDEX: 37.89 KG/M2 | OXYGEN SATURATION: 95 % | TEMPERATURE: 98.5 F

## 2025-03-18 DIAGNOSIS — E11.65 TYPE 2 DIABETES MELLITUS WITH HYPERGLYCEMIA, UNSPECIFIED WHETHER LONG TERM INSULIN USE (HCC): ICD-10-CM

## 2025-03-18 DIAGNOSIS — N18.30 TYPE 2 DIABETES MELLITUS WITH STAGE 3 CHRONIC KIDNEY DISEASE, WITHOUT LONG-TERM CURRENT USE OF INSULIN, UNSPECIFIED WHETHER STAGE 3A OR 3B CKD (HCC): ICD-10-CM

## 2025-03-18 DIAGNOSIS — E03.9 ACQUIRED HYPOTHYROIDISM: ICD-10-CM

## 2025-03-18 DIAGNOSIS — E78.1 ESSENTIAL HYPERTRIGLYCERIDEMIA: ICD-10-CM

## 2025-03-18 DIAGNOSIS — N18.9 CHRONIC KIDNEY DISEASE, UNSPECIFIED CKD STAGE: ICD-10-CM

## 2025-03-18 DIAGNOSIS — E11.65 TYPE 2 DIABETES MELLITUS WITH HYPERGLYCEMIA, WITH LONG-TERM CURRENT USE OF INSULIN (HCC): ICD-10-CM

## 2025-03-18 DIAGNOSIS — Z79.4 TYPE 2 DIABETES MELLITUS WITH HYPERGLYCEMIA, WITH LONG-TERM CURRENT USE OF INSULIN (HCC): ICD-10-CM

## 2025-03-18 DIAGNOSIS — E11.22 TYPE 2 DIABETES MELLITUS WITH STAGE 3 CHRONIC KIDNEY DISEASE, WITHOUT LONG-TERM CURRENT USE OF INSULIN, UNSPECIFIED WHETHER STAGE 3A OR 3B CKD (HCC): ICD-10-CM

## 2025-03-18 DIAGNOSIS — E79.0 HYPERURICEMIA: Primary | ICD-10-CM

## 2025-03-18 PROCEDURE — 99214 OFFICE O/P EST MOD 30 MIN: CPT | Performed by: FAMILY MEDICINE

## 2025-03-18 PROCEDURE — G2211 COMPLEX E/M VISIT ADD ON: HCPCS | Performed by: FAMILY MEDICINE

## 2025-03-18 NOTE — TELEPHONE ENCOUNTER
Upon review of the In Basket request and the patient's chart, initial outreach has been made via fax to facility. Please see Contacts section for details.     Thank you  Sherry Vee

## 2025-03-18 NOTE — ASSESSMENT & PLAN NOTE
Triglycerides remain elevated at greater than 300 however HDL LDL and total cholesterol are at goal.

## 2025-03-18 NOTE — TELEPHONE ENCOUNTER
----- Message from Katharina GARIBAY sent at 3/18/2025  8:09 AM EDT -----  Regarding: Care Gap Request  03/18/25 8:09 AM    Hello, our patient Irineo Rawls has had Diabetic Eye Exam completed/performed. Please assist in updating the patient chart by making an External outreach to [facility] facility located in [place]. The date of service is within the past year.    Thank you,  Katharina Carlos  [facility]

## 2025-03-18 NOTE — LETTER
Diabetic Eye Exam Form    Date Requested: 25  Patient: Irineo Rawls  Patient : [id number]   Referring Provider: Obi Esquivel,       DIABETIC Eye Exam Date _______________________________      Type of Exam MUST be documented for Diabetic Eye Exams. Please CHECK ONE.     Retinal Exam       Dilated Retinal Exam       OCT       Optomap-Iris Exam      Fundus Photography       Left Eye - Please check Retinopathy or No Retinopathy        Exam did show retinopathy    Exam did not show retinopathy       Right Eye - Please check Retinopathy or No Retinopathy       Exam did show retinopathy    Exam did not show retinopathy       Comments __________________________________________________________    Practice Providing Exam ______________________________________________    Exam Performed By (print name) _______________________________________      Provider Signature ___________________________________________________      These reports are needed for  compliance.  Please fax this completed form and a copy of the Diabetic Eye Exam report to our office located at [address] [place] [address] as soon as possible via Fax [phone] attention Sherry: Phone [phone]  We thank you for your assistance in treating our mutual patient.

## 2025-03-18 NOTE — PROGRESS NOTES
Name: Irineo Rawls      : 1945      MRN: [id number]  Encounter Provider: Obi Esquivel DO  Encounter Date: 3/18/2025   Encounter department: [facility]    Assessment & Plan  Type 2 diabetes mellitus with hyperglycemia, with long-term current use of insulin (HCC)    Lab Results   Component Value Date    HGBA1C 6.1 (H) 2025   Diabetic control is good.  Currently on no medication.  Home blood sugars readings generally less than 150.  Hemoglobin A1c stable at 6.1.       Hyperuricemia  No recent gout flares.  Continue allopurinol and recheck in 6 months  Orders:  •  Uric acid; Future    Essential hypertriglyceridemia  Triglycerides remain elevated at greater than 300 however HDL LDL and total cholesterol are at goal.       Chronic kidney disease, unspecified CKD stage  Lab Results   Component Value Date    EGFR 29 2025    EGFR 30 2025    EGFR 34 2025    CREATININE 2.09 (H) 2025    CREATININE 2.02 (H) 2025    CREATININE 1.80 (H) 2025   Creatinine stable around 2.  Patient follows with nephrology.  Continue to avoid NSAIDs.         Type 2 diabetes mellitus with hyperglycemia, unspecified whether long term insulin use (HCC)    Lab Results   Component Value Date    HGBA1C 6.1 (H) 2025     Orders:  •  Comprehensive metabolic panel; Future  •  Hemoglobin A1C; Future    Type 2 diabetes mellitus with stage 3 chronic kidney disease, without long-term current use of insulin, unspecified whether stage 3a or 3b CKD (HCC)    Lab Results   Component Value Date    HGBA1C 6.1 (H) 2025          Acquired hypothyroidism  TSH elevated greater than 7.  This began after starting amiodarone.  Will continue to monitor.  Patient following with cardiology in the near future.  If he is to remain on amiodarone will likely need to correct TSH with levothyroxine.  Will recheck TSH in 3 months.  Orders:  •  TSH, 3rd generation with Free T4 reflex; Future  •   TSH, 3rd generation with Free T4 reflex; Future         History of Present Illness     Patient was seen for follow-up of chronic medical problems.  He is being treated for atrial fibrillation, type 2 diabetes, chronic kidney disease, history of sleep apnea, gout, hypertriglyceridemia, hypertension and BPH.  Also has a history of acute heart failure.      Review of Systems   Constitutional: Negative.    Respiratory: Negative.     Cardiovascular: Negative.    Gastrointestinal: Negative.    Genitourinary: Negative.    Musculoskeletal: Negative.    Psychiatric/Behavioral: Negative.       Past Medical History:   Diagnosis Date   • Acute renal failure superimposed on stage 3 chronic kidney disease ([facility]) 02/25/2023   • Acute renal failure superimposed on stage 3b chronic kidney disease, unspecified acute renal failure type ([facility]) 12/20/2012   • MATILDE (acute kidney injury) ([facility])    • Chronic kidney disease    • CPAP (continuous positive airway pressure) dependence    • Gout    • Hearing aid worn     sabrina   • Hernia, inguinal     RIH and umbilical hernia   • Hypertension    • Metabolic acidosis    • Morbidly obese ([facility])    • Rib fracture 01/05/2024   • Sleep apnea    • Type 2 diabetes mellitus with hyperglycemia, with long-term current use of insulin ([facility]) 01/29/2016   • Wears glasses      Past Surgical History:   Procedure Laterality Date   • CARPAL TUNNEL RELEASE Bilateral    • COLONOSCOPY N/A 1/6/2017    Procedure: COLONOSCOPY;  Surgeon: João Perez MD;  Location: [facility];  Service:    • CYST REMOVAL     • FOOT SURGERY     • HERNIA REPAIR     • JOINT REPLACEMENT      knee left    • MA LAPAROSCOPY SURG RPR INITIAL INGUINAL HERNIA Right 3/16/2017    Procedure: REPAIR HERNIA INGUINAL, LAPAROSCOPIC WITH MESH;  Surgeon: Ortiz Salcedo MD;  Location: [facility];  Service: General   • MA RPR UMBILICAL HRNA 5 YRS/> REDUCIBLE N/A 3/16/2017    Procedure: OPEN REPAIR HERNIA UMBILICAL;  Surgeon: Ortiz Salcedo MD;  Location: [facility];  Service: General   • TONSILLECTOMY       Family History   Problem Relation Age of Onset   • No Known Problems Mother    • Diabetes Father    • Diabetes Sister      Social History     Tobacco Use   • Smoking status: Former     Current packs/day: 0.00     Types: Cigarettes     Quit date: 3/10/2003     Years since quittin.0   • Smokeless tobacco: Never   Vaping Use   • Vaping status: Never Used   Substance and Sexual Activity   • Alcohol use: Never   • Drug use: No   • Sexual activity: Yes     Partners: Female     Current Outpatient Medications on File Prior to Visit   Medication Sig   • allopurinol (ZYLOPRIM) 300 mg tablet Take 1 tablet (300 mg total) by mouth daily   • amiodarone 200 mg tablet Take 1 tablet (200 mg total) by mouth daily   • apixaban (Eliquis) 2.5 mg Take 1 tablet (2.5 mg total) by mouth 2 (two) times a day THIS IS A PRICE  CHECK   • aspirin 81 MG tablet Take 81 mg by mouth daily   • Blood Glucose Monitoring Suppl (OneTouch Verio Reflect) w/Device KIT May substitute brand based on insurance coverage. Check glucose ACHS.   • Cholecalciferol (VITAMIN D3) 5000 units TABS Take 1 tablet by mouth daily   • Ergocalciferol 50 MCG (2000 UT) TABS Take 2,000 Units by mouth daily   • furosemide (LASIX) 20 mg tablet Take 1 tablet (20 mg total) by mouth daily   • glucose blood (OneTouch Verio) test strip Check glucose before meals and at bedtime.   • glucose blood (OneTouch Verio) test strip Check blood sugars three times daily. Please substitute with appropriate alternative as covered by patient's insurance. Dx: E11.65   • Insulin Pen Needle (Unifine Pentips) 32G X 4 MM MISC For use with insulin pen 4 times daily.   • Lancets (OneTouch Delica Plus [id number]) MISC Check glucose before meals and at bedtime   • losartan (COZAAR) 50 mg tablet Take 1 tablet (50 mg total) by mouth daily   • metoprolol succinate (TOPROL-XL) 25 mg 24 hr tablet Take 1 tablet (25 mg total) by mouth daily   • NIFEdipine (PROCARDIA  "XL) 30 mg 24 hr tablet Take 1 tablet (30 mg total) by mouth daily   • Omega-3 Fatty Acids (FISH OIL) 1200 MG CAPS Take 1 capsule by mouth 3 (three) times a day     No Known Allergies  Immunization History   Administered Date(s) Administered   • COVID-19 MODERNA VACC 0.5 ML IM 01/19/2021, 02/16/2021, 11/07/2021, 04/07/2022   • COVID-19 Moderna Vac BIVALENT 12 Yr+ IM 0.5 ML 12/01/2022   • COVID-19 Moderna mRNA Vaccine 12 Yr+ 50 mcg/0.5 mL (Spikevax) 10/17/2024   • COVID-19 Pfizer mRNA vacc PF scarlett-sucrose 12 yr and older (Comirnaty) 10/05/2023   • INFLUENZA 01/19/2005, 09/27/2007, 10/17/2008, 11/05/2011, 12/21/2012, 11/15/2013, 11/01/2015, 11/01/2016, 11/16/2018, 10/31/2021, 10/10/2022, 10/05/2023   • Influenza Quadrivalent, 6-35 Months IM 11/01/2015   • Influenza Split High Dose Preservative Free IM 08/14/2017, 11/02/2019, 10/17/2024   • Influenza, Seasonal Vaccine, Quadrivalent, Adjuvanted, .5e 10/05/2023   • Influenza, high dose seasonal 0.7 mL 10/07/2020   • Influenza, seasonal, injectable 11/01/2016   • Pneumococcal Conjugate 13-Valent 07/29/2016   • Pneumococcal Polysaccharide PPV23 11/06/2009, 08/14/2017   • Td (adult), Unspecified 01/01/1993   • Zoster 02/08/2012, 07/29/2013     Objective   /90   Pulse 96   Temp 98.5 °F (36.9 °C)   Ht 5' 7.5\" (1.715 m)   Wt 113 kg (250 lb)   SpO2 95%   BMI 38.58 kg/m²     Physical Exam  Vitals and nursing note reviewed.   Constitutional:       General: He is not in acute distress.     Appearance: Normal appearance.   HENT:      Head: Normocephalic and atraumatic.   Eyes:      Pupils: Pupils are equal, round, and reactive to light.   Cardiovascular:      Rate and Rhythm: Normal rate and regular rhythm.      Pulses: Normal pulses.      Heart sounds: Normal heart sounds.   Pulmonary:      Effort: Pulmonary effort is normal.      Breath sounds: Normal breath sounds.   Musculoskeletal:         General: Normal range of motion.      Cervical back: Normal range of motion. "   Skin:     General: Skin is warm and dry.   Neurological:      General: No focal deficit present.      Mental Status: He is alert and oriented to person, place, and time. Mental status is at baseline.   Psychiatric:         Mood and Affect: Mood normal.         Behavior: Behavior normal.         Thought Content: Thought content normal.         Judgment: Judgment normal.

## 2025-03-18 NOTE — PATIENT INSTRUCTIONS
Get thyroid blood work in 3 months.  Nonfasting.  Regular blood work in 6 months, early September before next appointment.

## 2025-03-18 NOTE — ASSESSMENT & PLAN NOTE
Lab Results   Component Value Date    HGBA1C 6.1 (H) 03/11/2025   Diabetic control is good.  Currently on no medication.  Home blood sugars readings generally less than 150.  Hemoglobin A1c stable at 6.1.

## 2025-03-18 NOTE — PROGRESS NOTES
Diabetic Foot Exam    Patient's shoes and socks removed.    Right Foot/Ankle   Right Foot Inspection  Skin Exam: skin normal and skin intact. No dry skin, no warmth, no callus, no erythema, no maceration, no abnormal color, no pre-ulcer, no ulcer and no callus.     Toe Exam: ROM and strength within normal limits.     Sensory   Vibration: intact  Proprioception: intact  Monofilament testing: intact    Vascular  Capillary refills: < 3 seconds  The right DP pulse is 2+. The right PT pulse is 2+.     Left Foot/Ankle  Left Foot Inspection  Skin Exam: skin normal and skin intact. No dry skin, no warmth, no erythema, no maceration, normal color, no pre-ulcer, no ulcer and no callus.     Toe Exam: ROM and strength within normal limits.     Sensory   Vibration: intact  Proprioception: intact  Monofilament testing: diminished    Vascular  Capillary refills: < 3 seconds  The left DP pulse is 2+. The left PT pulse is 2+.     Assign Risk Category  No deformity present  No loss of protective sensation  No weak pulses  Risk: 0

## 2025-03-18 NOTE — ASSESSMENT & PLAN NOTE
No recent gout flares.  Continue allopurinol and recheck in 6 months  Orders:  •  Uric acid; Future

## 2025-03-19 ENCOUNTER — OFFICE VISIT (OUTPATIENT)
Dept: NEPHROLOGY | Facility: CLINIC | Age: 80
End: 2025-03-19
Payer: COMMERCIAL

## 2025-03-19 VITALS
HEART RATE: 78 BPM | WEIGHT: 250 LBS | BODY MASS INDEX: 37.89 KG/M2 | DIASTOLIC BLOOD PRESSURE: 90 MMHG | HEIGHT: 68 IN | SYSTOLIC BLOOD PRESSURE: 162 MMHG

## 2025-03-19 DIAGNOSIS — I10 ESSENTIAL HYPERTENSION: ICD-10-CM

## 2025-03-19 DIAGNOSIS — N18.9 CHRONIC KIDNEY DISEASE, UNSPECIFIED CKD STAGE: Primary | ICD-10-CM

## 2025-03-19 PROBLEM — N18.32 STAGE 3B CHRONIC KIDNEY DISEASE (HCC): Status: RESOLVED | Noted: 2024-03-19 | Resolved: 2025-03-19

## 2025-03-19 PROCEDURE — 99214 OFFICE O/P EST MOD 30 MIN: CPT | Performed by: INTERNAL MEDICINE

## 2025-03-19 NOTE — PATIENT INSTRUCTIONS
You are here for follow-up it was great to see you and thank you for filling me in on what you went through in January.  You are having shortness of breath chest pressure and they discovered you are in A-fib with a rapid heart rate.  They put you on medicine and did 2 cardioversions and you are back in the normal rhythm.  In the meantime they adjusted your medications and your blood pressure is higher and your creatinine which is the kidney function was a little higher at 2.  I think it is related to what you went through in the medications as opposed to true worsening of your kidney function permanently and I expect to see it improved.    You do not have diabetic kidney disease and you do not have a lot of protein in the urine so you do not have a serious intrinsic kidney problem.  We did discover that one of your kidneys is a lot smaller than the other so you have a little aging or nephrosclerosis in the kidney and your baseline's been around 1.5-1.6 and it was at that level when you went into the hospital that is why I think it is related to what you went through.    You are starting up back on your nifedipine and hopefully the blood pressure will come down.  I want you to repeat blood work the first or second week of April I will call you I will see how your blood pressure is doing and we will see how your kidney function is and then determine follow-up.

## 2025-03-19 NOTE — LETTER
2025     Obi Esquivel DO  [address]  [address]  [facility] [address]    Patient: Irineo Rawls   YOB: 1945   Date of Visit: 3/19/2025       Dear Dr. Esquivel:    Thank you for referring Irineo Rawls to me for evaluation. Below are my notes for this consultation.    If you have questions, please do not hesitate to call me. I look forward to following your patient along with you.         Sincerely,        Mitch Garcia MD        CC: DO Mitch Keating MD  3/20/2025  8:31 AM  Sign when Signing Visit  Name: Irineo Rawls      : 1945      MRN: [id number]  Encounter Provider: Mitch Garcia MD  Encounter Date: 3/19/2025   Encounter [facility]  :  Assessment & Plan  Chronic kidney disease, unspecified CKD stage  Lab Results   Component Value Date    EGFR 29 2025    EGFR 30 2025    EGFR 34 2025    CREATININE 2.09 (H) 2025    CREATININE 2.02 (H) 2025    CREATININE 1.80 (H) 2025     The patient is chronic kidney disease with insignificant proteinuria in the past.  He was discovered to have an atrophic kidney and slightly enlarged contralateral kidney which may indicate that it was atrophic for a while and there is maybe a little hyperfiltration.  There is no way to prove that but overall his creatinine had been running around 1.5.  He was hospitalized for cardiac issues was found to be in new onset atrial fibrillation.  At this point he is in sinus rhythm was found to have an LVEF of 47% and he is on medical therapy and diuretics.    His creatinine when he was in the hospital on presentation was roughly at his baseline then it fluctuated a little and post discharge is running around 2 and has been stable the last 2 checks.  I suspect it is really due to the events related to his cardiac issues.  It does not appear that he has significant intrinsic disease given the low  proteinuria.  He has had very low levels of proteinuria.    His blood pressure was also elevated but there were adjustments in medications and an ARB was started and that potentially could be reducing glomerular pressure but it may be required for his systolic dysfunction.    I am going to check a BMP in a month to make sure things are stable will see if his creatinine improves slightly  Continue current medical therapy as he is compensated from a cardiac standpoint.  Follow-up with cardiology as scheduled.    Orders:  •  Basic metabolic panel; Future    Essential hypertension    Patient has history of hypertension and it was well-controlled last visit he had been on nifedipine.  In the hospital this was discontinued and he had a beta-blocker and ARB added.  Blood pressures been running elevated and so he is recently been resumed on his nifedipine at a lower dose and has not started it yet because he did not receive it from the pharmacy.    I told him to monitor blood pressure and contact me if it does not come down once he resumes nifedipine  Continue other medications as they have cardiac indications and cardiology following.       I have spent a total time of 35 minutes in caring for this patient on the day of the visit/encounter including Diagnostic results, Patient and family education, Importance of tx compliance, Impressions, Reviewing/placing orders in the medical record (including tests, medications, and/or procedures), and Obtaining or reviewing history  .       History of Present Illness  HPI  Irineo Rawls is a 79 y.o. male who presents for follow-up.  The patient was actually experiencing chest pressure in early January he was admitted to the hospital found to be on new onset atrial fibrillation.  He said he had symptoms of shortness of breath which has resolved and swelling is improved as well in his legs.  He had some adjustments in his medications and he notices his blood pressure has been  "running significantly elevated at other physician's offices and at home.  He is here for follow-up regarding renal function and recommendations.  History obtained from: patient    Review of Systems   Constitutional:  Negative for chills, diaphoresis and fever.   Eyes: Negative.    Respiratory:  Negative for cough, chest tightness and shortness of breath.    Cardiovascular:  Negative for chest pain.        Occasionally a little ankle swelling in the end of the day.   Gastrointestinal:  Negative for abdominal pain, diarrhea, nausea and vomiting.   Genitourinary: Negative.    Neurological:  Negative for dizziness and light-headedness.   Psychiatric/Behavioral:  Negative for agitation, behavioral problems and confusion.           Objective  /90 (BP Location: Left arm, Patient Position: Sitting, Cuff Size: Standard)   Pulse 78   Ht 5' 7.5\" (1.715 m)   Wt 113 kg (250 lb)   BMI 38.58 kg/m²      Physical Exam  Constitutional:       General: He is not in acute distress.     Appearance: He is not toxic-appearing.   HENT:      Head: Normocephalic and atraumatic.      Nose: Nose normal.      Mouth/Throat:      Mouth: Mucous membranes are moist.   Eyes:      Extraocular Movements: Extraocular movements intact.   Cardiovascular:      Rate and Rhythm: Normal rate and regular rhythm.      Heart sounds:      No gallop.      Comments: No significant pitting edema.  Pulmonary:      Effort: Pulmonary effort is normal. No respiratory distress.      Breath sounds: No wheezing or rales.   Abdominal:      General: Bowel sounds are normal. There is no distension.      Palpations: Abdomen is soft.      Tenderness: There is no abdominal tenderness.   Neurological:      Mental Status: He is alert and oriented to person, place, and time.   Psychiatric:         Mood and Affect: Mood normal.         Behavior: Behavior normal.           "

## 2025-03-20 PROBLEM — N18.9 CKD (CHRONIC KIDNEY DISEASE): Status: ACTIVE | Noted: 2025-03-20

## 2025-03-20 NOTE — ASSESSMENT & PLAN NOTE
Patient has history of hypertension and it was well-controlled last visit he had been on nifedipine.  In the hospital this was discontinued and he had a beta-blocker and ARB added.  Blood pressures been running elevated and so he is recently been resumed on his nifedipine at a lower dose and has not started it yet because he did not receive it from the pharmacy.    I told him to monitor blood pressure and contact me if it does not come down once he resumes nifedipine  Continue other medications as they have cardiac indications and cardiology following.       I have spent a total time of 35 minutes in caring for this patient on the day of the visit/encounter including Diagnostic results, Patient and family education, Importance of tx compliance, Impressions, Reviewing/placing orders in the medical record (including tests, medications, and/or procedures), and Obtaining or reviewing history  .

## 2025-03-20 NOTE — ASSESSMENT & PLAN NOTE
Lab Results   Component Value Date    EGFR 29 03/11/2025    EGFR 30 02/26/2025    EGFR 34 01/16/2025    CREATININE 2.09 (H) 03/11/2025    CREATININE 2.02 (H) 02/26/2025    CREATININE 1.80 (H) 01/16/2025     The patient is chronic kidney disease with insignificant proteinuria in the past.  He was discovered to have an atrophic kidney and slightly enlarged contralateral kidney which may indicate that it was atrophic for a while and there is maybe a little hyperfiltration.  There is no way to prove that but overall his creatinine had been running around 1.5.  He was hospitalized for cardiac issues was found to be in new onset atrial fibrillation.  At this point he is in sinus rhythm was found to have an LVEF of 47% and he is on medical therapy and diuretics.    His creatinine when he was in the hospital on presentation was roughly at his baseline then it fluctuated a little and post discharge is running around 2 and has been stable the last 2 checks.  I suspect it is really due to the events related to his cardiac issues.  It does not appear that he has significant intrinsic disease given the low proteinuria.  He has had very low levels of proteinuria.    His blood pressure was also elevated but there were adjustments in medications and an ARB was started and that potentially could be reducing glomerular pressure but it may be required for his systolic dysfunction.    I am going to check a BMP in a month to make sure things are stable will see if his creatinine improves slightly  Continue current medical therapy as he is compensated from a cardiac standpoint.  Follow-up with cardiology as scheduled.    Orders:    Basic metabolic panel; Future

## 2025-03-20 NOTE — PROGRESS NOTES
Name: Irineo Rawls      : 1945      MRN: [id number]  Encounter Provider: Mitch Garcia MD  Encounter Date: 3/19/2025   Encounter department: [facility]  :  Assessment & Plan  Chronic kidney disease, unspecified CKD stage  Lab Results   Component Value Date    EGFR 29 2025    EGFR 30 2025    EGFR 34 2025    CREATININE 2.09 (H) 2025    CREATININE 2.02 (H) 2025    CREATININE 1.80 (H) 2025     The patient is chronic kidney disease with insignificant proteinuria in the past.  He was discovered to have an atrophic kidney and slightly enlarged contralateral kidney which may indicate that it was atrophic for a while and there is maybe a little hyperfiltration.  There is no way to prove that but overall his creatinine had been running around 1.5.  He was hospitalized for cardiac issues was found to be in new onset atrial fibrillation.  At this point he is in sinus rhythm was found to have an LVEF of 47% and he is on medical therapy and diuretics.    His creatinine when he was in the hospital on presentation was roughly at his baseline then it fluctuated a little and post discharge is running around 2 and has been stable the last 2 checks.  I suspect it is really due to the events related to his cardiac issues.  It does not appear that he has significant intrinsic disease given the low proteinuria.  He has had very low levels of proteinuria.    His blood pressure was also elevated but there were adjustments in medications and an ARB was started and that potentially could be reducing glomerular pressure but it may be required for his systolic dysfunction.    I am going to check a BMP in a month to make sure things are stable will see if his creatinine improves slightly  Continue current medical therapy as he is compensated from a cardiac standpoint.  Follow-up with cardiology as scheduled.    Orders:    Basic metabolic panel; Future    Essential  hypertension    Patient has history of hypertension and it was well-controlled last visit he had been on nifedipine.  In the hospital this was discontinued and he had a beta-blocker and ARB added.  Blood pressures been running elevated and so he is recently been resumed on his nifedipine at a lower dose and has not started it yet because he did not receive it from the pharmacy.    I told him to monitor blood pressure and contact me if it does not come down once he resumes nifedipine  Continue other medications as they have cardiac indications and cardiology following.       I have spent a total time of 35 minutes in caring for this patient on the day of the visit/encounter including Diagnostic results, Patient and family education, Importance of tx compliance, Impressions, Reviewing/placing orders in the medical record (including tests, medications, and/or procedures), and Obtaining or reviewing history  .       History of Present Illness   HPI  Irineo Rawls is a 79 y.o. male who presents for follow-up.  The patient was actually experiencing chest pressure in early January he was admitted to the hospital found to be on new onset atrial fibrillation.  He said he had symptoms of shortness of breath which has resolved and swelling is improved as well in his legs.  He had some adjustments in his medications and he notices his blood pressure has been running significantly elevated at other physician's offices and at home.  He is here for follow-up regarding renal function and recommendations.  History obtained from: patient    Review of Systems   Constitutional:  Negative for chills, diaphoresis and fever.   Eyes: Negative.    Respiratory:  Negative for cough, chest tightness and shortness of breath.    Cardiovascular:  Negative for chest pain.        Occasionally a little ankle swelling in the end of the day.   Gastrointestinal:  Negative for abdominal pain, diarrhea, nausea and vomiting.   Genitourinary:  "Negative.    Neurological:  Negative for dizziness and light-headedness.   Psychiatric/Behavioral:  Negative for agitation, behavioral problems and confusion.           Objective   /90 (BP Location: Left arm, Patient Position: Sitting, Cuff Size: Standard)   Pulse 78   Ht 5' 7.5\" (1.715 m)   Wt 113 kg (250 lb)   BMI 38.58 kg/m²      Physical Exam  Constitutional:       General: He is not in acute distress.     Appearance: He is not toxic-appearing.   HENT:      Head: Normocephalic and atraumatic.      Nose: Nose normal.      Mouth/Throat:      Mouth: Mucous membranes are moist.   Eyes:      Extraocular Movements: Extraocular movements intact.   Cardiovascular:      Rate and Rhythm: Normal rate and regular rhythm.      Heart sounds:      No gallop.      Comments: No significant pitting edema.  Pulmonary:      Effort: Pulmonary effort is normal. No respiratory distress.      Breath sounds: No wheezing or rales.   Abdominal:      General: Bowel sounds are normal. There is no distension.      Palpations: Abdomen is soft.      Tenderness: There is no abdominal tenderness.   Neurological:      Mental Status: He is alert and oriented to person, place, and time.   Psychiatric:         Mood and Affect: Mood normal.         Behavior: Behavior normal.           "

## 2025-03-21 NOTE — TELEPHONE ENCOUNTER
Upon review of the In Basket request we were able to locate, review, and update the patient chart as requested for Diabetic Eye Exam.    Any additional questions or concerns should be emailed to the Practice Liaisons via the appropriate education email address, please do not reply via In Basket.    Thank you  Sherry Vee   [facility]

## 2025-03-25 ENCOUNTER — TELEPHONE (OUTPATIENT)
Age: 80
End: 2025-03-25

## 2025-03-25 NOTE — TELEPHONE ENCOUNTER
Caller: Patient     Doctor: Dr. Odell     Reason for call: Pt is scheduled for an urgent appt on 4/10 and would like to move this appt after he sees Dr. Wu on 4/29. Please call pt to r/s appt     Call back#: [phone]

## 2025-03-26 ENCOUNTER — HOSPITAL ENCOUNTER (OUTPATIENT)
Dept: NON INVASIVE DIAGNOSTICS | Facility: HOSPITAL | Age: 80
Discharge: HOME | End: 2025-03-26
Attending: INTERNAL MEDICINE
Payer: COMMERCIAL

## 2025-03-26 VITALS
BODY MASS INDEX: 37.89 KG/M2 | DIASTOLIC BLOOD PRESSURE: 90 MMHG | SYSTOLIC BLOOD PRESSURE: 162 MMHG | WEIGHT: 250 LBS | HEIGHT: 68 IN | HEART RATE: 78 BPM

## 2025-03-26 DIAGNOSIS — I50.22 CHRONIC HEART FAILURE WITH MILDLY REDUCED EJECTION FRACTION (HFMREF, 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): ICD-10-CM

## 2025-03-26 PROCEDURE — 93321 DOPPLER ECHO F-UP/LMTD STD: CPT

## 2025-03-26 PROCEDURE — 93325 DOPPLER ECHO COLOR FLOW MAPG: CPT

## 2025-03-26 PROCEDURE — 93308 TTE F-UP OR LMTD: CPT

## 2025-03-27 LAB
AORTIC VALVE MEAN VELOCITY: 11.1 M/S
AV AREA BY CONTINUOUS VTI: 2 CM2
AV AREA PEAK VELOCITY: 2 CM2
AV LVOT MEAN GRADIENT: 2 MMHG
AV LVOT PEAK GRADIENT: 3 MMHG
AV MEAN PRESS GRAD SYS DOP V1V2: 6 MMHG
AV ORIFICE AREA US: 2 CM2
AV PEAK GRADIENT: 10 MMHG
AV VELOCITY RATIO: 0.58
AV VMAX SYS DOP: 1.56 M/S
BSA FOR ECHO PROCEDURE: 2.24 M2
DOP CALC AO VTI: 38.72 CM
DOP CALC LVOT AREA: 3.46 CM2
DOP CALC LVOT CARDIAC INDEX: 1.87 L/MIN/M2
DOP CALC LVOT CARDIAC OUTPUT: 4.19 L/MIN
DOP CALC LVOT DIAMETER: 2.1 CM
DOP CALC LVOT PEAK VEL VTI: 22.42 CM
DOP CALC LVOT PEAK VEL: 0.89 M/S
DOP CALC LVOT STROKE INDEX: 34.8 ML/M2
DOP CALC LVOT STROKE VOLUME: 77.61
E WAVE DECELERATION TIME: 298 MS
E/A RATIO: 1.27
LEFT VENTRICLE DIASTOLIC VOLUME (MOD BIPLANE): 133 ML
LEFT VENTRICLE DIASTOLIC VOLUME INDEX (MOD BIPLANE): 59.4 ML/M2
LEFT VENTRICLE SYSTOLIC VOLUME (MOD BIPLANE): 56 ML
LEFT VENTRICLE SYSTOLIC VOLUME INDEX (MOD BIPLANE): 25 ML/M2
LV EF BIPLANE MOD: 58 %
LV EF US.2D.A4C+ESTIMATED: 59 %
MV E'TISSUE VEL-SEP: 6 CM/S
MV PEAK A VEL: 0.85 M/S
MV PEAK E VEL: 108 CM/S
MV STENOSIS PRESSURE HALF TIME: 87 MS
MV VALVE AREA P 1/2 METHOD: 2.53
SL CV LV EF: 53
TR MAX PG: 27 MMHG
TR PEAK VELOCITY: 2.6 M/S
TRICUSPID VALVE PEAK REGURGITATION VELOCITY: 2.59 M/S

## 2025-03-27 PROCEDURE — 93325 DOPPLER ECHO COLOR FLOW MAPG: CPT | Performed by: INTERNAL MEDICINE

## 2025-03-27 PROCEDURE — 93308 TTE F-UP OR LMTD: CPT | Performed by: INTERNAL MEDICINE

## 2025-03-27 PROCEDURE — 93321 DOPPLER ECHO F-UP/LMTD STD: CPT | Performed by: INTERNAL MEDICINE

## 2025-04-07 ENCOUNTER — APPOINTMENT (OUTPATIENT)
Dept: LAB | Facility: CLINIC | Age: 80
End: 2025-04-07
Payer: COMMERCIAL

## 2025-04-07 ENCOUNTER — RESULTS FOLLOW-UP (OUTPATIENT)
Dept: NEPHROLOGY | Facility: CLINIC | Age: 80
End: 2025-04-07

## 2025-04-07 DIAGNOSIS — N18.9 CHRONIC KIDNEY DISEASE, UNSPECIFIED CKD STAGE: ICD-10-CM

## 2025-04-07 LAB
ANION GAP SERPL CALCULATED.3IONS-SCNC: 12 MMOL/L (ref 4–13)
BUN SERPL-MCNC: 44 MG/DL (ref 5–25)
CALCIUM SERPL-MCNC: 10.1 MG/DL (ref 8.4–10.2)
CHLORIDE SERPL-SCNC: 102 MMOL/L (ref 96–108)
CO2 SERPL-SCNC: 24 MMOL/L (ref 21–32)
CREAT SERPL-MCNC: 1.87 MG/DL (ref 0.6–1.3)
GFR SERPL CREATININE-BSD FRML MDRD: 33 ML/MIN/1.73SQ M
GLUCOSE SERPL-MCNC: 120 MG/DL (ref 65–140)
POTASSIUM SERPL-SCNC: 4.4 MMOL/L (ref 3.5–5.3)
SODIUM SERPL-SCNC: 138 MMOL/L (ref 135–147)

## 2025-04-07 PROCEDURE — 36415 COLL VENOUS BLD VENIPUNCTURE: CPT

## 2025-04-07 PROCEDURE — 80048 BASIC METABOLIC PNL TOTAL CA: CPT

## 2025-04-07 NOTE — TELEPHONE ENCOUNTER
"Patient returning a call. I reviewed above message per provider. Patient verbalized understanding of all, and thanks provider.   He states he been taking lasix 20mg- 1 tab daily and would like to ensure that this is kidney safe for him to cont to take- states he's been doing a lot of reading that Lasix is  \"not the best\" for patients with CKD. Please advise, patient is requesting a call back. Thank you.   "

## 2025-04-07 NOTE — TELEPHONE ENCOUNTER
----- Message from Mitch Garcia MD sent at 4/7/2025  3:02 PM EDT -----  Please let him know his creatinine is 1.8 which is better than it was a month ago.

## 2025-04-07 NOTE — TELEPHONE ENCOUNTER
Called patient and left a voicemail stating the following information:    Patients creatinine is 1.8 which is better than it was a month ago.    I asked the patient please call back with further questions.

## 2025-04-08 NOTE — TELEPHONE ENCOUNTER
----- Message from Mitch Garcia MD sent at 4/8/2025  9:53 AM EDT -----  Yes it is safe to take if it is needed to keep fluid down.  It is not in itself bad for the kidneys suggestive for patient becomes dehydrated but he is not send no changes.  ----- Message -----  From: Starr Goode MA  Sent: 4/8/2025   7:32 AM EDT  To: Mitch Garcia MD    Please advise. Thank you.  ----- Message -----  From: Anjali Smith  Sent: 4/7/2025   3:49 PM EDT  To: Nephrology [facility]

## 2025-04-08 NOTE — TELEPHONE ENCOUNTER
Called patient and left a voicemail stating the following information:    Yes it is safe to take if it is needed to keep fluid down.  It is not in itself bad for the kidneys suggestive for patient becomes dehydrated but he is not send no changes.    I asked the patient please call back with further questions.

## 2025-04-29 ENCOUNTER — APPOINTMENT (OUTPATIENT)
Dept: LAB | Facility: CLINIC | Age: 80
End: 2025-04-29
Payer: COMMERCIAL

## 2025-04-29 ENCOUNTER — OFFICE VISIT (OUTPATIENT)
Dept: CARDIOLOGY CLINIC | Facility: CLINIC | Age: 80
End: 2025-04-29
Payer: COMMERCIAL

## 2025-04-29 VITALS
WEIGHT: 254.63 LBS | DIASTOLIC BLOOD PRESSURE: 72 MMHG | BODY MASS INDEX: 38.59 KG/M2 | HEIGHT: 68 IN | SYSTOLIC BLOOD PRESSURE: 156 MMHG | HEART RATE: 63 BPM

## 2025-04-29 DIAGNOSIS — I10 ESSENTIAL HYPERTENSION: ICD-10-CM

## 2025-04-29 DIAGNOSIS — Z79.899 AT RISK FOR AMIODARONE TOXICITY WITH LONG TERM USE: Primary | ICD-10-CM

## 2025-04-29 DIAGNOSIS — I50.21 ACUTE HEART FAILURE WITH MILDLY REDUCED EJECTION FRACTION (HFMREF, 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): ICD-10-CM

## 2025-04-29 DIAGNOSIS — Z91.89 AT RISK FOR AMIODARONE TOXICITY WITH LONG TERM USE: Primary | ICD-10-CM

## 2025-04-29 DIAGNOSIS — Z91.89 AT RISK FOR AMIODARONE TOXICITY WITH LONG TERM USE: ICD-10-CM

## 2025-04-29 DIAGNOSIS — I48.91 ATRIAL FIBRILLATION (HCC): ICD-10-CM

## 2025-04-29 DIAGNOSIS — G47.33 OSA (OBSTRUCTIVE SLEEP APNEA): ICD-10-CM

## 2025-04-29 DIAGNOSIS — I48.19 PERSISTENT ATRIAL FIBRILLATION (HCC): ICD-10-CM

## 2025-04-29 DIAGNOSIS — Z79.899 AT RISK FOR AMIODARONE TOXICITY WITH LONG TERM USE: ICD-10-CM

## 2025-04-29 LAB
ATRIAL RATE: 0 BPM
P AXIS: 0 DEGREES
QRS AXIS: 0 DEGREES
QRSD INTERVAL: 0 MS
QT INTERVAL: 0 MS
QTC INTERVAL: 0 MS
T WAVE AXIS: 0 DEGREES
T4 FREE SERPL-MCNC: 0.75 NG/DL (ref 0.61–1.12)
TSH SERPL DL<=0.05 MIU/L-ACNC: 4.72 UIU/ML (ref 0.45–4.5)
VENTRICULAR RATE: 0 BPM

## 2025-04-29 PROCEDURE — 36415 COLL VENOUS BLD VENIPUNCTURE: CPT

## 2025-04-29 PROCEDURE — 84439 ASSAY OF FREE THYROXINE: CPT

## 2025-04-29 PROCEDURE — 84443 ASSAY THYROID STIM HORMONE: CPT

## 2025-04-29 PROCEDURE — 99214 OFFICE O/P EST MOD 30 MIN: CPT | Performed by: STUDENT IN AN ORGANIZED HEALTH CARE EDUCATION/TRAINING PROGRAM

## 2025-04-29 PROCEDURE — 93000 ELECTROCARDIOGRAM COMPLETE: CPT | Performed by: STUDENT IN AN ORGANIZED HEALTH CARE EDUCATION/TRAINING PROGRAM

## 2025-04-29 NOTE — PROGRESS NOTES
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, 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)     Other Visit Diagnoses         At risk for amiodarone toxicity with long term use    -  Primary    Relevant Orders    TSH + Free T4    Complete PFT without post bronchodilator                    PLAN:  Persistent atrial fibrillation  -Continue amiodarone 200 mg daily  -Continue apixaban 2.5 mg twice daily (age greater than 80, CR 1.8)  -DCCV performed after which patient felt better with more energy  - Discussed options for management, amiodarone unfortunately the best option for AAD  - Patient does not want an ablation  - Repeating TSH with free T4  - LFT within normal limits in March 2025  - Ordered PFTs    Concerns for hypothyroidism  - Repeating TSH or free T4    Heart failure with mild reduced ejection fraction, mildly improved at EF 53%  -Continue metoprolol succinate 25 mg daily  -Continue losartan to 50 mg daily    Hypertension  -Blood pressure today's visit 156/72 mmHg  -Continue current meds  -will defer to PCP/Nephrologist for further management    KALIA  -Continue CPAP use    Obesity  -encouraged weight loss/exercise    Follow up in: 6 months    Orders Placed This Encounter   Procedures    TSH + Free T4    POCT ECG    Complete PFT without post bronchodilator     Medications Discontinued During This Encounter   Medication Reason    aspirin 81 MG tablet     glucose  blood (OneTouch Verio) test strip     Insulin Pen Needle (Unifine Pentips) 32G X 4 MM MISC            .............................................................................................    HPI/Subjective:     Mr. Irineo Rawls is an 79-year-old gentleman with a past medical history of persistent atrial fibrillation on Eliquis and metoprolol, heart failure with mild reduced ejection fraction EF 47%, hypertension, hyperlipidemia, KALIA, and CKD stage III.  His last EKG performed on February 17, 2025 revealed rate controlled atrial fibrillation.  Per EMR he is scheduled for cardioversion on February 17, 2025.    He was seen in outpatient EP clinic where he appeared to be completely asymptomatic when in A-fib.  He did appear to be symptomatic when hospitalized and his heart rates were in the low 100s, but patient states when his heart rates are better controlled he has no symptoms.  His wife noted that his insulin was discontinued in hospital due to low blood sugars, and since discontinuation he has seemed to feel much better.  The patient echo at this notion stating he feels great currently.  He is able to be more active, has more energy, and denies dyspnea on exertion, chest pain, syncope, near syncope.  He does note occasional feelings of palpitations, but states these are not bothersome.  He has a cardioversion scheduled for Wednesday, February 26, 2025, and has been loaded on amiodarone.    We discussed management options of atrial fibrillation.  To begin we discussed that management of A-fib is treatment not cure.  We discussed atrial fibrillation including the risk involved and ablation which include but are not limited to bleeding, bruising, hematomas, fistulas, bleeding into the abdomen, bleeding into the pelvis, cardiac perforation requiring a drain or surgical repair, stroke, heart attack, death.  We discussed antiarrhythmic management including continuing amiodarone, transition to dofetilide, and  continued rate control without attempts at rhythm control.    We discussed modifiable risk factors including weight management, obstructive sleep apnea management, and avoidance of alcohol.  He notes CPAP for sleep apnea and avoiding alcohol. HE is working to lose weight.     In the end, given that it is difficult to ascertain if the patient is symptomatic when in A-fib, it is difficult to decide on if rate control versus rhythm control is more appropriate.  Since he has a cardioversion scheduled for Wednesday, 2/26/2025, I have asked that he continue forward with this procedure.  If when cardioverted he feels even better than he currently does, it would make sense to be more aggressive with rhythm control therapy.  If however he does not feel any different in normal sinus rhythm, it warrants the question of if rhythm control is necessary.    Today presents in follow-up.   Post cardioversion echo reveals slightly improved EF to 53%.  Moderate diastolic dysfunction noted with grade 2 pseudo normal relaxation sided.  In terms of symptoms, initially patient noted no change in symptoms since cardioversion.  However, wife stated that since his cardioversion he has had more energy, is more active, and is more eager to do things such as chores around the house.    We discussed options for management in the future.  We discussed ablation to which the patient declined stating he does not want a procedure.  Due to the patient's kidney function and elevated creatinine, the use of dofetilide/sotalol would be suboptimal.  Given this, the decision was made to stay on amiodarone.    Prior TSH was elevated with no free T4 documented.  I will reorder TSH with free T4 for evaluation of thyroid function.  CMP performed on 3/11/2025 revealed normal liver function.  PFTs ordered.    His blood pressure still elevated 156/72.  I will defer to his PCP and nephrologist for further management.    Will see the patient in follow-up in 6  months.        Complete 12 point ROS reviewed and otherwise non pertinent or negative except as per HPI pertinent positives in Cardiovascular and Respiratory emphasized. Please see paper chart for outpatient clinic patients where the patient completed the 12 point ROS survey.           Past Medical History:   Diagnosis Date    Acute renal failure superimposed on stage 3 chronic kidney disease ([facility]) 02/25/2023    Acute renal failure superimposed on stage 3b chronic kidney disease, unspecified acute renal failure type ([facility]) 12/20/2012    MATILDE (acute kidney injury) ([facility])     Chronic kidney disease     CPAP (continuous positive airway pressure) dependence     Gout     Hearing aid worn     sabrina    Hernia, inguinal     RIH and umbilical hernia    Hypertension     Metabolic acidosis     Morbidly obese ([facility])     Rib fracture 01/05/2024    Sleep apnea     Type 2 diabetes mellitus with hyperglycemia, with long-term current use of insulin ([facility]) 01/29/2016    Wears glasses        No Known Allergies  I reviewed the Home Medication list and Allergies in the chart.   Scheduled Meds:  Current Outpatient Medications   Medication Sig Dispense Refill    allopurinol (ZYLOPRIM) 300 mg tablet Take 1 tablet (300 mg total) by mouth daily 90 tablet 1    amiodarone 200 mg tablet Take 1 tablet (200 mg total) by mouth daily 90 tablet 3    apixaban (Eliquis) 2.5 mg Take 1 tablet (2.5 mg total) by mouth 2 (two) times a day THIS IS A PRICE  CHECK 180 tablet 0    Blood Glucose Monitoring Suppl (OneTouch Verio Reflect) w/Device KIT May substitute brand based on insurance coverage. Check glucose ACHS. 1 kit 0    Cholecalciferol (VITAMIN D3) 5000 units TABS Take 1 tablet by mouth daily      furosemide (LASIX) 20 mg tablet Take 1 tablet (20 mg total) by mouth daily 90 tablet 3    glucose blood (OneTouch Verio) test strip Check glucose before meals and at bedtime. 400 strip 1    Lancets (OneTouch Delica Plus Frolwa28Z) MISC Check glucose before meals and  at bedtime 400 each 1    losartan (COZAAR) 50 mg tablet Take 1 tablet (50 mg total) by mouth daily 90 tablet 3    metoprolol succinate (TOPROL-XL) 25 mg 24 hr tablet Take 1 tablet (25 mg total) by mouth daily 90 tablet 3    Omega-3 Fatty Acids (FISH OIL) 1200 MG CAPS Take 1 capsule by mouth 3 (three) times a day      Ergocalciferol 50 MCG (2000 UT) TABS Take 2,000 Units by mouth daily (Patient not taking: Reported on 3/19/2025)      NIFEdipine (PROCARDIA XL) 30 mg 24 hr tablet Take 1 tablet (30 mg total) by mouth daily 90 tablet 3     No current facility-administered medications for this visit.     PRN Meds:.        Family History   Problem Relation Age of Onset    No Known Problems Mother     Diabetes Father     Diabetes Sister        Social History     Socioeconomic History    Marital status: /Civil Union     Spouse name: Not on file    Number of children: Not on file    Years of education: Not on file    Highest education level: Not on file   Occupational History    Not on file   Tobacco Use    Smoking status: Former     Current packs/day: 0.00     Types: Cigarettes     Quit date: 3/10/2003     Years since quittin.1    Smokeless tobacco: Never   Vaping Use    Vaping status: Never Used   Substance and Sexual Activity    Alcohol use: Never    Drug use: No    Sexual activity: Yes     Partners: Female   Other Topics Concern    Not on file   Social History Narrative    Always uses seat belt    Feels safe at home     Social Drivers of Health     Financial Resource Strain: Low Risk  (2025)    Received from [employer]    Financial Resource Strain     Do you have any trouble paying for your medications, or do you think you might in the future?: No     Does your family have trouble paying for medicine? (Household - for ages 0-17 years): Not on file   Food Insecurity: No Food Insecurity (2025)    Received from [employer]    Hunger Vital Sign     Worried About Running Out of Food in the Last Year: Never  true     Ran Out of Food in the Last Year: Never true   Transportation Needs: No Transportation Needs (1/13/2025)    Received from [employer]    Transportation Needs     Do you have trouble getting a ride to medical visits or work? (Adult - for ages 18 years and over): Not on file     Does your family have a hard time getting a ride to doctors’ visits? (Household - for ages 0-17 years): Not on file     Has lack of transportation kept you from medical appointments, meetings, work, or from getting things needed for daily living? Check all that apply.: No     Do you (or your family) have trouble finding or paying for a ride (transportation)? (Household - for ages 0-17 years): Not on file   Physical Activity: Not on file   Stress: Not on file   Social Connections: Socially Integrated (1/13/2025)    Received from [employer]    Social Connections     How often do you feel lonely or isolated from those around you?: Never   Intimate Partner Violence: Unknown (1/6/2025)    Nursing IPS     Feels Physically and Emotionally Safe: Not on file     Physically Hurt by Someone: Not on file     Humiliated or Emotionally Abused by Someone: Not on file     Physically Hurt by Someone: No     Hurt or Threatened by Someone: No   Housing Stability: Low Risk  (1/13/2025)    Received from [employer]    Housing Stability     Do you currently live in a shelter or have no steady place to sleep at night?: No     Do you think you are at risk of becoming homeless? (Adult - for ages 18 years and over): Not on file     Does your family worry about paying for your home or becoming homeless? (Household - for ages 0-17 years): Not on file     Are you homeless or worried that you might be in the future?: No     Are you (or your family) homeless or worried that you might be in the future? (Household - for ages 0-17 years): Not on file         OBJECTIVE:    /72 (BP Location: Left arm, Patient Position: Sitting, Cuff Size: Large)   Pulse 63   Ht 5'  "7.5\" (1.715 m)   Wt 115 kg (254 lb 10.1 oz)   BMI 39.29 kg/m²   Vitals:    04/29/25 0745   Weight: 115 kg (254 lb 10.1 oz)       GEN: No acute distress, Alert and oriented, well appearing  HEENT: Normocephalic, atraumatic  CARDIOVASCULAR: Regular rate/rhythm, No murmur, rub, gallops S1,S2  LUNGS: Clear To auscultation bilaterally, normal effort, no rales, rhonchi, crackles   ABDOMEN: Protuberant, soft, nontender  EXTREMITIES/VASCULAR:  No edema. warm an well perfused.  PSYCH: Normal Affect,  linear speech pattern without evidence of psychosis.   NEURO: Grossly intact, moving all extremiteis equal, face symmetric, alert and responsive, no obvious focal defecits   HEME: No bleeding, bruising, petechia, purpura   SKIN: No significant rashes on visibile skin, warm, no diaphoresis or pallor.     Lab Results:       LABS:      Chemistry        Component Value Date/Time    K 4.4 04/07/2025 0823    K 5.2 08/14/2021 0750     04/07/2025 0823     (H) 08/14/2021 0750    CO2 24 04/07/2025 0823    CO2 23 08/14/2021 0750    BUN 44 (H) 04/07/2025 0823    BUN 45 (H) 08/14/2021 0750    CREATININE 1.87 (H) 04/07/2025 0823    CREATININE 1.78 (H) 08/14/2021 0750        Component Value Date/Time    CALCIUM 10.1 04/07/2025 0823    CALCIUM 9.2 08/14/2021 0750    ALKPHOS 87 03/11/2025 0729    ALKPHOS 90 08/14/2021 0750    AST 25 03/11/2025 0729    AST 19 08/14/2021 0750    ALT 22 03/11/2025 0729    ALT 42 08/14/2021 0750            No results found for: \"CHOL\"  Lab Results   Component Value Date    HDL 31 (L) 03/11/2025    HDL 38 (L) 03/12/2024    HDL 27 (L) 02/24/2023     Lab Results   Component Value Date    LDLCALC 56 03/11/2025    LDLCALC 91 03/12/2024    LDLCALC 42 02/24/2023     Lab Results   Component Value Date    TRIG 305 (H) 03/11/2025    TRIG 177 (H) 03/12/2024    TRIG 283 (H) 02/24/2023     No results found for: \"CHOLHDL\"    IMAGING: No results found.     Cardiac testing:         I reviewed and interpreted the " following LABS/EKG/TELE/IMAGING and below is summary of my interpretation (if data available):    LABS:    Current EKG performed at today's visit read by me personally reveals sinus rhythm first-degree AV block    Echocardiogram performed on 3/26/2025    Left Ventricle: Left ventricular cavity size is normal. Wall thickness is mildly increased. The left ventricular ejection fraction is 53%. Systolic function is low normal. Wall motion is normal. Diastolic function is likely moderately abnormal, consistent with grade II (pseudonormal) relaxation.    Left Atrium: The atrium is mildly dilated.    Aortic Valve: There is aortic valve sclerosis.    Mitral Valve: There is mild to moderateannular calcification. There is mild to moderate regurgitation with an eccentrically directed jet.    Tricuspid Valve: There is mild regurgitation.     Paired to prior echocardiogram dated 1/7/2025, left ventricular systolic function appears mildly improved.    ECHO 1/7/25      Left Ventricle: Left ventricular cavity size is normal. Wall thickness is moderately increased. There is moderate concentric hypertrophy. There is concentric remodeling. The left ventricular ejection fraction is 47% by biplane measurement. Systolic function is mildly reduced. There is mild global hypokinesis.    Right Ventricle: Right ventricular cavity size is mildly dilated. Systolic function is mildly reduced. Abnormal tricuspid annular plane systolic excursion (TAPSE) = 1.7 cm.    Left Atrium: The atrium is moderately dilated (42-48 mL/m2).    Right Atrium: The atrium is mildly dilated.    Aortic Valve: The aortic valve is trileaflet.  The noncoronary cusp is heavily calcified but has well-preserved mobility. There is mild stenosis. The aortic valve peak velocity is 1.66 m/s. The aortic valve mean gradient is 7 mmHg. The dimensionless velocity index is 0.48. The aortic valve area is 1.59 cm2. The stroke volume index is 21.60 ml/m2.    Mitral Valve: There is  moderate annular calcification. There is mild regurgitation.    Tricuspid Valve: There is mild regurgitation. The right ventricular systolic pressure is mildly elevated. The estimated right ventricular systolic pressure is 48.00 mmHg.

## 2025-05-15 ENCOUNTER — OFFICE VISIT (OUTPATIENT)
Dept: CARDIOLOGY CLINIC | Facility: CLINIC | Age: 80
End: 2025-05-15
Payer: COMMERCIAL

## 2025-05-15 VITALS
WEIGHT: 251.4 LBS | HEART RATE: 60 BPM | DIASTOLIC BLOOD PRESSURE: 70 MMHG | SYSTOLIC BLOOD PRESSURE: 150 MMHG | BODY MASS INDEX: 38.1 KG/M2 | HEIGHT: 68 IN

## 2025-05-15 DIAGNOSIS — I10 ESSENTIAL HYPERTENSION: ICD-10-CM

## 2025-05-15 DIAGNOSIS — I35.0 NONRHEUMATIC AORTIC (VALVE) STENOSIS: ICD-10-CM

## 2025-05-15 DIAGNOSIS — E78.5 DYSLIPIDEMIA: ICD-10-CM

## 2025-05-15 DIAGNOSIS — I50.32 HEART FAILURE WITH IMPROVED EJECTION FRACTION (HFIMPEF) (HCC): Primary | ICD-10-CM

## 2025-05-15 PROCEDURE — 99214 OFFICE O/P EST MOD 30 MIN: CPT | Performed by: INTERNAL MEDICINE

## 2025-05-15 RX ORDER — NIFEDIPINE 60 MG/1
60 TABLET, EXTENDED RELEASE ORAL DAILY
Qty: 90 TABLET | Refills: 3 | Status: SHIPPED | OUTPATIENT
Start: 2025-05-15 | End: 2025-08-13

## 2025-05-15 NOTE — PROGRESS NOTES
Cardiology Office Note  MD Perez Roque MD, [facility]  Geoff Odell DO, [facility]  MD Jann Edwards DO, [facility]  Obi Carrion DO, [facility]  ----------------------------------------------------------------  [place] [address]    Irineo Rawls 79 y.o. male MRN: [id number]  Unit/Bed#:  Encounter: [id number]      History of Present Illness:  It was a pleasure to see Irineo Rawls in the office today for follow-up CV evaluation. He has a past medical history of atrial fibrillation, hypertension, chronic HFmrEF, dyslipidemia, type 2 diabetes mellitus, CKD and KALIA.  He establish care with us in January 2025.  In January 2025, patient presented with shortness of breath with chest discomfort and chest congestion.  Feeling like this may be some illness, the patient presented to the emergency department and was found to be in atrial fibrillation with mildly accelerated response.  LVEF was found to be 47%.  The patient was sent for DAVID guided cardioversion which was unsuccessful.  He was subsequently loaded on amiodarone and sent for repeat cardioversion.  Patient was also seen by electrophysiology with Dr. Wu and was recommended for follow-up post cardioversion.  Following cardioversion, the patient felt as though he could sleep again and was having better rest that he had in the past.  He was seen by and recommended to be on chronic amiodarone with routine screening for PFTs, TFTs and LFTs.    He feels to be in his usual state of health.  Denies any chest pain, pressure, tightness or squeezing.  Denies lightheadedness, dizziness or palpitations.  Denies lower extreme swelling orthopnea or paroxysmal nocturnal dyspnea.    Review of Systems:  Review of Systems   Constitutional: Negative for decreased appetite, fever, weight gain and weight loss.   HENT:  Negative for congestion and sore throat.    Eyes:  Negative for visual disturbance.    Cardiovascular:  Negative for chest pain, dyspnea on exertion, leg swelling, near-syncope and palpitations.   Respiratory:  Negative for cough and shortness of breath.    Hematologic/Lymphatic: Negative for bleeding problem.   Skin:  Negative for rash.   Musculoskeletal:  Negative for myalgias and neck pain.   Gastrointestinal:  Negative for abdominal pain and nausea.   Neurological:  Negative for light-headedness and weakness.   Psychiatric/Behavioral:  Negative for depression.        Past Medical History:   Diagnosis Date    Acute renal failure superimposed on stage 3 chronic kidney disease ([facility]) 02/25/2023    Acute renal failure superimposed on stage 3b chronic kidney disease, unspecified acute renal failure type ([facility]) 12/20/2012    MATILDE (acute kidney injury) ([facility])     Chronic kidney disease     CPAP (continuous positive airway pressure) dependence     Gout     Hearing aid worn     sabrina    Hernia, inguinal     RIH and umbilical hernia    Hypertension     Metabolic acidosis     Morbidly obese ([facility])     Rib fracture 01/05/2024    Sleep apnea     Type 2 diabetes mellitus with hyperglycemia, with long-term current use of insulin ([facility]) 01/29/2016    Wears glasses        Past Surgical History:   Procedure Laterality Date    CARPAL TUNNEL RELEASE Bilateral     COLONOSCOPY N/A 1/6/2017    Procedure: COLONOSCOPY;  Surgeon: João Perez MD;  Location: [facility];  Service:     CYST REMOVAL      FOOT SURGERY      HERNIA REPAIR      JOINT REPLACEMENT      knee left     [place] LAPAROSCOPY SURG RPR INITIAL INGUINAL HERNIA Right 3/16/2017    Procedure: REPAIR HERNIA INGUINAL, LAPAROSCOPIC WITH MESH;  Surgeon: Ortiz Salcedo MD;  Location: [facility];  Service: General    KY RPR UMBILICAL HRNA 5 YRS/> REDUCIBLE N/A 3/16/2017    Procedure: OPEN REPAIR HERNIA UMBILICAL;  Surgeon: Ortiz Salcedo MD;  Location: [facility];  Service: General    TONSILLECTOMY         Social History     Socioeconomic History    Marital status:  /Civil Union     Spouse name: None    Number of children: None    Years of education: None    Highest education level: None   Occupational History    None   Tobacco Use    Smoking status: Former     Current packs/day: 0.00     Types: Cigarettes     Quit date: 3/10/2003     Years since quittin.1    Smokeless tobacco: Never   Vaping Use    Vaping status: Never Used   Substance and Sexual Activity    Alcohol use: Never    Drug use: No    Sexual activity: Yes     Partners: Female   Other Topics Concern    None   Social History Narrative    Always uses seat belt    Feels safe at home     Social Drivers of Health     Financial Resource Strain: Low Risk  (2025)    Received from [facility]    Financial Resource Strain     Do you have any trouble paying for your medications, or do you think you might in the future?: No     Does your family have trouble paying for medicine? (Household - for ages 0-17 years): Not on file   Food Insecurity: No Food Insecurity (2025)    Received from [facility]    Food Insecurity     Within the past 12 months, you worried that your food would run out before you got the money to buy more.: Never true     Within the past 12 months, the food you bought just didn't last and you didn't have money to get more.: Never true     Do you need food for this week?: No   Transportation Needs: No Transportation Needs (2025)    Received from [facility]    Transportation Needs     Do you have trouble getting a ride to medical visits or work? (Adult - for ages 18 years and over): Not on file     Does your family have a hard time getting a ride to doctors’ visits? (Household - for ages 0-17 years): Not on file     Has lack of transportation kept you from medical appointments, meetings, work, or from getting things needed for daily living? Check all that apply.: No     Do you (or your family) have trouble finding or paying for a ride (transportation)? (Household - for ages 0-17 years): Not  on file   Physical Activity: Not on file   Stress: Not on file   Social Connections: Socially Integrated (1/13/2025)    Received from [employer]    Social [employer]     How often do you feel lonely or isolated from those around you?: Never   Intimate Partner Violence: Unknown (1/6/2025)    Nursing IPS     Feels Physically and Emotionally Safe: Not on file     Physically Hurt by Someone: Not on file     Humiliated or Emotionally Abused by Someone: Not on file     Physically Hurt by Someone: No     Hurt or Threatened by Someone: No   Housing Stability: Low Risk  (1/13/2025)    Received from [employer]    Housing Stability     Do you currently live in a shelter or have no steady place to sleep at night?: No     Do you think you are at risk of becoming homeless? (Adult - for ages 18 years and over): Not on file     Does your family worry about paying for your home or becoming homeless? (Household - for ages 0-17 years): Not on file     Are you homeless or worried that you might be in the future?: No     Are you (or your family) homeless or worried that you might be in the future? (Household - for ages 0-17 years): Not on file       Family History   Problem Relation Age of Onset    No Known Problems Mother     Diabetes Father     Diabetes Sister        No Known Allergies      Current Outpatient Medications:     allopurinol (ZYLOPRIM) 300 mg tablet, Take 1 tablet (300 mg total) by mouth daily, Disp: 90 tablet, Rfl: 1    amiodarone 200 mg tablet, Take 1 tablet (200 mg total) by mouth daily, Disp: 90 tablet, Rfl: 3    apixaban (Eliquis) 2.5 mg, Take 1 tablet (2.5 mg total) by mouth 2 (two) times a day, Disp: 180 tablet, Rfl: 1    Cholecalciferol (VITAMIN D3) 5000 units TABS, Take 1 tablet by mouth in the morning., Disp: , Rfl:     furosemide (LASIX) 20 mg tablet, Take 1 tablet (20 mg total) by mouth daily, Disp: 90 tablet, Rfl: 3    losartan (COZAAR) 50 mg tablet, Take 1 tablet (50 mg total) by mouth daily, Disp: 90 tablet,  "Rfl: 3    metoprolol succinate (TOPROL-XL) 25 mg 24 hr tablet, Take 1 tablet (25 mg total) by mouth daily, Disp: 90 tablet, Rfl: 3    NIFEdipine (PROCARDIA XL) 60 mg 24 hr tablet, Take 1 tablet (60 mg total) by mouth daily, Disp: 90 tablet, Rfl: 3    Omega-3 Fatty Acids (FISH OIL) 1200 MG CAPS, Take 1 capsule by mouth in the morning and 1 capsule in the evening and 1 capsule before bedtime., Disp: , Rfl:     Blood Glucose Monitoring Suppl (OneTouch Verio Reflect) w/Device KIT, May substitute brand based on insurance coverage. Check glucose ACHS., Disp: 1 kit, Rfl: 0    Ergocalciferol 50 MCG (2000 UT) TABS, Take 2,000 Units by mouth daily, Disp: , Rfl:     glucose blood (OneTouch Verio) test strip, Check glucose before meals and at bedtime., Disp: 400 strip, Rfl: 1    Lancets (OneTouch Delica Plus [id number]) MISC, Check glucose before meals and at bedtime, Disp: 400 each, Rfl: 1    Vitals:    05/15/25 1305   BP: 150/70   BP Location: Left arm   Patient Position: Sitting   Cuff Size: Large   Pulse: 60   Weight: 114 kg (251 lb 6.4 oz)   Height: 5' 7.5\" (1.715 m)       Body mass index is 38.79 kg/m².    PHYSICAL EXAMINATION:  Gen: Awake, Alert, NAD   Head/eyes: AT/NC, pupils equal and round, Anicteric  ENT: mmm  Neck: Supple, No elevated JVP, trachea midline  Resp: CTA bilaterally no w/r/r  CV: RRR +S1, S2, No m/r/g  Abd: Soft, NT/ND + BS  Ext: no LE edema bilaterally  Neuro: Follows commands, moves all extermities  Psych: Appropriate affect, happy mood, pleasant attitude, non-combative  Skin: warm; no rash, erythema or venous stasis changes on exposed skin    --------------------------------------------------------------------------------  TREADMILL STRESS  No results found for this or any previous visit.     --------------------------------------------------------------------------------  NUCLEAR STRESS TEST: No results found for this or any previous visit.    No results found for this or any previous " "visit.      --------------------------------------------------------------------------------  CATH:  No results found for this or any previous visit.    --------------------------------------------------------------------------------  ECHO:   LVEF 47%, moderate LVH, mild global hypokinesis, mild RV dilatation with mildly reduced function, moderate LA and mild RA dilatation, mild AS, moderate MAC, mild MR/TR with PASP 48 mmHg, January 2025  --------------------------------------------------------------------------------  HOLTER  No results found for this or any previous visit.    No results found for this or any previous visit.    --------------------------------------------------------------------------------  CAROTIDS  No results found for this or any previous visit.     --------------------------------------------------------------------------------  ECGs:  No results found for this visit on 05/15/25.       Lab Results   Component Value Date    WBC 6.88 03/11/2025    HGB 15.2 03/11/2025    HCT 45.5 03/11/2025     (H) 03/11/2025     (L) 03/11/2025      Lab Results   Component Value Date    SODIUM 138 04/07/2025    K 4.4 04/07/2025     04/07/2025    CO2 24 04/07/2025    BUN 44 (H) 04/07/2025    CREATININE 1.87 (H) 04/07/2025    GLUC 120 04/07/2025    CALCIUM 10.1 04/07/2025      Lab Results   Component Value Date    HGBA1C 6.1 (H) 03/11/2025      No results found for: \"CHOL\"  Lab Results   Component Value Date    HDL 31 (L) 03/11/2025    HDL 38 (L) 03/12/2024    HDL 27 (L) 02/24/2023     Lab Results   Component Value Date    LDLCALC 56 03/11/2025    LDLCALC 91 03/12/2024    LDLCALC 42 02/24/2023     Lab Results   Component Value Date    TRIG 305 (H) 03/11/2025    TRIG 177 (H) 03/12/2024    TRIG 283 (H) 02/24/2023     No results found for: \"CHOLHDL\"   Lab Results   Component Value Date    INR 1.12 01/06/2025    INR 1.10 01/06/2025    INR 1.08 01/06/2025    PROTIME 14.6 01/06/2025    PROTIME 14.4 " 01/06/2025    PROTIME 14.2 01/06/2025        1. Heart failure with improved ejection fraction (HFimpEF) ([facility])  2. Essential hypertension  -     NIFEdipine (PROCARDIA XL) 60 mg 24 hr tablet; Take 1 tablet (60 mg total) by mouth daily  3. Nonrheumatic aortic (valve) stenosis  4. Dyslipidemia    IMPRESSION:    Paroxysmal atrial fibrillation on Eliquis and amiodarone  s/p unsuccessful DAVID DCCV, January 2025  s/p successful cardioversion, February 2025  Hypertension  Chronic HFimpEF  LVEF 53%, mild LVH, grade 2 diastolic dysfunction, mild LA dilatation, AV sclerosis, mild to moderate MAC, mild to moderate MR, mild TR, March 2025  Mild aortic stenosis   w/ Vmax 1.66 m/s, DVI 0.48, ERWIN 1.59 cm2, SVi 21.6, January 2025  Hypertension  Dyslipidemia  Type 2 diabetes mellitus  Severe obesity  CKD stage III  KALIA  Thrombocytopenia    PLAN:  It was a pleasure to see Irineo in the office today for follow-up CV evaluation.  He is here today to review the results of his echocardiogram.  Echocardiogram shows recovered left ventricular systolic function with mild to moderate mitral valvular disease.  I shared with him this news.  He has no chest pain concerning for angina and no signs or symptoms of heart failure.  Clinically he examines to be euvolemic.  Blood pressure is elevated, but heart rate are currently stable.  He is tolerating his current medications without any reported adverse effects.  He can perform greater than 4 METS on daily basis without significant exertional symptoms.  Based on his clinical presentation, I have the following recommendations:    1.  Recommend increasing his nifedipine to 60 mg daily of the extended release formulation.  Would continue losartan 50 mg daily and Toprol-XL.  If the patient requires further antihypertensive control, would increase losartan to 100 mg daily.  2.  Would encourage 30 minutes a day, 5 days a week of moderate intensity activity to build cardiovascular endurance.  3.  Recommend  "a heart healthy diet low in sodium and carbohydrate.  4.  Recommend annual PFTs and routine CMP and TSH on chronic amiodarone  5.  Amiodarone for rhythm control  6.  Continue Eliquis for thromboembolic prophylaxis.  7.  He is otherwise up-to-date with CV testing.  8.  Will again broach the possibility of statin at our future encounter.  9.  We will follow-up with him in 6 months to reassess his progress.  Encouraged routine follow-up with Dr. Wu with EP.    As always, please do not hesitate to call if any questions.    Portions of the record may have been created with voice recognition software. Occasional wrong word or \"sound a like\" substitutions may have occurred due to the inherent limitations of voice recognition software. Read the chart carefully and recognize, using context, where substitutions have occurred.      Signed: Geoff Odell DO, FACC, FASE, FASNC, FACP  "

## 2025-06-02 ENCOUNTER — HOSPITAL ENCOUNTER (OUTPATIENT)
Dept: PULMONOLOGY | Facility: HOSPITAL | Age: 80
Discharge: HOME | End: 2025-06-02
Payer: COMMERCIAL

## 2025-06-02 DIAGNOSIS — Z79.899 AT RISK FOR AMIODARONE TOXICITY WITH LONG TERM USE: ICD-10-CM

## 2025-06-02 DIAGNOSIS — Z91.89 AT RISK FOR AMIODARONE TOXICITY WITH LONG TERM USE: ICD-10-CM

## 2025-06-02 DIAGNOSIS — I48.19 PERSISTENT ATRIAL FIBRILLATION (HCC): ICD-10-CM

## 2025-06-02 PROCEDURE — 94010 BREATHING CAPACITY TEST: CPT | Performed by: INTERNAL MEDICINE

## 2025-06-02 PROCEDURE — 94726 PLETHYSMOGRAPHY LUNG VOLUMES: CPT | Performed by: INTERNAL MEDICINE

## 2025-06-02 PROCEDURE — 94726 PLETHYSMOGRAPHY LUNG VOLUMES: CPT

## 2025-06-02 PROCEDURE — 94010 BREATHING CAPACITY TEST: CPT

## 2025-06-02 PROCEDURE — 94760 N-INVAS EAR/PLS OXIMETRY 1: CPT

## 2025-06-02 PROCEDURE — 94729 DIFFUSING CAPACITY: CPT | Performed by: INTERNAL MEDICINE

## 2025-06-02 PROCEDURE — 94729 DIFFUSING CAPACITY: CPT

## 2025-06-17 ENCOUNTER — APPOINTMENT (OUTPATIENT)
Dept: LAB | Facility: CLINIC | Age: 80
End: 2025-06-17
Payer: COMMERCIAL

## 2025-06-17 DIAGNOSIS — E03.9 ACQUIRED HYPOTHYROIDISM: ICD-10-CM

## 2025-06-17 LAB
T4 FREE SERPL-MCNC: 0.95 NG/DL (ref 0.61–1.12)
TSH SERPL DL<=0.05 MIU/L-ACNC: 7.46 UIU/ML (ref 0.45–4.5)

## 2025-06-17 PROCEDURE — 36415 COLL VENOUS BLD VENIPUNCTURE: CPT

## 2025-06-17 PROCEDURE — 84439 ASSAY OF FREE THYROXINE: CPT

## 2025-06-17 PROCEDURE — 84443 ASSAY THYROID STIM HORMONE: CPT

## 2025-06-18 ENCOUNTER — RESULTS FOLLOW-UP (OUTPATIENT)
Dept: FAMILY MEDICINE CLINIC | Facility: CLINIC | Age: 80
End: 2025-06-18

## 2025-06-18 DIAGNOSIS — E03.9 ACQUIRED HYPOTHYROIDISM: Primary | ICD-10-CM

## 2025-06-18 RX ORDER — LEVOTHYROXINE SODIUM 25 UG/1
25 TABLET ORAL
Qty: 30 TABLET | Refills: 2 | Status: SHIPPED | OUTPATIENT
Start: 2025-06-18 | End: 2025-06-19 | Stop reason: CLARIF

## 2025-06-19 ENCOUNTER — NURSE TRIAGE (OUTPATIENT)
Age: 80
End: 2025-06-19

## 2025-06-19 DIAGNOSIS — E03.9 ACQUIRED HYPOTHYROIDISM: ICD-10-CM

## 2025-06-19 RX ORDER — LEVOTHYROXINE SODIUM 25 UG/1
25 TABLET ORAL
Qty: 30 TABLET | Refills: 2 | Status: SHIPPED | OUTPATIENT
Start: 2025-06-19 | End: 2025-06-20 | Stop reason: SDUPTHER

## 2025-06-19 NOTE — TELEPHONE ENCOUNTER
"Wrong pharmacy     Reason for Disposition   Prescription prescribed recently is not at pharmacy and triager has access to patient's EMR and prescription is recorded in the EMR    Answer Assessment - Initial Assessment Questions  1. NAME of MEDICINE: \"What medicine(s) are you calling about?\"      levothyroxine 25 mcg tablet [[id number]]    2. QUESTION: \"What is your question?\" (e.g., double dose of medicine, side effect)      Wrong pharmacy   3. PRESCRIBER: \"Who prescribed the medicine?\" Reason: if prescribed by specialist, call should be referred to that group.      Authorizing Provider:  Obi Esquivel DO    Protocols used: Medication Question Call-Adult-[place]    "
Regarding: medicaion problem  ----- Message from Chula LOCKETT sent at 6/19/2025  2:03 PM EDT -----  Pt would like script for levothyroxine 25mcg [facility] [address] [facility]. Please advise, thank you!    
Fall at home

## 2025-06-20 DIAGNOSIS — E03.9 ACQUIRED HYPOTHYROIDISM: ICD-10-CM

## 2025-06-20 RX ORDER — LEVOTHYROXINE SODIUM 25 UG/1
25 TABLET ORAL
Qty: 90 TABLET | Refills: 2 | Status: SHIPPED | OUTPATIENT
Start: 2025-06-20

## (undated) DEVICE — SUT PROLENE 2-0 MO-6 30 IN 8417H

## (undated) DEVICE — CHLORAPREP HI-LITE 26ML ORANGE

## (undated) DEVICE — ENDOPATH XCEL BLADELESS TROCARS WITH STABILITY SLEEVES: Brand: ENDOPATH XCEL

## (undated) DEVICE — ENDOPATH XCEL UNIVERSAL TROCAR STABLILITY SLEEVES: Brand: ENDOPATH XCEL

## (undated) DEVICE — SUT VICRYL 0 UR-6 27 IN J603H

## (undated) DEVICE — BLUE HEAT SCOPE WARMER

## (undated) DEVICE — SCD SEQUENTIAL COMPRESSION COMFORT SLEEVE MEDIUM KNEE LENGTH: Brand: KENDALL SCD

## (undated) DEVICE — NEEDLE 25G X 1 1/2

## (undated) DEVICE — REM POLYHESIVE ADULT PATIENT RETURN ELECTRODE: Brand: VALLEYLAB

## (undated) DEVICE — ENDO STITCH DEVICE 10 MM

## (undated) DEVICE — DRAPE LAPAROTOMY W/POUCHES

## (undated) DEVICE — ALLENTOWN LAP CHOLE APP PACK: Brand: CARDINAL HEALTH

## (undated) DEVICE — GLOVE SRG BIOGEL ECLIPSE 7.5

## (undated) DEVICE — [HIGH FLOW INSUFFLATOR,  DO NOT USE IF PACKAGE IS DAMAGED,  KEEP DRY,  KEEP AWAY FROM SUNLIGHT,  PROTECT FROM HEAT AND RADIOACTIVE SOURCES.]: Brand: PNEUMOSURE

## (undated) DEVICE — FIXATION SECURE STRAP 5MM W/12 ABSORABLE STRAPS

## (undated) DEVICE — SUT SILK 2-0 SH 30 IN K833H

## (undated) DEVICE — SUT VICRYL 3-0 SH 27 IN J416H

## (undated) DEVICE — GLOVE INDICATOR PI UNDERGLOVE SZ 8 BLUE

## (undated) DEVICE — ADHESIVE SKN CLSR HISTOACRYL FLEX 0.5ML LF

## (undated) DEVICE — INTENDED FOR TISSUE SEPARATION, AND OTHER PROCEDURES THAT REQUIRE A SHARP SURGICAL BLADE TO PUNCTURE OR CUT.: Brand: BARD-PARKER SAFETY BLADES SIZE 11, STERILE

## (undated) DEVICE — TRAY FOLEY 16FR URIMETER SURESTEP

## (undated) DEVICE — PMI DISPOSABLE PUNCTURE CLOSURE DEVICE / SUTURE GRASPER: Brand: PMI

## (undated) DEVICE — AEM CORD

## (undated) DEVICE — SUT MONOCRYL 4-0 PS-2 27 IN Y426H

## (undated) DEVICE — ENDO STITCH 2-0 VICRYL

## (undated) DEVICE — INTENDED FOR TISSUE SEPARATION, AND OTHER PROCEDURES THAT REQUIRE A SHARP SURGICAL BLADE TO PUNCTURE OR CUT.: Brand: BARD-PARKER SAFETY BLADES SIZE 15, STERILE